# Patient Record
Sex: FEMALE | Race: BLACK OR AFRICAN AMERICAN | NOT HISPANIC OR LATINO | Employment: OTHER | URBAN - METROPOLITAN AREA
[De-identification: names, ages, dates, MRNs, and addresses within clinical notes are randomized per-mention and may not be internally consistent; named-entity substitution may affect disease eponyms.]

---

## 2019-05-08 ENCOUNTER — OFFICE VISIT (OUTPATIENT)
Dept: URGENT CARE | Facility: CLINIC | Age: 64
End: 2019-05-08
Payer: COMMERCIAL

## 2019-05-08 VITALS
HEART RATE: 95 BPM | WEIGHT: 210 LBS | RESPIRATION RATE: 16 BRPM | SYSTOLIC BLOOD PRESSURE: 125 MMHG | BODY MASS INDEX: 32.96 KG/M2 | TEMPERATURE: 99 F | DIASTOLIC BLOOD PRESSURE: 66 MMHG | OXYGEN SATURATION: 96 % | HEIGHT: 67 IN

## 2019-05-08 DIAGNOSIS — K59.00 CONSTIPATION, UNSPECIFIED CONSTIPATION TYPE: ICD-10-CM

## 2019-05-08 DIAGNOSIS — N39.0 URINARY TRACT INFECTION WITHOUT HEMATURIA, SITE UNSPECIFIED: ICD-10-CM

## 2019-05-08 DIAGNOSIS — R10.9 FLANK PAIN: Primary | ICD-10-CM

## 2019-05-08 DIAGNOSIS — M62.830 MUSCLE SPASM OF BACK: ICD-10-CM

## 2019-05-08 LAB
BILIRUB UR QL STRIP: POSITIVE
GLUCOSE UR QL STRIP: POSITIVE
KETONES UR QL STRIP: POSITIVE
LEUKOCYTE ESTERASE UR QL STRIP: POSITIVE
PH, POC UA: 7.5 (ref 5–8)
POC BLOOD, URINE: NEGATIVE
POC NITRATES, URINE: POSITIVE
PROT UR QL STRIP: POSITIVE
SP GR UR STRIP: 1.01 (ref 1–1.03)
UROBILINOGEN UR STRIP-ACNC: POSITIVE (ref 0.1–1.1)

## 2019-05-08 PROCEDURE — 99214 PR OFFICE/OUTPT VISIT, EST, LEVL IV, 30-39 MIN: ICD-10-PCS | Mod: 25,S$GLB,, | Performed by: EMERGENCY MEDICINE

## 2019-05-08 PROCEDURE — 81003 POCT URINALYSIS, DIPSTICK, AUTOMATED, W/O SCOPE: ICD-10-PCS | Mod: QW,S$GLB,, | Performed by: EMERGENCY MEDICINE

## 2019-05-08 PROCEDURE — 99214 OFFICE O/P EST MOD 30 MIN: CPT | Mod: 25,S$GLB,, | Performed by: EMERGENCY MEDICINE

## 2019-05-08 PROCEDURE — 81003 URINALYSIS AUTO W/O SCOPE: CPT | Mod: QW,S$GLB,, | Performed by: EMERGENCY MEDICINE

## 2019-05-08 RX ORDER — ALPRAZOLAM 1 MG/1
TABLET ORAL
Refills: 3 | COMMUNITY
Start: 2019-04-07 | End: 2021-06-08

## 2019-05-08 RX ORDER — SITAGLIPTIN 100 MG/1
TABLET, FILM COATED ORAL
Refills: 3 | COMMUNITY
Start: 2019-04-15 | End: 2019-08-14

## 2019-05-08 RX ORDER — LOSARTAN POTASSIUM AND HYDROCHLOROTHIAZIDE 25; 100 MG/1; MG/1
TABLET ORAL
Refills: 3 | COMMUNITY
Start: 2019-05-01 | End: 2021-05-17 | Stop reason: SDUPTHER

## 2019-05-08 RX ORDER — ATORVASTATIN CALCIUM 40 MG/1
TABLET, FILM COATED ORAL
Refills: 3 | COMMUNITY
Start: 2019-05-01 | End: 2019-08-14

## 2019-05-08 RX ORDER — CIPROFLOXACIN 500 MG/1
500 TABLET ORAL EVERY 12 HOURS
Qty: 14 TABLET | Refills: 0 | Status: SHIPPED | OUTPATIENT
Start: 2019-05-08 | End: 2019-05-15

## 2019-05-08 RX ORDER — CYCLOBENZAPRINE HCL 5 MG
5 TABLET ORAL 3 TIMES DAILY PRN
Qty: 15 TABLET | Refills: 0 | Status: SHIPPED | OUTPATIENT
Start: 2019-05-08 | End: 2019-05-18

## 2019-05-08 RX ORDER — DULAGLUTIDE 1.5 MG/.5ML
INJECTION, SOLUTION SUBCUTANEOUS
Refills: 11 | COMMUNITY
Start: 2019-04-29 | End: 2021-04-06 | Stop reason: SDUPTHER

## 2019-05-08 RX ORDER — PAROXETINE HYDROCHLORIDE 20 MG/1
TABLET, FILM COATED ORAL
Refills: 3 | COMMUNITY
Start: 2019-04-15 | End: 2021-06-08

## 2019-05-08 RX ORDER — DICYCLOMINE HYDROCHLORIDE 20 MG/1
20 TABLET ORAL 3 TIMES DAILY
Qty: 21 TABLET | Refills: 0 | Status: SHIPPED | OUTPATIENT
Start: 2019-05-08 | End: 2019-05-15

## 2019-05-08 NOTE — PATIENT INSTRUCTIONS
Ciprofloxacin prescription.  Seven day course  Bentyl prescription for abdominal pain and cramping  Flexeril prescription for muscle spasm of the back  Important to keep the stool soft and regular.  1.  Drink plenty of fluids and take in plenty of greens and fiber  2.  MiraLax 1 big scoop full twice daily until having 1-2 loose stools per day  3.  Dulcolax suppository as needed to relieve lower constipation or blockage by stool    It is very important to go to the emergency department if you are having worsening pain despite treatment, fevers, persistent nausea or vomiting, blood in the stool, black tarry stools, severe pain that persists, inability to have a bowel movement, inability to pas gas, or if not improved with the above regimen.    Be sure to follow up with her primary care physician  Return for urgent care needs.         Back Spasm (No Trauma)    Spasm of the back muscles can occur after a sudden forceful twisting or bending force (such as in a car accident), after a simple awkward movement, or after lifting something heavy with poor body positioning. In any case, muscle spasm adds to the pain. Sleeping in an awkward position or on a poor quality mattress can also cause this. Some people respond to emotional stress by tensing the muscles of their back.  Pain that continues may need further evaluation or other types of treatment such as physical therapy.  You don't always need X-rays for the initial evaluation of back pain, unless you had a physical injury such as from a car accident or fall. If your pain continues and doesn't respond to medical treatment, X-rays and other tests may then be done.   Home care  · As soon as possible, start sitting or walking again to avoid problems from prolonged bed rest (muscle weakness, worsening back stiffness and pain, blood clots in the legs).  · When in bed, try to find a position of comfort. A firm mattress is best. Try lying flat on your back with pillows under your  knees. You can also try lying on your side with your knees bent up toward your chest and a pillow between your knees.  · Avoid prolonged sitting, long car rides, or travel. This puts more stress on the lower back than standing or walking.   · During the first 24 to 72 hours after an injury or flare-up, apply an ice pack to the painful area for 20 minutes, then remove it for 20 minutes. Do this over a period of 60 to 90 minutes or several times a day. This will reduce swelling and pain. Always wrap ice packs in a thin towel.  · You can start with ice, then switch to heat. Heat (hot shower, hot bath, or heating pad) reduces pain, and works well for muscle spasms. Apply heat to the painful area for 20 minutes, then remove it for 20 minutes. Do this over a period of 60 to 90 minutes or several times a day. Do not sleep on a heating pad as it can burn or damage skin.  · Alternate ice and heat therapies.  · Be aware of safe lifting methods and do not lift anything over 15 pounds until all the pain is gone.  Gentle stretching will help your back heal faster. Do this simple routine 2 to 3 times a day until your back is feeling better.  · Lie on your back with your knees bent and both feet on the ground  · Slowly raise your left knee to your chest as you flatten your lower back against the floor. Hold for 20 to 30 seconds.  · Relax and repeat the exercise with your right knee.  · Do 2 to 3 of these exercises for each leg.  · Repeat, hugging both knees to your chest at the same time.  · Do not bounce, but use a gentle pull.  Medicines  Talk to your doctor before using medicine, especially if you have other medical problems or are taking other medicines.  You may use acetaminophen or ibuprofen to control pain, unless your healthcare provider prescribed another pain medicine. If you have a chronic condition such as diabetes, liver or kidney disease, stomach ulcer, or gastrointestinal bleeding, or are taking blood thinners, talk  with your healthcare provider before taking any medicines.  Be careful if you are given prescription pain medicine, narcotics, or medicine for muscle spasm. They can cause drowsiness, affect your coordination, reflexes, or judgment. Do not drive or operate heavy machinery when taking these medicines. Take pain medicine only as prescribed by your healthcare provider.  Follow-up care  Follow up with your doctor, or as advised. Physical therapy or further tests may be needed.  If X-rays were taken, they may be reviewed by a radiologist. You will be notified of any new findings that may affect your care.  Call 911  Seek emergency medical care if any of these occur:  · Trouble breathing  · Confusion  · Drowsiness or trouble awakening  · Fainting or loss of consciousness  · Rapid or very slow heart rate  · Loss of bowel or bladder control  When to seek medical advice  Call your healthcare provider right away if any of these occur:  · Pain becomes worse or spreads to your legs  · Weakness or numbness in one or both legs  · Numbness in the groin or genital area  · Unexplained fever over 100.4ºF (38.0ºC)  · Burning or pain when passing urine  Date Last Reviewed: 6/1/2016  © 1141-1648 [employer]. [address] [place] [address]. All rights reserved. This information is not intended as a substitute for professional medical care. Always follow your healthcare professional's instructions.        Constipation (Adult)  Constipation means that you have bowel movements that are less frequent than usual. Stools often become very hard and difficult to pass.  Constipation is very common. At some point in life it affects almost everyone. Since everyone's bowel habits are different, what is constipation to one person may not be to another. Your healthcare provider may do tests to diagnose constipation. It depends on what he or she finds when evaluating you.    Symptoms of constipation include:  · Abdominal  pain  · Bloating  · Vomiting  · Painful bowel movements  · Itching, swelling, bleeding, or pain around the anus  Causes  Constipation can have many causes. These include:  · Diet low in fiber  · Too much dairy  · Not drinking enough liquids  · Lack of exercise or physical activity. This is especially true for older adults.  · Changes in lifestyle or daily routine, including pregnancy, aging, work, and travel  · Frequent use or misuse of laxatives  · Ignoring the urge to have a bowel movement or delaying it until later  · Medicines, such as certain prescription pain medicines, iron supplements, antacids, certain antidepressants, and calcium supplements  · Diseases like irritable bowel syndrome, bowel obstructions, stroke, diabetes, thyroid disease, Parkinson disease, hemorrhoids, and colon cancer  Complications  Potential complications of constipation can include:  · Hemorrhoids  · Rectal bleeding from hemorrhoids or anal fissures (skin tears)  · Hernias  · Dependency on laxatives  · Chronic constipation  · Fecal impaction  · Bowel obstruction or perforation  Home care  All treatment should be done after talking with your healthcare provider. This is especially true if you have another medical problems, are taking prescription medicines, or are an older adult. Treatment most often involves lifestyle changes. You may also need medicines. Your healthcare provider will tell you which will work best for you. Follow the advice below to help avoid this problem in the future.  Lifestyle changes  These lifestyle changes can help prevent constipation:  · Diet. Eat a high-fiber diet, with fresh fruit and vegetables, and reduce dairy intake, meats, and processed foods  · Fluids. It's important to get enough fluids each day. Drink plenty of water when you eat more fiber. If you are on diet that limits the amount of fluid you can have, talk about this with your healthcare provider.  · Regular exercise. Check with your healthcare  provider first.  Medications  Take any medicines as directed. Some laxatives are safe to use only every now and then. Others can be taken on a regular basis. Talk with your doctor or pharmacist if you have questions.  Prescription pain medicines can cause constipation. If you are taking this kind of medicine, ask your healthcare provider if you should also take a stool softener.  Medicines you may take to treat constipation include:  · Fiber supplements  · Stool softeners  · Laxatives  · Enemas  · Rectal suppositories  Follow-up care  Follow up with your healthcare provider if symptoms don't get better in the next few days. You may need to have more tests or see a specialist.  Call 911  Call 911 if any of these occur:  · Trouble breathing  · Stiff, rigid abdomen that is severely painful to touch  · Confusion  · Fainting or loss of consciousness  · Rapid heart rate  · Chest pain  When to seek medical advice  Call your healthcare provider right away if any of these occur:  · Fever over 100.4°F (38°C)  · Failure to resume normal bowel movements  · Pain in your abdomen or back gets worse  · Nausea or vomiting  · Swelling in your abdomen  · Blood in the stool  · Black, tarry stool  · Involuntary weight loss  · Weakness  Date Last Reviewed: 12/30/2015  © 3451-4834 [employer]. [address] [place] [address], [place] [address]. All rights reserved. This information is not intended as a substitute for professional medical care. Always follow your healthcare professional's instructions.        Flank Pain, Uncertain Cause  The flank is the area between your upper abdomen and your back. Pain there is often caused by a problem with your kidneys. It might be a kidney infection or a kidney stone. Other causes of flank pain include spinal arthritis, a pinched nerve from a back injury, or a back muscle strain or spasm.  The cause of your flank pain is not certain. You may need other tests.  Home care  Follow these tips when  caring for yourself at home:  · You may use acetaminophen or ibuprofen to control pain, unless your health care provider prescribed another medicine. If you have chronic liver or kidney disease, talk with your provider before taking these medicines. Also talk with your provider first if youve ever had a stomach ulcer or GI bleeding.  · If the pain is coming from your muscles, you may get relief with ice or heat. During the first 2 days after the injury, put an ice pack on the painful area for 20 minutes every 2 to 4 hours. This will reduce swelling and pain. A hot shower, hot bath, or heating pad works well for a muscle spasm. You can start with ice, then switch to heat after 2 days. You might find that alternating ice and heat works well. Use the method that feels the best to you.  Follow-up care  Follow up with your healthcare provider if your symptoms dont get better over the next few days.  When to seek medical advice  Call your healthcare provider right away if any of these happen:  · Repeated vomiting  · Fever of 100.4ºF (38ºC) or higher, or as directed by your health care provider  · Flank pain that gets worse  · Pain that spreads to the front of your belly (abdomen)  · Dizziness, weakness, or fainting  · Blood in your urine  · Burning feeling when you urinate or the need to urinate often  · Pain in one of your legs that gets worse  · Numbness or weakness in a leg  Date Last Reviewed: 10/1/2016  © 7667-0202 [employer]. [address] [place] [address]. All rights reserved. This information is not intended as a substitute for professional medical care. Always follow your healthcare professional's instructions.        Urinary Tract Infections in Women    Urinary tract infections (UTIs) are most often caused by bacteria (germs). These bacteria enter the urinary tract. The bacteria may come from outside the body. Or they may travel from the skin outside the rectum or vagina into the  urethra. Female anatomy makes it easy for bacteria from the bowel to enter a womans urinary tract, which is the most common source of UTI. This means women develop UTIs more often than men. Pain in or around the urinary tract is a common UTI symptom. But the only way to know for sure if you have a UTI for the healthcare provider to test your urine. The two tests that may be done are the urinalysis and urine culture.  Types of UTIs  · Cystitis: A bladder infection (cystitis) is the most common UTI in women. You may have urgent or frequent urination. You may also have pain, burning when you urinate, and bloody urine.  · Urethritis: This is an inflamed urethra, which is the tube that carries urine from the bladder to outside the body. You may have lower stomach or back pain. You may also have urgent or frequent urination.  · Pyelonephritis: This is a kidney infection. If not treated, it can be serious and damage your kidneys. In severe cases, you may be hospitalized. You may have a fever and lower back pain.  Medicines to treat a UTI  Most UTIs are treated with antibiotics. These kill the bacteria. The length of time you need to take them depends on the type of infection. It may be as short as 3 days. If you have repeated UTIs, a low-dose antibiotic may be needed for several months. Take antibiotics exactly as directed. Dont stop taking them until all of the medicine is gone. If you stop taking the antibiotic too soon, the infection may not go away, and you may develop a resistance to the antibiotic. This can make it much harder to treat.  Lifestyle changes to treat and prevent UTIs  The lifestyle changes below will help get rid of your UTI. They may also help prevent future UTIs.  · Drink plenty of fluids. This includes water, juice, or other caffeine-free drinks. Fluids help flush bacteria out of your body.  · Empty your bladder. Always empty your bladder when you feel the urge to urinate. And always urinate before  going to sleep. Urine that stays in your bladder can lead to infection. Try to urinate before and after sex as well.  · Practice good personal hygiene. Wipe yourself from front to back after using the toilet. This helps keep bacteria from getting into the urethra.  · Use condoms during sex. These help prevent UTIs caused by sexually transmitted bacteria. Also, avoid using spermicides during sex. These can increase the risk of UTIs. Choose other forms of birth control instead. For women who tend to get UTIs after sex, a low-dose of a preventive antibiotic may be used. Be sure to discuss this option with your healthcare provider.  · Follow up with your healthcare provider as directed. He or she may test to make sure the infection has cleared. If needed, more treatment may be started.  Date Last Reviewed: 1/1/2017  © 7450-6885 [employer] [address] [place] [address]. All rights reserved. This information is not intended as a substitute for professional medical care. Always follow your healthcare professional's instructions.

## 2019-05-08 NOTE — PROGRESS NOTES
"Subjective:       Patient ID: Dee Hamilton is a 63 y.o. female.    Vitals:    05/08/19 1024   BP: 125/66   Pulse: 95   Resp: 16   Temp: 98.6 °F (37 °C)   TempSrc: Oral   SpO2: 96%   Weight: 95.3 kg (210 lb)   Height: 5' 7" (1.702 m)       Chief Complaint: Flank Pain    Pt states Monday onset lower back pain/spasms. Tuesday more frequent urination. Pt states today left flank pain. Patient states that she has had dysuria and frequent urination with incomplete voiding and low volumes.  She states that this was very much improved with the azo that she took however she developed some bloating, left-sided flank pain, back spasms intermittently not associated with the other pains that she is having.  She states that she always suffers with constipation and that recently she has been having trouble getting stool to pass easily.  She states small caliber and even hard small nuggets of stool without a normal bowel movement in quite some time.  She states that she suffers with chronic constipation.  She has not had any melena or bright red blood per rectum or any nausea or vomiting.  She states that she has increased gas however no problems passing it.  She also states that she has a remote history of diverticulitis.  She has no fever and no specific abdominal tenderness only the left side and flank.    Flank Pain   This is a new problem. The current episode started in the past 7 days. The problem occurs intermittently. The problem has been gradually worsening since onset. The pain does not radiate. The pain is at a severity of 9/10. The pain is severe. The pain is the same all the time. The symptoms are aggravated by sitting. Pertinent negatives include no abdominal pain, dysuria or fever. Treatments tried: uristat. The treatment provided mild relief.     Review of Systems   Constitution: Negative for chills and fever.   Skin: Negative for itching.   Musculoskeletal: Negative for back pain.   Gastrointestinal: Negative " for abdominal pain, nausea and vomiting.   Genitourinary: Positive for flank pain, frequency and urgency. Negative for dysuria, genital sores, hematuria, missed menses and non-menstrual bleeding.       Objective:      Physical Exam   Constitutional: She is oriented to person, place, and time. Vital signs are normal. She appears well-developed and well-nourished. She is active and cooperative. No distress.   HENT:   Head: Normocephalic and atraumatic.   Nose: No nasal deformity. No epistaxis.   Mouth/Throat: Mucous membranes are normal.   Eyes: Conjunctivae and lids are normal.   Neck: Trachea normal, normal range of motion, full passive range of motion without pain and phonation normal. Neck supple.   Cardiovascular: Normal rate, regular rhythm, normal heart sounds, intact distal pulses and normal pulses.   Pulmonary/Chest: Effort normal and breath sounds normal.   Abdominal: Soft. Normal appearance and bowel sounds are normal. She exhibits distension (Soft, reportedly bloated, bowel sounds normal). She exhibits no abdominal bruit and no pulsatile midline mass. There is no tenderness. There is no rebound, no guarding and no CVA tenderness.   Patient has no abdominal tenderness however does have left-sided flank pain and some left-sided lumbar paraspinous muscle pain as well.  No particular CVA tenderness.   Musculoskeletal: Normal range of motion. She exhibits no edema or deformity.   Neurological: She is alert and oriented to person, place, and time. She has normal strength and normal reflexes. No sensory deficit.   Skin: Skin is warm, dry and intact. No rash noted. She is not diaphoretic.   Psychiatric: She has a normal mood and affect. Her speech is normal and behavior is normal. Cognition and memory are normal.   Nursing note and vitals reviewed.        Office Visit on 05/08/2019   Component Date Value Ref Range Status    POC Blood, Urine 05/08/2019 Negative  Negative Final    POC Bilirubin, Urine 05/08/2019  Positive* Negative Final    1.0 mg/dL    POC Urobilinogen, Urine 05/08/2019 Positive  0.1 - 1.1 Final    4.0 mg/dL    POC Ketones, Urine 05/08/2019 Positive* Negative Final    5 mg/dL    POC Protein, Urine 05/08/2019 Positive* Negative Final    10 mg/dL    POC Nitrates, Urine 05/08/2019 Positive* Negative Final    POC Glucose, Urine 05/08/2019 Positive* Negative Final    100 mg/dL    pH, UA 05/08/2019 7.5  5 - 8 Final    POC Specific Gravity, Urine 05/08/2019 1.015  1.003 - 1.029 Final    POC Leukocytes, Urine 05/08/2019 Positive* Negative Final    25 WBC/uL     UA contaminated from taking azo however with very symptomatic in typical symptoms of a UTI prior to developing the flank pain, will cover with ciprofloxacin for 1 week, concomitant only treating constipation and abdominal bloating.  The patient has been given very strict ER precautions due to the fact that she has many things contributing to her flank pain at this time.  See discharge instructions for particular ER precautions and she does have a primary care physician to follow up with.    Assessment:       1. Flank pain    2. Urinary tract infection without hematuria, site unspecified    3. Constipation, unspecified constipation type    4. Muscle spasm of back        Plan:       Dee was seen today for flank pain.    Diagnoses and all orders for this visit:    Flank pain  -     POCT Urinalysis, Dipstick, Automated, W/O Scope    Urinary tract infection without hematuria, site unspecified    Constipation, unspecified constipation type    Muscle spasm of back    Other orders  -     ciprofloxacin HCl (CIPRO) 500 MG tablet; Take 1 tablet (500 mg total) by mouth every 12 (twelve) hours. for 7 days  -     cyclobenzaprine (FLEXERIL) 5 MG tablet; Take 1 tablet (5 mg total) by mouth 3 (three) times daily as needed for Muscle spasms.  -     dicyclomine (BENTYL) 20 mg tablet; Take 1 tablet (20 mg total) by mouth 3 (three) times daily. for 7 days           Patient Instructions   Ciprofloxacin prescription.  Seven day course  Bentyl prescription for abdominal pain and cramping  Flexeril prescription for muscle spasm of the back  Important to keep the stool soft and regular.  1.  Drink plenty of fluids and take in plenty of greens and fiber  2.  MiraLax 1 big scoop full twice daily until having 1-2 loose stools per day  3.  Dulcolax suppository as needed to relieve lower constipation or blockage by stool    It is very important to go to the emergency department if you are having worsening pain despite treatment, fevers, persistent nausea or vomiting, blood in the stool, black tarry stools, severe pain that persists, inability to have a bowel movement, inability to pas gas, or if not improved with the above regimen.    Be sure to follow up with her primary care physician  Return for urgent care needs.         Back Spasm (No Trauma)    Spasm of the back muscles can occur after a sudden forceful twisting or bending force (such as in a car accident), after a simple awkward movement, or after lifting something heavy with poor body positioning. In any case, muscle spasm adds to the pain. Sleeping in an awkward position or on a poor quality mattress can also cause this. Some people respond to emotional stress by tensing the muscles of their back.  Pain that continues may need further evaluation or other types of treatment such as physical therapy.  You don't always need X-rays for the initial evaluation of back pain, unless you had a physical injury such as from a car accident or fall. If your pain continues and doesn't respond to medical treatment, X-rays and other tests may then be done.   Home care  · As soon as possible, start sitting or walking again to avoid problems from prolonged bed rest (muscle weakness, worsening back stiffness and pain, blood clots in the legs).  · When in bed, try to find a position of comfort. A firm mattress is best. Try lying flat on your  back with pillows under your knees. You can also try lying on your side with your knees bent up toward your chest and a pillow between your knees.  · Avoid prolonged sitting, long car rides, or travel. This puts more stress on the lower back than standing or walking.   · During the first 24 to 72 hours after an injury or flare-up, apply an ice pack to the painful area for 20 minutes, then remove it for 20 minutes. Do this over a period of 60 to 90 minutes or several times a day. This will reduce swelling and pain. Always wrap ice packs in a thin towel.  · You can start with ice, then switch to heat. Heat (hot shower, hot bath, or heating pad) reduces pain, and works well for muscle spasms. Apply heat to the painful area for 20 minutes, then remove it for 20 minutes. Do this over a period of 60 to 90 minutes or several times a day. Do not sleep on a heating pad as it can burn or damage skin.  · Alternate ice and heat therapies.  · Be aware of safe lifting methods and do not lift anything over 15 pounds until all the pain is gone.  Gentle stretching will help your back heal faster. Do this simple routine 2 to 3 times a day until your back is feeling better.  · Lie on your back with your knees bent and both feet on the ground  · Slowly raise your left knee to your chest as you flatten your lower back against the floor. Hold for 20 to 30 seconds.  · Relax and repeat the exercise with your right knee.  · Do 2 to 3 of these exercises for each leg.  · Repeat, hugging both knees to your chest at the same time.  · Do not bounce, but use a gentle pull.  Medicines  Talk to your doctor before using medicine, especially if you have other medical problems or are taking other medicines.  You may use acetaminophen or ibuprofen to control pain, unless your healthcare provider prescribed another pain medicine. If you have a chronic condition such as diabetes, liver or kidney disease, stomach ulcer, or gastrointestinal bleeding, or  are taking blood thinners, talk with your healthcare provider before taking any medicines.  Be careful if you are given prescription pain medicine, narcotics, or medicine for muscle spasm. They can cause drowsiness, affect your coordination, reflexes, or judgment. Do not drive or operate heavy machinery when taking these medicines. Take pain medicine only as prescribed by your healthcare provider.  Follow-up care  Follow up with your doctor, or as advised. Physical therapy or further tests may be needed.  If X-rays were taken, they may be reviewed by a radiologist. You will be notified of any new findings that may affect your care.  Call 911  Seek emergency medical care if any of these occur:  · Trouble breathing  · Confusion  · Drowsiness or trouble awakening  · Fainting or loss of consciousness  · Rapid or very slow heart rate  · Loss of bowel or bladder control  When to seek medical advice  Call your healthcare provider right away if any of these occur:  · Pain becomes worse or spreads to your legs  · Weakness or numbness in one or both legs  · Numbness in the groin or genital area  · Unexplained fever over 100.4ºF (38.0ºC)  · Burning or pain when passing urine  Date Last Reviewed: 6/1/2016  © 6763-8115 [employer]. [address] [place] [address], [place] [address]. All rights reserved. This information is not intended as a substitute for professional medical care. Always follow your healthcare professional's instructions.        Constipation (Adult)  Constipation means that you have bowel movements that are less frequent than usual. Stools often become very hard and difficult to pass.  Constipation is very common. At some point in life it affects almost everyone. Since everyone's bowel habits are different, what is constipation to one person may not be to another. Your healthcare provider may do tests to diagnose constipation. It depends on what he or she finds when evaluating you.    Symptoms of  constipation include:  · Abdominal pain  · Bloating  · Vomiting  · Painful bowel movements  · Itching, swelling, bleeding, or pain around the anus  Causes  Constipation can have many causes. These include:  · Diet low in fiber  · Too much dairy  · Not drinking enough liquids  · Lack of exercise or physical activity. This is especially true for older adults.  · Changes in lifestyle or daily routine, including pregnancy, aging, work, and travel  · Frequent use or misuse of laxatives  · Ignoring the urge to have a bowel movement or delaying it until later  · Medicines, such as certain prescription pain medicines, iron supplements, antacids, certain antidepressants, and calcium supplements  · Diseases like irritable bowel syndrome, bowel obstructions, stroke, diabetes, thyroid disease, Parkinson disease, hemorrhoids, and colon cancer  Complications  Potential complications of constipation can include:  · Hemorrhoids  · Rectal bleeding from hemorrhoids or anal fissures (skin tears)  · Hernias  · Dependency on laxatives  · Chronic constipation  · Fecal impaction  · Bowel obstruction or perforation  Home care  All treatment should be done after talking with your healthcare provider. This is especially true if you have another medical problems, are taking prescription medicines, or are an older adult. Treatment most often involves lifestyle changes. You may also need medicines. Your healthcare provider will tell you which will work best for you. Follow the advice below to help avoid this problem in the future.  Lifestyle changes  These lifestyle changes can help prevent constipation:  · Diet. Eat a high-fiber diet, with fresh fruit and vegetables, and reduce dairy intake, meats, and processed foods  · Fluids. It's important to get enough fluids each day. Drink plenty of water when you eat more fiber. If you are on diet that limits the amount of fluid you can have, talk about this with your healthcare provider.  · Regular  exercise. Check with your healthcare provider first.  Medications  Take any medicines as directed. Some laxatives are safe to use only every now and then. Others can be taken on a regular basis. Talk with your doctor or pharmacist if you have questions.  Prescription pain medicines can cause constipation. If you are taking this kind of medicine, ask your healthcare provider if you should also take a stool softener.  Medicines you may take to treat constipation include:  · Fiber supplements  · Stool softeners  · Laxatives  · Enemas  · Rectal suppositories  Follow-up care  Follow up with your healthcare provider if symptoms don't get better in the next few days. You may need to have more tests or see a specialist.  Call 911  Call 911 if any of these occur:  · Trouble breathing  · Stiff, rigid abdomen that is severely painful to touch  · Confusion  · Fainting or loss of consciousness  · Rapid heart rate  · Chest pain  When to seek medical advice  Call your healthcare provider right away if any of these occur:  · Fever over 100.4°F (38°C)  · Failure to resume normal bowel movements  · Pain in your abdomen or back gets worse  · Nausea or vomiting  · Swelling in your abdomen  · Blood in the stool  · Black, tarry stool  · Involuntary weight loss  · Weakness  Date Last Reviewed: 12/30/2015  © 9988-7030 [employer]. [address] [place] [address], [place] [address]. All rights reserved. This information is not intended as a substitute for professional medical care. Always follow your healthcare professional's instructions.        Flank Pain, Uncertain Cause  The flank is the area between your upper abdomen and your back. Pain there is often caused by a problem with your kidneys. It might be a kidney infection or a kidney stone. Other causes of flank pain include spinal arthritis, a pinched nerve from a back injury, or a back muscle strain or spasm.  The cause of your flank pain is not certain. You may need other  tests.  Home care  Follow these tips when caring for yourself at home:  · You may use acetaminophen or ibuprofen to control pain, unless your health care provider prescribed another medicine. If you have chronic liver or kidney disease, talk with your provider before taking these medicines. Also talk with your provider first if youve ever had a stomach ulcer or GI bleeding.  · If the pain is coming from your muscles, you may get relief with ice or heat. During the first 2 days after the injury, put an ice pack on the painful area for 20 minutes every 2 to 4 hours. This will reduce swelling and pain. A hot shower, hot bath, or heating pad works well for a muscle spasm. You can start with ice, then switch to heat after 2 days. You might find that alternating ice and heat works well. Use the method that feels the best to you.  Follow-up care  Follow up with your healthcare provider if your symptoms dont get better over the next few days.  When to seek medical advice  Call your healthcare provider right away if any of these happen:  · Repeated vomiting  · Fever of 100.4ºF (38ºC) or higher, or as directed by your health care provider  · Flank pain that gets worse  · Pain that spreads to the front of your belly (abdomen)  · Dizziness, weakness, or fainting  · Blood in your urine  · Burning feeling when you urinate or the need to urinate often  · Pain in one of your legs that gets worse  · Numbness or weakness in a leg  Date Last Reviewed: 10/1/2016  © 0523-0663 [employer]. [address] [place] [address], [place] [address]. All rights reserved. This information is not intended as a substitute for professional medical care. Always follow your healthcare professional's instructions.        Urinary Tract Infections in Women    Urinary tract infections (UTIs) are most often caused by bacteria (germs). These bacteria enter the urinary tract. The bacteria may come from outside the body. Or they may travel from the skin  outside the rectum or vagina into the urethra. Female anatomy makes it easy for bacteria from the bowel to enter a womans urinary tract, which is the most common source of UTI. This means women develop UTIs more often than men. Pain in or around the urinary tract is a common UTI symptom. But the only way to know for sure if you have a UTI for the healthcare provider to test your urine. The two tests that may be done are the urinalysis and urine culture.  Types of UTIs  · Cystitis: A bladder infection (cystitis) is the most common UTI in women. You may have urgent or frequent urination. You may also have pain, burning when you urinate, and bloody urine.  · Urethritis: This is an inflamed urethra, which is the tube that carries urine from the bladder to outside the body. You may have lower stomach or back pain. You may also have urgent or frequent urination.  · Pyelonephritis: This is a kidney infection. If not treated, it can be serious and damage your kidneys. In severe cases, you may be hospitalized. You may have a fever and lower back pain.  Medicines to treat a UTI  Most UTIs are treated with antibiotics. These kill the bacteria. The length of time you need to take them depends on the type of infection. It may be as short as 3 days. If you have repeated UTIs, a low-dose antibiotic may be needed for several months. Take antibiotics exactly as directed. Dont stop taking them until all of the medicine is gone. If you stop taking the antibiotic too soon, the infection may not go away, and you may develop a resistance to the antibiotic. This can make it much harder to treat.  Lifestyle changes to treat and prevent UTIs  The lifestyle changes below will help get rid of your UTI. They may also help prevent future UTIs.  · Drink plenty of fluids. This includes water, juice, or other caffeine-free drinks. Fluids help flush bacteria out of your body.  · Empty your bladder. Always empty your bladder when you feel the urge  to urinate. And always urinate before going to sleep. Urine that stays in your bladder can lead to infection. Try to urinate before and after sex as well.  · Practice good personal hygiene. Wipe yourself from front to back after using the toilet. This helps keep bacteria from getting into the urethra.  · Use condoms during sex. These help prevent UTIs caused by sexually transmitted bacteria. Also, avoid using spermicides during sex. These can increase the risk of UTIs. Choose other forms of birth control instead. For women who tend to get UTIs after sex, a low-dose of a preventive antibiotic may be used. Be sure to discuss this option with your healthcare provider.  · Follow up with your healthcare provider as directed. He or she may test to make sure the infection has cleared. If needed, more treatment may be started.  Date Last Reviewed: 1/1/2017  © 6543-8498 [employer]. [address] [place] [address], [place] [address]. All rights reserved. This information is not intended as a substitute for professional medical care. Always follow your healthcare professional's instructions.

## 2019-08-14 ENCOUNTER — HOSPITAL ENCOUNTER (EMERGENCY)
Facility: OTHER | Age: 64
Discharge: HOME | End: 2019-08-14
Attending: EMERGENCY MEDICINE
Payer: MEDICARE

## 2019-08-14 VITALS
HEIGHT: 67 IN | WEIGHT: 200 LBS | SYSTOLIC BLOOD PRESSURE: 121 MMHG | BODY MASS INDEX: 31.39 KG/M2 | HEART RATE: 74 BPM | TEMPERATURE: 98 F | OXYGEN SATURATION: 95 % | RESPIRATION RATE: 18 BRPM | DIASTOLIC BLOOD PRESSURE: 60 MMHG

## 2019-08-14 DIAGNOSIS — R10.9 ABDOMINAL PAIN, RIGHT LATERAL: ICD-10-CM

## 2019-08-14 DIAGNOSIS — E83.52 HYPERCALCEMIA: ICD-10-CM

## 2019-08-14 DIAGNOSIS — R19.7 DIARRHEA, UNSPECIFIED TYPE: Primary | ICD-10-CM

## 2019-08-14 LAB
ALBUMIN SERPL BCP-MCNC: 3.9 G/DL (ref 3.5–5.2)
ALP SERPL-CCNC: 105 U/L (ref 55–135)
ALT SERPL W/O P-5'-P-CCNC: 50 U/L (ref 10–44)
ANION GAP SERPL CALC-SCNC: 12 MMOL/L (ref 8–16)
ANION GAP SERPL CALC-SCNC: 13 MMOL/L (ref 8–16)
AST SERPL-CCNC: 140 U/L (ref 10–40)
BACTERIA #/AREA URNS HPF: NORMAL /HPF
BASOPHILS # BLD AUTO: 0.04 K/UL (ref 0–0.2)
BASOPHILS NFR BLD: 0.6 % (ref 0–1.9)
BILIRUB SERPL-MCNC: 0.5 MG/DL (ref 0.1–1)
BILIRUB UR QL STRIP: NEGATIVE
BUN SERPL-MCNC: 8 MG/DL (ref 8–23)
BUN SERPL-MCNC: 9 MG/DL (ref 8–23)
CALCIUM SERPL-MCNC: 11.1 MG/DL (ref 8.7–10.5)
CALCIUM SERPL-MCNC: 12.2 MG/DL (ref 8.7–10.5)
CHLORIDE SERPL-SCNC: 102 MMOL/L (ref 95–110)
CHLORIDE SERPL-SCNC: 98 MMOL/L (ref 95–110)
CLARITY UR: CLEAR
CO2 SERPL-SCNC: 23 MMOL/L (ref 23–29)
CO2 SERPL-SCNC: 26 MMOL/L (ref 23–29)
COLOR UR: YELLOW
CREAT SERPL-MCNC: 0.6 MG/DL (ref 0.5–1.4)
CREAT SERPL-MCNC: 0.8 MG/DL (ref 0.5–1.4)
DIFFERENTIAL METHOD: ABNORMAL
EOSINOPHIL # BLD AUTO: 0.1 K/UL (ref 0–0.5)
EOSINOPHIL NFR BLD: 0.8 % (ref 0–8)
ERYTHROCYTE [DISTWIDTH] IN BLOOD BY AUTOMATED COUNT: 15.2 % (ref 11.5–14.5)
EST. GFR  (AFRICAN AMERICAN): >60 ML/MIN/1.73 M^2
EST. GFR  (AFRICAN AMERICAN): >60 ML/MIN/1.73 M^2
EST. GFR  (NON AFRICAN AMERICAN): >60 ML/MIN/1.73 M^2
EST. GFR  (NON AFRICAN AMERICAN): >60 ML/MIN/1.73 M^2
GLUCOSE SERPL-MCNC: 109 MG/DL (ref 70–110)
GLUCOSE SERPL-MCNC: 265 MG/DL (ref 70–110)
GLUCOSE UR QL STRIP: ABNORMAL
HCT VFR BLD AUTO: 38.5 % (ref 37–48.5)
HGB BLD-MCNC: 12.2 G/DL (ref 12–16)
HGB UR QL STRIP: NEGATIVE
HYALINE CASTS #/AREA URNS LPF: 1 /LPF
IMM GRANULOCYTES # BLD AUTO: 0.02 K/UL (ref 0–0.04)
IMM GRANULOCYTES NFR BLD AUTO: 0.3 % (ref 0–0.5)
KETONES UR QL STRIP: NEGATIVE
LEUKOCYTE ESTERASE UR QL STRIP: NEGATIVE
LYMPHOCYTES # BLD AUTO: 2.7 K/UL (ref 1–4.8)
LYMPHOCYTES NFR BLD: 37.4 % (ref 18–48)
MAGNESIUM SERPL-MCNC: 1.7 MG/DL (ref 1.6–2.6)
MCH RBC QN AUTO: 25.5 PG (ref 27–31)
MCHC RBC AUTO-ENTMCNC: 31.7 G/DL (ref 32–36)
MCV RBC AUTO: 81 FL (ref 82–98)
MICROSCOPIC COMMENT: NORMAL
MONOCYTES # BLD AUTO: 0.4 K/UL (ref 0.3–1)
MONOCYTES NFR BLD: 5 % (ref 4–15)
NEUTROPHILS # BLD AUTO: 4.1 K/UL (ref 1.8–7.7)
NEUTROPHILS NFR BLD: 55.9 % (ref 38–73)
NITRITE UR QL STRIP: NEGATIVE
NON-SQ EPI CELLS #/AREA URNS HPF: NORMAL /HPF
NRBC BLD-RTO: 0 /100 WBC
PH UR STRIP: 7 [PH] (ref 5–8)
PHOSPHATE SERPL-MCNC: 3.5 MG/DL (ref 2.7–4.5)
PLATELET # BLD AUTO: 346 K/UL (ref 150–350)
PMV BLD AUTO: 10.3 FL (ref 9.2–12.9)
POTASSIUM SERPL-SCNC: 3.8 MMOL/L (ref 3.5–5.1)
POTASSIUM SERPL-SCNC: 4.2 MMOL/L (ref 3.5–5.1)
PROT SERPL-MCNC: 7.6 G/DL (ref 6–8.4)
PROT UR QL STRIP: NEGATIVE
RBC # BLD AUTO: 4.78 M/UL (ref 4–5.4)
SODIUM SERPL-SCNC: 137 MMOL/L (ref 136–145)
SODIUM SERPL-SCNC: 137 MMOL/L (ref 136–145)
SP GR UR STRIP: <=1.005 (ref 1–1.03)
SQUAMOUS #/AREA URNS HPF: 5 /HPF
URN SPEC COLLECT METH UR: ABNORMAL
UROBILINOGEN UR STRIP-ACNC: NEGATIVE EU/DL
WBC # BLD AUTO: 7.24 K/UL (ref 3.9–12.7)
WBC #/AREA URNS HPF: 2 /HPF (ref 0–5)
YEAST URNS QL MICRO: NORMAL

## 2019-08-14 PROCEDURE — 96374 THER/PROPH/DIAG INJ IV PUSH: CPT

## 2019-08-14 PROCEDURE — 25500020 PHARM REV CODE 255: Performed by: EMERGENCY MEDICINE

## 2019-08-14 PROCEDURE — 84100 ASSAY OF PHOSPHORUS: CPT

## 2019-08-14 PROCEDURE — 63600175 PHARM REV CODE 636 W HCPCS: Performed by: EMERGENCY MEDICINE

## 2019-08-14 PROCEDURE — 85025 COMPLETE CBC W/AUTO DIFF WBC: CPT

## 2019-08-14 PROCEDURE — 83735 ASSAY OF MAGNESIUM: CPT

## 2019-08-14 PROCEDURE — 80048 BASIC METABOLIC PNL TOTAL CA: CPT

## 2019-08-14 PROCEDURE — 99285 EMERGENCY DEPT VISIT HI MDM: CPT | Mod: 25

## 2019-08-14 PROCEDURE — 36415 COLL VENOUS BLD VENIPUNCTURE: CPT

## 2019-08-14 PROCEDURE — 81000 URINALYSIS NONAUTO W/SCOPE: CPT

## 2019-08-14 PROCEDURE — 80053 COMPREHEN METABOLIC PANEL: CPT

## 2019-08-14 PROCEDURE — 96361 HYDRATE IV INFUSION ADD-ON: CPT

## 2019-08-14 RX ORDER — INSULIN GLARGINE 100 [IU]/ML
45 INJECTION, SOLUTION SUBCUTANEOUS
COMMUNITY
End: 2021-06-08

## 2019-08-14 RX ORDER — ALBUTEROL SULFATE 90 UG/1
AEROSOL, METERED RESPIRATORY (INHALATION)
COMMUNITY
Start: 2018-05-08 | End: 2021-06-08

## 2019-08-14 RX ORDER — LOSARTAN POTASSIUM AND HYDROCHLOROTHIAZIDE 25; 100 MG/1; MG/1
1 TABLET ORAL DAILY
COMMUNITY
End: 2019-08-14

## 2019-08-14 RX ORDER — HYDROMORPHONE HYDROCHLORIDE 1 MG/ML
1 INJECTION, SOLUTION INTRAMUSCULAR; INTRAVENOUS; SUBCUTANEOUS
Status: COMPLETED | OUTPATIENT
Start: 2019-08-14 | End: 2019-08-14

## 2019-08-14 RX ADMIN — HYDROMORPHONE HYDROCHLORIDE 1 MG: 1 INJECTION, SOLUTION INTRAMUSCULAR; INTRAVENOUS; SUBCUTANEOUS at 12:08

## 2019-08-14 RX ADMIN — IOHEXOL 100 ML: 350 INJECTION, SOLUTION INTRAVENOUS at 01:08

## 2019-08-14 RX ADMIN — SODIUM CHLORIDE 1000 ML: 0.9 INJECTION, SOLUTION INTRAVENOUS at 01:08

## 2019-08-14 NOTE — ED NOTES
"ROUNDING:  Lying on the stretcher with HOB elevated, AAOx4, States pain 6/10 on pain scale. Pt states, "Pain is getting a little better, Denies any other discomfort at this time, Skin is warm and dry, Resp. even and unlabored, Comfort and BR needs addressed, Plan of care updated, NADN, Automatic BP cuff and Pulse ox in place, Bed locked in low position, side rails up x2 and call light within reach, Will continue to monitor.  "

## 2019-08-14 NOTE — ED PROVIDER NOTES
"Encounter Date: 8/14/2019    SCRIBE #1 NOTE: I, Milesanne Cadet, am scribing for, and in the presence of, Dr. Luna.       History     Chief Complaint   Patient presents with    Abdominal Pain     R sided ABD cramping, without nausea vomiting x this AM, + reports of gas, also c/o rash to abd. Had an episode of diarrhea this AM.      Time seen by provider: 11:54 AM    This is a 64 y.o. female, with a history of DM, HTN, and HLD, who presents with complaint of constant right-sided abdominal pain. Pain is described as a stabbing sensation that began this morning upon waking up. She reports it is 10/10 at its worst and is currently "an 8 or 9". She also reports abdominal bloating, and three episodes of diarrhea this morning. She reports her abdominal pain worsened after eating grits this morning. She reports taking TUMS for the abdominal pain but had no relief.  She also reports that the abdominal pain occasionally radiated to her right ankle.  The patient denies recent injury to her leg. She also present with cough and sneezing which has been chronic since June. The patient denies chest pain, shortness of breath, vaginal bleeding, vaginal discharge, constipation, blood in her stool, fever, chills, nausea, vomiting, or urinary symptoms. The patient reports surgical history of cholecystectomy. She denies medical history of MI, heart disease, stroke, or cancer. She reports having a colonoscopy in June. She reports quitting tobacco use and denies alcohol or drug use.    The history is provided by the patient.     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     Past Surgical History:   Procedure Laterality Date    BLADDER SUSPENSION      CHOLECYSTECTOMY      EXTRACTION-CATARACT-IOL Right 2016    Performed [facility]ccurdy [facility] at [facility]    EXTRACTION-CATARACT-IOL Left 2016    Performed [facility] at [facility]    Glaucoma Laser Sx ou      vaginal mesh       Family History   Problem Relation Age of Onset    Diabetes Sister     Cataracts Mother     Hypertension Mother     Cancer Father 68        Jaw Bone    Cataracts Father     Amblyopia Neg Hx     Blindness Neg Hx     Glaucoma Neg Hx     Macular degeneration Neg Hx     Retinal detachment Neg Hx     Strabismus Neg Hx     Stroke Neg Hx     Thyroid disease Neg Hx      Social History     Tobacco Use    Smoking status: Former Smoker     Last attempt to quit: 2007     Years since quittin.9    Smokeless tobacco: Never Used   Substance Use Topics    Alcohol use: Yes     Comment: social     Drug use: No     Review of Systems   Constitutional: Negative for chills and fever.   HENT: Positive for sneezing. Negative for congestion and sore throat.    Eyes: Negative for visual disturbance.   Respiratory: Positive for cough. Negative for shortness of breath.    Cardiovascular: Negative for chest pain and palpitations.   Gastrointestinal: Positive for abdominal distention, abdominal pain and diarrhea. Negative for blood in stool, constipation, nausea and vomiting.   Genitourinary: Negative for decreased urine volume, dysuria, frequency, hematuria, urgency, vaginal bleeding and vaginal discharge.   Musculoskeletal: Positive for arthralgias (Right ankle. Right leg.). Negative for joint swelling, neck pain and neck stiffness.   Skin: Negative for rash and wound.   Neurological: Negative for weakness, numbness and headaches.   Psychiatric/Behavioral: Negative for confusion.   All other systems reviewed  and are negative.      Physical Exam     Initial Vitals [08/14/19 1140]   BP Pulse Resp Temp SpO2   121/75 90 18 98.3 °F (36.8 °C) 97 %      MAP       --         Physical Exam    Nursing note and vitals reviewed.  Constitutional: She appears well-developed and well-nourished. She is not diaphoretic. No distress.   HENT:   Head: Normocephalic and atraumatic.   Nose: Nose normal.   Mouth/Throat: Oropharynx is clear and moist. No oropharyngeal exudate.   Eyes: Conjunctivae and EOM are normal. Pupils are equal, round, and reactive to light. No scleral icterus.   Neck: Normal range of motion. Neck supple.   Cardiovascular: Normal rate, regular rhythm, normal heart sounds and intact distal pulses. Exam reveals no gallop and no friction rub.    No murmur heard.  Pulses:       Dorsalis pedis pulses are 2+ on the right side, and 2+ on the left side.   Pulmonary/Chest: Breath sounds normal. No respiratory distress. She has no wheezes. She has no rhonchi. She has no rales.   Abdominal: Soft. Bowel sounds are normal. There is guarding. There is no rebound.   Right sided abdominal tenderness to palpation in both upper and lower quadrants, greatest in the mid abdomen.  No flank tenderness.   Musculoskeletal: Normal range of motion. She exhibits tenderness. She exhibits no edema.   DP pulses 2+ and equal. Tenderness along medial malleolus of right ankle. No visible swelling or bruising.  There is no calf tenderness or edema.   Neurological: She is alert and oriented to person, place, and time. She has normal strength. No cranial nerve deficit or sensory deficit. GCS score is 15. GCS eye subscore is 4. GCS verbal subscore is 5. GCS motor subscore is 6.   Skin: Skin is warm and dry. No rash noted. No erythema. No pallor.   Psychiatric: She has a normal mood and affect. Thought content normal.         ED Course   Procedures  Labs Reviewed   CBC W/ AUTO DIFFERENTIAL - Abnormal; Notable for the following components:       Result Value     Mean Corpuscular Volume 81 (*)     Mean Corpuscular Hemoglobin 25.5 (*)     Mean Corpuscular Hemoglobin Conc 31.7 (*)     RDW 15.2 (*)     All other components within normal limits   COMPREHENSIVE METABOLIC PANEL - Abnormal; Notable for the following components:    Glucose 265 (*)     Calcium 12.2 (*)      (*)     ALT 50 (*)     All other components within normal limits    Narrative:     CA critical result(s) called and verbal readback obtained from Arleen Aj RN. , 08/14/2019 13:24   URINALYSIS, REFLEX TO URINE CULTURE - Abnormal; Notable for the following components:    Specific Gravity, UA <=1.005 (*)     Glucose, UA 3+ (*)     All other components within normal limits    Narrative:     Preferred Collection Type->Urine, Clean Catch   BASIC METABOLIC PANEL - Abnormal; Notable for the following components:    Calcium 11.1 (*)     All other components within normal limits   URINALYSIS MICROSCOPIC    Narrative:     Preferred Collection Type->Urine, Clean Catch   MAGNESIUM   PHOSPHORUS          Imaging Results          CT Abdomen Pelvis With Contrast (Final result)  Result time 08/14/19 14:13:31    Final result by Aashish Shay MD (08/14/19 14:13:31)                 Impression:      1. No acute process or CT findings identified to explain patient's symptoms of right lower quadrant pain.  Specifically, no evidence of appendicitis.  2. Diverticulosis coli without diverticulitis.  3. Hepatomegaly.  4. Cholecystectomy.  5. Grossly stable few additional findings as above.      Electronically signed by: Aashish Shay MD  Date:    08/14/2019  Time:    14:13             Narrative:    EXAMINATION:  CT ABDOMEN PELVIS WITH CONTRAST    CLINICAL HISTORY:  RLQ pain, appendicitis suspected;    TECHNIQUE:  Low dose axial images, sagittal and coronal reformations were obtained from the lung bases to the pubic symphysis following the IV administration of 100 mL of Omnipaque 350 .  Oral contrast was not  given.    COMPARISON:  CT renal stone study 10/10/2016    FINDINGS:  Imaged lung bases are clear.  Base of the heart is within normal limits.  Unchanged small lipoma at the anterolateral lower right chest wall.    Cholecystectomy.  No biliary ductal dilatation.  Liver is enlarged without focal process seen.  Pancreas, spleen, stomach, duodenum and bilateral adrenal glands are within normal limits.    Bilateral kidneys are normal in size, shape and location with symmetric normal enhancement.  No hydronephrosis or significant perinephric stranding.  Right renal upper pole tiny hypoattenuating cortical focus which is too small to characterize.  Ureters are nondilated.  Urinary bladder is within normal limits.  Uterus and bilateral adnexal regions are within normal limits.  Pelvic phleboliths noted.    No ascites, free air or lymphadenopathy.  Mild to moderate scattered atherosclerosis of the aorta and its proximal branch vessels.  No aortic aneurysm or dissection.    Small fat containing umbilical hernia.  Small fat containing left inguinal hernia.  Appendix and terminal ileum are within normal limits.  Multiple scattered diverticula of the descending and sigmoid colon without focal diverticulitis.  Mild amount of scattered fecal material throughout the colon.  Fatty hypertrophy of the ileocecal valve.  There is unchanged mural fat stratification of the cecum and proximal ascending colon, likely sequela of remote/chronic inflammation or laxative use.  No evidence of bowel obstruction or acute inflammation.  No pneumatosis or portal venous gas.    Osseous structures appear grossly stable without acute or destructive process seen.                                 Medical Decision Making:   Clinical Tests:   Lab Tests: Ordered and Reviewed  Radiological Study: Ordered and Reviewed  ED Management:  2:50 PM - Spoke to Dr. Woodson, on-call hospitalist, who states if repeat calcium after hydration is stable and is improved it is  okay for discharge and out-patient workup.    Emergent evaluation of 64-year-old female who presents with complaint of 1 day of right-sided abdominal pain with several episodes of diarrhea, pain occasionally radiating to the leg, other chronic complaints. She was treated with analgesics and fluids.  I considered possibility of aortic aneurysm/dissection, but doubt this given rarity and equal pulses and no midline abdominal pain or mass. Patient has already had cholecystectomy, and has no Velazquez sign. Labs are benign other than hypercalcemia of unknown origin.  I discussed the case with the hospitalist, who recommended repeat metabolic profile now that the patient has been hydrated. Repeat showed improvement, and Dr. Woodson recommended outpatient follow-up for further care, possible hyperparathyroidism.  She did have very mild elevation in LFTs, but has no gallbladder and I do not think levels are commensurate with acute hepatitis.  Patient is advised to follow this up.  She had no further episodes of diarrhea while in the ED. Imaging is benign, no acute process.  Patient had colonoscopy last year.  Urine shows no hematuria, I doubt kidney stone.  She was discharged in good condition and encouraged close follow-up with her PCP or to return for new or worsening symptoms.            Scribe Attestation:   Scribe #1: I performed the above scribed service and the documentation accurately describes the services I performed. I attest to the accuracy of the note.    Attending Attestation:           Physician Attestation for Scribe:  Physician Attestation Statement for Scribe #1: I, Dr. Luna, reviewed documentation, as scribed by Amee Cadet in my presence, and it is both accurate and complete.                    Clinical Impression:     1. Diarrhea, unspecified type    2. Abdominal pain, right lateral    3. Hypercalcemia                                   Sherley Luna MD  08/15/19 1248

## 2019-08-14 NOTE — ED TRIAGE NOTES
63y/o F to Ed for abd pain and bloating noted this morning. Pt report one BM that was normal and after that it was watery diarrhea. abd tender to palpation on the right side.

## 2020-02-11 ENCOUNTER — OFFICE VISIT (OUTPATIENT)
Dept: URGENT CARE | Facility: CLINIC | Age: 65
End: 2020-02-11
Payer: MEDICARE

## 2020-02-11 VITALS
WEIGHT: 200 LBS | OXYGEN SATURATION: 98 % | HEIGHT: 67 IN | HEART RATE: 88 BPM | DIASTOLIC BLOOD PRESSURE: 77 MMHG | TEMPERATURE: 98 F | BODY MASS INDEX: 31.39 KG/M2 | SYSTOLIC BLOOD PRESSURE: 129 MMHG | RESPIRATION RATE: 18 BRPM

## 2020-02-11 DIAGNOSIS — K59.00 CONSTIPATION, UNSPECIFIED CONSTIPATION TYPE: ICD-10-CM

## 2020-02-11 DIAGNOSIS — R10.9 ABDOMINAL PAIN, UNSPECIFIED ABDOMINAL LOCATION: Primary | ICD-10-CM

## 2020-02-11 LAB
BILIRUB UR QL STRIP: NEGATIVE
GLUCOSE UR QL STRIP: POSITIVE
KETONES UR QL STRIP: NEGATIVE
LEUKOCYTE ESTERASE UR QL STRIP: NEGATIVE
PH, POC UA: 5.5 (ref 5–8)
POC BLOOD, URINE: NEGATIVE
POC NITRATES, URINE: NEGATIVE
PROT UR QL STRIP: NEGATIVE
SP GR UR STRIP: 1.02 (ref 1–1.03)
UROBILINOGEN UR STRIP-ACNC: ABNORMAL (ref 0.1–1.1)

## 2020-02-11 PROCEDURE — 74019 RADEX ABDOMEN 2 VIEWS: CPT | Mod: S$GLB,,, | Performed by: RADIOLOGY

## 2020-02-11 PROCEDURE — 81003 POCT URINALYSIS, DIPSTICK, AUTOMATED, W/O SCOPE: ICD-10-PCS | Mod: QW,S$GLB,, | Performed by: FAMILY MEDICINE

## 2020-02-11 PROCEDURE — 74019 XR ABDOMEN FLAT AND ERECT: ICD-10-PCS | Mod: S$GLB,,, | Performed by: RADIOLOGY

## 2020-02-11 PROCEDURE — 81003 URINALYSIS AUTO W/O SCOPE: CPT | Mod: QW,S$GLB,, | Performed by: FAMILY MEDICINE

## 2020-02-11 PROCEDURE — 99214 PR OFFICE/OUTPT VISIT, EST, LEVL IV, 30-39 MIN: ICD-10-PCS | Mod: 25,S$GLB,, | Performed by: FAMILY MEDICINE

## 2020-02-11 PROCEDURE — 99214 OFFICE O/P EST MOD 30 MIN: CPT | Mod: 25,S$GLB,, | Performed by: FAMILY MEDICINE

## 2020-02-11 RX ORDER — EXENATIDE 2 MG/.85ML
INJECTION, SUSPENSION, EXTENDED RELEASE SUBCUTANEOUS
COMMUNITY
Start: 2020-02-09 | End: 2021-04-06 | Stop reason: ALTCHOICE

## 2020-02-11 NOTE — PROGRESS NOTES
"Subjective:       Patient ID: Dee Hamilton is a 64 y.o. female.    Vitals:  height is 5' 7" (1.702 m) and weight is 90.7 kg (200 lb). Her oral temperature is 97.9 °F (36.6 °C). Her blood pressure is 129/77 and her pulse is 88. Her respiration is 18 and oxygen saturation is 98%.     Chief Complaint: Abdominal Pain    Patient states 5 days ago started with abdominal pain she believes from constipation she was only having small bowel movements then yesterday took a stool softener which improved symptoms and she had a good bowel movement slightly diarrhea.  She noticed little lennie colored "worms" (about 3-4 ) in her stool, not moving around, got nervous and flushed immediately did not see any blood.    No nausea vomiting fever. No rectal itching. Recent traveling to [place] last week, no camping no foreign travel.  She ate Mexican just prior to symptom onset.  No one in her house with similar symptoms has not been around any children lately.  Last CBG she got a few days ago 130.  No UTI symptoms. Also with back pain that she has been taking ibuprofen for. Had LLQ pain at onset of sx which improved with the stool softener and BM. The "worms" are what really concerned her today.   Pt had a BM in clinic which I observed and did not see any worms.     Abdominal Pain   This is a new problem. The current episode started in the past 7 days. The pain is at a severity of 8/10. The pain is moderate. The abdominal pain radiates to the back. Associated symptoms include diarrhea. Pertinent negatives include no arthralgias, dysuria, fever, frequency, headaches, myalgias, nausea or vomiting. Treatments tried: Aleve. The treatment provided mild relief.       Constitution: Negative for chills, fatigue and fever.   HENT: Negative for congestion and sore throat.    Neck: Negative for painful lymph nodes.   Cardiovascular: Negative for chest pain and leg swelling.   Eyes: Negative for double vision and blurred vision.   Respiratory: " Negative for cough and shortness of breath.    Gastrointestinal: Positive for abdominal pain and diarrhea. Negative for nausea and vomiting.   Genitourinary: Negative for dysuria, frequency, urgency and history of kidney stones.   Musculoskeletal: Positive for back pain. Negative for joint pain, joint swelling, muscle cramps and muscle ache.   Skin: Negative for color change, pale, rash and bruising.   Allergic/Immunologic: Negative for seasonal allergies.   Neurological: Negative for dizziness, history of vertigo, light-headedness, passing out and headaches.   Hematologic/Lymphatic: Negative for swollen lymph nodes.   Psychiatric/Behavioral: Negative for nervous/anxious, sleep disturbance and depression. The patient is not nervous/anxious.        Objective:      Physical Exam   Constitutional: She is oriented to person, place, and time. She appears well-developed and well-nourished.   HENT:   Head: Normocephalic and atraumatic.   Right Ear: External ear normal.   Left Ear: External ear normal.   Nose: Nose normal.   Mouth/Throat: Mucous membranes are normal.   Eyes: Conjunctivae and lids are normal.   Neck: Trachea normal and full passive range of motion without pain. Neck supple.   Cardiovascular: Normal rate, regular rhythm and normal heart sounds.   Pulmonary/Chest: Effort normal and breath sounds normal. No respiratory distress.   Abdominal: Soft. Normal appearance and bowel sounds are normal. She exhibits no distension, no abdominal bruit, no pulsatile midline mass and no mass. There is tenderness (mild) in the epigastric area. There is CVA tenderness (mild left). There is no rigidity, no rebound, no guarding, no tenderness at McBurney's point and negative Velazquez's sign.   No rebound tenderness or guarding  Appears comfortable in clinic with sitting and lying flat   Genitourinary: Rectal exam shows external hemorrhoid.   Genitourinary Comments: Taylor present for exam.   No worms noted on EULOGIO or around the  rectum, no excoriation   Musculoskeletal: Normal range of motion. She exhibits no edema.   Neurological: She is alert and oriented to person, place, and time. She has normal strength.   Skin: Skin is warm, dry, intact, not diaphoretic and not pale.   Psychiatric: She has a normal mood and affect. Her speech is normal and behavior is normal. Judgment and thought content normal. Cognition and memory are normal.   Nursing note and vitals reviewed.      X-ray Abdomen Flat And Erect    Result Date: 2/11/2020  EXAMINATION: XR ABDOMEN FLAT AND ERECT CLINICAL HISTORY: Unspecified abdominal pain TECHNIQUE: Flat and erect AP views of the abdomen were performed. COMPARISON: None FINDINGS: No free air in the abdomen.  No significant bowel dilatation identified.  Tiny calcification noted in the right paraspinal area at L1/L2 level.     See above Electronically signed by: Osvaldo Wilder MD Date:    02/11/2020 Time:    17:00    Assessment:       1. Abdominal pain, unspecified abdominal location    2. Constipation, unspecified constipation type        Plan:         Abdominal pain, unspecified abdominal location  -     X-Ray Abdomen Flat And Erect; Future; Expected date: 02/11/2020  -     POCT Urinalysis, Dipstick, Automated, W/O Scope    Constipation, unspecified constipation type    UA and xray without any significant results, her symptoms were improving with stool softener advised to keep with that and fluids discussed ER precautions.  Unsure if they were really worms present in her stool if they were pinworms?, showed her a  picture of what pinworms look like and advised over-the-counter pin X if she sees them again if still not clearing see primary care. Pt agreeable to plan    Patient Instructions     PLEASE READ YOUR DISCHARGE INSTRUCTIONS ENTIRELY AS IT CONTAINS IMPORTANT INFORMATION.    Continue taking the stool softener    Drink plenty of fluids bland foods soups crackers    Please go to the ER if you develop severe  abdominal pain blood in your stool throwing up blood fever    If you see the worms again or have rectal itching take over-the-counter PinX - 1 dose when you get it repeated in 2 weeks if needed    If that is still not clearing things please see primary care for stool studies    Please return or see your primary care doctor if you develop new or worsening symptoms.     You must understand that you have received an Urgent Care treatment only and that you may be released before all of your medical problems are known or treated.    Abdominal Pain    Abdominal pain is pain in the stomach or belly area. Everyone has this pain from time to time. In many cases it goes away on its own. But abdominal pain can sometimes be due to a serious problem, such as appendicitis. So its important to know when to seek help.  Causes of abdominal pain  There are many possible causes of abdominal pain. Common causes in adults include:  · Constipation, diarrhea, or gas  · Stomach acid flowing back up into the esophagus (acid reflux or heartburn)  · Severe acid reflux, called GERD (gastroesophageal reflux disease)  · A sore in the lining of the stomach or small intestine (peptic ulcer)  · Inflammation of the gallbladder, liver, or pancreas  · Gallstones or kidney stones  · Appendicitis   · Intestinal blockage   · An internal organ pushing through a muscle or other tissue (hernia)  · Urinary tract infections  · In women, menstrual cramps, fibroids, or endometriosis  · Inflammation or infection of the intestines  Diagnosing the cause of abdominal pain  Your healthcare provider will do a physical exam help find the cause of your pain. If needed, tests will be ordered. Belly pain has many possible causes. So it can be hard to find the reason for your pain. Giving details about your pain can help. Tell your provider where and when you feel the pain, and what makes it better or worse. Also let your provider know if you have other symptoms such  as:  · Fever  · Tiredness  · Upset stomach (nausea)  · Vomiting  · Changes in bathroom habits  Treating abdominal pain  Some causes of pain need emergency medical treatment right away. These include appendicitis or a bowel blockage. Other problems can be treated with rest, fluids, or medicines. Your healthcare provider can give you specific instructions for treatment or self-care based on what is causing your pain.  If you have vomiting or diarrhea, sip water or other clear fluids. When you are ready to eat solid foods again, start with small amounts of easy-to-digest, low-fat foods. These include apple sauce, toast, or crackers.   When to seek medical care  Call 911 or go to the hospital right away if you:  · Cant pass stool and are vomiting  · Are vomiting blood or have bloody diarrhea or black, tarry diarrhea  · Have chest, neck, or shoulder pain  · Feel like you might pass out  · Have pain in your shoulder blades with nausea  · Have sudden, severe belly pain  · Have new, severe pain unlike any you have felt before  · Have a belly that is rigid, hard, and tender to touch  Call your healthcare provider if you have:  · Pain for more than 5 days  · Bloating for more than 2 days  · Diarrhea for more than 5 days  · A fever of 100.4°F (38.0°C) or higher, or as directed by your provider  · Pain that gets worse  · Weight loss for no reason  · Continued lack of appetite  · Blood in your stool  How to prevent abdominal pain  Here are some tips to help prevent abdominal pain:  · Eat smaller amounts of food at one time.  · Avoid greasy, fried, or other high-fat foods.  · Avoid foods that give you gas.  · Exercise regularly.  · Drink plenty of fluids.  To help prevent GERD symptoms:  · Quit smoking.  · Reduce alcohol and certain foods that increase stomach acid.  · Avoid aspirin and over-the-counter pain and fever medicines (NSAIDS or nonsteroidal anti-inflammatory drugs), if possible  · Lose extra weight.  · Finish eating  at least 2 hours before you go to bed or lie down.  · Raise the head of your bed.  Date Last Reviewed: 7/1/2016  © 2475-8730 [employer]. [address] [place] [address], [place] [address]. All rights reserved. This information is not intended as a substitute for professional medical care. Always follow your healthcare professional's instructions.

## 2020-02-11 NOTE — PATIENT INSTRUCTIONS
PLEASE READ YOUR DISCHARGE INSTRUCTIONS ENTIRELY AS IT CONTAINS IMPORTANT INFORMATION.    Continue taking the stool softener    Drink plenty of fluids bland foods soups crackers    Please go to the ER if you develop severe abdominal pain blood in your stool throwing up blood fever    If you see the worms again or have rectal itching take over-the-counter PinX - 1 dose when you get it repeated in 2 weeks if needed    If that is still not clearing things please see primary care for stool studies    Please return or see your primary care doctor if you develop new or worsening symptoms.     You must understand that you have received an Urgent Care treatment only and that you may be released before all of your medical problems are known or treated.    Abdominal Pain    Abdominal pain is pain in the stomach or belly area. Everyone has this pain from time to time. In many cases it goes away on its own. But abdominal pain can sometimes be due to a serious problem, such as appendicitis. So its important to know when to seek help.  Causes of abdominal pain  There are many possible causes of abdominal pain. Common causes in adults include:  · Constipation, diarrhea, or gas  · Stomach acid flowing back up into the esophagus (acid reflux or heartburn)  · Severe acid reflux, called GERD (gastroesophageal reflux disease)  · A sore in the lining of the stomach or small intestine (peptic ulcer)  · Inflammation of the gallbladder, liver, or pancreas  · Gallstones or kidney stones  · Appendicitis   · Intestinal blockage   · An internal organ pushing through a muscle or other tissue (hernia)  · Urinary tract infections  · In women, menstrual cramps, fibroids, or endometriosis  · Inflammation or infection of the intestines  Diagnosing the cause of abdominal pain  Your healthcare provider will do a physical exam help find the cause of your pain. If needed, tests will be ordered. Belly pain has many possible causes. So it can be hard to  find the reason for your pain. Giving details about your pain can help. Tell your provider where and when you feel the pain, and what makes it better or worse. Also let your provider know if you have other symptoms such as:  · Fever  · Tiredness  · Upset stomach (nausea)  · Vomiting  · Changes in bathroom habits  Treating abdominal pain  Some causes of pain need emergency medical treatment right away. These include appendicitis or a bowel blockage. Other problems can be treated with rest, fluids, or medicines. Your healthcare provider can give you specific instructions for treatment or self-care based on what is causing your pain.  If you have vomiting or diarrhea, sip water or other clear fluids. When you are ready to eat solid foods again, start with small amounts of easy-to-digest, low-fat foods. These include apple sauce, toast, or crackers.   When to seek medical care  Call 911 or go to the hospital right away if you:  · Cant pass stool and are vomiting  · Are vomiting blood or have bloody diarrhea or black, tarry diarrhea  · Have chest, neck, or shoulder pain  · Feel like you might pass out  · Have pain in your shoulder blades with nausea  · Have sudden, severe belly pain  · Have new, severe pain unlike any you have felt before  · Have a belly that is rigid, hard, and tender to touch  Call your healthcare provider if you have:  · Pain for more than 5 days  · Bloating for more than 2 days  · Diarrhea for more than 5 days  · A fever of 100.4°F (38.0°C) or higher, or as directed by your provider  · Pain that gets worse  · Weight loss for no reason  · Continued lack of appetite  · Blood in your stool  How to prevent abdominal pain  Here are some tips to help prevent abdominal pain:  · Eat smaller amounts of food at one time.  · Avoid greasy, fried, or other high-fat foods.  · Avoid foods that give you gas.  · Exercise regularly.  · Drink plenty of fluids.  To help prevent GERD symptoms:  · Quit smoking.  · Reduce  alcohol and certain foods that increase stomach acid.  · Avoid aspirin and over-the-counter pain and fever medicines (NSAIDS or nonsteroidal anti-inflammatory drugs), if possible  · Lose extra weight.  · Finish eating at least 2 hours before you go to bed or lie down.  · Raise the head of your bed.  Date Last Reviewed: 7/1/2016  © 7717-3291 [employer]. [address] [place] [address], [place] [address]. All rights reserved. This information is not intended as a substitute for professional medical care. Always follow your healthcare professional's instructions.

## 2020-02-22 ENCOUNTER — HOSPITAL ENCOUNTER (EMERGENCY)
Facility: HOSPITAL | Age: 65
Discharge: HOME | End: 2020-02-22
Attending: EMERGENCY MEDICINE
Payer: MEDICARE

## 2020-02-22 VITALS
DIASTOLIC BLOOD PRESSURE: 61 MMHG | WEIGHT: 200 LBS | RESPIRATION RATE: 17 BRPM | BODY MASS INDEX: 31.39 KG/M2 | OXYGEN SATURATION: 96 % | SYSTOLIC BLOOD PRESSURE: 123 MMHG | HEIGHT: 67 IN | TEMPERATURE: 98 F | HEART RATE: 76 BPM

## 2020-02-22 DIAGNOSIS — R10.32 LEFT LOWER QUADRANT ABDOMINAL PAIN: Primary | ICD-10-CM

## 2020-02-22 LAB
ALBUMIN SERPL BCP-MCNC: 3.9 G/DL (ref 3.5–5.2)
ALP SERPL-CCNC: 102 U/L (ref 55–135)
ALT SERPL W/O P-5'-P-CCNC: 22 U/L (ref 10–44)
ANION GAP SERPL CALC-SCNC: 11 MMOL/L (ref 8–16)
AST SERPL-CCNC: 32 U/L (ref 10–40)
BACTERIA #/AREA URNS AUTO: ABNORMAL /HPF
BASOPHILS # BLD AUTO: 0.03 K/UL (ref 0–0.2)
BASOPHILS NFR BLD: 0.4 % (ref 0–1.9)
BILIRUB SERPL-MCNC: 0.2 MG/DL (ref 0.1–1)
BILIRUB UR QL STRIP: NEGATIVE
BUN SERPL-MCNC: 15 MG/DL (ref 8–23)
CALCIUM SERPL-MCNC: 10.2 MG/DL (ref 8.7–10.5)
CHLORIDE SERPL-SCNC: 100 MMOL/L (ref 95–110)
CLARITY UR REFRACT.AUTO: CLEAR
CO2 SERPL-SCNC: 25 MMOL/L (ref 23–29)
COLOR UR AUTO: ABNORMAL
CREAT SERPL-MCNC: 0.8 MG/DL (ref 0.5–1.4)
DIFFERENTIAL METHOD: ABNORMAL
EOSINOPHIL # BLD AUTO: 0.1 K/UL (ref 0–0.5)
EOSINOPHIL NFR BLD: 1.3 % (ref 0–8)
ERYTHROCYTE [DISTWIDTH] IN BLOOD BY AUTOMATED COUNT: 14.8 % (ref 11.5–14.5)
EST. GFR  (AFRICAN AMERICAN): >60 ML/MIN/1.73 M^2
EST. GFR  (NON AFRICAN AMERICAN): >60 ML/MIN/1.73 M^2
GLUCOSE SERPL-MCNC: 301 MG/DL (ref 70–110)
GLUCOSE UR QL STRIP: ABNORMAL
HCT VFR BLD AUTO: 42.8 % (ref 37–48.5)
HGB BLD-MCNC: 12.8 G/DL (ref 12–16)
HGB UR QL STRIP: NEGATIVE
IMM GRANULOCYTES # BLD AUTO: 0.02 K/UL (ref 0–0.04)
IMM GRANULOCYTES NFR BLD AUTO: 0.3 % (ref 0–0.5)
KETONES UR QL STRIP: NEGATIVE
LACTATE SERPL-SCNC: 2.8 MMOL/L (ref 0.5–2.2)
LACTATE SERPL-SCNC: 2.9 MMOL/L (ref 0.5–2.2)
LEUKOCYTE ESTERASE UR QL STRIP: ABNORMAL
LYMPHOCYTES # BLD AUTO: 2.9 K/UL (ref 1–4.8)
LYMPHOCYTES NFR BLD: 42 % (ref 18–48)
MCH RBC QN AUTO: 25.3 PG (ref 27–31)
MCHC RBC AUTO-ENTMCNC: 29.9 G/DL (ref 32–36)
MCV RBC AUTO: 85 FL (ref 82–98)
MICROSCOPIC COMMENT: ABNORMAL
MONOCYTES # BLD AUTO: 0.3 K/UL (ref 0.3–1)
MONOCYTES NFR BLD: 4.8 % (ref 4–15)
NEUTROPHILS # BLD AUTO: 3.5 K/UL (ref 1.8–7.7)
NEUTROPHILS NFR BLD: 51.2 % (ref 38–73)
NITRITE UR QL STRIP: NEGATIVE
NON-SQ EPI CELLS #/AREA URNS AUTO: 1 /HPF
NRBC BLD-RTO: 0 /100 WBC
PH UR STRIP: 6 [PH] (ref 5–8)
PLATELET # BLD AUTO: 319 K/UL (ref 150–350)
PMV BLD AUTO: 10.5 FL (ref 9.2–12.9)
POTASSIUM SERPL-SCNC: 4.1 MMOL/L (ref 3.5–5.1)
PROT SERPL-MCNC: 8.1 G/DL (ref 6–8.4)
PROT UR QL STRIP: NEGATIVE
RBC # BLD AUTO: 5.05 M/UL (ref 4–5.4)
RBC #/AREA URNS AUTO: 1 /HPF (ref 0–4)
SODIUM SERPL-SCNC: 136 MMOL/L (ref 136–145)
SP GR UR STRIP: 1.01 (ref 1–1.03)
SQUAMOUS #/AREA URNS AUTO: 1 /HPF
URN SPEC COLLECT METH UR: ABNORMAL
WBC # BLD AUTO: 6.84 K/UL (ref 3.9–12.7)
WBC #/AREA URNS AUTO: 2 /HPF (ref 0–5)
YEAST UR QL AUTO: ABNORMAL

## 2020-02-22 PROCEDURE — 99284 EMERGENCY DEPT VISIT MOD MDM: CPT | Mod: ,,, | Performed by: EMERGENCY MEDICINE

## 2020-02-22 PROCEDURE — 96360 HYDRATION IV INFUSION INIT: CPT | Mod: 59

## 2020-02-22 PROCEDURE — 99284 PR EMERGENCY DEPT VISIT,LEVEL IV: ICD-10-PCS | Mod: ,,, | Performed by: EMERGENCY MEDICINE

## 2020-02-22 PROCEDURE — 25000003 PHARM REV CODE 250: Performed by: EMERGENCY MEDICINE

## 2020-02-22 PROCEDURE — 85025 COMPLETE CBC W/AUTO DIFF WBC: CPT

## 2020-02-22 PROCEDURE — 81001 URINALYSIS AUTO W/SCOPE: CPT

## 2020-02-22 PROCEDURE — 99285 EMERGENCY DEPT VISIT HI MDM: CPT | Mod: 25

## 2020-02-22 PROCEDURE — 63600175 PHARM REV CODE 636 W HCPCS: Performed by: EMERGENCY MEDICINE

## 2020-02-22 PROCEDURE — 80053 COMPREHEN METABOLIC PANEL: CPT

## 2020-02-22 PROCEDURE — 83605 ASSAY OF LACTIC ACID: CPT | Mod: 91

## 2020-02-22 PROCEDURE — 25500020 PHARM REV CODE 255: Performed by: EMERGENCY MEDICINE

## 2020-02-22 RX ORDER — DICYCLOMINE HYDROCHLORIDE 10 MG/1
20 CAPSULE ORAL
Status: COMPLETED | OUTPATIENT
Start: 2020-02-22 | End: 2020-02-22

## 2020-02-22 RX ORDER — ACETAMINOPHEN 500 MG
1000 TABLET ORAL
Status: COMPLETED | OUTPATIENT
Start: 2020-02-22 | End: 2020-02-22

## 2020-02-22 RX ORDER — DICYCLOMINE HYDROCHLORIDE 10 MG/1
20 CAPSULE ORAL
Status: DISCONTINUED | OUTPATIENT
Start: 2020-02-22 | End: 2020-02-22

## 2020-02-22 RX ORDER — MAG HYDROX/ALUMINUM HYD/SIMETH 200-200-20
5 SUSPENSION, ORAL (FINAL DOSE FORM) ORAL
Status: COMPLETED | OUTPATIENT
Start: 2020-02-22 | End: 2020-02-22

## 2020-02-22 RX ADMIN — DICYCLOMINE HYDROCHLORIDE 20 MG: 10 CAPSULE ORAL at 01:02

## 2020-02-22 RX ADMIN — SODIUM CHLORIDE 1000 ML: 0.9 INJECTION, SOLUTION INTRAVENOUS at 11:02

## 2020-02-22 RX ADMIN — ALUMINUM HYDROXIDE, MAGNESIUM HYDROXIDE, AND SIMETHICONE 5 ML: 200; 200; 20 SUSPENSION ORAL at 01:02

## 2020-02-22 RX ADMIN — IOHEXOL 100 ML: 350 INJECTION, SOLUTION INTRAVENOUS at 11:02

## 2020-02-22 RX ADMIN — ACETAMINOPHEN 1000 MG: 500 TABLET ORAL at 01:02

## 2020-02-22 NOTE — ED TRIAGE NOTES
States she feels like she is having diverticulitis.  C/O abdominal pain  W/ watery diarrhea for the last week.  No blood in stool. Seen in urgent care b/c she thought she saw worms but it was negative. States she feels weak. Denies SOB/CP/dysuria.

## 2020-02-22 NOTE — DISCHARGE INSTRUCTIONS
It is not clear at this time what is causing your pain. Your CT Scan, blood work, urinalysis did not show a cause for your pain.     I recommend that you take Miralax (Available over the counter) one capful nightly to help relieve constipation. You can take Simethicone (Gas X) 180 mg twice a day for gas pain.  Be sure to drink plenty of water and stay hydrated.     Follow up closely with your primary doctor for re-evaluation.     Seek immediate medical attention if you have new or worsening symptoms, fever, persistent pain, or for any other concern.

## 2020-02-22 NOTE — ED PROVIDER NOTES
Encounter Date: 2/22/2020    SCRIBE #1 NOTE: I, Tanya Vaughn, am scribing for, and in the presence of,  Dr. Reddy. I have scribed the following portions of the note - Other sections scribed: HPI, ROS, PE.       History     Chief Complaint   Patient presents with    Abdominal Pain     lower left abd/     Time patient was seen by the provider: 9:33 AM    The patient is a 64 y.o. female with medical history of type 2 diabetes, anxiety, hyperlipidemia, glaucoma, hypertension and diverticulitis who presents to the ED with a complaint of abdominal pain. Patient reports left lower abdominal pain x several days . She reports history of similar presentation last year with diverticulitis. She took advil for pain relief. She endorses associated small amount of diarrhea, but states she also feels constipated. She tried taking a stool softener. She fever, chills, chest pain, SOB, cough, vomiting, dysuria, hematuria, or rash.     The history is provided by the patient and medical records.     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     Past Surgical History:   Procedure Laterality Date    BLADDER SUSPENSION  12/11    CHOLECYSTECTOMY      Glaucoma Laser Sx ou      TONSILLECTOMY      vaginal mesh       Family History   Problem Relation Age of Onset    Diabetes Sister     Cataracts Mother     Hypertension Mother     Cancer Father 68        Jaw Bone    Cataracts Father     Amblyopia Neg Hx     Blindness Neg Hx     Glaucoma Neg Hx     Macular degeneration Neg Hx      Retinal detachment Neg Hx     Strabismus Neg Hx     Stroke Neg Hx     Thyroid disease Neg Hx      Social History     Tobacco Use    Smoking status: Former Smoker     Last attempt to quit: 2007     Years since quittin.4    Smokeless tobacco: Never Used   Substance Use Topics    Alcohol use: Yes     Comment: social     Drug use: No     Review of Systems   Constitutional: Negative for fever.   HENT: Negative for sore throat.    Eyes: Negative for visual disturbance.   Respiratory: Negative for shortness of breath.    Cardiovascular: Negative for chest pain.   Gastrointestinal: Positive for abdominal pain (left lower), constipation and diarrhea. Negative for abdominal distention, blood in stool, nausea and vomiting.   Endocrine: Negative for polydipsia and polyuria.   Genitourinary: Negative for dysuria.   Musculoskeletal: Negative for back pain.   Skin: Negative for rash.   Allergic/Immunologic: Negative for immunocompromised state.   Neurological: Negative for weakness.   Hematological: Does not bruise/bleed easily.   Psychiatric/Behavioral: The patient is not nervous/anxious.        Physical Exam     Initial Vitals [20 0920]   BP Pulse Resp Temp SpO2   (!) 142/67 98 16 98.6 °F (37 °C) 95 %      MAP       --         Physical Exam    Nursing note and vitals reviewed.  Constitutional: She appears well-developed and well-nourished. She is not diaphoretic. No distress.   HENT:   Head: Normocephalic and atraumatic.   Eyes: EOM are normal.   Neck: Neck supple.   Cardiovascular: Normal rate and regular rhythm.   Pulmonary/Chest: Breath sounds normal. No respiratory distress.   Abdominal: Soft. There is tenderness (left lower). There is no rebound and no guarding.   Musculoskeletal: Normal range of motion.   Neurological: She is alert and oriented to person, place, and time.   Skin: Skin is warm and dry.   Psychiatric: She has a normal mood and affect. Her behavior is normal. Judgment and thought  content normal.         ED Course   Procedures  Labs Reviewed   CBC W/ AUTO DIFFERENTIAL - Abnormal; Notable for the following components:       Result Value    Mean Corpuscular Hemoglobin 25.3 (*)     Mean Corpuscular Hemoglobin Conc 29.9 (*)     RDW 14.8 (*)     All other components within normal limits   COMPREHENSIVE METABOLIC PANEL - Abnormal; Notable for the following components:    Glucose 301 (*)     All other components within normal limits   LACTIC ACID, PLASMA - Abnormal; Notable for the following components:    Lactate (Lactic Acid) 2.8 (*)     All other components within normal limits   URINALYSIS, REFLEX TO URINE CULTURE - Abnormal; Notable for the following components:    Glucose, UA 3+ (*)     Leukocytes, UA Trace (*)     All other components within normal limits    Narrative:     Preferred Collection Type->Urine, Clean Catch   URINALYSIS MICROSCOPIC - Abnormal; Notable for the following components:    Non-Squam Epith 1 (*)     All other components within normal limits    Narrative:     Preferred Collection Type->Urine, Clean Catch   LACTIC ACID, PLASMA - Abnormal; Notable for the following components:    Lactate (Lactic Acid) 2.9 (*)     All other components within normal limits          Imaging Results          CT Abdomen Pelvis With Contrast (Final result)  Result time 02/22/20 11:58:21    Final result by Moshe Aguilar DO (02/22/20 11:58:21)                 Impression:      No acute intra-abdominal findings.    Colonic diverticulosis without evidence for acute diverticulitis.    No signs of appendicitis with normal caliber appendix identified.    Hepatomegaly with diffuse study ule tracheal liver.    Remote operative change from cholecystectomy.    Please see above for additional details.      Electronically signed by: Moshe Aguilar DO  Date:    02/22/2020  Time:    11:58             Narrative:    EXAMINATION:  CT ABDOMEN PELVIS WITH CONTRAST    CLINICAL HISTORY:  LLQ pain, suspect  diverticulitis;    TECHNIQUE:  Low dose axial images, sagittal and coronal reformations were obtained from the lung bases to the pubic symphysis following the IV administration of 100 mL of Omnipaque 350 .  Oral contrast was not given.    COMPARISON:  08/14/2019    FINDINGS:  There is no consolidation within the visualized lungs.  Atherosclerotic plaquing of the visualized aorta without aneurysmal dilatation.    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    The liver is enlarged measuring 21 cm in craniocaudal mention.  There is diffuse decreased attenuation of liver concerning for fatty infiltration.  Surgical clips in gallbladder fossa compatible with prior cholecystectomy.  The spleen and pancreas are grossly normal in size without focal lesion.  The adrenal glands are within normal limits.    The kidneys measure approximately 11-12 cm in length bilaterally.  There is no hydronephrosis with uptake of contrast bilaterally.    No free intraperitoneal gas or fluid collection.  Uterus identified in the pelvis.  No focal pelvic mass, fluid collection or adenopathy.  Colonic diverticulosis without definite acute diverticulitis.  Trace probable inguinal hernia containing fat unchanged from prior.    Degenerative change of the visualized spine without evidence for acute fracture or subluxation..    The appendix normal in caliber without secondary signs to suggest acute appendicitis.  No free intraperitoneal gas or fluid collection.                                 Medical Decision Making:   History:   Old Medical Records: I decided to obtain old medical records.  Initial Assessment:   Left lower ab pain  Differential Diagnosis:   Diverticulitis, colitis, mesenteric ischemia, constipation, ureteral stone, pyelonephritis  Clinical Tests:   Lab Tests: Reviewed and Ordered  Radiological Study: Ordered and Reviewed  ED Management:  1:17 PM  I discussed  with Dr. Aguilar, radiologist who reviewed her CT scan again.  He tells me that patient's mesenteric vasculature is widely patent and while she has diverticulosis there is no sign of diverticulitis    Pt feeling better, is in agreement with discharge  Lactic acid mildly elevated, perhaps secondary to metformin  She has no signs of sepsis and dont think this is mesenteric ischemia  Will start her on a bowel regimen and recommend hydration, return if new or worsening symptoms              Scribe Attestation:   Scribe #1: I performed the above scribed service and the documentation accurately describes the services I performed. I attest to the accuracy of the note.            ED Course as of Feb 24 1013   Sat Feb 22, 2020   1202 No signs of sepsis, does not appear dehydrated  Does take Metformin  Will recheck   Lactate, Niko(!): 2.8 [GK]      ED Course User Index  [GK] Fariha Reddy MD                Clinical Impression:       ICD-10-CM ICD-9-CM   1. Left lower quadrant abdominal pain R10.32 789.04                             Fariha Reddy MD  02/24/20 1013

## 2020-02-22 NOTE — ED NOTES
LOC: The patient is awake and alert; oriented x 3 and speaking appropriately.  APPEARANCE: Patient resting comfortably, patient is clean and well groomed  SKIN: warm and dry, normal skin turgor & moist mucus membranes, skin intact, no breakdown noted.  MUSCULOSKELETAL: Patient moving all extremities well, no obvious swelling or deformities noted  RESPIRATORY: Airway is open and patent,  respirations are spontaneous, normal effort and rate  CARDIAC: Patient has a normal rate, no peripheral edema noted, capillary refill < 3 seconds; No complaints of chest pain   ABDOMEN: Soft and non tender to palpation, no distention noted. C/O watery diarrhea w/ abdominal pain x 1 week.

## 2021-01-22 ENCOUNTER — OFFICE VISIT (OUTPATIENT)
Dept: URGENT CARE | Facility: CLINIC | Age: 66
End: 2021-01-22
Payer: MEDICARE

## 2021-01-22 VITALS
BODY MASS INDEX: 31.39 KG/M2 | DIASTOLIC BLOOD PRESSURE: 72 MMHG | TEMPERATURE: 98 F | RESPIRATION RATE: 20 BRPM | OXYGEN SATURATION: 95 % | WEIGHT: 200 LBS | SYSTOLIC BLOOD PRESSURE: 148 MMHG | HEART RATE: 85 BPM | HEIGHT: 67 IN

## 2021-01-22 DIAGNOSIS — B34.9 ACUTE VIRAL SYNDROME: Primary | ICD-10-CM

## 2021-01-22 DIAGNOSIS — R19.7 DIARRHEA, UNSPECIFIED TYPE: ICD-10-CM

## 2021-01-22 DIAGNOSIS — J02.9 SORE THROAT: ICD-10-CM

## 2021-01-22 DIAGNOSIS — R53.83 FATIGUE, UNSPECIFIED TYPE: ICD-10-CM

## 2021-01-22 LAB
CTP QC/QA: YES
SARS-COV-2 RDRP RESP QL NAA+PROBE: NEGATIVE

## 2021-01-22 PROCEDURE — 99214 PR OFFICE/OUTPT VISIT, EST, LEVL IV, 30-39 MIN: ICD-10-PCS | Mod: S$GLB,,, | Performed by: NURSE PRACTITIONER

## 2021-01-22 PROCEDURE — 99214 OFFICE O/P EST MOD 30 MIN: CPT | Mod: S$GLB,,, | Performed by: NURSE PRACTITIONER

## 2021-01-22 PROCEDURE — U0002 COVID-19 LAB TEST NON-CDC: HCPCS | Mod: QW,S$GLB,, | Performed by: NURSE PRACTITIONER

## 2021-01-22 PROCEDURE — U0002: ICD-10-PCS | Mod: QW,S$GLB,, | Performed by: NURSE PRACTITIONER

## 2021-01-22 PROCEDURE — 3008F PR BODY MASS INDEX (BMI) DOCUMENTED: ICD-10-PCS | Mod: CPTII,S$GLB,, | Performed by: NURSE PRACTITIONER

## 2021-01-22 PROCEDURE — 3008F BODY MASS INDEX DOCD: CPT | Mod: CPTII,S$GLB,, | Performed by: NURSE PRACTITIONER

## 2021-01-22 RX ORDER — DICYCLOMINE HYDROCHLORIDE 20 MG/1
20 TABLET ORAL EVERY 6 HOURS PRN
Qty: 30 TABLET | Refills: 0 | Status: SHIPPED | OUTPATIENT
Start: 2021-01-22 | End: 2021-06-08

## 2021-01-22 RX ORDER — DICYCLOMINE HYDROCHLORIDE 20 MG/1
20 TABLET ORAL EVERY 6 HOURS PRN
Qty: 30 TABLET | Refills: 0 | Status: SHIPPED | OUTPATIENT
Start: 2021-01-22 | End: 2021-01-22 | Stop reason: SDUPTHER

## 2021-01-22 RX ORDER — FUROSEMIDE 20 MG/1
TABLET ORAL
COMMUNITY
Start: 2020-10-27 | End: 2021-06-08

## 2021-03-01 ENCOUNTER — OFFICE VISIT (OUTPATIENT)
Dept: ENDOCRINOLOGY | Facility: CLINIC | Age: 66
End: 2021-03-01
Payer: MEDICARE

## 2021-03-01 ENCOUNTER — CLINICAL SUPPORT (OUTPATIENT)
Dept: INTERNAL MEDICINE | Facility: CLINIC | Age: 66
End: 2021-03-01
Payer: MEDICARE

## 2021-03-01 VITALS — DIASTOLIC BLOOD PRESSURE: 60 MMHG | SYSTOLIC BLOOD PRESSURE: 120 MMHG | HEART RATE: 86 BPM | TEMPERATURE: 97 F

## 2021-03-01 VITALS
SYSTOLIC BLOOD PRESSURE: 120 MMHG | OXYGEN SATURATION: 94 % | HEIGHT: 68 IN | BODY MASS INDEX: 31.02 KG/M2 | HEART RATE: 86 BPM | TEMPERATURE: 97 F | DIASTOLIC BLOOD PRESSURE: 60 MMHG | WEIGHT: 204.69 LBS

## 2021-03-01 DIAGNOSIS — E78.5 HYPERLIPIDEMIA ASSOCIATED WITH TYPE 2 DIABETES MELLITUS: ICD-10-CM

## 2021-03-01 DIAGNOSIS — E55.9 HYPOVITAMINOSIS D: ICD-10-CM

## 2021-03-01 DIAGNOSIS — R94.6 THYROID FUNCTION TEST ABNORMAL: ICD-10-CM

## 2021-03-01 DIAGNOSIS — D53.9 NUTRITIONAL ANEMIA: ICD-10-CM

## 2021-03-01 DIAGNOSIS — E11.65 TYPE 2 DIABETES MELLITUS WITH HYPERGLYCEMIA, UNSPECIFIED WHETHER LONG TERM INSULIN USE: Primary | ICD-10-CM

## 2021-03-01 DIAGNOSIS — E66.9 OBESITY (BMI 30-39.9): ICD-10-CM

## 2021-03-01 DIAGNOSIS — R09.89 CAROTID BRUIT, UNSPECIFIED LATERALITY: ICD-10-CM

## 2021-03-01 DIAGNOSIS — Z78.0 POSTMENOPAUSAL: ICD-10-CM

## 2021-03-01 DIAGNOSIS — I10 ESSENTIAL HYPERTENSION: ICD-10-CM

## 2021-03-01 DIAGNOSIS — E11.69 HYPERLIPIDEMIA ASSOCIATED WITH TYPE 2 DIABETES MELLITUS: ICD-10-CM

## 2021-03-01 DIAGNOSIS — E78.00 HYPERCHOLESTEROLEMIA: ICD-10-CM

## 2021-03-01 DIAGNOSIS — F32.A DEPRESSION, UNSPECIFIED DEPRESSION TYPE: ICD-10-CM

## 2021-03-01 PROCEDURE — 99204 PR OFFICE/OUTPT VISIT, NEW, LEVL IV, 45-59 MIN: ICD-10-PCS | Mod: S$PBB,,, | Performed by: INTERNAL MEDICINE

## 2021-03-01 PROCEDURE — 99204 OFFICE O/P NEW MOD 45 MIN: CPT | Mod: S$PBB,,, | Performed by: INTERNAL MEDICINE

## 2021-03-01 PROCEDURE — 90471 IMMUNIZATION ADMIN: CPT | Mod: PBBFAC,PO

## 2021-03-01 PROCEDURE — 99999 PR PBB SHADOW E&M-NEW PATIENT-LVL IV: ICD-10-PCS | Mod: PBBFAC,,, | Performed by: INTERNAL MEDICINE

## 2021-03-01 PROCEDURE — 99999 PR PBB SHADOW E&M-EST. PATIENT-LVL III: CPT | Mod: PBBFAC,,,

## 2021-03-01 PROCEDURE — 99999 PR PBB SHADOW E&M-NEW PATIENT-LVL IV: CPT | Mod: PBBFAC,,, | Performed by: INTERNAL MEDICINE

## 2021-03-01 PROCEDURE — 99999 PR PBB SHADOW E&M-EST. PATIENT-LVL III: ICD-10-PCS | Mod: PBBFAC,,,

## 2021-03-01 PROCEDURE — 99204 OFFICE O/P NEW MOD 45 MIN: CPT | Mod: PBBFAC,25,PO | Performed by: INTERNAL MEDICINE

## 2021-03-01 RX ORDER — DICYCLOMINE HYDROCHLORIDE 20 MG/1
TABLET ORAL
COMMUNITY
Start: 2021-01-22 | End: 2021-06-08

## 2021-03-01 RX ORDER — TRAVOPROST OPHTHALMIC SOLUTION 0.04 MG/ML
SOLUTION OPHTHALMIC
COMMUNITY
Start: 2021-02-01 | End: 2021-09-08

## 2021-03-01 RX ORDER — DULAGLUTIDE 0.75 MG/.5ML
INJECTION, SOLUTION SUBCUTANEOUS
COMMUNITY
Start: 2021-02-03 | End: 2021-04-06 | Stop reason: DRUGHIGH

## 2021-03-01 RX ORDER — NAPROXEN SODIUM 220 MG/1
81 TABLET, FILM COATED ORAL DAILY
Qty: 90 TABLET | Refills: 3 | Status: SHIPPED | OUTPATIENT
Start: 2021-03-01 | End: 2021-12-10

## 2021-03-01 RX ORDER — INSULIN GLARGINE 100 [IU]/ML
60 INJECTION, SOLUTION SUBCUTANEOUS
COMMUNITY
Start: 2021-02-04 | End: 2021-06-08

## 2021-03-01 RX ORDER — ALPRAZOLAM 1 MG/1
TABLET ORAL
COMMUNITY
Start: 2021-02-20 | End: 2021-12-10

## 2021-03-01 RX ORDER — EXENATIDE 2 MG/.85ML
INJECTION, SUSPENSION, EXTENDED RELEASE SUBCUTANEOUS
COMMUNITY
Start: 2020-12-31 | End: 2021-03-01

## 2021-03-01 RX ORDER — ATORVASTATIN CALCIUM 40 MG/1
40 TABLET, FILM COATED ORAL DAILY
COMMUNITY
End: 2021-04-06 | Stop reason: DRUGHIGH

## 2021-03-01 RX ORDER — LOSARTAN POTASSIUM AND HYDROCHLOROTHIAZIDE 25; 100 MG/1; MG/1
1 TABLET ORAL DAILY
COMMUNITY
Start: 2021-02-01 | End: 2021-06-08

## 2021-03-01 RX ORDER — LANCETS 33 GAUGE
EACH MISCELLANEOUS
COMMUNITY

## 2021-03-01 RX ORDER — POTASSIUM CHLORIDE 750 MG/1
10 TABLET, EXTENDED RELEASE ORAL DAILY
COMMUNITY
End: 2021-09-08

## 2021-03-01 RX ORDER — FUROSEMIDE 20 MG/1
20 TABLET ORAL 2 TIMES DAILY
COMMUNITY
Start: 2021-02-02 | End: 2021-07-12 | Stop reason: SDUPTHER

## 2021-03-01 RX ORDER — METFORMIN HYDROCHLORIDE 1000 MG/1
TABLET ORAL
COMMUNITY
Start: 2021-02-20 | End: 2021-04-06 | Stop reason: DRUGHIGH

## 2021-03-01 RX ORDER — PAROXETINE HYDROCHLORIDE 20 MG/1
20 TABLET, FILM COATED ORAL DAILY
COMMUNITY
Start: 2021-02-01 | End: 2021-06-08

## 2021-04-01 ENCOUNTER — TELEPHONE (OUTPATIENT)
Dept: FAMILY MEDICINE | Facility: CLINIC | Age: 66
End: 2021-04-01

## 2021-04-06 ENCOUNTER — LAB VISIT (OUTPATIENT)
Dept: LAB | Facility: HOSPITAL | Age: 66
End: 2021-04-06
Attending: INTERNAL MEDICINE
Payer: MEDICARE

## 2021-04-06 DIAGNOSIS — R94.6 THYROID FUNCTION TEST ABNORMAL: ICD-10-CM

## 2021-04-06 DIAGNOSIS — D53.9 NUTRITIONAL ANEMIA: ICD-10-CM

## 2021-04-06 DIAGNOSIS — E55.9 HYPOVITAMINOSIS D: ICD-10-CM

## 2021-04-06 DIAGNOSIS — E11.65 TYPE 2 DIABETES MELLITUS WITH HYPERGLYCEMIA, UNSPECIFIED WHETHER LONG TERM INSULIN USE: Primary | ICD-10-CM

## 2021-04-06 DIAGNOSIS — E78.5 HYPERLIPIDEMIA ASSOCIATED WITH TYPE 2 DIABETES MELLITUS: ICD-10-CM

## 2021-04-06 DIAGNOSIS — E83.52 HYPERCALCEMIA: ICD-10-CM

## 2021-04-06 DIAGNOSIS — E11.65 TYPE 2 DIABETES MELLITUS WITH HYPERGLYCEMIA, UNSPECIFIED WHETHER LONG TERM INSULIN USE: ICD-10-CM

## 2021-04-06 DIAGNOSIS — E78.00 HYPERCHOLESTEROLEMIA: ICD-10-CM

## 2021-04-06 DIAGNOSIS — I10 ESSENTIAL HYPERTENSION: ICD-10-CM

## 2021-04-06 DIAGNOSIS — E11.69 HYPERLIPIDEMIA ASSOCIATED WITH TYPE 2 DIABETES MELLITUS: ICD-10-CM

## 2021-04-06 LAB
ALBUMIN SERPL BCP-MCNC: 4 G/DL (ref 3.5–5.2)
ALP SERPL-CCNC: 74 U/L (ref 55–135)
ALT SERPL W/O P-5'-P-CCNC: 22 U/L (ref 10–44)
AMYLASE SERPL-CCNC: 42 U/L (ref 20–110)
ANION GAP SERPL CALC-SCNC: 12 MMOL/L (ref 8–16)
AST SERPL-CCNC: 26 U/L (ref 10–40)
BASOPHILS # BLD AUTO: 0.06 K/UL (ref 0–0.2)
BASOPHILS NFR BLD: 0.7 % (ref 0–1.9)
BILIRUB SERPL-MCNC: 0.3 MG/DL (ref 0.1–1)
BUN SERPL-MCNC: 16 MG/DL (ref 8–23)
CA-I BLDV-SCNC: 1.42 MMOL/L (ref 1.06–1.42)
CALCIUM SERPL-MCNC: 11.5 MG/DL (ref 8.7–10.5)
CHLORIDE SERPL-SCNC: 99 MMOL/L (ref 95–110)
CHOLEST SERPL-MCNC: 253 MG/DL (ref 120–199)
CHOLEST/HDLC SERPL: 6.8 {RATIO} (ref 2–5)
CO2 SERPL-SCNC: 28 MMOL/L (ref 23–29)
CREAT SERPL-MCNC: 0.8 MG/DL (ref 0.5–1.4)
DIFFERENTIAL METHOD: ABNORMAL
EOSINOPHIL # BLD AUTO: 0.2 K/UL (ref 0–0.5)
EOSINOPHIL NFR BLD: 2.6 % (ref 0–8)
ERYTHROCYTE [DISTWIDTH] IN BLOOD BY AUTOMATED COUNT: 14.4 % (ref 11.5–14.5)
EST. GFR  (AFRICAN AMERICAN): >60 ML/MIN/1.73 M^2
EST. GFR  (NON AFRICAN AMERICAN): >60 ML/MIN/1.73 M^2
ESTIMATED AVG GLUCOSE: 189 MG/DL (ref 68–131)
FOLATE SERPL-MCNC: 6 NG/ML (ref 4–24)
GLUCOSE SERPL-MCNC: 97 MG/DL (ref 70–110)
HBA1C MFR BLD: 8.2 % (ref 4–5.6)
HCT VFR BLD AUTO: 39.5 % (ref 37–48.5)
HDLC SERPL-MCNC: 37 MG/DL (ref 40–75)
HDLC SERPL: 14.6 % (ref 20–50)
HGB BLD-MCNC: 12.6 G/DL (ref 12–16)
IMM GRANULOCYTES # BLD AUTO: 0.03 K/UL (ref 0–0.04)
IMM GRANULOCYTES NFR BLD AUTO: 0.4 % (ref 0–0.5)
LDLC SERPL CALC-MCNC: 136.6 MG/DL (ref 63–159)
LIPASE SERPL-CCNC: 17 U/L (ref 4–60)
LYMPHOCYTES # BLD AUTO: 3.3 K/UL (ref 1–4.8)
LYMPHOCYTES NFR BLD: 40 % (ref 18–48)
MCH RBC QN AUTO: 27 PG (ref 27–31)
MCHC RBC AUTO-ENTMCNC: 31.9 G/DL (ref 32–36)
MCV RBC AUTO: 85 FL (ref 82–98)
MONOCYTES # BLD AUTO: 0.4 K/UL (ref 0.3–1)
MONOCYTES NFR BLD: 5 % (ref 4–15)
NEUTROPHILS # BLD AUTO: 4.2 K/UL (ref 1.8–7.7)
NEUTROPHILS NFR BLD: 51.3 % (ref 38–73)
NONHDLC SERPL-MCNC: 216 MG/DL
NRBC BLD-RTO: 0 /100 WBC
PLATELET # BLD AUTO: 357 K/UL (ref 150–450)
PMV BLD AUTO: 9.6 FL (ref 9.2–12.9)
POTASSIUM SERPL-SCNC: 4 MMOL/L (ref 3.5–5.1)
PROT SERPL-MCNC: 8 G/DL (ref 6–8.4)
PTH-INTACT SERPL-MCNC: 54.7 PG/ML (ref 9–77)
RBC # BLD AUTO: 4.66 M/UL (ref 4–5.4)
SODIUM SERPL-SCNC: 139 MMOL/L (ref 136–145)
T3 SERPL-MCNC: 102 NG/DL (ref 60–180)
T4 FREE SERPL-MCNC: 1.03 NG/DL (ref 0.71–1.51)
TRIGL SERPL-MCNC: 397 MG/DL (ref 30–150)
TSH SERPL DL<=0.005 MIU/L-ACNC: 2.1 UIU/ML (ref 0.4–4)
URATE SERPL-MCNC: 7 MG/DL (ref 2.4–5.7)
VIT B12 SERPL-MCNC: >2000 PG/ML (ref 210–950)
WBC # BLD AUTO: 8.19 K/UL (ref 3.9–12.7)

## 2021-04-06 PROCEDURE — 82306 VITAMIN D 25 HYDROXY: CPT | Performed by: INTERNAL MEDICINE

## 2021-04-06 PROCEDURE — 84550 ASSAY OF BLOOD/URIC ACID: CPT | Performed by: INTERNAL MEDICINE

## 2021-04-06 PROCEDURE — 82330 ASSAY OF CALCIUM: CPT | Performed by: INTERNAL MEDICINE

## 2021-04-06 PROCEDURE — 84378 SUGARS SINGLE QUANT: CPT | Performed by: INTERNAL MEDICINE

## 2021-04-06 PROCEDURE — 80053 COMPREHEN METABOLIC PANEL: CPT | Performed by: INTERNAL MEDICINE

## 2021-04-06 PROCEDURE — 36415 COLL VENOUS BLD VENIPUNCTURE: CPT | Performed by: INTERNAL MEDICINE

## 2021-04-06 PROCEDURE — 80061 LIPID PANEL: CPT | Performed by: INTERNAL MEDICINE

## 2021-04-06 PROCEDURE — 84480 ASSAY TRIIODOTHYRONINE (T3): CPT | Performed by: INTERNAL MEDICINE

## 2021-04-06 PROCEDURE — 83970 ASSAY OF PARATHORMONE: CPT | Performed by: INTERNAL MEDICINE

## 2021-04-06 PROCEDURE — 84443 ASSAY THYROID STIM HORMONE: CPT | Performed by: INTERNAL MEDICINE

## 2021-04-06 PROCEDURE — 85025 COMPLETE CBC W/AUTO DIFF WBC: CPT | Performed by: INTERNAL MEDICINE

## 2021-04-06 PROCEDURE — 82746 ASSAY OF FOLIC ACID SERUM: CPT | Performed by: INTERNAL MEDICINE

## 2021-04-06 PROCEDURE — 82607 VITAMIN B-12: CPT | Performed by: INTERNAL MEDICINE

## 2021-04-06 PROCEDURE — 83036 HEMOGLOBIN GLYCOSYLATED A1C: CPT | Performed by: INTERNAL MEDICINE

## 2021-04-06 PROCEDURE — 83690 ASSAY OF LIPASE: CPT | Performed by: INTERNAL MEDICINE

## 2021-04-06 PROCEDURE — 82150 ASSAY OF AMYLASE: CPT | Performed by: INTERNAL MEDICINE

## 2021-04-06 PROCEDURE — 84439 ASSAY OF FREE THYROXINE: CPT | Performed by: INTERNAL MEDICINE

## 2021-04-06 PROCEDURE — 82985 ASSAY OF GLYCATED PROTEIN: CPT | Performed by: INTERNAL MEDICINE

## 2021-04-06 PROCEDURE — 82747 ASSAY OF FOLIC ACID RBC: CPT | Performed by: INTERNAL MEDICINE

## 2021-04-06 RX ORDER — ATORVASTATIN CALCIUM 80 MG/1
80 TABLET, FILM COATED ORAL DAILY
Qty: 90 TABLET | Refills: 3 | Status: SHIPPED | OUTPATIENT
Start: 2021-04-06 | End: 2021-05-17 | Stop reason: SDUPTHER

## 2021-04-06 RX ORDER — METFORMIN HYDROCHLORIDE 850 MG/1
850 TABLET ORAL
Qty: 270 TABLET | Refills: 3 | Status: SHIPPED | OUTPATIENT
Start: 2021-04-06 | End: 2021-07-12

## 2021-04-06 RX ORDER — DULAGLUTIDE 1.5 MG/.5ML
1.5 INJECTION, SOLUTION SUBCUTANEOUS WEEKLY
Qty: 12 PEN | Refills: 3 | Status: SHIPPED | OUTPATIENT
Start: 2021-04-06 | End: 2021-05-17 | Stop reason: SDUPTHER

## 2021-04-07 LAB
25(OH)D3+25(OH)D2 SERPL-MCNC: 30 NG/ML (ref 30–96)
CALCIT SERPL-MCNC: <5 PG/ML
THRYOGLOBULIN INTERPRETATION: ABNORMAL
THYROGLOB AB SERPL-ACNC: <1.8 IU/ML
THYROGLOB SERPL-MCNC: 7.7 NG/ML
VIT B12 SERPL-MCNC: >1400 NG/L (ref 180–914)

## 2021-04-08 PROBLEM — E55.9 HYPOVITAMINOSIS D: Status: ACTIVE | Noted: 2021-04-08

## 2021-04-09 LAB — GLYCOMARK (TM): 9 UG/ML

## 2021-04-12 LAB
FOLATE RBC-MCNC: 666 NG/ML (ref 280–791)
FRUCTOSAMINE SERPL-SCNC: 250 UMOL /L

## 2021-04-14 ENCOUNTER — TELEPHONE (OUTPATIENT)
Dept: DIABETES | Facility: CLINIC | Age: 66
End: 2021-04-14

## 2021-04-21 ENCOUNTER — TELEPHONE (OUTPATIENT)
Dept: ENDOCRINOLOGY | Facility: CLINIC | Age: 66
End: 2021-04-21

## 2021-04-21 ENCOUNTER — TELEPHONE (OUTPATIENT)
Dept: ADMINISTRATIVE | Facility: HOSPITAL | Age: 66
End: 2021-04-21

## 2021-04-21 RX ORDER — ALPRAZOLAM 1 MG/1
1 TABLET ORAL 2 TIMES DAILY PRN
Refills: 3 | OUTPATIENT
Start: 2021-04-21

## 2021-04-27 DIAGNOSIS — E11.9 DIABETES MELLITUS DUE TO INSULIN RECEPTOR ANTIBODIES: ICD-10-CM

## 2021-04-27 RX ORDER — INSULIN GLARGINE 100 [IU]/ML
INJECTION, SOLUTION SUBCUTANEOUS
Qty: 15 ML | Refills: 3 | Status: SHIPPED | OUTPATIENT
Start: 2021-04-27 | End: 2022-01-30

## 2021-04-28 RX ORDER — PAROXETINE HYDROCHLORIDE 20 MG/1
TABLET, FILM COATED ORAL
Refills: 3 | OUTPATIENT
Start: 2021-04-28

## 2021-04-30 DIAGNOSIS — E11.65 TYPE 2 DIABETES MELLITUS WITH HYPERGLYCEMIA, UNSPECIFIED WHETHER LONG TERM INSULIN USE: ICD-10-CM

## 2021-04-30 RX ORDER — METFORMIN HYDROCHLORIDE 850 MG/1
850 TABLET ORAL
Qty: 270 TABLET | Refills: 3 | OUTPATIENT
Start: 2021-04-30 | End: 2021-07-29

## 2021-04-30 RX ORDER — FUROSEMIDE 20 MG/1
20 TABLET ORAL 2 TIMES DAILY
Qty: 180 TABLET | Refills: 3 | OUTPATIENT
Start: 2021-04-30 | End: 2021-07-29

## 2021-04-30 RX ORDER — INSULIN PUMP SYRINGE, 3 ML
EACH MISCELLANEOUS
Qty: 1 EACH | Refills: 0 | OUTPATIENT
Start: 2021-04-30 | End: 2022-04-30

## 2021-04-30 RX ORDER — LOSARTAN POTASSIUM AND HYDROCHLOROTHIAZIDE 25; 100 MG/1; MG/1
1 TABLET ORAL DAILY
Qty: 90 TABLET | Refills: 3 | OUTPATIENT
Start: 2021-04-30 | End: 2021-07-29

## 2021-04-30 RX ORDER — PAROXETINE HYDROCHLORIDE 20 MG/1
20 TABLET, FILM COATED ORAL DAILY
OUTPATIENT
Start: 2021-04-30

## 2021-05-11 ENCOUNTER — PATIENT MESSAGE (OUTPATIENT)
Dept: ENDOCRINOLOGY | Facility: CLINIC | Age: 66
End: 2021-05-11

## 2021-05-15 ENCOUNTER — PATIENT MESSAGE (OUTPATIENT)
Dept: ENDOCRINOLOGY | Facility: CLINIC | Age: 66
End: 2021-05-15

## 2021-05-17 DIAGNOSIS — E78.5 HYPERLIPIDEMIA ASSOCIATED WITH TYPE 2 DIABETES MELLITUS: ICD-10-CM

## 2021-05-17 DIAGNOSIS — E11.65 TYPE 2 DIABETES MELLITUS WITH HYPERGLYCEMIA, UNSPECIFIED WHETHER LONG TERM INSULIN USE: ICD-10-CM

## 2021-05-17 DIAGNOSIS — E11.69 HYPERLIPIDEMIA ASSOCIATED WITH TYPE 2 DIABETES MELLITUS: ICD-10-CM

## 2021-05-17 DIAGNOSIS — E78.00 HYPERCHOLESTEROLEMIA: ICD-10-CM

## 2021-05-17 DIAGNOSIS — I10 ESSENTIAL HYPERTENSION: Primary | ICD-10-CM

## 2021-05-17 RX ORDER — BLOOD SUGAR DIAGNOSTIC
STRIP MISCELLANEOUS
Qty: 50 STRIP | Refills: 3 | OUTPATIENT
Start: 2021-05-17

## 2021-05-17 RX ORDER — METFORMIN HYDROCHLORIDE 850 MG/1
850 TABLET ORAL
Qty: 270 TABLET | Refills: 3 | Status: CANCELLED | OUTPATIENT
Start: 2021-05-17 | End: 2021-08-15

## 2021-05-17 RX ORDER — LOSARTAN POTASSIUM AND HYDROCHLOROTHIAZIDE 25; 100 MG/1; MG/1
TABLET ORAL
Qty: 90 TABLET | Refills: 3 | Status: SHIPPED | OUTPATIENT
Start: 2021-05-17 | End: 2021-07-12

## 2021-05-17 RX ORDER — DULAGLUTIDE 1.5 MG/.5ML
1.5 INJECTION, SOLUTION SUBCUTANEOUS WEEKLY
Qty: 12 PEN | Refills: 3 | Status: SHIPPED | OUTPATIENT
Start: 2021-05-17 | End: 2021-06-30 | Stop reason: SDUPTHER

## 2021-05-17 RX ORDER — ATORVASTATIN CALCIUM 80 MG/1
80 TABLET, FILM COATED ORAL DAILY
Qty: 90 TABLET | Refills: 3 | Status: SHIPPED | OUTPATIENT
Start: 2021-05-17 | End: 2022-03-08 | Stop reason: SDUPTHER

## 2021-06-01 ENCOUNTER — HOSPITAL ENCOUNTER (OUTPATIENT)
Dept: RADIOLOGY | Facility: HOSPITAL | Age: 66
Discharge: HOME | End: 2021-06-01
Attending: INTERNAL MEDICINE
Payer: MEDICARE

## 2021-06-01 DIAGNOSIS — E78.5 HYPERLIPIDEMIA ASSOCIATED WITH TYPE 2 DIABETES MELLITUS: ICD-10-CM

## 2021-06-01 DIAGNOSIS — R09.89 CAROTID BRUIT, UNSPECIFIED LATERALITY: ICD-10-CM

## 2021-06-01 DIAGNOSIS — E78.00 HYPERCHOLESTEROLEMIA: ICD-10-CM

## 2021-06-01 DIAGNOSIS — E11.69 HYPERLIPIDEMIA ASSOCIATED WITH TYPE 2 DIABETES MELLITUS: ICD-10-CM

## 2021-06-01 DIAGNOSIS — I10 ESSENTIAL HYPERTENSION: ICD-10-CM

## 2021-06-01 PROCEDURE — 93880 EXTRACRANIAL BILAT STUDY: CPT | Mod: 26,,, | Performed by: RADIOLOGY

## 2021-06-01 PROCEDURE — 93880 US CAROTID BILATERAL: ICD-10-PCS | Mod: 26,,, | Performed by: RADIOLOGY

## 2021-06-01 PROCEDURE — 93880 EXTRACRANIAL BILAT STUDY: CPT | Mod: TC

## 2021-06-03 ENCOUNTER — TELEPHONE (OUTPATIENT)
Dept: OBSTETRICS AND GYNECOLOGY | Facility: CLINIC | Age: 66
End: 2021-06-03

## 2021-06-03 ENCOUNTER — CLINICAL SUPPORT (OUTPATIENT)
Dept: DIABETES | Facility: CLINIC | Age: 66
End: 2021-06-03
Payer: MEDICARE

## 2021-06-03 DIAGNOSIS — F33.9 RECURRENT MAJOR DEPRESSIVE DISORDER, REMISSION STATUS UNSPECIFIED: ICD-10-CM

## 2021-06-03 DIAGNOSIS — E11.65 TYPE 2 DIABETES MELLITUS WITH HYPERGLYCEMIA, UNSPECIFIED WHETHER LONG TERM INSULIN USE: ICD-10-CM

## 2021-06-03 DIAGNOSIS — F41.9 ANXIETY: Primary | ICD-10-CM

## 2021-06-03 PROCEDURE — G0108 PR DIAB MANAGE TRN  PER INDIV: ICD-10-PCS | Mod: S$GLB,,, | Performed by: DIETITIAN, REGISTERED

## 2021-06-03 PROCEDURE — G0108 DIAB MANAGE TRN  PER INDIV: HCPCS | Mod: S$GLB,,, | Performed by: DIETITIAN, REGISTERED

## 2021-06-08 ENCOUNTER — PATIENT OUTREACH (OUTPATIENT)
Dept: ADMINISTRATIVE | Facility: HOSPITAL | Age: 66
End: 2021-06-08

## 2021-06-08 ENCOUNTER — OFFICE VISIT (OUTPATIENT)
Dept: PRIMARY CARE CLINIC | Facility: CLINIC | Age: 66
End: 2021-06-08
Payer: MEDICARE

## 2021-06-08 ENCOUNTER — LAB VISIT (OUTPATIENT)
Dept: LAB | Facility: HOSPITAL | Age: 66
End: 2021-06-08
Attending: INTERNAL MEDICINE
Payer: MEDICARE

## 2021-06-08 VITALS
HEART RATE: 99 BPM | RESPIRATION RATE: 18 BRPM | TEMPERATURE: 98 F | BODY MASS INDEX: 30.87 KG/M2 | DIASTOLIC BLOOD PRESSURE: 67 MMHG | SYSTOLIC BLOOD PRESSURE: 124 MMHG | WEIGHT: 203.69 LBS | OXYGEN SATURATION: 99 % | HEIGHT: 68 IN

## 2021-06-08 DIAGNOSIS — R41.3 MEMORY IMPAIRMENT: ICD-10-CM

## 2021-06-08 DIAGNOSIS — R42 DIZZINESS AND GIDDINESS: ICD-10-CM

## 2021-06-08 DIAGNOSIS — F41.9 ANXIETY: ICD-10-CM

## 2021-06-08 DIAGNOSIS — F32.9 MAJOR DEPRESSIVE DISORDER WITH CURRENT ACTIVE EPISODE, UNSPECIFIED DEPRESSION EPISODE SEVERITY, UNSPECIFIED WHETHER RECURRENT: ICD-10-CM

## 2021-06-08 DIAGNOSIS — Z12.31 SCREENING MAMMOGRAM, ENCOUNTER FOR: ICD-10-CM

## 2021-06-08 DIAGNOSIS — E11.69 HYPERLIPIDEMIA ASSOCIATED WITH TYPE 2 DIABETES MELLITUS: ICD-10-CM

## 2021-06-08 DIAGNOSIS — Z11.59 NEED FOR HEPATITIS C SCREENING TEST: ICD-10-CM

## 2021-06-08 DIAGNOSIS — E11.65 TYPE 2 DIABETES MELLITUS WITH HYPERGLYCEMIA, WITH LONG-TERM CURRENT USE OF INSULIN: Primary | ICD-10-CM

## 2021-06-08 DIAGNOSIS — Z91.199 NONCOMPLIANCE: ICD-10-CM

## 2021-06-08 DIAGNOSIS — Z79.4 TYPE 2 DIABETES MELLITUS WITH HYPERGLYCEMIA, WITH LONG-TERM CURRENT USE OF INSULIN: Primary | ICD-10-CM

## 2021-06-08 DIAGNOSIS — N39.46 MIXED INCONTINENCE URGE AND STRESS (MALE)(FEMALE): ICD-10-CM

## 2021-06-08 DIAGNOSIS — R26.89 IMBALANCE: ICD-10-CM

## 2021-06-08 DIAGNOSIS — E78.5 HYPERLIPIDEMIA ASSOCIATED WITH TYPE 2 DIABETES MELLITUS: ICD-10-CM

## 2021-06-08 DIAGNOSIS — R60.0 BILATERAL LEG EDEMA: ICD-10-CM

## 2021-06-08 DIAGNOSIS — E66.09 CLASS 1 OBESITY DUE TO EXCESS CALORIES WITH SERIOUS COMORBIDITY AND BODY MASS INDEX (BMI) OF 30.0 TO 30.9 IN ADULT: ICD-10-CM

## 2021-06-08 DIAGNOSIS — N81.11 CYSTOCELE, MIDLINE: ICD-10-CM

## 2021-06-08 DIAGNOSIS — Z12.4 ENCOUNTER FOR PAPANICOLAOU SMEAR FOR CERVICAL CANCER SCREENING: ICD-10-CM

## 2021-06-08 PROBLEM — E66.811 CLASS 1 OBESITY DUE TO EXCESS CALORIES WITH SERIOUS COMORBIDITY AND BODY MASS INDEX (BMI) OF 30.0 TO 30.9 IN ADULT: Status: ACTIVE | Noted: 2021-03-01

## 2021-06-08 PROBLEM — E78.00 HYPERCHOLESTEROLEMIA: Status: RESOLVED | Noted: 2021-03-01 | Resolved: 2021-06-08

## 2021-06-08 LAB
CREAT SERPL-MCNC: 0.7 MG/DL (ref 0.5–1.4)
EST. GFR  (AFRICAN AMERICAN): >60 ML/MIN/1.73 M^2
EST. GFR  (NON AFRICAN AMERICAN): >60 ML/MIN/1.73 M^2
FOLATE SERPL-MCNC: 5.9 NG/ML (ref 4–24)
VIT B12 SERPL-MCNC: >2000 PG/ML (ref 210–950)

## 2021-06-08 PROCEDURE — 1159F PR MEDICATION LIST DOCUMENTED IN MEDICAL RECORD: ICD-10-PCS | Mod: S$GLB,,, | Performed by: INTERNAL MEDICINE

## 2021-06-08 PROCEDURE — 1159F MED LIST DOCD IN RCRD: CPT | Mod: S$GLB,,, | Performed by: INTERNAL MEDICINE

## 2021-06-08 PROCEDURE — 99204 PR OFFICE/OUTPT VISIT, NEW, LEVL IV, 45-59 MIN: ICD-10-PCS | Mod: S$GLB,,, | Performed by: INTERNAL MEDICINE

## 2021-06-08 PROCEDURE — 99499 UNLISTED E&M SERVICE: CPT | Mod: S$GLB,,, | Performed by: INTERNAL MEDICINE

## 2021-06-08 PROCEDURE — 3288F PR FALLS RISK ASSESSMENT DOCUMENTED: ICD-10-PCS | Mod: CPTII,S$GLB,, | Performed by: INTERNAL MEDICINE

## 2021-06-08 PROCEDURE — 1126F PR PAIN SEVERITY QUANTIFIED, NO PAIN PRESENT: ICD-10-PCS | Mod: S$GLB,,, | Performed by: INTERNAL MEDICINE

## 2021-06-08 PROCEDURE — 82565 ASSAY OF CREATININE: CPT | Performed by: INTERNAL MEDICINE

## 2021-06-08 PROCEDURE — 99204 OFFICE O/P NEW MOD 45 MIN: CPT | Mod: S$GLB,,, | Performed by: INTERNAL MEDICINE

## 2021-06-08 PROCEDURE — 1101F PT FALLS ASSESS-DOCD LE1/YR: CPT | Mod: CPTII,S$GLB,, | Performed by: INTERNAL MEDICINE

## 2021-06-08 PROCEDURE — 3008F BODY MASS INDEX DOCD: CPT | Mod: CPTII,S$GLB,, | Performed by: INTERNAL MEDICINE

## 2021-06-08 PROCEDURE — 1101F PR PT FALLS ASSESS DOC 0-1 FALLS W/OUT INJ PAST YR: ICD-10-PCS | Mod: CPTII,S$GLB,, | Performed by: INTERNAL MEDICINE

## 2021-06-08 PROCEDURE — 99999 PR PBB SHADOW E&M-EST. PATIENT-LVL V: CPT | Mod: PBBFAC,,, | Performed by: INTERNAL MEDICINE

## 2021-06-08 PROCEDURE — 3052F HG A1C>EQUAL 8.0%<EQUAL 9.0%: CPT | Mod: CPTII,S$GLB,, | Performed by: INTERNAL MEDICINE

## 2021-06-08 PROCEDURE — 86803 HEPATITIS C AB TEST: CPT | Performed by: INTERNAL MEDICINE

## 2021-06-08 PROCEDURE — 3052F PR MOST RECENT HEMOGLOBIN A1C LEVEL 8.0 - < 9.0%: ICD-10-PCS | Mod: CPTII,S$GLB,, | Performed by: INTERNAL MEDICINE

## 2021-06-08 PROCEDURE — 84425 ASSAY OF VITAMIN B-1: CPT | Performed by: INTERNAL MEDICINE

## 2021-06-08 PROCEDURE — 3288F FALL RISK ASSESSMENT DOCD: CPT | Mod: CPTII,S$GLB,, | Performed by: INTERNAL MEDICINE

## 2021-06-08 PROCEDURE — 99999 PR PBB SHADOW E&M-EST. PATIENT-LVL V: ICD-10-PCS | Mod: PBBFAC,,, | Performed by: INTERNAL MEDICINE

## 2021-06-08 PROCEDURE — 3008F PR BODY MASS INDEX (BMI) DOCUMENTED: ICD-10-PCS | Mod: CPTII,S$GLB,, | Performed by: INTERNAL MEDICINE

## 2021-06-08 PROCEDURE — 82746 ASSAY OF FOLIC ACID SERUM: CPT | Performed by: INTERNAL MEDICINE

## 2021-06-08 PROCEDURE — 82607 VITAMIN B-12: CPT | Performed by: INTERNAL MEDICINE

## 2021-06-08 PROCEDURE — 1126F AMNT PAIN NOTED NONE PRSNT: CPT | Mod: S$GLB,,, | Performed by: INTERNAL MEDICINE

## 2021-06-08 PROCEDURE — 99499 RISK ADDL DX/OHS AUDIT: ICD-10-PCS | Mod: S$GLB,,, | Performed by: INTERNAL MEDICINE

## 2021-06-08 RX ORDER — PAROXETINE HYDROCHLORIDE 20 MG/1
20 TABLET, FILM COATED ORAL DAILY
Qty: 90 TABLET | Refills: 0 | Status: SHIPPED | OUTPATIENT
Start: 2021-06-08 | End: 2021-07-08

## 2021-06-09 LAB — HCV AB SERPL QL IA: NEGATIVE

## 2021-06-10 ENCOUNTER — HOSPITAL ENCOUNTER (OUTPATIENT)
Dept: RADIOLOGY | Facility: HOSPITAL | Age: 66
Discharge: HOME | End: 2021-06-10
Attending: INTERNAL MEDICINE
Payer: MEDICARE

## 2021-06-10 ENCOUNTER — TELEPHONE (OUTPATIENT)
Dept: PHARMACY | Facility: CLINIC | Age: 66
End: 2021-06-10

## 2021-06-10 DIAGNOSIS — Z12.31 SCREENING MAMMOGRAM, ENCOUNTER FOR: ICD-10-CM

## 2021-06-10 PROCEDURE — 77067 MAMMO DIGITAL SCREENING BILAT WITH TOMO: ICD-10-PCS | Mod: 26,,, | Performed by: RADIOLOGY

## 2021-06-10 PROCEDURE — 77067 SCR MAMMO BI INCL CAD: CPT | Mod: TC,PN

## 2021-06-10 PROCEDURE — 77063 MAMMO DIGITAL SCREENING BILAT WITH TOMO: ICD-10-PCS | Mod: 26,,, | Performed by: RADIOLOGY

## 2021-06-10 PROCEDURE — 77067 SCR MAMMO BI INCL CAD: CPT | Mod: 26,,, | Performed by: RADIOLOGY

## 2021-06-10 PROCEDURE — 77063 BREAST TOMOSYNTHESIS BI: CPT | Mod: 26,,, | Performed by: RADIOLOGY

## 2021-06-15 LAB — VIT B1 BLD-MCNC: 48 UG/L (ref 38–122)

## 2021-06-17 ENCOUNTER — PATIENT OUTREACH (OUTPATIENT)
Dept: ADMINISTRATIVE | Facility: OTHER | Age: 66
End: 2021-06-17

## 2021-06-17 DIAGNOSIS — Z12.11 ENCOUNTER FOR FIT (FECAL IMMUNOCHEMICAL TEST) SCREENING: Primary | ICD-10-CM

## 2021-06-18 ENCOUNTER — OFFICE VISIT (OUTPATIENT)
Dept: UROGYNECOLOGY | Facility: CLINIC | Age: 66
End: 2021-06-18
Payer: MEDICARE

## 2021-06-18 VITALS — WEIGHT: 196.88 LBS | HEIGHT: 68 IN | BODY MASS INDEX: 29.84 KG/M2

## 2021-06-18 DIAGNOSIS — Z12.11 SPECIAL SCREENING FOR MALIGNANT NEOPLASM OF COLON: Primary | ICD-10-CM

## 2021-06-18 DIAGNOSIS — N39.46 MIXED INCONTINENCE URGE AND STRESS (MALE)(FEMALE): ICD-10-CM

## 2021-06-18 PROCEDURE — 99999 PR PBB SHADOW E&M-EST. PATIENT-LVL III: ICD-10-PCS | Mod: PBBFAC,,, | Performed by: OBSTETRICS & GYNECOLOGY

## 2021-06-18 PROCEDURE — 1159F PR MEDICATION LIST DOCUMENTED IN MEDICAL RECORD: ICD-10-PCS | Mod: S$GLB,,, | Performed by: OBSTETRICS & GYNECOLOGY

## 2021-06-18 PROCEDURE — 3008F PR BODY MASS INDEX (BMI) DOCUMENTED: ICD-10-PCS | Mod: CPTII,S$GLB,, | Performed by: OBSTETRICS & GYNECOLOGY

## 2021-06-18 PROCEDURE — 1101F PT FALLS ASSESS-DOCD LE1/YR: CPT | Mod: CPTII,S$GLB,, | Performed by: OBSTETRICS & GYNECOLOGY

## 2021-06-18 PROCEDURE — 3008F BODY MASS INDEX DOCD: CPT | Mod: CPTII,S$GLB,, | Performed by: OBSTETRICS & GYNECOLOGY

## 2021-06-18 PROCEDURE — 99999 PR PBB SHADOW E&M-EST. PATIENT-LVL III: CPT | Mod: PBBFAC,,, | Performed by: OBSTETRICS & GYNECOLOGY

## 2021-06-18 PROCEDURE — 99204 PR OFFICE/OUTPT VISIT, NEW, LEVL IV, 45-59 MIN: ICD-10-PCS | Mod: S$GLB,,, | Performed by: OBSTETRICS & GYNECOLOGY

## 2021-06-18 PROCEDURE — 1126F PR PAIN SEVERITY QUANTIFIED, NO PAIN PRESENT: ICD-10-PCS | Mod: S$GLB,,, | Performed by: OBSTETRICS & GYNECOLOGY

## 2021-06-18 PROCEDURE — 3288F FALL RISK ASSESSMENT DOCD: CPT | Mod: CPTII,S$GLB,, | Performed by: OBSTETRICS & GYNECOLOGY

## 2021-06-18 PROCEDURE — 99204 OFFICE O/P NEW MOD 45 MIN: CPT | Mod: S$GLB,,, | Performed by: OBSTETRICS & GYNECOLOGY

## 2021-06-18 PROCEDURE — 1126F AMNT PAIN NOTED NONE PRSNT: CPT | Mod: S$GLB,,, | Performed by: OBSTETRICS & GYNECOLOGY

## 2021-06-18 PROCEDURE — 1159F MED LIST DOCD IN RCRD: CPT | Mod: S$GLB,,, | Performed by: OBSTETRICS & GYNECOLOGY

## 2021-06-18 PROCEDURE — 1101F PR PT FALLS ASSESS DOC 0-1 FALLS W/OUT INJ PAST YR: ICD-10-PCS | Mod: CPTII,S$GLB,, | Performed by: OBSTETRICS & GYNECOLOGY

## 2021-06-18 PROCEDURE — 3288F PR FALLS RISK ASSESSMENT DOCUMENTED: ICD-10-PCS | Mod: CPTII,S$GLB,, | Performed by: OBSTETRICS & GYNECOLOGY

## 2021-06-18 RX ORDER — MIRABEGRON 25 MG/1
25 TABLET, FILM COATED, EXTENDED RELEASE ORAL DAILY
Qty: 30 TABLET | Refills: 11 | Status: SHIPPED | OUTPATIENT
Start: 2021-06-18 | End: 2021-09-27 | Stop reason: CLARIF

## 2021-06-28 ENCOUNTER — LAB VISIT (OUTPATIENT)
Dept: LAB | Facility: HOSPITAL | Age: 66
End: 2021-06-28
Attending: INTERNAL MEDICINE
Payer: MEDICARE

## 2021-06-28 ENCOUNTER — OFFICE VISIT (OUTPATIENT)
Dept: OBSTETRICS AND GYNECOLOGY | Facility: CLINIC | Age: 66
End: 2021-06-28
Payer: MEDICARE

## 2021-06-28 VITALS
DIASTOLIC BLOOD PRESSURE: 70 MMHG | WEIGHT: 201.75 LBS | SYSTOLIC BLOOD PRESSURE: 126 MMHG | BODY MASS INDEX: 30.67 KG/M2

## 2021-06-28 DIAGNOSIS — Z01.419 ENCOUNTER FOR ANNUAL ROUTINE GYNECOLOGICAL EXAMINATION: Primary | ICD-10-CM

## 2021-06-28 DIAGNOSIS — N95.1 HOT FLASHES DUE TO MENOPAUSE: ICD-10-CM

## 2021-06-28 DIAGNOSIS — Z12.4 ENCOUNTER FOR PAPANICOLAOU SMEAR FOR CERVICAL CANCER SCREENING: ICD-10-CM

## 2021-06-28 DIAGNOSIS — Z12.4 SCREENING FOR MALIGNANT NEOPLASM OF CERVIX: ICD-10-CM

## 2021-06-28 LAB — ESTRADIOL SERPL-MCNC: 13 PG/ML

## 2021-06-28 PROCEDURE — 1101F PT FALLS ASSESS-DOCD LE1/YR: CPT | Mod: CPTII,,, | Performed by: NURSE PRACTITIONER

## 2021-06-28 PROCEDURE — 88175 CYTOPATH C/V AUTO FLUID REDO: CPT | Performed by: PATHOLOGY

## 2021-06-28 PROCEDURE — 82670 ASSAY OF TOTAL ESTRADIOL: CPT | Performed by: NURSE PRACTITIONER

## 2021-06-28 PROCEDURE — G0101 CA SCREEN;PELVIC/BREAST EXAM: HCPCS | Mod: ,,, | Performed by: NURSE PRACTITIONER

## 2021-06-28 PROCEDURE — 99999 PR PBB SHADOW E&M-EST. PATIENT-LVL IV: CPT | Mod: PBBFAC,,, | Performed by: NURSE PRACTITIONER

## 2021-06-28 PROCEDURE — 3288F PR FALLS RISK ASSESSMENT DOCUMENTED: ICD-10-PCS | Mod: CPTII,,, | Performed by: NURSE PRACTITIONER

## 2021-06-28 PROCEDURE — 88141 CYTOPATH C/V INTERPRET: CPT | Mod: ,,, | Performed by: PATHOLOGY

## 2021-06-28 PROCEDURE — 1101F PR PT FALLS ASSESS DOC 0-1 FALLS W/OUT INJ PAST YR: ICD-10-PCS | Mod: CPTII,,, | Performed by: NURSE PRACTITIONER

## 2021-06-28 PROCEDURE — 87624 HPV HI-RISK TYP POOLED RSLT: CPT | Performed by: NURSE PRACTITIONER

## 2021-06-28 PROCEDURE — 3008F PR BODY MASS INDEX (BMI) DOCUMENTED: ICD-10-PCS | Mod: CPTII,,, | Performed by: NURSE PRACTITIONER

## 2021-06-28 PROCEDURE — 36415 COLL VENOUS BLD VENIPUNCTURE: CPT | Mod: PN | Performed by: NURSE PRACTITIONER

## 2021-06-28 PROCEDURE — 1126F AMNT PAIN NOTED NONE PRSNT: CPT | Mod: ,,, | Performed by: NURSE PRACTITIONER

## 2021-06-28 PROCEDURE — 3288F FALL RISK ASSESSMENT DOCD: CPT | Mod: CPTII,,, | Performed by: NURSE PRACTITIONER

## 2021-06-28 PROCEDURE — 99999 PR PBB SHADOW E&M-EST. PATIENT-LVL IV: ICD-10-PCS | Mod: PBBFAC,,, | Performed by: NURSE PRACTITIONER

## 2021-06-28 PROCEDURE — 88141 PR  CYTOPATH CERV/VAG INTERPRET: ICD-10-PCS | Mod: ,,, | Performed by: PATHOLOGY

## 2021-06-28 PROCEDURE — 1126F PR PAIN SEVERITY QUANTIFIED, NO PAIN PRESENT: ICD-10-PCS | Mod: ,,, | Performed by: NURSE PRACTITIONER

## 2021-06-28 PROCEDURE — G0101 PR CA SCREEN;PELVIC/BREAST EXAM: ICD-10-PCS | Mod: ,,, | Performed by: NURSE PRACTITIONER

## 2021-06-28 PROCEDURE — 3008F BODY MASS INDEX DOCD: CPT | Mod: CPTII,,, | Performed by: NURSE PRACTITIONER

## 2021-06-29 ENCOUNTER — PATIENT MESSAGE (OUTPATIENT)
Dept: OBSTETRICS AND GYNECOLOGY | Facility: CLINIC | Age: 66
End: 2021-06-29

## 2021-06-30 DIAGNOSIS — E11.65 TYPE 2 DIABETES MELLITUS WITH HYPERGLYCEMIA, UNSPECIFIED WHETHER LONG TERM INSULIN USE: ICD-10-CM

## 2021-07-01 RX ORDER — DULAGLUTIDE 1.5 MG/.5ML
1.5 INJECTION, SOLUTION SUBCUTANEOUS WEEKLY
Qty: 12 PEN | Refills: 3 | Status: SHIPPED | OUTPATIENT
Start: 2021-07-01 | End: 2021-08-05 | Stop reason: SDUPTHER

## 2021-07-06 ENCOUNTER — TELEPHONE (OUTPATIENT)
Dept: OBSTETRICS AND GYNECOLOGY | Facility: CLINIC | Age: 66
End: 2021-07-06

## 2021-07-06 LAB
FINAL PATHOLOGIC DIAGNOSIS: ABNORMAL
HPV HR 12 DNA SPEC QL NAA+PROBE: POSITIVE
HPV16 AG SPEC QL: NEGATIVE
HPV18 DNA SPEC QL NAA+PROBE: NEGATIVE
Lab: ABNORMAL

## 2021-07-09 ENCOUNTER — LAB VISIT (OUTPATIENT)
Dept: LAB | Facility: OTHER | Age: 66
End: 2021-07-09
Attending: INTERNAL MEDICINE
Payer: MEDICARE

## 2021-07-09 ENCOUNTER — OFFICE VISIT (OUTPATIENT)
Dept: ENDOCRINOLOGY | Facility: CLINIC | Age: 66
End: 2021-07-09
Payer: MEDICARE

## 2021-07-09 VITALS
WEIGHT: 201.75 LBS | HEART RATE: 91 BPM | BODY MASS INDEX: 30.58 KG/M2 | HEIGHT: 68 IN | DIASTOLIC BLOOD PRESSURE: 57 MMHG | SYSTOLIC BLOOD PRESSURE: 111 MMHG

## 2021-07-09 DIAGNOSIS — E11.65 TYPE 2 DIABETES MELLITUS WITH HYPERGLYCEMIA, WITH LONG-TERM CURRENT USE OF INSULIN: ICD-10-CM

## 2021-07-09 DIAGNOSIS — E83.52 HYPERCALCEMIA: Primary | ICD-10-CM

## 2021-07-09 DIAGNOSIS — E83.52 HYPERCALCEMIA: ICD-10-CM

## 2021-07-09 DIAGNOSIS — E78.5 HYPERLIPIDEMIA ASSOCIATED WITH TYPE 2 DIABETES MELLITUS: ICD-10-CM

## 2021-07-09 DIAGNOSIS — Z79.4 TYPE 2 DIABETES MELLITUS WITH HYPERGLYCEMIA, WITH LONG-TERM CURRENT USE OF INSULIN: ICD-10-CM

## 2021-07-09 DIAGNOSIS — E66.09 CLASS 1 OBESITY DUE TO EXCESS CALORIES WITH SERIOUS COMORBIDITY AND BODY MASS INDEX (BMI) OF 30.0 TO 30.9 IN ADULT: ICD-10-CM

## 2021-07-09 DIAGNOSIS — E11.69 HYPERLIPIDEMIA ASSOCIATED WITH TYPE 2 DIABETES MELLITUS: ICD-10-CM

## 2021-07-09 LAB
ALBUMIN SERPL BCP-MCNC: 4 G/DL (ref 3.5–5.2)
ALP SERPL-CCNC: 87 U/L (ref 55–135)
ALT SERPL W/O P-5'-P-CCNC: 14 U/L (ref 10–44)
ANION GAP SERPL CALC-SCNC: 15 MMOL/L (ref 8–16)
AST SERPL-CCNC: 25 U/L (ref 10–40)
BILIRUB SERPL-MCNC: 0.5 MG/DL (ref 0.1–1)
BUN SERPL-MCNC: 16 MG/DL (ref 8–23)
CA-I BLDV-SCNC: 1.38 MMOL/L (ref 1.06–1.42)
CALCIUM SERPL-MCNC: 11.4 MG/DL (ref 8.7–10.5)
CHLORIDE SERPL-SCNC: 97 MMOL/L (ref 95–110)
CO2 SERPL-SCNC: 25 MMOL/L (ref 23–29)
CREAT SERPL-MCNC: 0.8 MG/DL (ref 0.5–1.4)
EST. GFR  (AFRICAN AMERICAN): >60 ML/MIN/1.73 M^2
EST. GFR  (NON AFRICAN AMERICAN): >60 ML/MIN/1.73 M^2
ESTIMATED AVG GLUCOSE: 226 MG/DL (ref 68–131)
GLUCOSE SERPL-MCNC: 206 MG/DL (ref 70–110)
HBA1C MFR BLD: 9.5 % (ref 4–5.6)
POTASSIUM SERPL-SCNC: 3.8 MMOL/L (ref 3.5–5.1)
PROT SERPL-MCNC: 7.9 G/DL (ref 6–8.4)
PTH-INTACT SERPL-MCNC: 42.6 PG/ML (ref 9–77)
SODIUM SERPL-SCNC: 137 MMOL/L (ref 136–145)

## 2021-07-09 PROCEDURE — 3288F FALL RISK ASSESSMENT DOCD: CPT | Mod: CPTII,S$GLB,, | Performed by: INTERNAL MEDICINE

## 2021-07-09 PROCEDURE — 1126F AMNT PAIN NOTED NONE PRSNT: CPT | Mod: S$GLB,,, | Performed by: INTERNAL MEDICINE

## 2021-07-09 PROCEDURE — 3008F BODY MASS INDEX DOCD: CPT | Mod: CPTII,S$GLB,, | Performed by: INTERNAL MEDICINE

## 2021-07-09 PROCEDURE — 3052F HG A1C>EQUAL 8.0%<EQUAL 9.0%: CPT | Mod: CPTII,S$GLB,, | Performed by: INTERNAL MEDICINE

## 2021-07-09 PROCEDURE — 83970 ASSAY OF PARATHORMONE: CPT | Performed by: INTERNAL MEDICINE

## 2021-07-09 PROCEDURE — 36415 COLL VENOUS BLD VENIPUNCTURE: CPT | Performed by: INTERNAL MEDICINE

## 2021-07-09 PROCEDURE — 1101F PT FALLS ASSESS-DOCD LE1/YR: CPT | Mod: CPTII,S$GLB,, | Performed by: INTERNAL MEDICINE

## 2021-07-09 PROCEDURE — 3008F PR BODY MASS INDEX (BMI) DOCUMENTED: ICD-10-PCS | Mod: CPTII,S$GLB,, | Performed by: INTERNAL MEDICINE

## 2021-07-09 PROCEDURE — 99214 OFFICE O/P EST MOD 30 MIN: CPT | Mod: S$GLB,,, | Performed by: INTERNAL MEDICINE

## 2021-07-09 PROCEDURE — 1101F PR PT FALLS ASSESS DOC 0-1 FALLS W/OUT INJ PAST YR: ICD-10-PCS | Mod: CPTII,S$GLB,, | Performed by: INTERNAL MEDICINE

## 2021-07-09 PROCEDURE — 82330 ASSAY OF CALCIUM: CPT | Performed by: INTERNAL MEDICINE

## 2021-07-09 PROCEDURE — 3052F PR MOST RECENT HEMOGLOBIN A1C LEVEL 8.0 - < 9.0%: ICD-10-PCS | Mod: CPTII,S$GLB,, | Performed by: INTERNAL MEDICINE

## 2021-07-09 PROCEDURE — 99214 PR OFFICE/OUTPT VISIT, EST, LEVL IV, 30-39 MIN: ICD-10-PCS | Mod: S$GLB,,, | Performed by: INTERNAL MEDICINE

## 2021-07-09 PROCEDURE — 83036 HEMOGLOBIN GLYCOSYLATED A1C: CPT | Performed by: INTERNAL MEDICINE

## 2021-07-09 PROCEDURE — 80053 COMPREHEN METABOLIC PANEL: CPT | Performed by: INTERNAL MEDICINE

## 2021-07-09 PROCEDURE — 3288F PR FALLS RISK ASSESSMENT DOCUMENTED: ICD-10-PCS | Mod: CPTII,S$GLB,, | Performed by: INTERNAL MEDICINE

## 2021-07-09 PROCEDURE — 1126F PR PAIN SEVERITY QUANTIFIED, NO PAIN PRESENT: ICD-10-PCS | Mod: S$GLB,,, | Performed by: INTERNAL MEDICINE

## 2021-07-09 RX ORDER — INSULIN PUMP SYRINGE, 3 ML
EACH MISCELLANEOUS
Qty: 1 EACH | Refills: 0 | Status: SHIPPED | OUTPATIENT
Start: 2021-07-09

## 2021-07-09 RX ORDER — LANCETS
EACH MISCELLANEOUS
Qty: 200 EACH | Refills: 11 | Status: SHIPPED | OUTPATIENT
Start: 2021-07-09 | End: 2024-02-28

## 2021-07-12 ENCOUNTER — OFFICE VISIT (OUTPATIENT)
Dept: PRIMARY CARE CLINIC | Facility: CLINIC | Age: 66
End: 2021-07-12
Payer: MEDICARE

## 2021-07-12 ENCOUNTER — PATIENT OUTREACH (OUTPATIENT)
Dept: ADMINISTRATIVE | Facility: HOSPITAL | Age: 66
End: 2021-07-12

## 2021-07-12 VITALS
WEIGHT: 203.06 LBS | DIASTOLIC BLOOD PRESSURE: 67 MMHG | TEMPERATURE: 98 F | OXYGEN SATURATION: 98 % | HEIGHT: 68 IN | BODY MASS INDEX: 30.78 KG/M2 | HEART RATE: 94 BPM | SYSTOLIC BLOOD PRESSURE: 126 MMHG | RESPIRATION RATE: 19 BRPM

## 2021-07-12 DIAGNOSIS — E78.5 HYPERLIPIDEMIA ASSOCIATED WITH TYPE 2 DIABETES MELLITUS: Primary | ICD-10-CM

## 2021-07-12 DIAGNOSIS — I10 HYPERTENSION, UNSPECIFIED TYPE: ICD-10-CM

## 2021-07-12 DIAGNOSIS — Z79.4 TYPE 2 DIABETES MELLITUS WITH HYPERGLYCEMIA, WITH LONG-TERM CURRENT USE OF INSULIN: ICD-10-CM

## 2021-07-12 DIAGNOSIS — R60.0 BILATERAL LEG EDEMA: ICD-10-CM

## 2021-07-12 DIAGNOSIS — F32.9 MAJOR DEPRESSIVE DISORDER WITH CURRENT ACTIVE EPISODE, UNSPECIFIED DEPRESSION EPISODE SEVERITY, UNSPECIFIED WHETHER RECURRENT: ICD-10-CM

## 2021-07-12 DIAGNOSIS — E83.52 HYPERCALCEMIA: ICD-10-CM

## 2021-07-12 DIAGNOSIS — E11.65 TYPE 2 DIABETES MELLITUS WITH HYPERGLYCEMIA, WITH LONG-TERM CURRENT USE OF INSULIN: ICD-10-CM

## 2021-07-12 DIAGNOSIS — R19.7 DIARRHEA, UNSPECIFIED TYPE: ICD-10-CM

## 2021-07-12 DIAGNOSIS — F41.9 ANXIETY: ICD-10-CM

## 2021-07-12 DIAGNOSIS — E11.69 HYPERLIPIDEMIA ASSOCIATED WITH TYPE 2 DIABETES MELLITUS: Primary | ICD-10-CM

## 2021-07-12 PROCEDURE — 3288F FALL RISK ASSESSMENT DOCD: CPT | Mod: CPTII,S$GLB,, | Performed by: INTERNAL MEDICINE

## 2021-07-12 PROCEDURE — 3046F HEMOGLOBIN A1C LEVEL >9.0%: CPT | Mod: CPTII,S$GLB,, | Performed by: INTERNAL MEDICINE

## 2021-07-12 PROCEDURE — 99999 PR PBB SHADOW E&M-EST. PATIENT-LVL V: ICD-10-PCS | Mod: PBBFAC,,, | Performed by: INTERNAL MEDICINE

## 2021-07-12 PROCEDURE — 3046F PR MOST RECENT HEMOGLOBIN A1C LEVEL > 9.0%: ICD-10-PCS | Mod: CPTII,S$GLB,, | Performed by: INTERNAL MEDICINE

## 2021-07-12 PROCEDURE — 99499 UNLISTED E&M SERVICE: CPT | Mod: S$GLB,,, | Performed by: INTERNAL MEDICINE

## 2021-07-12 PROCEDURE — 3008F BODY MASS INDEX DOCD: CPT | Mod: CPTII,S$GLB,, | Performed by: INTERNAL MEDICINE

## 2021-07-12 PROCEDURE — 99214 PR OFFICE/OUTPT VISIT, EST, LEVL IV, 30-39 MIN: ICD-10-PCS | Mod: S$GLB,,, | Performed by: INTERNAL MEDICINE

## 2021-07-12 PROCEDURE — 1126F AMNT PAIN NOTED NONE PRSNT: CPT | Mod: S$GLB,,, | Performed by: INTERNAL MEDICINE

## 2021-07-12 PROCEDURE — 3078F PR MOST RECENT DIASTOLIC BLOOD PRESSURE < 80 MM HG: ICD-10-PCS | Mod: CPTII,S$GLB,, | Performed by: INTERNAL MEDICINE

## 2021-07-12 PROCEDURE — 3288F PR FALLS RISK ASSESSMENT DOCUMENTED: ICD-10-PCS | Mod: CPTII,S$GLB,, | Performed by: INTERNAL MEDICINE

## 2021-07-12 PROCEDURE — 1101F PR PT FALLS ASSESS DOC 0-1 FALLS W/OUT INJ PAST YR: ICD-10-PCS | Mod: CPTII,S$GLB,, | Performed by: INTERNAL MEDICINE

## 2021-07-12 PROCEDURE — 99999 PR PBB SHADOW E&M-EST. PATIENT-LVL V: CPT | Mod: PBBFAC,,, | Performed by: INTERNAL MEDICINE

## 2021-07-12 PROCEDURE — 3008F PR BODY MASS INDEX (BMI) DOCUMENTED: ICD-10-PCS | Mod: CPTII,S$GLB,, | Performed by: INTERNAL MEDICINE

## 2021-07-12 PROCEDURE — 1159F PR MEDICATION LIST DOCUMENTED IN MEDICAL RECORD: ICD-10-PCS | Mod: S$GLB,,, | Performed by: INTERNAL MEDICINE

## 2021-07-12 PROCEDURE — 3074F SYST BP LT 130 MM HG: CPT | Mod: CPTII,S$GLB,, | Performed by: INTERNAL MEDICINE

## 2021-07-12 PROCEDURE — 99214 OFFICE O/P EST MOD 30 MIN: CPT | Mod: S$GLB,,, | Performed by: INTERNAL MEDICINE

## 2021-07-12 PROCEDURE — 1101F PT FALLS ASSESS-DOCD LE1/YR: CPT | Mod: CPTII,S$GLB,, | Performed by: INTERNAL MEDICINE

## 2021-07-12 PROCEDURE — 1159F MED LIST DOCD IN RCRD: CPT | Mod: S$GLB,,, | Performed by: INTERNAL MEDICINE

## 2021-07-12 PROCEDURE — 3078F DIAST BP <80 MM HG: CPT | Mod: CPTII,S$GLB,, | Performed by: INTERNAL MEDICINE

## 2021-07-12 PROCEDURE — 1126F PR PAIN SEVERITY QUANTIFIED, NO PAIN PRESENT: ICD-10-PCS | Mod: S$GLB,,, | Performed by: INTERNAL MEDICINE

## 2021-07-12 PROCEDURE — 99499 RISK ADDL DX/OHS AUDIT: ICD-10-PCS | Mod: S$GLB,,, | Performed by: INTERNAL MEDICINE

## 2021-07-12 PROCEDURE — 3074F PR MOST RECENT SYSTOLIC BLOOD PRESSURE < 130 MM HG: ICD-10-PCS | Mod: CPTII,S$GLB,, | Performed by: INTERNAL MEDICINE

## 2021-07-12 RX ORDER — METFORMIN HYDROCHLORIDE 500 MG/1
TABLET, EXTENDED RELEASE ORAL
Qty: 180 TABLET | Refills: 0 | Status: SHIPPED | OUTPATIENT
Start: 2021-07-12 | End: 2021-09-08 | Stop reason: SDUPTHER

## 2021-07-12 RX ORDER — FUROSEMIDE 20 MG/1
20 TABLET ORAL DAILY
Qty: 90 TABLET | Refills: 0 | Status: SHIPPED | OUTPATIENT
Start: 2021-07-12 | End: 2021-08-05

## 2021-07-12 RX ORDER — LATANOPROST 50 UG/ML
SOLUTION/ DROPS OPHTHALMIC
COMMUNITY
Start: 2021-06-03 | End: 2022-04-01 | Stop reason: ALTCHOICE

## 2021-07-12 RX ORDER — LOSARTAN POTASSIUM 100 MG/1
100 TABLET ORAL DAILY
Qty: 90 TABLET | Refills: 1 | Status: SHIPPED | OUTPATIENT
Start: 2021-07-12 | End: 2021-10-05

## 2021-07-13 ENCOUNTER — PATIENT MESSAGE (OUTPATIENT)
Dept: PRIMARY CARE CLINIC | Facility: CLINIC | Age: 66
End: 2021-07-13

## 2021-07-13 ENCOUNTER — PATIENT OUTREACH (OUTPATIENT)
Dept: ADMINISTRATIVE | Facility: HOSPITAL | Age: 66
End: 2021-07-13

## 2021-07-13 ENCOUNTER — TELEPHONE (OUTPATIENT)
Dept: PRIMARY CARE CLINIC | Facility: CLINIC | Age: 66
End: 2021-07-13

## 2021-07-19 ENCOUNTER — LAB VISIT (OUTPATIENT)
Dept: LAB | Facility: HOSPITAL | Age: 66
End: 2021-07-19
Attending: INTERNAL MEDICINE
Payer: MEDICARE

## 2021-07-19 DIAGNOSIS — E78.5 HYPERLIPIDEMIA ASSOCIATED WITH TYPE 2 DIABETES MELLITUS: ICD-10-CM

## 2021-07-19 DIAGNOSIS — I10 HYPERTENSION, UNSPECIFIED TYPE: ICD-10-CM

## 2021-07-19 DIAGNOSIS — E11.69 HYPERLIPIDEMIA ASSOCIATED WITH TYPE 2 DIABETES MELLITUS: ICD-10-CM

## 2021-07-19 LAB
ANION GAP SERPL CALC-SCNC: 12 MMOL/L (ref 8–16)
BUN SERPL-MCNC: 8 MG/DL (ref 8–23)
CALCIUM SERPL-MCNC: 10.2 MG/DL (ref 8.7–10.5)
CHLORIDE SERPL-SCNC: 106 MMOL/L (ref 95–110)
CHOLEST SERPL-MCNC: 155 MG/DL (ref 120–199)
CHOLEST/HDLC SERPL: 4.8 {RATIO} (ref 2–5)
CO2 SERPL-SCNC: 24 MMOL/L (ref 23–29)
CREAT SERPL-MCNC: 0.8 MG/DL (ref 0.5–1.4)
EST. GFR  (AFRICAN AMERICAN): >60 ML/MIN/1.73 M^2
EST. GFR  (NON AFRICAN AMERICAN): >60 ML/MIN/1.73 M^2
GLUCOSE SERPL-MCNC: 171 MG/DL (ref 70–110)
HDLC SERPL-MCNC: 32 MG/DL (ref 40–75)
HDLC SERPL: 20.6 % (ref 20–50)
LDLC SERPL CALC-MCNC: 92.8 MG/DL (ref 63–159)
NONHDLC SERPL-MCNC: 123 MG/DL
POTASSIUM SERPL-SCNC: 3.8 MMOL/L (ref 3.5–5.1)
SODIUM SERPL-SCNC: 142 MMOL/L (ref 136–145)
TRIGL SERPL-MCNC: 151 MG/DL (ref 30–150)

## 2021-07-19 PROCEDURE — 80048 BASIC METABOLIC PNL TOTAL CA: CPT | Performed by: INTERNAL MEDICINE

## 2021-07-19 PROCEDURE — 80061 LIPID PANEL: CPT | Performed by: INTERNAL MEDICINE

## 2021-07-19 PROCEDURE — 36415 COLL VENOUS BLD VENIPUNCTURE: CPT | Mod: PN | Performed by: INTERNAL MEDICINE

## 2021-07-20 ENCOUNTER — TELEPHONE (OUTPATIENT)
Dept: PRIMARY CARE CLINIC | Facility: CLINIC | Age: 66
End: 2021-07-20

## 2021-07-22 DIAGNOSIS — E83.52 HYPERCALCEMIA: ICD-10-CM

## 2021-07-22 DIAGNOSIS — E11.65 TYPE 2 DIABETES MELLITUS WITH HYPERGLYCEMIA, WITH LONG-TERM CURRENT USE OF INSULIN: Primary | ICD-10-CM

## 2021-07-22 DIAGNOSIS — Z79.4 TYPE 2 DIABETES MELLITUS WITH HYPERGLYCEMIA, WITH LONG-TERM CURRENT USE OF INSULIN: Primary | ICD-10-CM

## 2021-07-27 ENCOUNTER — PROCEDURE VISIT (OUTPATIENT)
Dept: OBSTETRICS AND GYNECOLOGY | Facility: CLINIC | Age: 66
End: 2021-07-27
Payer: MEDICARE

## 2021-07-27 VITALS
BODY MASS INDEX: 30.77 KG/M2 | SYSTOLIC BLOOD PRESSURE: 122 MMHG | WEIGHT: 202.38 LBS | DIASTOLIC BLOOD PRESSURE: 68 MMHG

## 2021-07-27 DIAGNOSIS — R87.619 ATYPICAL GLANDULAR CELLS ON CERVICAL PAP SMEAR: Primary | ICD-10-CM

## 2021-07-27 PROCEDURE — 58110 BIOPSY (GYNECOLOGICAL): ICD-10-PCS | Mod: S$GLB,,, | Performed by: OBSTETRICS & GYNECOLOGY

## 2021-07-27 PROCEDURE — 57454 BX/CURETT OF CERVIX W/SCOPE: CPT | Mod: S$GLB,,, | Performed by: OBSTETRICS & GYNECOLOGY

## 2021-07-27 PROCEDURE — 88305 TISSUE EXAM BY PATHOLOGIST: ICD-10-PCS | Mod: 26,,, | Performed by: PATHOLOGY

## 2021-07-27 PROCEDURE — 99499 UNLISTED E&M SERVICE: CPT | Mod: S$GLB,,, | Performed by: OBSTETRICS & GYNECOLOGY

## 2021-07-27 PROCEDURE — 57454 COLPOSCOPY: ICD-10-PCS | Mod: S$GLB,,, | Performed by: OBSTETRICS & GYNECOLOGY

## 2021-07-27 PROCEDURE — 88305 TISSUE EXAM BY PATHOLOGIST: CPT | Performed by: PATHOLOGY

## 2021-07-27 PROCEDURE — 58110 BX DONE W/COLPOSCOPY ADD-ON: CPT | Mod: S$GLB,,, | Performed by: OBSTETRICS & GYNECOLOGY

## 2021-07-27 PROCEDURE — 99499 NO LOS: ICD-10-PCS | Mod: S$GLB,,, | Performed by: OBSTETRICS & GYNECOLOGY

## 2021-07-27 PROCEDURE — 88305 TISSUE EXAM BY PATHOLOGIST: CPT | Mod: 26,,, | Performed by: PATHOLOGY

## 2021-07-27 RX ORDER — BLOOD-GLUCOSE METER
EACH MISCELLANEOUS
COMMUNITY
Start: 2021-07-11 | End: 2022-01-30

## 2021-07-27 RX ORDER — LOSARTAN POTASSIUM AND HYDROCHLOROTHIAZIDE 25; 100 MG/1; MG/1
1 TABLET ORAL DAILY
COMMUNITY
Start: 2021-03-16 | End: 2021-09-08

## 2021-07-30 LAB
FINAL PATHOLOGIC DIAGNOSIS: NORMAL
GROSS: NORMAL
Lab: NORMAL

## 2021-08-02 ENCOUNTER — TELEPHONE (OUTPATIENT)
Dept: OBSTETRICS AND GYNECOLOGY | Facility: CLINIC | Age: 66
End: 2021-08-02

## 2021-08-02 DIAGNOSIS — R87.619 ATYPICAL CERVICAL GLANDULAR CELLS: Primary | ICD-10-CM

## 2021-08-02 DIAGNOSIS — N87.1 CIN II (CERVICAL INTRAEPITHELIAL NEOPLASIA II): ICD-10-CM

## 2021-08-03 ENCOUNTER — TELEPHONE (OUTPATIENT)
Dept: SURGERY | Facility: CLINIC | Age: 66
End: 2021-08-03

## 2021-08-04 ENCOUNTER — PATIENT MESSAGE (OUTPATIENT)
Dept: ENDOCRINOLOGY | Facility: CLINIC | Age: 66
End: 2021-08-04

## 2021-08-04 DIAGNOSIS — E11.65 TYPE 2 DIABETES MELLITUS WITH HYPERGLYCEMIA, UNSPECIFIED WHETHER LONG TERM INSULIN USE: ICD-10-CM

## 2021-08-05 RX ORDER — DULAGLUTIDE 1.5 MG/.5ML
1.5 INJECTION, SOLUTION SUBCUTANEOUS WEEKLY
Qty: 12 PEN | Refills: 3 | Status: SHIPPED | OUTPATIENT
Start: 2021-08-05 | End: 2022-03-08 | Stop reason: SDUPTHER

## 2021-09-08 ENCOUNTER — OFFICE VISIT (OUTPATIENT)
Dept: PRIMARY CARE CLINIC | Facility: CLINIC | Age: 66
End: 2021-09-08
Payer: MEDICARE

## 2021-09-08 VITALS
HEIGHT: 68 IN | SYSTOLIC BLOOD PRESSURE: 127 MMHG | OXYGEN SATURATION: 97 % | RESPIRATION RATE: 18 BRPM | WEIGHT: 205.94 LBS | TEMPERATURE: 98 F | DIASTOLIC BLOOD PRESSURE: 72 MMHG | HEART RATE: 86 BPM | BODY MASS INDEX: 31.21 KG/M2

## 2021-09-08 DIAGNOSIS — E11.65 TYPE 2 DIABETES MELLITUS WITH HYPERGLYCEMIA, WITH LONG-TERM CURRENT USE OF INSULIN: Primary | ICD-10-CM

## 2021-09-08 DIAGNOSIS — Z79.4 TYPE 2 DIABETES MELLITUS WITH HYPERGLYCEMIA, WITH LONG-TERM CURRENT USE OF INSULIN: Primary | ICD-10-CM

## 2021-09-08 DIAGNOSIS — I10 HYPERTENSION, UNSPECIFIED TYPE: ICD-10-CM

## 2021-09-08 DIAGNOSIS — E11.69 HYPERLIPIDEMIA ASSOCIATED WITH TYPE 2 DIABETES MELLITUS: ICD-10-CM

## 2021-09-08 DIAGNOSIS — F32.9 MAJOR DEPRESSIVE DISORDER WITH CURRENT ACTIVE EPISODE, UNSPECIFIED DEPRESSION EPISODE SEVERITY, UNSPECIFIED WHETHER RECURRENT: ICD-10-CM

## 2021-09-08 DIAGNOSIS — E78.5 HYPERLIPIDEMIA ASSOCIATED WITH TYPE 2 DIABETES MELLITUS: ICD-10-CM

## 2021-09-08 DIAGNOSIS — R60.0 BILATERAL LEG EDEMA: ICD-10-CM

## 2021-09-08 PROCEDURE — 99999 PR PBB SHADOW E&M-EST. PATIENT-LVL V: ICD-10-PCS | Mod: PBBFAC,,, | Performed by: INTERNAL MEDICINE

## 2021-09-08 PROCEDURE — 3078F PR MOST RECENT DIASTOLIC BLOOD PRESSURE < 80 MM HG: ICD-10-PCS | Mod: CPTII,S$GLB,, | Performed by: INTERNAL MEDICINE

## 2021-09-08 PROCEDURE — 1160F RVW MEDS BY RX/DR IN RCRD: CPT | Mod: CPTII,S$GLB,, | Performed by: INTERNAL MEDICINE

## 2021-09-08 PROCEDURE — 1159F MED LIST DOCD IN RCRD: CPT | Mod: CPTII,S$GLB,, | Performed by: INTERNAL MEDICINE

## 2021-09-08 PROCEDURE — 4010F PR ACE/ARB THEARPY RXD/TAKEN: ICD-10-PCS | Mod: CPTII,S$GLB,, | Performed by: INTERNAL MEDICINE

## 2021-09-08 PROCEDURE — 1126F PR PAIN SEVERITY QUANTIFIED, NO PAIN PRESENT: ICD-10-PCS | Mod: CPTII,S$GLB,, | Performed by: INTERNAL MEDICINE

## 2021-09-08 PROCEDURE — 99214 OFFICE O/P EST MOD 30 MIN: CPT | Mod: S$GLB,,, | Performed by: INTERNAL MEDICINE

## 2021-09-08 PROCEDURE — 3066F PR DOCUMENTATION OF TREATMENT FOR NEPHROPATHY: ICD-10-PCS | Mod: CPTII,S$GLB,, | Performed by: INTERNAL MEDICINE

## 2021-09-08 PROCEDURE — 3288F FALL RISK ASSESSMENT DOCD: CPT | Mod: CPTII,S$GLB,, | Performed by: INTERNAL MEDICINE

## 2021-09-08 PROCEDURE — 3046F HEMOGLOBIN A1C LEVEL >9.0%: CPT | Mod: CPTII,S$GLB,, | Performed by: INTERNAL MEDICINE

## 2021-09-08 PROCEDURE — 3074F PR MOST RECENT SYSTOLIC BLOOD PRESSURE < 130 MM HG: ICD-10-PCS | Mod: CPTII,S$GLB,, | Performed by: INTERNAL MEDICINE

## 2021-09-08 PROCEDURE — 99499 RISK ADDL DX/OHS AUDIT: ICD-10-PCS | Mod: S$GLB,,, | Performed by: INTERNAL MEDICINE

## 2021-09-08 PROCEDURE — 3046F PR MOST RECENT HEMOGLOBIN A1C LEVEL > 9.0%: ICD-10-PCS | Mod: CPTII,S$GLB,, | Performed by: INTERNAL MEDICINE

## 2021-09-08 PROCEDURE — 1101F PR PT FALLS ASSESS DOC 0-1 FALLS W/OUT INJ PAST YR: ICD-10-PCS | Mod: CPTII,S$GLB,, | Performed by: INTERNAL MEDICINE

## 2021-09-08 PROCEDURE — 3074F SYST BP LT 130 MM HG: CPT | Mod: CPTII,S$GLB,, | Performed by: INTERNAL MEDICINE

## 2021-09-08 PROCEDURE — 1126F AMNT PAIN NOTED NONE PRSNT: CPT | Mod: CPTII,S$GLB,, | Performed by: INTERNAL MEDICINE

## 2021-09-08 PROCEDURE — 3061F PR NEG MICROALBUMINURIA RESULT DOCUMENTED/REVIEW: ICD-10-PCS | Mod: CPTII,S$GLB,, | Performed by: INTERNAL MEDICINE

## 2021-09-08 PROCEDURE — 99214 PR OFFICE/OUTPT VISIT, EST, LEVL IV, 30-39 MIN: ICD-10-PCS | Mod: S$GLB,,, | Performed by: INTERNAL MEDICINE

## 2021-09-08 PROCEDURE — 3008F PR BODY MASS INDEX (BMI) DOCUMENTED: ICD-10-PCS | Mod: CPTII,S$GLB,, | Performed by: INTERNAL MEDICINE

## 2021-09-08 PROCEDURE — 4010F ACE/ARB THERAPY RXD/TAKEN: CPT | Mod: CPTII,S$GLB,, | Performed by: INTERNAL MEDICINE

## 2021-09-08 PROCEDURE — 1159F PR MEDICATION LIST DOCUMENTED IN MEDICAL RECORD: ICD-10-PCS | Mod: CPTII,S$GLB,, | Performed by: INTERNAL MEDICINE

## 2021-09-08 PROCEDURE — 3288F PR FALLS RISK ASSESSMENT DOCUMENTED: ICD-10-PCS | Mod: CPTII,S$GLB,, | Performed by: INTERNAL MEDICINE

## 2021-09-08 PROCEDURE — 3066F NEPHROPATHY DOC TX: CPT | Mod: CPTII,S$GLB,, | Performed by: INTERNAL MEDICINE

## 2021-09-08 PROCEDURE — 3008F BODY MASS INDEX DOCD: CPT | Mod: CPTII,S$GLB,, | Performed by: INTERNAL MEDICINE

## 2021-09-08 PROCEDURE — 1160F PR REVIEW ALL MEDS BY PRESCRIBER/CLIN PHARMACIST DOCUMENTED: ICD-10-PCS | Mod: CPTII,S$GLB,, | Performed by: INTERNAL MEDICINE

## 2021-09-08 PROCEDURE — 3061F NEG MICROALBUMINURIA REV: CPT | Mod: CPTII,S$GLB,, | Performed by: INTERNAL MEDICINE

## 2021-09-08 PROCEDURE — 99499 UNLISTED E&M SERVICE: CPT | Mod: S$GLB,,, | Performed by: INTERNAL MEDICINE

## 2021-09-08 PROCEDURE — 99999 PR PBB SHADOW E&M-EST. PATIENT-LVL V: CPT | Mod: PBBFAC,,, | Performed by: INTERNAL MEDICINE

## 2021-09-08 PROCEDURE — 1101F PT FALLS ASSESS-DOCD LE1/YR: CPT | Mod: CPTII,S$GLB,, | Performed by: INTERNAL MEDICINE

## 2021-09-08 PROCEDURE — 3078F DIAST BP <80 MM HG: CPT | Mod: CPTII,S$GLB,, | Performed by: INTERNAL MEDICINE

## 2021-09-08 RX ORDER — FUROSEMIDE 20 MG/1
20 TABLET ORAL DAILY
Qty: 90 TABLET | Refills: 1 | Status: SHIPPED | OUTPATIENT
Start: 2021-09-08 | End: 2022-03-08 | Stop reason: SDUPTHER

## 2021-09-08 RX ORDER — METFORMIN HYDROCHLORIDE 500 MG/1
TABLET, EXTENDED RELEASE ORAL
Qty: 180 TABLET | Refills: 0 | Status: SHIPPED | OUTPATIENT
Start: 2021-09-08 | End: 2021-09-10

## 2021-09-08 RX ORDER — POTASSIUM CHLORIDE 750 MG/1
10 TABLET, EXTENDED RELEASE ORAL DAILY
Qty: 90 TABLET | Refills: 0 | Status: SHIPPED | OUTPATIENT
Start: 2021-09-08 | End: 2021-10-05

## 2021-09-26 ENCOUNTER — PATIENT OUTREACH (OUTPATIENT)
Dept: ADMINISTRATIVE | Facility: OTHER | Age: 66
End: 2021-09-26

## 2021-09-27 ENCOUNTER — ANESTHESIA EVENT (OUTPATIENT)
Dept: SURGERY | Facility: OTHER | Age: 66
End: 2021-09-27
Payer: MEDICARE

## 2021-09-27 ENCOUNTER — HOSPITAL ENCOUNTER (OUTPATIENT)
Dept: PREADMISSION TESTING | Facility: OTHER | Age: 66
Discharge: HOME | End: 2021-09-27
Attending: OBSTETRICS & GYNECOLOGY
Payer: MEDICARE

## 2021-09-27 ENCOUNTER — OFFICE VISIT (OUTPATIENT)
Dept: OBSTETRICS AND GYNECOLOGY | Facility: CLINIC | Age: 66
End: 2021-09-27
Payer: MEDICARE

## 2021-09-27 VITALS
HEART RATE: 78 BPM | TEMPERATURE: 98 F | OXYGEN SATURATION: 97 % | BODY MASS INDEX: 30.16 KG/M2 | WEIGHT: 199 LBS | HEIGHT: 68 IN | DIASTOLIC BLOOD PRESSURE: 62 MMHG | SYSTOLIC BLOOD PRESSURE: 125 MMHG | RESPIRATION RATE: 16 BRPM

## 2021-09-27 VITALS
SYSTOLIC BLOOD PRESSURE: 118 MMHG | WEIGHT: 199.75 LBS | BODY MASS INDEX: 30.27 KG/M2 | HEIGHT: 68 IN | DIASTOLIC BLOOD PRESSURE: 60 MMHG

## 2021-09-27 DIAGNOSIS — R87.619 ATYPICAL GLANDULAR CELLS ON CERVICAL PAP SMEAR: ICD-10-CM

## 2021-09-27 DIAGNOSIS — R87.619 ATYPICAL CERVICAL GLANDULAR CELLS: Primary | ICD-10-CM

## 2021-09-27 DIAGNOSIS — Z01.818 PRE-OP TESTING: ICD-10-CM

## 2021-09-27 LAB — SARS-COV-2 RDRP RESP QL NAA+PROBE: NEGATIVE

## 2021-09-27 PROCEDURE — U0002 COVID-19 LAB TEST NON-CDC: HCPCS | Performed by: ANESTHESIOLOGY

## 2021-09-27 PROCEDURE — 3046F HEMOGLOBIN A1C LEVEL >9.0%: CPT | Mod: CPTII,S$GLB,, | Performed by: OBSTETRICS & GYNECOLOGY

## 2021-09-27 PROCEDURE — 3066F PR DOCUMENTATION OF TREATMENT FOR NEPHROPATHY: ICD-10-PCS | Mod: CPTII,S$GLB,, | Performed by: OBSTETRICS & GYNECOLOGY

## 2021-09-27 PROCEDURE — 99999 PR PBB SHADOW E&M-EST. PATIENT-LVL III: CPT | Mod: PBBFAC,,, | Performed by: OBSTETRICS & GYNECOLOGY

## 2021-09-27 PROCEDURE — 1160F PR REVIEW ALL MEDS BY PRESCRIBER/CLIN PHARMACIST DOCUMENTED: ICD-10-PCS | Mod: CPTII,S$GLB,, | Performed by: OBSTETRICS & GYNECOLOGY

## 2021-09-27 PROCEDURE — 3074F PR MOST RECENT SYSTOLIC BLOOD PRESSURE < 130 MM HG: ICD-10-PCS | Mod: CPTII,S$GLB,, | Performed by: OBSTETRICS & GYNECOLOGY

## 2021-09-27 PROCEDURE — 1160F RVW MEDS BY RX/DR IN RCRD: CPT | Mod: CPTII,S$GLB,, | Performed by: OBSTETRICS & GYNECOLOGY

## 2021-09-27 PROCEDURE — 3008F BODY MASS INDEX DOCD: CPT | Mod: CPTII,S$GLB,, | Performed by: OBSTETRICS & GYNECOLOGY

## 2021-09-27 PROCEDURE — 99999 PR PBB SHADOW E&M-EST. PATIENT-LVL III: ICD-10-PCS | Mod: PBBFAC,,, | Performed by: OBSTETRICS & GYNECOLOGY

## 2021-09-27 PROCEDURE — 1126F PR PAIN SEVERITY QUANTIFIED, NO PAIN PRESENT: ICD-10-PCS | Mod: CPTII,S$GLB,, | Performed by: OBSTETRICS & GYNECOLOGY

## 2021-09-27 PROCEDURE — 1159F MED LIST DOCD IN RCRD: CPT | Mod: CPTII,S$GLB,, | Performed by: OBSTETRICS & GYNECOLOGY

## 2021-09-27 PROCEDURE — 3061F PR NEG MICROALBUMINURIA RESULT DOCUMENTED/REVIEW: ICD-10-PCS | Mod: CPTII,S$GLB,, | Performed by: OBSTETRICS & GYNECOLOGY

## 2021-09-27 PROCEDURE — 3078F DIAST BP <80 MM HG: CPT | Mod: CPTII,S$GLB,, | Performed by: OBSTETRICS & GYNECOLOGY

## 2021-09-27 PROCEDURE — 3046F PR MOST RECENT HEMOGLOBIN A1C LEVEL > 9.0%: ICD-10-PCS | Mod: CPTII,S$GLB,, | Performed by: OBSTETRICS & GYNECOLOGY

## 2021-09-27 PROCEDURE — 4010F PR ACE/ARB THEARPY RXD/TAKEN: ICD-10-PCS | Mod: CPTII,S$GLB,, | Performed by: OBSTETRICS & GYNECOLOGY

## 2021-09-27 PROCEDURE — 99499 NO LOS: ICD-10-PCS | Mod: S$GLB,,, | Performed by: OBSTETRICS & GYNECOLOGY

## 2021-09-27 PROCEDURE — 1126F AMNT PAIN NOTED NONE PRSNT: CPT | Mod: CPTII,S$GLB,, | Performed by: OBSTETRICS & GYNECOLOGY

## 2021-09-27 PROCEDURE — 3074F SYST BP LT 130 MM HG: CPT | Mod: CPTII,S$GLB,, | Performed by: OBSTETRICS & GYNECOLOGY

## 2021-09-27 PROCEDURE — 3066F NEPHROPATHY DOC TX: CPT | Mod: CPTII,S$GLB,, | Performed by: OBSTETRICS & GYNECOLOGY

## 2021-09-27 PROCEDURE — 4010F ACE/ARB THERAPY RXD/TAKEN: CPT | Mod: CPTII,S$GLB,, | Performed by: OBSTETRICS & GYNECOLOGY

## 2021-09-27 PROCEDURE — 3078F PR MOST RECENT DIASTOLIC BLOOD PRESSURE < 80 MM HG: ICD-10-PCS | Mod: CPTII,S$GLB,, | Performed by: OBSTETRICS & GYNECOLOGY

## 2021-09-27 PROCEDURE — 1159F PR MEDICATION LIST DOCUMENTED IN MEDICAL RECORD: ICD-10-PCS | Mod: CPTII,S$GLB,, | Performed by: OBSTETRICS & GYNECOLOGY

## 2021-09-27 PROCEDURE — 3061F NEG MICROALBUMINURIA REV: CPT | Mod: CPTII,S$GLB,, | Performed by: OBSTETRICS & GYNECOLOGY

## 2021-09-27 PROCEDURE — 3008F PR BODY MASS INDEX (BMI) DOCUMENTED: ICD-10-PCS | Mod: CPTII,S$GLB,, | Performed by: OBSTETRICS & GYNECOLOGY

## 2021-09-27 PROCEDURE — 99499 UNLISTED E&M SERVICE: CPT | Mod: S$GLB,,, | Performed by: OBSTETRICS & GYNECOLOGY

## 2021-09-27 RX ORDER — FAMOTIDINE 20 MG/1
20 TABLET, FILM COATED ORAL
Status: CANCELLED | OUTPATIENT
Start: 2021-09-27 | End: 2021-09-27

## 2021-09-27 RX ORDER — LIDOCAINE HYDROCHLORIDE 10 MG/ML
0.5 INJECTION, SOLUTION EPIDURAL; INFILTRATION; INTRACAUDAL; PERINEURAL ONCE
Status: CANCELLED | OUTPATIENT
Start: 2021-09-27 | End: 2021-09-27

## 2021-09-27 RX ORDER — SODIUM CHLORIDE, SODIUM LACTATE, POTASSIUM CHLORIDE, CALCIUM CHLORIDE 600; 310; 30; 20 MG/100ML; MG/100ML; MG/100ML; MG/100ML
INJECTION, SOLUTION INTRAVENOUS CONTINUOUS
Status: CANCELLED | OUTPATIENT
Start: 2021-09-27

## 2021-09-27 RX ORDER — ACETAMINOPHEN 500 MG
1000 TABLET ORAL
Status: CANCELLED | OUTPATIENT
Start: 2021-09-27 | End: 2021-09-27

## 2021-09-28 ENCOUNTER — TELEPHONE (OUTPATIENT)
Dept: OBSTETRICS AND GYNECOLOGY | Facility: CLINIC | Age: 66
End: 2021-09-28

## 2021-09-28 RX ORDER — SODIUM CHLORIDE 9 MG/ML
INJECTION, SOLUTION INTRAVENOUS CONTINUOUS
Status: CANCELLED | OUTPATIENT
Start: 2021-09-28

## 2021-09-29 ENCOUNTER — ANESTHESIA (OUTPATIENT)
Dept: SURGERY | Facility: OTHER | Age: 66
End: 2021-09-29
Payer: MEDICARE

## 2021-09-29 ENCOUNTER — HOSPITAL ENCOUNTER (OUTPATIENT)
Facility: OTHER | Age: 66
Discharge: HOME | End: 2021-09-29
Attending: OBSTETRICS & GYNECOLOGY | Admitting: OBSTETRICS & GYNECOLOGY
Payer: MEDICARE

## 2021-09-29 VITALS
DIASTOLIC BLOOD PRESSURE: 62 MMHG | TEMPERATURE: 98 F | OXYGEN SATURATION: 95 % | HEART RATE: 77 BPM | BODY MASS INDEX: 30.16 KG/M2 | RESPIRATION RATE: 16 BRPM | SYSTOLIC BLOOD PRESSURE: 135 MMHG | HEIGHT: 68 IN | WEIGHT: 199 LBS

## 2021-09-29 DIAGNOSIS — Z98.890 HISTORY OF CONIZATION OF CERVIX: ICD-10-CM

## 2021-09-29 DIAGNOSIS — R87.619 ATYPICAL GLANDULAR CELLS ON CERVICAL PAP SMEAR: ICD-10-CM

## 2021-09-29 DIAGNOSIS — Z98.890 STATUS POST HYSTEROSCOPY: Primary | ICD-10-CM

## 2021-09-29 DIAGNOSIS — R87.619 ATYPICAL CERVICAL GLANDULAR CELLS: ICD-10-CM

## 2021-09-29 DIAGNOSIS — Z01.818 PRE-OP TESTING: ICD-10-CM

## 2021-09-29 LAB — POCT GLUCOSE: 112 MG/DL (ref 70–110)

## 2021-09-29 PROCEDURE — 82962 GLUCOSE BLOOD TEST: CPT | Performed by: OBSTETRICS & GYNECOLOGY

## 2021-09-29 PROCEDURE — 88305 TISSUE EXAM BY PATHOLOGIST: CPT | Mod: 59 | Performed by: PATHOLOGY

## 2021-09-29 PROCEDURE — 25000003 PHARM REV CODE 250: Performed by: ANESTHESIOLOGY

## 2021-09-29 PROCEDURE — 57520 PR CONIZATION CERVIX,KNIFE/LASER: ICD-10-PCS | Mod: ,,, | Performed by: OBSTETRICS & GYNECOLOGY

## 2021-09-29 PROCEDURE — 88305 TISSUE EXAM BY PATHOLOGIST: ICD-10-PCS | Mod: 26,,, | Performed by: PATHOLOGY

## 2021-09-29 PROCEDURE — 63600175 PHARM REV CODE 636 W HCPCS: Performed by: ANESTHESIOLOGY

## 2021-09-29 PROCEDURE — 36000706: Performed by: OBSTETRICS & GYNECOLOGY

## 2021-09-29 PROCEDURE — 37000009 HC ANESTHESIA EA ADD 15 MINS: Performed by: OBSTETRICS & GYNECOLOGY

## 2021-09-29 PROCEDURE — 88307 PR  SURG PATH,LEVEL V: ICD-10-PCS | Mod: 26,,, | Performed by: PATHOLOGY

## 2021-09-29 PROCEDURE — 37000008 HC ANESTHESIA 1ST 15 MINUTES: Performed by: OBSTETRICS & GYNECOLOGY

## 2021-09-29 PROCEDURE — 25000003 PHARM REV CODE 250: Performed by: SPECIALIST

## 2021-09-29 PROCEDURE — 58558 HYSTEROSCOPY BIOPSY: CPT | Mod: 51,,, | Performed by: OBSTETRICS & GYNECOLOGY

## 2021-09-29 PROCEDURE — 71000039 HC RECOVERY, EACH ADD'L HOUR: Performed by: OBSTETRICS & GYNECOLOGY

## 2021-09-29 PROCEDURE — 25000003 PHARM REV CODE 250: Performed by: NURSE ANESTHETIST, CERTIFIED REGISTERED

## 2021-09-29 PROCEDURE — 71000033 HC RECOVERY, INTIAL HOUR: Performed by: OBSTETRICS & GYNECOLOGY

## 2021-09-29 PROCEDURE — 88305 TISSUE EXAM BY PATHOLOGIST: CPT | Mod: 26,,, | Performed by: PATHOLOGY

## 2021-09-29 PROCEDURE — 27201423 OPTIME MED/SURG SUP & DEVICES STERILE SUPPLY: Performed by: OBSTETRICS & GYNECOLOGY

## 2021-09-29 PROCEDURE — 25000003 PHARM REV CODE 250: Performed by: OBSTETRICS & GYNECOLOGY

## 2021-09-29 PROCEDURE — 71000016 HC POSTOP RECOV ADDL HR: Performed by: OBSTETRICS & GYNECOLOGY

## 2021-09-29 PROCEDURE — 36000707: Performed by: OBSTETRICS & GYNECOLOGY

## 2021-09-29 PROCEDURE — 88307 TISSUE EXAM BY PATHOLOGIST: CPT | Mod: 26,,, | Performed by: PATHOLOGY

## 2021-09-29 PROCEDURE — 88307 TISSUE EXAM BY PATHOLOGIST: CPT | Performed by: PATHOLOGY

## 2021-09-29 PROCEDURE — 63600175 PHARM REV CODE 636 W HCPCS: Performed by: SPECIALIST

## 2021-09-29 PROCEDURE — 58558 PR HYSTEROSCOPY,W/ENDO BX: ICD-10-PCS | Mod: 51,,, | Performed by: OBSTETRICS & GYNECOLOGY

## 2021-09-29 PROCEDURE — 71000015 HC POSTOP RECOV 1ST HR: Performed by: OBSTETRICS & GYNECOLOGY

## 2021-09-29 PROCEDURE — 63600175 PHARM REV CODE 636 W HCPCS: Performed by: NURSE ANESTHETIST, CERTIFIED REGISTERED

## 2021-09-29 PROCEDURE — 57520 CONIZATION OF CERVIX: CPT | Mod: ,,, | Performed by: OBSTETRICS & GYNECOLOGY

## 2021-09-29 RX ORDER — PHENYLEPHRINE HYDROCHLORIDE 10 MG/ML
INJECTION INTRAVENOUS
Status: DISCONTINUED | OUTPATIENT
Start: 2021-09-29 | End: 2021-09-29

## 2021-09-29 RX ORDER — PROPOFOL 10 MG/ML
INJECTION, EMULSION INTRAVENOUS
Status: DISCONTINUED | OUTPATIENT
Start: 2021-09-29 | End: 2021-09-29

## 2021-09-29 RX ORDER — FENTANYL CITRATE 50 UG/ML
INJECTION, SOLUTION INTRAMUSCULAR; INTRAVENOUS
Status: DISCONTINUED | OUTPATIENT
Start: 2021-09-29 | End: 2021-09-29

## 2021-09-29 RX ORDER — SODIUM CHLORIDE 0.9 % (FLUSH) 0.9 %
3 SYRINGE (ML) INJECTION
Status: DISCONTINUED | OUTPATIENT
Start: 2021-09-29 | End: 2021-09-29 | Stop reason: HOSPADM

## 2021-09-29 RX ORDER — HYDROCODONE BITARTRATE AND ACETAMINOPHEN 5; 325 MG/1; MG/1
1 TABLET ORAL EVERY 6 HOURS PRN
Qty: 10 TABLET | Refills: 0 | Status: SHIPPED | OUTPATIENT
Start: 2021-09-29 | End: 2022-03-08

## 2021-09-29 RX ORDER — MEPERIDINE HYDROCHLORIDE 25 MG/ML
12.5 INJECTION INTRAMUSCULAR; INTRAVENOUS; SUBCUTANEOUS ONCE AS NEEDED
Status: DISCONTINUED | OUTPATIENT
Start: 2021-09-29 | End: 2021-09-29 | Stop reason: HOSPADM

## 2021-09-29 RX ORDER — SODIUM CHLORIDE 9 MG/ML
INJECTION, SOLUTION INTRAVENOUS CONTINUOUS
Status: DISCONTINUED | OUTPATIENT
Start: 2021-09-29 | End: 2021-09-29 | Stop reason: HOSPADM

## 2021-09-29 RX ORDER — HYDROMORPHONE HYDROCHLORIDE 2 MG/ML
0.4 INJECTION, SOLUTION INTRAMUSCULAR; INTRAVENOUS; SUBCUTANEOUS EVERY 5 MIN PRN
Status: DISCONTINUED | OUTPATIENT
Start: 2021-09-29 | End: 2021-09-29 | Stop reason: HOSPADM

## 2021-09-29 RX ORDER — ACETAMINOPHEN 500 MG
1000 TABLET ORAL
Status: COMPLETED | OUTPATIENT
Start: 2021-09-29 | End: 2021-09-29

## 2021-09-29 RX ORDER — OXYCODONE HYDROCHLORIDE 5 MG/1
5 TABLET ORAL
Status: DISCONTINUED | OUTPATIENT
Start: 2021-09-29 | End: 2021-09-29 | Stop reason: HOSPADM

## 2021-09-29 RX ORDER — IBUPROFEN 600 MG/1
600 TABLET ORAL EVERY 6 HOURS PRN
Status: CANCELLED | OUTPATIENT
Start: 2021-09-29

## 2021-09-29 RX ORDER — ONDANSETRON 2 MG/ML
INJECTION INTRAMUSCULAR; INTRAVENOUS
Status: DISCONTINUED | OUTPATIENT
Start: 2021-09-29 | End: 2021-09-29

## 2021-09-29 RX ORDER — LIDOCAINE HYDROCHLORIDE 10 MG/ML
0.5 INJECTION, SOLUTION EPIDURAL; INFILTRATION; INTRACAUDAL; PERINEURAL ONCE
Status: DISCONTINUED | OUTPATIENT
Start: 2021-09-29 | End: 2021-09-29 | Stop reason: HOSPADM

## 2021-09-29 RX ORDER — DIPHENHYDRAMINE HCL 25 MG
25 CAPSULE ORAL EVERY 4 HOURS PRN
Status: CANCELLED | OUTPATIENT
Start: 2021-09-29

## 2021-09-29 RX ORDER — LIDOCAINE HCL/PF 100 MG/5ML
SYRINGE (ML) INTRAVENOUS
Status: DISCONTINUED | OUTPATIENT
Start: 2021-09-29 | End: 2021-09-29

## 2021-09-29 RX ORDER — HYDROCODONE BITARTRATE AND ACETAMINOPHEN 10; 325 MG/1; MG/1
1 TABLET ORAL EVERY 4 HOURS PRN
Status: CANCELLED | OUTPATIENT
Start: 2021-09-29

## 2021-09-29 RX ORDER — DIPHENHYDRAMINE HYDROCHLORIDE 50 MG/ML
25 INJECTION INTRAMUSCULAR; INTRAVENOUS EVERY 4 HOURS PRN
Status: CANCELLED | OUTPATIENT
Start: 2021-09-29

## 2021-09-29 RX ORDER — FAMOTIDINE 20 MG/1
20 TABLET, FILM COATED ORAL
Status: COMPLETED | OUTPATIENT
Start: 2021-09-29 | End: 2021-09-29

## 2021-09-29 RX ORDER — PROCHLORPERAZINE EDISYLATE 5 MG/ML
5 INJECTION INTRAMUSCULAR; INTRAVENOUS EVERY 6 HOURS PRN
Status: DISCONTINUED | OUTPATIENT
Start: 2021-09-29 | End: 2021-09-29 | Stop reason: HOSPADM

## 2021-09-29 RX ORDER — SODIUM CHLORIDE, SODIUM LACTATE, POTASSIUM CHLORIDE, CALCIUM CHLORIDE 600; 310; 30; 20 MG/100ML; MG/100ML; MG/100ML; MG/100ML
INJECTION, SOLUTION INTRAVENOUS CONTINUOUS
Status: DISCONTINUED | OUTPATIENT
Start: 2021-09-29 | End: 2021-09-29 | Stop reason: HOSPADM

## 2021-09-29 RX ORDER — HYDROCODONE BITARTRATE AND ACETAMINOPHEN 5; 325 MG/1; MG/1
1 TABLET ORAL EVERY 4 HOURS PRN
Status: CANCELLED | OUTPATIENT
Start: 2021-09-29

## 2021-09-29 RX ORDER — DIPHENHYDRAMINE HYDROCHLORIDE 50 MG/ML
25 INJECTION INTRAMUSCULAR; INTRAVENOUS EVERY 6 HOURS PRN
Status: DISCONTINUED | OUTPATIENT
Start: 2021-09-29 | End: 2021-09-29 | Stop reason: HOSPADM

## 2021-09-29 RX ORDER — IBUPROFEN 600 MG/1
600 TABLET ORAL EVERY 6 HOURS PRN
Qty: 30 TABLET | Refills: 3 | Status: SHIPPED | OUTPATIENT
Start: 2021-09-29 | End: 2022-03-08

## 2021-09-29 RX ORDER — PROCHLORPERAZINE EDISYLATE 5 MG/ML
5 INJECTION INTRAMUSCULAR; INTRAVENOUS EVERY 30 MIN PRN
Status: DISCONTINUED | OUTPATIENT
Start: 2021-09-29 | End: 2021-09-29 | Stop reason: HOSPADM

## 2021-09-29 RX ORDER — ONDANSETRON 8 MG/1
8 TABLET, ORALLY DISINTEGRATING ORAL EVERY 8 HOURS PRN
Status: DISCONTINUED | OUTPATIENT
Start: 2021-09-29 | End: 2021-09-29 | Stop reason: HOSPADM

## 2021-09-29 RX ORDER — VASOPRESSIN 20 [USP'U]/ML
INJECTION, SOLUTION INTRAMUSCULAR; SUBCUTANEOUS
Status: DISCONTINUED | OUTPATIENT
Start: 2021-09-29 | End: 2021-09-29 | Stop reason: HOSPADM

## 2021-09-29 RX ORDER — IBUPROFEN 400 MG/1
800 TABLET ORAL ONCE
Status: COMPLETED | OUTPATIENT
Start: 2021-09-29 | End: 2021-09-29

## 2021-09-29 RX ADMIN — SODIUM CHLORIDE, SODIUM LACTATE, POTASSIUM CHLORIDE, AND CALCIUM CHLORIDE: 600; 310; 30; 20 INJECTION, SOLUTION INTRAVENOUS at 10:09

## 2021-09-29 RX ADMIN — HYDROMORPHONE HYDROCHLORIDE 0.4 MG: 2 INJECTION INTRAMUSCULAR; INTRAVENOUS; SUBCUTANEOUS at 01:09

## 2021-09-29 RX ADMIN — PHENYLEPHRINE HYDROCHLORIDE 100 MCG: 10 INJECTION INTRAVENOUS at 11:09

## 2021-09-29 RX ADMIN — GLYCOPYRROLATE 0.2 MG: 0.2 INJECTION, SOLUTION INTRAMUSCULAR; INTRAVITREAL at 11:09

## 2021-09-29 RX ADMIN — FAMOTIDINE 20 MG: 20 TABLET ORAL at 10:09

## 2021-09-29 RX ADMIN — SODIUM CHLORIDE, SODIUM LACTATE, POTASSIUM CHLORIDE, AND CALCIUM CHLORIDE: 600; 310; 30; 20 INJECTION, SOLUTION INTRAVENOUS at 12:09

## 2021-09-29 RX ADMIN — ONDANSETRON HYDROCHLORIDE 4 MG: 2 INJECTION INTRAMUSCULAR; INTRAVENOUS at 11:09

## 2021-09-29 RX ADMIN — FENTANYL CITRATE 50 MCG: 50 INJECTION, SOLUTION INTRAMUSCULAR; INTRAVENOUS at 11:09

## 2021-09-29 RX ADMIN — ACETAMINOPHEN 1000 MG: 500 TABLET, FILM COATED ORAL at 10:09

## 2021-09-29 RX ADMIN — IBUPROFEN 800 MG: 400 TABLET, FILM COATED ORAL at 03:09

## 2021-09-29 RX ADMIN — LIDOCAINE HYDROCHLORIDE 60 MG: 20 INJECTION, SOLUTION INTRAVENOUS at 11:09

## 2021-09-29 RX ADMIN — OXYCODONE 5 MG: 5 TABLET ORAL at 01:09

## 2021-09-29 RX ADMIN — PROPOFOL 200 MG: 10 INJECTION, EMULSION INTRAVENOUS at 11:09

## 2021-09-29 RX ADMIN — PHENYLEPHRINE HYDROCHLORIDE 100 MCG: 10 INJECTION INTRAVENOUS at 12:09

## 2021-09-29 RX ADMIN — IBUPROFEN 800 MG: 800 INJECTION INTRAVENOUS at 02:09

## 2021-09-30 ENCOUNTER — TELEPHONE (OUTPATIENT)
Dept: OBSTETRICS AND GYNECOLOGY | Facility: CLINIC | Age: 66
End: 2021-09-30

## 2021-10-04 ENCOUNTER — TELEPHONE (OUTPATIENT)
Dept: OBSTETRICS AND GYNECOLOGY | Facility: CLINIC | Age: 66
End: 2021-10-04

## 2021-10-04 DIAGNOSIS — I10 HYPERTENSION, UNSPECIFIED TYPE: ICD-10-CM

## 2021-10-04 DIAGNOSIS — F32.9 MAJOR DEPRESSIVE DISORDER WITH CURRENT ACTIVE EPISODE, UNSPECIFIED DEPRESSION EPISODE SEVERITY, UNSPECIFIED WHETHER RECURRENT: ICD-10-CM

## 2021-10-04 DIAGNOSIS — F41.9 ANXIETY: ICD-10-CM

## 2021-10-04 LAB
FINAL PATHOLOGIC DIAGNOSIS: NORMAL
GROSS: NORMAL
Lab: NORMAL

## 2021-10-05 RX ORDER — POTASSIUM CHLORIDE 750 MG/1
TABLET, EXTENDED RELEASE ORAL
Qty: 90 TABLET | Refills: 0 | Status: SHIPPED | OUTPATIENT
Start: 2021-10-05 | End: 2022-03-08 | Stop reason: SDUPTHER

## 2021-10-05 RX ORDER — LOSARTAN POTASSIUM 100 MG/1
TABLET ORAL
Qty: 90 TABLET | Refills: 1 | Status: SHIPPED | OUTPATIENT
Start: 2021-10-05 | End: 2022-03-08 | Stop reason: SDUPTHER

## 2021-10-05 RX ORDER — PAROXETINE HYDROCHLORIDE 20 MG/1
20 TABLET, FILM COATED ORAL DAILY
Qty: 90 TABLET | Refills: 0 | OUTPATIENT
Start: 2021-10-05

## 2021-10-07 ENCOUNTER — TELEPHONE (OUTPATIENT)
Dept: OBSTETRICS AND GYNECOLOGY | Facility: CLINIC | Age: 66
End: 2021-10-07

## 2021-10-08 ENCOUNTER — LAB VISIT (OUTPATIENT)
Dept: LAB | Facility: OTHER | Age: 66
End: 2021-10-08
Attending: INTERNAL MEDICINE
Payer: MEDICARE

## 2021-10-08 DIAGNOSIS — Z79.4 TYPE 2 DIABETES MELLITUS WITH HYPERGLYCEMIA, WITH LONG-TERM CURRENT USE OF INSULIN: ICD-10-CM

## 2021-10-08 DIAGNOSIS — E11.65 TYPE 2 DIABETES MELLITUS WITH HYPERGLYCEMIA, WITH LONG-TERM CURRENT USE OF INSULIN: ICD-10-CM

## 2021-10-08 DIAGNOSIS — E83.52 HYPERCALCEMIA: ICD-10-CM

## 2021-10-08 LAB
ALBUMIN SERPL BCP-MCNC: 3.8 G/DL (ref 3.5–5.2)
ALP SERPL-CCNC: 100 U/L (ref 55–135)
ALT SERPL W/O P-5'-P-CCNC: 18 U/L (ref 10–44)
ANION GAP SERPL CALC-SCNC: 11 MMOL/L (ref 8–16)
AST SERPL-CCNC: 25 U/L (ref 10–40)
BILIRUB SERPL-MCNC: 0.4 MG/DL (ref 0.1–1)
BUN SERPL-MCNC: 9 MG/DL (ref 8–23)
CA-I BLDV-SCNC: 1.27 MMOL/L (ref 1.06–1.42)
CALCIUM SERPL-MCNC: 10 MG/DL (ref 8.7–10.5)
CHLORIDE SERPL-SCNC: 106 MMOL/L (ref 95–110)
CO2 SERPL-SCNC: 24 MMOL/L (ref 23–29)
CREAT SERPL-MCNC: 0.8 MG/DL (ref 0.5–1.4)
EST. GFR  (AFRICAN AMERICAN): >60 ML/MIN/1.73 M^2
EST. GFR  (NON AFRICAN AMERICAN): >60 ML/MIN/1.73 M^2
ESTIMATED AVG GLUCOSE: 258 MG/DL (ref 68–131)
GLUCOSE SERPL-MCNC: 158 MG/DL (ref 70–110)
HBA1C MFR BLD: 10.6 % (ref 4–5.6)
POTASSIUM SERPL-SCNC: 3.6 MMOL/L (ref 3.5–5.1)
PROT SERPL-MCNC: 7.6 G/DL (ref 6–8.4)
PTH-INTACT SERPL-MCNC: 116 PG/ML (ref 9–77)
SODIUM SERPL-SCNC: 141 MMOL/L (ref 136–145)

## 2021-10-08 PROCEDURE — 83970 ASSAY OF PARATHORMONE: CPT | Performed by: INTERNAL MEDICINE

## 2021-10-08 PROCEDURE — 36415 COLL VENOUS BLD VENIPUNCTURE: CPT | Performed by: INTERNAL MEDICINE

## 2021-10-08 PROCEDURE — 80053 COMPREHEN METABOLIC PANEL: CPT | Performed by: INTERNAL MEDICINE

## 2021-10-08 PROCEDURE — 83036 HEMOGLOBIN GLYCOSYLATED A1C: CPT | Performed by: INTERNAL MEDICINE

## 2021-10-08 PROCEDURE — 82330 ASSAY OF CALCIUM: CPT | Performed by: INTERNAL MEDICINE

## 2021-10-15 ENCOUNTER — OFFICE VISIT (OUTPATIENT)
Dept: ENDOCRINOLOGY | Facility: CLINIC | Age: 66
End: 2021-10-15
Payer: MEDICARE

## 2021-10-15 ENCOUNTER — PATIENT MESSAGE (OUTPATIENT)
Dept: PRIMARY CARE CLINIC | Facility: CLINIC | Age: 66
End: 2021-10-15
Payer: MEDICARE

## 2021-10-15 VITALS
HEART RATE: 85 BPM | HEIGHT: 68 IN | DIASTOLIC BLOOD PRESSURE: 69 MMHG | WEIGHT: 201.94 LBS | SYSTOLIC BLOOD PRESSURE: 155 MMHG | BODY MASS INDEX: 30.61 KG/M2

## 2021-10-15 DIAGNOSIS — E66.09 CLASS 1 OBESITY DUE TO EXCESS CALORIES WITH SERIOUS COMORBIDITY AND BODY MASS INDEX (BMI) OF 30.0 TO 30.9 IN ADULT: ICD-10-CM

## 2021-10-15 DIAGNOSIS — R53.83 FATIGUE, UNSPECIFIED TYPE: ICD-10-CM

## 2021-10-15 DIAGNOSIS — E11.69 HYPERLIPIDEMIA ASSOCIATED WITH TYPE 2 DIABETES MELLITUS: ICD-10-CM

## 2021-10-15 DIAGNOSIS — E78.5 HYPERLIPIDEMIA ASSOCIATED WITH TYPE 2 DIABETES MELLITUS: ICD-10-CM

## 2021-10-15 DIAGNOSIS — Z79.4 TYPE 2 DIABETES MELLITUS WITH HYPERGLYCEMIA, WITH LONG-TERM CURRENT USE OF INSULIN: Primary | ICD-10-CM

## 2021-10-15 DIAGNOSIS — F32.9 MAJOR DEPRESSIVE DISORDER WITH CURRENT ACTIVE EPISODE, UNSPECIFIED DEPRESSION EPISODE SEVERITY, UNSPECIFIED WHETHER RECURRENT: ICD-10-CM

## 2021-10-15 DIAGNOSIS — E83.52 HYPERCALCEMIA: ICD-10-CM

## 2021-10-15 DIAGNOSIS — R79.89 OTHER SPECIFIED ABNORMAL FINDINGS OF BLOOD CHEMISTRY: ICD-10-CM

## 2021-10-15 DIAGNOSIS — E11.65 TYPE 2 DIABETES MELLITUS WITH HYPERGLYCEMIA, WITH LONG-TERM CURRENT USE OF INSULIN: Primary | ICD-10-CM

## 2021-10-15 DIAGNOSIS — F41.9 ANXIETY: ICD-10-CM

## 2021-10-15 PROCEDURE — 4010F ACE/ARB THERAPY RXD/TAKEN: CPT | Mod: CPTII,S$GLB,, | Performed by: INTERNAL MEDICINE

## 2021-10-15 PROCEDURE — 3061F NEG MICROALBUMINURIA REV: CPT | Mod: CPTII,S$GLB,, | Performed by: INTERNAL MEDICINE

## 2021-10-15 PROCEDURE — 3061F PR NEG MICROALBUMINURIA RESULT DOCUMENTED/REVIEW: ICD-10-PCS | Mod: CPTII,S$GLB,, | Performed by: INTERNAL MEDICINE

## 2021-10-15 PROCEDURE — 3288F PR FALLS RISK ASSESSMENT DOCUMENTED: ICD-10-PCS | Mod: CPTII,S$GLB,, | Performed by: INTERNAL MEDICINE

## 2021-10-15 PROCEDURE — 99214 OFFICE O/P EST MOD 30 MIN: CPT | Mod: S$GLB,,, | Performed by: INTERNAL MEDICINE

## 2021-10-15 PROCEDURE — 3046F HEMOGLOBIN A1C LEVEL >9.0%: CPT | Mod: CPTII,S$GLB,, | Performed by: INTERNAL MEDICINE

## 2021-10-15 PROCEDURE — 3008F BODY MASS INDEX DOCD: CPT | Mod: CPTII,S$GLB,, | Performed by: INTERNAL MEDICINE

## 2021-10-15 PROCEDURE — 3078F DIAST BP <80 MM HG: CPT | Mod: CPTII,S$GLB,, | Performed by: INTERNAL MEDICINE

## 2021-10-15 PROCEDURE — 3077F SYST BP >= 140 MM HG: CPT | Mod: CPTII,S$GLB,, | Performed by: INTERNAL MEDICINE

## 2021-10-15 PROCEDURE — 99499 UNLISTED E&M SERVICE: CPT | Mod: S$GLB,,, | Performed by: INTERNAL MEDICINE

## 2021-10-15 PROCEDURE — 3046F PR MOST RECENT HEMOGLOBIN A1C LEVEL > 9.0%: ICD-10-PCS | Mod: CPTII,S$GLB,, | Performed by: INTERNAL MEDICINE

## 2021-10-15 PROCEDURE — 1126F AMNT PAIN NOTED NONE PRSNT: CPT | Mod: CPTII,S$GLB,, | Performed by: INTERNAL MEDICINE

## 2021-10-15 PROCEDURE — 1160F RVW MEDS BY RX/DR IN RCRD: CPT | Mod: CPTII,S$GLB,, | Performed by: INTERNAL MEDICINE

## 2021-10-15 PROCEDURE — 3288F FALL RISK ASSESSMENT DOCD: CPT | Mod: CPTII,S$GLB,, | Performed by: INTERNAL MEDICINE

## 2021-10-15 PROCEDURE — 99214 PR OFFICE/OUTPT VISIT, EST, LEVL IV, 30-39 MIN: ICD-10-PCS | Mod: S$GLB,,, | Performed by: INTERNAL MEDICINE

## 2021-10-15 PROCEDURE — 3078F PR MOST RECENT DIASTOLIC BLOOD PRESSURE < 80 MM HG: ICD-10-PCS | Mod: CPTII,S$GLB,, | Performed by: INTERNAL MEDICINE

## 2021-10-15 PROCEDURE — 3077F PR MOST RECENT SYSTOLIC BLOOD PRESSURE >= 140 MM HG: ICD-10-PCS | Mod: CPTII,S$GLB,, | Performed by: INTERNAL MEDICINE

## 2021-10-15 PROCEDURE — 1159F PR MEDICATION LIST DOCUMENTED IN MEDICAL RECORD: ICD-10-PCS | Mod: CPTII,S$GLB,, | Performed by: INTERNAL MEDICINE

## 2021-10-15 PROCEDURE — 99499 RISK ADDL DX/OHS AUDIT: ICD-10-PCS | Mod: S$GLB,,, | Performed by: INTERNAL MEDICINE

## 2021-10-15 PROCEDURE — 3066F PR DOCUMENTATION OF TREATMENT FOR NEPHROPATHY: ICD-10-PCS | Mod: CPTII,S$GLB,, | Performed by: INTERNAL MEDICINE

## 2021-10-15 PROCEDURE — 1126F PR PAIN SEVERITY QUANTIFIED, NO PAIN PRESENT: ICD-10-PCS | Mod: CPTII,S$GLB,, | Performed by: INTERNAL MEDICINE

## 2021-10-15 PROCEDURE — 4010F PR ACE/ARB THEARPY RXD/TAKEN: ICD-10-PCS | Mod: CPTII,S$GLB,, | Performed by: INTERNAL MEDICINE

## 2021-10-15 PROCEDURE — 3008F PR BODY MASS INDEX (BMI) DOCUMENTED: ICD-10-PCS | Mod: CPTII,S$GLB,, | Performed by: INTERNAL MEDICINE

## 2021-10-15 PROCEDURE — 1101F PR PT FALLS ASSESS DOC 0-1 FALLS W/OUT INJ PAST YR: ICD-10-PCS | Mod: CPTII,S$GLB,, | Performed by: INTERNAL MEDICINE

## 2021-10-15 PROCEDURE — 1160F PR REVIEW ALL MEDS BY PRESCRIBER/CLIN PHARMACIST DOCUMENTED: ICD-10-PCS | Mod: CPTII,S$GLB,, | Performed by: INTERNAL MEDICINE

## 2021-10-15 PROCEDURE — 1101F PT FALLS ASSESS-DOCD LE1/YR: CPT | Mod: CPTII,S$GLB,, | Performed by: INTERNAL MEDICINE

## 2021-10-15 PROCEDURE — 3066F NEPHROPATHY DOC TX: CPT | Mod: CPTII,S$GLB,, | Performed by: INTERNAL MEDICINE

## 2021-10-15 PROCEDURE — 1159F MED LIST DOCD IN RCRD: CPT | Mod: CPTII,S$GLB,, | Performed by: INTERNAL MEDICINE

## 2021-10-15 RX ORDER — REPAGLINIDE 1 MG/1
1 TABLET ORAL
Qty: 270 TABLET | Refills: 3 | Status: SHIPPED | OUTPATIENT
Start: 2021-10-15 | End: 2022-03-08 | Stop reason: SDUPTHER

## 2021-10-15 RX ORDER — PAROXETINE HYDROCHLORIDE 20 MG/1
20 TABLET, FILM COATED ORAL DAILY
Qty: 90 TABLET | Refills: 0 | Status: SHIPPED | OUTPATIENT
Start: 2021-10-15 | End: 2021-12-27

## 2021-10-19 ENCOUNTER — PATIENT MESSAGE (OUTPATIENT)
Dept: ADMINISTRATIVE | Facility: OTHER | Age: 66
End: 2021-10-19
Payer: MEDICARE

## 2021-10-19 ENCOUNTER — TELEPHONE (OUTPATIENT)
Dept: OBSTETRICS AND GYNECOLOGY | Facility: CLINIC | Age: 66
End: 2021-10-19

## 2021-10-29 ENCOUNTER — TELEPHONE (OUTPATIENT)
Dept: OBSTETRICS AND GYNECOLOGY | Facility: CLINIC | Age: 66
End: 2021-10-29
Payer: MEDICARE

## 2021-11-02 ENCOUNTER — PATIENT MESSAGE (OUTPATIENT)
Dept: OBSTETRICS AND GYNECOLOGY | Facility: CLINIC | Age: 66
End: 2021-11-02
Payer: MEDICARE

## 2021-11-02 ENCOUNTER — TELEPHONE (OUTPATIENT)
Dept: OBSTETRICS AND GYNECOLOGY | Facility: CLINIC | Age: 66
End: 2021-11-02
Payer: MEDICARE

## 2021-11-02 DIAGNOSIS — R30.0 DYSURIA: Primary | ICD-10-CM

## 2021-11-02 RX ORDER — SULFAMETHOXAZOLE AND TRIMETHOPRIM 800; 160 MG/1; MG/1
1 TABLET ORAL 2 TIMES DAILY
Qty: 6 TABLET | Refills: 0 | Status: SHIPPED | OUTPATIENT
Start: 2021-11-02 | End: 2021-11-05

## 2021-11-03 ENCOUNTER — LAB VISIT (OUTPATIENT)
Dept: LAB | Facility: HOSPITAL | Age: 66
End: 2021-11-03
Attending: NURSE PRACTITIONER
Payer: MEDICARE

## 2021-11-03 ENCOUNTER — PATIENT MESSAGE (OUTPATIENT)
Dept: OBSTETRICS AND GYNECOLOGY | Facility: CLINIC | Age: 66
End: 2021-11-03
Payer: MEDICARE

## 2021-11-03 DIAGNOSIS — R30.0 DYSURIA: ICD-10-CM

## 2021-11-03 PROCEDURE — 87086 URINE CULTURE/COLONY COUNT: CPT | Performed by: NURSE PRACTITIONER

## 2021-11-03 PROCEDURE — 87077 CULTURE AEROBIC IDENTIFY: CPT | Performed by: NURSE PRACTITIONER

## 2021-11-03 PROCEDURE — 87186 SC STD MICRODIL/AGAR DIL: CPT | Performed by: NURSE PRACTITIONER

## 2021-11-03 PROCEDURE — 87088 URINE BACTERIA CULTURE: CPT | Performed by: NURSE PRACTITIONER

## 2021-11-06 LAB — BACTERIA UR CULT: ABNORMAL

## 2021-12-08 ENCOUNTER — PATIENT OUTREACH (OUTPATIENT)
Dept: ADMINISTRATIVE | Facility: OTHER | Age: 66
End: 2021-12-08
Payer: MEDICARE

## 2021-12-09 ENCOUNTER — OFFICE VISIT (OUTPATIENT)
Dept: OBSTETRICS AND GYNECOLOGY | Facility: CLINIC | Age: 66
End: 2021-12-09
Payer: MEDICARE

## 2021-12-09 VITALS
WEIGHT: 207.69 LBS | DIASTOLIC BLOOD PRESSURE: 72 MMHG | SYSTOLIC BLOOD PRESSURE: 136 MMHG | HEIGHT: 68 IN | BODY MASS INDEX: 31.48 KG/M2

## 2021-12-09 DIAGNOSIS — R19.8 ABNORMAL DEFECATION: ICD-10-CM

## 2021-12-09 DIAGNOSIS — Z79.4 TYPE 2 DIABETES MELLITUS WITH HYPERGLYCEMIA, WITH LONG-TERM CURRENT USE OF INSULIN: ICD-10-CM

## 2021-12-09 DIAGNOSIS — N87.9 CERVICAL DYSPLASIA: ICD-10-CM

## 2021-12-09 DIAGNOSIS — Z78.0 MENOPAUSE: ICD-10-CM

## 2021-12-09 DIAGNOSIS — N89.8 VAGINAL DISCHARGE: ICD-10-CM

## 2021-12-09 DIAGNOSIS — E11.65 TYPE 2 DIABETES MELLITUS WITH HYPERGLYCEMIA, WITH LONG-TERM CURRENT USE OF INSULIN: ICD-10-CM

## 2021-12-09 DIAGNOSIS — Z98.890 POST-OPERATIVE STATE: Primary | ICD-10-CM

## 2021-12-09 PROCEDURE — 99999 PR PBB SHADOW E&M-EST. PATIENT-LVL IV: ICD-10-PCS | Mod: PBBFAC,,, | Performed by: OBSTETRICS & GYNECOLOGY

## 2021-12-09 PROCEDURE — 99024 PR POST-OP FOLLOW-UP VISIT: ICD-10-PCS | Mod: POP,,, | Performed by: OBSTETRICS & GYNECOLOGY

## 2021-12-09 PROCEDURE — 99999 PR PBB SHADOW E&M-EST. PATIENT-LVL IV: CPT | Mod: PBBFAC,,, | Performed by: OBSTETRICS & GYNECOLOGY

## 2021-12-09 PROCEDURE — 87481 CANDIDA DNA AMP PROBE: CPT | Mod: 59 | Performed by: OBSTETRICS & GYNECOLOGY

## 2021-12-09 PROCEDURE — 99024 POSTOP FOLLOW-UP VISIT: CPT | Mod: POP,,, | Performed by: OBSTETRICS & GYNECOLOGY

## 2021-12-10 ENCOUNTER — OFFICE VISIT (OUTPATIENT)
Dept: GASTROENTEROLOGY | Facility: CLINIC | Age: 66
End: 2021-12-10
Payer: MEDICARE

## 2021-12-10 ENCOUNTER — TELEPHONE (OUTPATIENT)
Dept: OBSTETRICS AND GYNECOLOGY | Facility: CLINIC | Age: 66
End: 2021-12-10
Payer: MEDICARE

## 2021-12-10 VITALS
SYSTOLIC BLOOD PRESSURE: 138 MMHG | HEART RATE: 73 BPM | OXYGEN SATURATION: 97 % | RESPIRATION RATE: 16 BRPM | HEIGHT: 67 IN | DIASTOLIC BLOOD PRESSURE: 70 MMHG | WEIGHT: 209.69 LBS | BODY MASS INDEX: 32.91 KG/M2

## 2021-12-10 DIAGNOSIS — R19.7 DIARRHEA, UNSPECIFIED TYPE: ICD-10-CM

## 2021-12-10 DIAGNOSIS — Z86.010 HISTORY OF COLON POLYPS: Primary | ICD-10-CM

## 2021-12-10 PROCEDURE — 99215 OFFICE O/P EST HI 40 MIN: CPT | Mod: PBBFAC,PN | Performed by: INTERNAL MEDICINE

## 2021-12-10 PROCEDURE — 99204 OFFICE O/P NEW MOD 45 MIN: CPT | Mod: S$GLB,,, | Performed by: INTERNAL MEDICINE

## 2021-12-10 PROCEDURE — 99204 PR OFFICE/OUTPT VISIT, NEW, LEVL IV, 45-59 MIN: ICD-10-PCS | Mod: S$GLB,,, | Performed by: INTERNAL MEDICINE

## 2021-12-10 PROCEDURE — 99999 PR PBB SHADOW E&M-EST. PATIENT-LVL V: CPT | Mod: PBBFAC,,, | Performed by: INTERNAL MEDICINE

## 2021-12-10 PROCEDURE — 99999 PR PBB SHADOW E&M-EST. PATIENT-LVL V: ICD-10-PCS | Mod: PBBFAC,,, | Performed by: INTERNAL MEDICINE

## 2021-12-10 RX ORDER — PANTOPRAZOLE SODIUM 40 MG/1
40 TABLET, DELAYED RELEASE ORAL DAILY
Qty: 30 TABLET | Refills: 2 | Status: SHIPPED | OUTPATIENT
Start: 2021-12-10 | End: 2022-03-08

## 2021-12-13 LAB
BACTERIAL VAGINOSIS DNA: NEGATIVE
CANDIDA GLABRATA DNA: NEGATIVE
CANDIDA KRUSEI DNA: NEGATIVE
CANDIDA RRNA VAG QL PROBE: NEGATIVE
T VAGINALIS RRNA GENITAL QL PROBE: NEGATIVE

## 2021-12-17 DIAGNOSIS — F41.9 ANXIETY: ICD-10-CM

## 2021-12-17 DIAGNOSIS — F32.9 MAJOR DEPRESSIVE DISORDER WITH CURRENT ACTIVE EPISODE, UNSPECIFIED DEPRESSION EPISODE SEVERITY, UNSPECIFIED WHETHER RECURRENT: ICD-10-CM

## 2021-12-23 ENCOUNTER — PATIENT MESSAGE (OUTPATIENT)
Dept: OBSTETRICS AND GYNECOLOGY | Facility: CLINIC | Age: 66
End: 2021-12-23
Payer: MEDICARE

## 2021-12-26 DIAGNOSIS — N39.46 URINARY INCONTINENCE, MIXED: Primary | ICD-10-CM

## 2021-12-27 RX ORDER — PAROXETINE HYDROCHLORIDE 20 MG/1
20 TABLET, FILM COATED ORAL DAILY
Qty: 90 TABLET | Refills: 2 | Status: SHIPPED | OUTPATIENT
Start: 2021-12-27 | End: 2022-03-08 | Stop reason: SDUPTHER

## 2022-01-05 DIAGNOSIS — E11.65 TYPE 2 DIABETES MELLITUS WITH HYPERGLYCEMIA, UNSPECIFIED WHETHER LONG TERM INSULIN USE: ICD-10-CM

## 2022-01-05 NOTE — TELEPHONE ENCOUNTER
Can you send the Rx. for TRULICITY 1.5 mg/0.5 mL pen injector to [facility] & they will submit the prior authorization. Once approved the pharmacy will mail the medication to the patient & if denied they will process the appeal.

## 2022-01-06 RX ORDER — DULAGLUTIDE 1.5 MG/.5ML
1.5 INJECTION, SOLUTION SUBCUTANEOUS WEEKLY
Qty: 12 PEN | Refills: 3 | OUTPATIENT
Start: 2022-01-06

## 2022-01-14 ENCOUNTER — LAB VISIT (OUTPATIENT)
Dept: PRIMARY CARE CLINIC | Facility: CLINIC | Age: 67
End: 2022-01-14
Payer: MEDICARE

## 2022-01-14 DIAGNOSIS — E11.65 TYPE 2 DIABETES MELLITUS WITH HYPERGLYCEMIA, WITH LONG-TERM CURRENT USE OF INSULIN: ICD-10-CM

## 2022-01-14 DIAGNOSIS — E83.52 HYPERCALCEMIA: ICD-10-CM

## 2022-01-14 DIAGNOSIS — R53.83 FATIGUE, UNSPECIFIED TYPE: ICD-10-CM

## 2022-01-14 DIAGNOSIS — R79.89 OTHER SPECIFIED ABNORMAL FINDINGS OF BLOOD CHEMISTRY: ICD-10-CM

## 2022-01-14 DIAGNOSIS — Z79.4 TYPE 2 DIABETES MELLITUS WITH HYPERGLYCEMIA, WITH LONG-TERM CURRENT USE OF INSULIN: ICD-10-CM

## 2022-01-14 LAB
25(OH)D3+25(OH)D2 SERPL-MCNC: 24 NG/ML (ref 30–96)
ALBUMIN SERPL BCP-MCNC: 4.3 G/DL (ref 3.5–5.2)
ALP SERPL-CCNC: 100 U/L (ref 55–135)
ALT SERPL W/O P-5'-P-CCNC: 20 U/L (ref 10–44)
ANION GAP SERPL CALC-SCNC: 13 MMOL/L (ref 8–16)
AST SERPL-CCNC: 26 U/L (ref 10–40)
BILIRUB SERPL-MCNC: 0.7 MG/DL (ref 0.1–1)
BUN SERPL-MCNC: 12 MG/DL (ref 8–23)
CA-I BLDV-SCNC: 1.39 MMOL/L (ref 1.06–1.42)
CALCIUM SERPL-MCNC: 10.5 MG/DL (ref 8.7–10.5)
CHLORIDE SERPL-SCNC: 105 MMOL/L (ref 95–110)
CO2 SERPL-SCNC: 25 MMOL/L (ref 23–29)
CREAT SERPL-MCNC: 0.8 MG/DL (ref 0.5–1.4)
EST. GFR  (AFRICAN AMERICAN): >60 ML/MIN/1.73 M^2
EST. GFR  (NON AFRICAN AMERICAN): >60 ML/MIN/1.73 M^2
ESTIMATED AVG GLUCOSE: 200 MG/DL (ref 68–131)
FERRITIN SERPL-MCNC: 34 NG/ML (ref 20–300)
GLUCOSE SERPL-MCNC: 143 MG/DL (ref 70–110)
HBA1C MFR BLD: 8.6 % (ref 4–5.6)
POTASSIUM SERPL-SCNC: 4.7 MMOL/L (ref 3.5–5.1)
PROT SERPL-MCNC: 7.7 G/DL (ref 6–8.4)
PTH-INTACT SERPL-MCNC: 118.9 PG/ML (ref 9–77)
SODIUM SERPL-SCNC: 143 MMOL/L (ref 136–145)

## 2022-01-14 PROCEDURE — 82306 VITAMIN D 25 HYDROXY: CPT | Performed by: INTERNAL MEDICINE

## 2022-01-14 PROCEDURE — 83036 HEMOGLOBIN GLYCOSYLATED A1C: CPT | Performed by: INTERNAL MEDICINE

## 2022-01-14 PROCEDURE — 83970 ASSAY OF PARATHORMONE: CPT | Performed by: INTERNAL MEDICINE

## 2022-01-14 PROCEDURE — 82728 ASSAY OF FERRITIN: CPT | Performed by: INTERNAL MEDICINE

## 2022-01-14 PROCEDURE — 80053 COMPREHEN METABOLIC PANEL: CPT | Performed by: INTERNAL MEDICINE

## 2022-01-14 PROCEDURE — 36415 COLL VENOUS BLD VENIPUNCTURE: CPT | Mod: PBBFAC,PN

## 2022-01-14 PROCEDURE — 82330 ASSAY OF CALCIUM: CPT | Performed by: INTERNAL MEDICINE

## 2022-01-19 ENCOUNTER — PATIENT OUTREACH (OUTPATIENT)
Dept: ADMINISTRATIVE | Facility: OTHER | Age: 67
End: 2022-01-19
Payer: MEDICARE

## 2022-01-20 ENCOUNTER — OFFICE VISIT (OUTPATIENT)
Dept: UROGYNECOLOGY | Facility: CLINIC | Age: 67
End: 2022-01-20
Payer: MEDICARE

## 2022-01-20 VITALS
HEIGHT: 67 IN | HEART RATE: 75 BPM | DIASTOLIC BLOOD PRESSURE: 59 MMHG | SYSTOLIC BLOOD PRESSURE: 135 MMHG | WEIGHT: 205.38 LBS | BODY MASS INDEX: 32.24 KG/M2

## 2022-01-20 DIAGNOSIS — R27.8 DYSSYNERGIA: ICD-10-CM

## 2022-01-20 DIAGNOSIS — N81.6 RECTOCELE: Primary | ICD-10-CM

## 2022-01-20 DIAGNOSIS — N39.46 URINARY INCONTINENCE, MIXED: ICD-10-CM

## 2022-01-20 DIAGNOSIS — K59.00 CONSTIPATION, UNSPECIFIED CONSTIPATION TYPE: ICD-10-CM

## 2022-01-20 PROCEDURE — 99999 PR PBB SHADOW E&M-EST. PATIENT-LVL V: ICD-10-PCS | Mod: PBBFAC,,, | Performed by: NURSE PRACTITIONER

## 2022-01-20 PROCEDURE — 99999 PR PBB SHADOW E&M-EST. PATIENT-LVL V: CPT | Mod: PBBFAC,,, | Performed by: NURSE PRACTITIONER

## 2022-01-20 PROCEDURE — 99215 OFFICE O/P EST HI 40 MIN: CPT | Mod: PBBFAC | Performed by: NURSE PRACTITIONER

## 2022-01-20 PROCEDURE — 99214 PR OFFICE/OUTPT VISIT, EST, LEVL IV, 30-39 MIN: ICD-10-PCS | Mod: S$GLB,,, | Performed by: NURSE PRACTITIONER

## 2022-01-20 PROCEDURE — 99214 OFFICE O/P EST MOD 30 MIN: CPT | Mod: S$GLB,,, | Performed by: NURSE PRACTITIONER

## 2022-01-20 RX ORDER — ESTRADIOL 0.1 MG/G
CREAM VAGINAL
Qty: 42.5 G | Refills: 3 | Status: SHIPPED | OUTPATIENT
Start: 2022-01-20 | End: 2023-08-15

## 2022-01-20 NOTE — PATIENT INSTRUCTIONS
1. Rectocele stage 2/ constipation  --continue to control bowel movements  --add stool softeners  --start pelvic floor PT with  BERLIN Cline: (p) [phone].  Or [phone].         2. Vaginal atrophy (dryness):  Use 1 gram of estrogen cream in vagina nightly x 2 weeks, then twice a week thereafter. Call if it is over $120.00    3. 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 RTC 4 months for follow up to discuss surgery with Dr. Coleman

## 2022-01-20 NOTE — PROGRESS NOTES
Urogyn follow up  01/20/2022  .  [facility] - UROGYNECOLOGY  [address] [id number]    Dee Hamilton  [id number]  1955      Dee Hamilton is a 66 y.o. here for a urogyn follow up for prolapse.        1)  UI:  (--) LASHA  (had sling placed)  (+) UUI  Very rarely for several years.   (--) pads: Daytime frequency: Q 2 hours.  Nocturia: Yes: 1/night. Limits fluids prior to bedtime.   (--) dysuria,  (--) hematuria,  (--) frequent UTIs.  (+) complete bladder emptying.  Complains of inconsistent urine flow    2)  POP:  Present. above introitus.  Symptoms:(--)    (--) vaginal bleeding. (--) vaginal discharge. (+) sexually active.  (+) dyspareunia--with initial penetration (+)  Vaginal dryness.  (--) vaginal estrogen use.     3)  BM:  (+) constipation/straining.  (--) chronic diarrhea. (--) hematochezia.  (--) fecal incontinence.  (--) fecal smearing/urgency.  (--) incomplete evacuation. Taking metamucil 2 capfuls daily and miralax prn. Splints to have a bowel movements       DATE OF PROCEDURE:  12/09/2011     TITLE OF OPERATION:   1.  Anterior repair.  2.  Placement of transobturator mid-urethral sling, TOT ([place]).  3.  Cystourethroscopy.          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       Past Surgical History:   Procedure Laterality Date    BLADDER SUSPENSION  12/11    CATARACT EXTRACTION, BILATERAL      CHOLECYSTECTOMY      COLD KNIFE CONIZATION OF CERVIX N/A 9/29/2021    Procedure: CONE BIOPSY, CERVIX, USING COLD KNIFE;  Surgeon: Sheeba Frank MD;  Location: [facility];  Service: OB/GYN;  Laterality: N/A;    EYE SURGERY      Glaucoma Laser Sx ou      HYSTEROSCOPY WITH DILATION AND  CURETTAGE OF UTERUS N/A 2021    Procedure: HYSTEROSCOPY, WITH DILATION AND CURETTAGE OF UTERUS;  Surgeon: Sheeba Frank MD;  Location: [facility];  Service: OB/GYN;  Laterality: N/A;    OOPHORECTOMY      TONSILLECTOMY      vaginal mesh         Family History   Problem Relation Age of Onset    Diabetes Sister     Cataracts Mother     Hypertension Mother     Cancer Father 68        Jaw Bone    Cataracts Father     Pancreatic cancer Brother     Ulcers Brother     Amblyopia Neg Hx     Blindness Neg Hx     Glaucoma Neg Hx     Macular degeneration Neg Hx     Retinal detachment Neg Hx     Strabismus Neg Hx     Stroke Neg Hx     Thyroid disease Neg Hx     Breast cancer Neg Hx     Colon cancer Neg Hx     Ovarian cancer Neg Hx        Social History     Socioeconomic History    Marital status:    Occupational History     Employer: [employer]   Tobacco Use    Smoking status: Former Smoker     Packs/day: 2.00     Years: 25.00     Pack years: 50.00     Quit date:      Years since quittin.0    Smokeless tobacco: Never Used   Substance and Sexual Activity    Alcohol use: Not Currently     Comment: social     Drug use: Never    Sexual activity: Not Currently     Partners: Male     Comment: works at [employer], 3 grown kids    Social History Narrative    ** Merged History Encounter **            Current Outpatient Medications   Medication Sig Dispense Refill    atorvastatin (LIPITOR) 80 MG tablet Take 1 tablet (80 mg total) by mouth once daily. 90 tablet 3    blood sugar diagnostic Strp To check BG 2 times daily, to use with insurance preferred meter 200 strip 3    blood-glucose meter kit To check BG 2 times daily, to use with insurance preferred meter 1 each 0    CONTOUR TEST STRIPS Strp USE TO TEST BLOOD SUGAR TWICE DAILY 50 strip 3    estradiol-norethindrone (COMBIPATCH) 0.05-0.25 mg/24 hr Place 1 patch onto the skin twice a week. 8 patch 6    furosemide (LASIX) 20 MG  "tablet Take 1 tablet (20 mg total) by mouth once daily. 90 tablet 1    HYDROcodone-acetaminophen (NORCO) 5-325 mg per tablet Take 1 tablet by mouth every 6 (six) hours as needed for Pain. 10 tablet 0    ibuprofen (ADVIL,MOTRIN) 600 MG tablet Take 1 tablet (600 mg total) by mouth every 6 (six) hours as needed for Pain. 30 tablet 3    insulin needles, disposable, (RELION PEN NEEDLES) 32 x 5/32 " Ndle Use as directed 50 each 6    lancets 33 gauge Misc by Misc.(Non-Drug; Combo Route) route. True plus      lancets Misc To check BG 2 times daily, to use with insurance preferred meter 200 each 11    latanoprost 0.005 % ophthalmic solution       losartan (COZAAR) 100 MG tablet TAKE 1 TABLET EVERY DAY 90 tablet 1    metFORMIN (GLUCOPHAGE-XR) 500 MG ER 24hr tablet TAKE 1 TABLET TWICE DAILY 180 tablet 1    pantoprazole (PROTONIX) 40 MG tablet Take 1 tablet (40 mg total) by mouth once daily. 30 tablet 2    paroxetine (PAXIL) 20 MG tablet TAKE 1 TABLET (20 MG TOTAL) BY MOUTH ONCE DAILY. 90 tablet 2    potassium chloride SA (K-DUR,KLOR-CON) 10 MEQ tablet TAKE 1 TABLET EVERY DAY 90 tablet 0    repaglinide (PRANDIN) 1 MG tablet Take 1 tablet (1 mg total) by mouth 3 (three) times daily before meals. 270 tablet 3    TRULICITY 1.5 mg/0.5 mL pen injector Inject 1.5 mg into the skin once a week. 12 pen 3    ACCU-CHEK GUIDE GLUCOSE METER [facility]       estradioL (ESTRACE) 0.01 % (0.1 mg/gram) vaginal cream Use 1 gram of estrogen cream in vagina nightly for 2 weeks, then twice a week thereafter. 42.5 g 3    insulin (LANTUS SOLOSTAR U-100 INSULIN) glargine 100 units/mL (3mL) SubQ pen Inject 8 units SQ qhs (Patient taking differently: 20 Units. Inject 8 units SQ qhs) 15 mL 3     No current facility-administered medications for this visit.       Review of patient's allergies indicates:  No Known Allergies    Well woman:  Pap:09/2021 ckc 1. CERVICAL CONE BIOPSY, STITCH AT 12 O'CLOCK SHOWS AREAS OF MODERATE TO   SEVERE SQUAMOUS " "DYSPLASIA (CHUN 2-3).  DYSPLASIA IS PRESENT IN SECTIONS FROM   THE 12-3 O'CLOCK QUADRANT AND FROM THE 9 TO 12 O'CLOCK QUADRANT.  DYSPLASIA   DOES INVOLVE THE INKED ENDOCERVICAL EDGE IN THESE AREAS.   2. CERVICAL CONE BIOPSY, STITCH AT 1 O'CLOCK FRAGMENTS OF BENIGN ENDOCERVICAL   MUCOSA WITH NO DYSPLASIA IDENTIFIED.   3. ENDOCERVICAL CURETTAGE SHOWS SMALL FRAGMENTS OF BENIGN ENDOCERVICAL TISSUE   ADMIXED WITH MUCUS AND BLOOD CLOT, NO DYSPLASIA IDENTIFIED.   4. FRAGMENTS INACTIVE ENDOMETRIUM AND BLOOD CLOT   Mammo:06/2021 normal  Colonoscopy:07/2018 diverticula  Dexa:09/2011 Osteoporosis of the lumbar spine.  Osteopenia of the left femoral neck.     ROS:  As per HPI.      Exam  BP (!) 135/59   Pulse 75   Ht 5' 7" (1.702 m)   Wt 93.2 kg (205 lb 6.4 oz)   BMI 32.17 kg/m²   General: alert and oriented, no acute distress  Respiratory: normal respiratory effort  Abd: soft, non-tender, non-distended    Pelvic  Ext. Genitalia: normal external genitalia. Normal bartholin's and skeens glands  Vagina: + atrophy. Normal vaginal mucosa without lesions. No discharge noted.   Non-tender bladder base without palpable mass.  Cervix: no lesions  Uterus:  uterus is normal size, shape, consistency and nontender   Urethra: no masses or tenderness  Urethral meatus: no lesions, caruncle or prolapse.    POP-Q:  Aa -3; Ba -3; C -5; Ap 0; Bp 0; D -7.  Genital hiatus 3, perineal body 2 total vaginal length 8.    Kegel 1/5 valsalva first    Impression  1. Rectocele     2. Urinary incontinence, mixed  Ambulatory referral/consult to Urogynecology   3. Constipation, unspecified constipation type  Ambulatory referral/consult to Physical/Occupational Therapy   4. Dyssynergia  Ambulatory referral/consult to Physical/Occupational Therapy     We reviewed the above issues and discussed options for short-term versus long-term management of her problems.   Plan:     1. Rectocele stage 2/ constipation  --continue to control bowel movements  --add stool " softeners  --start pelvic floor PT with  BERLIN Cline: (p) [phone].  Or [phone].         2. Vaginal atrophy (dryness):  Use 1 gram of estrogen cream in vagina nightly x 2 weeks, then twice a week thereafter. Call if it is over $120.00    3. 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 RTC 3 months for follow up to discuss surgery with Dr. Coleman        I spent a total of 30 minutes on the day of the visit.  This includes face to face time and non-face to face time preparing to see the patient (eg, review of tests), obtaining and/or reviewing separately obtained history, documenting clinical information in the electronic or other health record, independently interpreting results and communicating results to the patient/family/caregiver, or care coordinator.      Alicia Monroy, REGGIE-BC  [facility]  Division of Female Pelvic Medicine and Reconstructive Surgery  Department of Obstetrics & Gynecology

## 2022-03-06 NOTE — PROGRESS NOTES
"[facility] Note    Chief Complaint      Chief Complaint   Patient presents with    Follow-up       History of Present Illness      Dee Hamilton is a 66 y.o. F with Anxiety, Depression, Glaucoma, HLD, Hypercalcemia. Last visit - 9/8/21     ASCUS - Pt is fu by OB/GYN, Dr. Frank.  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 ECC neg. EMBx insufficient. Pt is s/p hysteroscopy/D&C and CKC 9/29/21.  Pt now reports spotting.  She ran out of her HRT patch and plans to d/w her OB, Dr. Frank and I alerted Dr. Frank as pt has some memory issues off and on.     Anxiety - She is on Paroxetine 20 mg/d. Will try inc to 30 mg/d.   D/w pt withdrawal effects with abrupt discontinuation. Referred to Psychiatry but she never got to go because her appt got cancelled twice at [facility].   "I go down a rabbit hole every now and then". It's depression more gagandeep than Anxiety because she doesn't go out much".  Pt talks to someone who is a friend of her daughter which has helped. She would like a support animal but her  does not like dogs. Playing with the neighbor's dog is helpful.  Will refer to Jonna NICOLE.      Depression - She is on Paroxetine 20 mg/d. Will try inc to 30 mg/d.  D/w pt withdrawal effects with abrupt discontinuation.  Referred to Psychiatry.  Pt talks to someone who is a friend of her daughter which has helped.   "It comes and goes". She has been meditating which helps. "My son is a trigger".       Glaucoma - Fu by Ophtho. Needs referral to new Ophtho.      HLD - Improved since resuming Atorvastatin.      Hypercalcemia - 10.5 - 1/14/22  Fu by Dr. Jarek Davison.  Vit D low normal - 24- low. iPTH - 118- elev. ionized calcium - 1.39 - wnl - 1/14/22.  Note - HCTZ prev stopped.      DM - II  - Dx '02 - HA1c  - 8.6 - uncontrolled on 1/14/22. Pt stopped  trulicity 1.5mg wkly due to finances. Pt on  Metformin  mg twice daily, Prandin 1 mg TID with meals, and lantus 20 units QD.  She reports " "she was not fu for appts during the pandemic. Fu by Dr. Tillye. Doing better since changed to Metformin ER - no further diarrhea.        Vit D def - 24 - 1/14/22. Pt not on suppl. She is fu by endo. Rec Ergocalciferol 50,000 units once weekly x 8 wks.  When complete she should start OTC Vit D 3 2000 units one daily.      Mixed urinary Incontinence - Cystocele - Fu by  UroGyn,  NP - Alicia Monroy . He Rx Myrbetriq but pt can't afford. She prefers not to go back to Dr. Dinh.  She had  Prev bladder suspension in '11. Checked MRI Brain to r/o  NPH with abn gait, imbalance, memory issues, and Incontinence.  No evid of this on MRI      Diaphragmatic hernia - No issues at present.  Will cont to monitor. She does have GERD. Rec smaller more freq meals.      Obesity - BMI - 31.84 - Pt denies snoring. Rec low carb diet.  She reports dec appetite. She has a shake in Am, Salad or sandwich at lunch and sometimes skips dinner or has fruit.     Diverticulosis - No issues at present. Diarrhea resolved with changing to Metformin ER.       Memory issues - no evid of NPH on MRI. Pt reports imbalance, Incontinence, and memory issues. She has known Mixed urinary incontinence and had a prev bladder supsension. Carotid U/S - 6/1/21 - no evid of sig stenosis. Vitamin B1 level and it was normal.  Vitamin b12 was elevated. Her folic acid was normal. "It's better.  It comes and goes". She started reading again and feels this has helped.      Imbalance - "It's better".   I'm not wobbling as much any more and haven't fallen or had dizziness.  My mood swings are gone and it's much better".  I suspect this has improved since resuming her SSRI.      Fatty Liver - Noted on CT abd/pelvis -2/22/20.   Rec she  limit tylenol and alcohol.  Rec diet and exercise for wt loss   As discussed at visit there is a potential for cirrhosis.      Suspected lipoma within the right lower thoracic chest wall overlying the right 7th rib anterior inferiorly - " seen on CT abd/pelvis 2/22/20.     Small fat containing umbilical hernia and Small fat containing left inguinal hernia - Noted on chart review. Not palpated on today's exam.      Atherosclerosis aorta - Seen on prev CT scans.  Cont Statin.     Rectocele - Fu by Uro/GYN. Planning for Pelvic Floor PTx.      Noncompliance - Pt ran out of her meds and had not fu during the pandemic. She is doing better with this.     HCM - Flu -10/27/21  Tdap - ? At [facility];  PCV 13 - 10/24/19;  PVX 23 - 3/1/21;  Shingrix -#1 - 10/24/19 and #2 -12/30/19- she thinks she had shingles in Jan. 2022 to Left shoulder/flank x 2 wks;  COVID - 19 Vaccine (Moderna) #1 - 2/15/21; #2 - 3/15/21; # 3 - 10/27/21; Hep C Screen -6/8/21 - neg.; C-scope - 7/25/18 - hemorrhoids and polyps - repeat 5 yrs At [facility]; PAP - 6/28/21 - + ASCUS; MGM - 6/10/21- repeat 1 yr;  DEXA - has order from Endo; Prev PCP - Dr Benito at [facility] [place]; Endo - Dr Gonzales; Ophtho - Dr. Mccurdy in [place]- retired - needs a new Ophtho referral; GI - Dr. Jarvis; Uro/GYN - Dr. Dinh; OB/GYN - Dr. Frank    Patient Care Team:  Farzana Dorman MD as PCP - General (Internal Medicine)  Ivy Wharton MD (Family Medicine)  Crystal Almanza RD, CDE as Dietitian (Diabetes)  Heriberto Man MD as Consulting Physician (Gastroenterology)     Health Maintenance:  Immunization History   Administered Date(s) Administered    COVID-19, MRNA, LN-S, PF (MODERNA FULL 0.5 ML DOSE) 02/15/2021, 03/15/2021, 10/27/2021    Influenza (FLUAD) - Quadrivalent - Adjuvanted - PF *Preferred* (65+) 10/27/2021    Influenza - High Dose - PF (65 years and older) 10/24/2019    Influenza - Intradermal - Quadrivalent - PF 11/27/2013    Influenza - Quadrivalent - High Dose - PF (65 years and older) 09/08/2020    Influenza - Quadrivalent - PF *Preferred* (6 months and older) 10/15/2018    Influenza - Trivalent (ADULT) 10/24/2011, 10/17/2014, 10/23/2017    Influenza - Trivalent - PF (ADULT) 10/25/2016     Pneumococcal Conjugate - 13 Valent 10/24/2019    Pneumococcal Polysaccharide - 23 Valent 2015, 2021    Zoster Recombinant 10/24/2019, 2019      Health Maintenance   Topic Date Due    Foot Exam  Never done    TETANUS VACCINE  Never done    DEXA Scan  2014    Hemoglobin A1c  2022    Mammogram  06/10/2022    Lipid Panel  2022    Eye Exam  10/12/2022    Low Dose Statin  2023    Hepatitis C Screening  Completed        Past Medical History:  Past Medical History:   Diagnosis Date    Anxiety     Bilateral leg edema 2021    Depression     Diabetes mellitus, type 2     Diverticulitis     GERD (gastroesophageal reflux disease)     Glaucoma     Hyperlipidemia     Hypertension     Nicotine dependence in remission     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       Past Surgical History:   has a past surgical history that includes vaginal mesh; Bladder suspension (); Glaucoma Laser Sx ou; Tonsillectomy; Cholecystectomy; Cataract extraction, bilateral; Oophorectomy; Eye surgery; Hysteroscopy with dilation and curettage of uterus (N/A, 2021); and Cold knife conization of cervix (N/A, 2021).    Family History:  family history includes Cancer (age of onset: 68) in her father; Cataracts in her father and mother; Diabetes in her sister; Hypertension in her mother; Pancreatic cancer in her brother; Ulcers in her brother.     Social History:  Social History     Tobacco Use    Smoking status: Former Smoker     Packs/day: 2.00     Years: 25.00     Pack years: 50.00     Quit date:      Years since quittin.1    Smokeless tobacco: Never Used   Substance Use Topics    Alcohol use: Not Currently     Comment: social     Drug use: Never       Review of Systems   Constitutional: Positive for diaphoresis. Negative for chills and fever.        Not to the point she changes her clothes or sheets       HENT: Negative for sore throat.    Respiratory: Negative for  "cough, chest tightness and shortness of breath.    Cardiovascular: Negative for chest pain.   Gastrointestinal: Positive for abdominal pain and constipation. Negative for blood in stool and diarrhea.        She takes stool softeners which have helped.   LLQ pain - 8/10 in severity x 8 days- comes nad goes.  Last BM yesterday - hard stools.    Genitourinary: Positive for bladder incontinence and vaginal bleeding. Negative for dysuria, frequency and hematuria.        +spotting since ranout of HRT patches 2 wks ago.    Musculoskeletal: Positive for arthralgias. Negative for myalgias.        Left leg.    Neurological: Negative for dizziness and headaches.   Psychiatric/Behavioral: Positive for dysphoric mood. Negative for suicidal ideas. The patient is not nervous/anxious.         Medications:    Current Outpatient Medications:     blood sugar diagnostic Strp, To check BG 2 times daily, to use with insurance preferred meter, Disp: 200 strip, Rfl: 3    blood-glucose meter kit, To check BG 2 times daily, to use with insurance preferred meter, Disp: 1 each, Rfl: 0    CONTOUR TEST STRIPS Strp, USE TO TEST BLOOD SUGAR TWICE DAILY, Disp: 50 strip, Rfl: 3    estradioL (ESTRACE) 0.01 % (0.1 mg/gram) vaginal cream, Use 1 gram of estrogen cream in vagina nightly for 2 weeks, then twice a week thereafter. (Patient taking differently: Use 1 gram of estrogen cream in vagina nightly for 2 weeks, then twice a week thereafter.), Disp: 42.5 g, Rfl: 3    insulin needles, disposable, (RELION PEN NEEDLES) 32 x 5/32 " Ndle, Use as directed, Disp: 50 each, Rfl: 6    latanoprost 0.005 % ophthalmic solution, , Disp: , Rfl:     atorvastatin (LIPITOR) 80 MG tablet, Take 1 tablet (80 mg total) by mouth once daily., Disp: 90 tablet, Rfl: 3    ergocalciferol (ERGOCALCIFEROL) 50,000 unit Cap, Take 1 capsule by mouth once weekly for 8 weeks, Disp: 8 capsule, Rfl: 0    estradiol-norethindrone (COMBIPATCH) 0.05-0.25 mg/24 hr, Place 1 patch " "onto the skin twice a week. (Patient not taking: Reported on 3/8/2022), Disp: 8 patch, Rfl: 6    furosemide (LASIX) 20 MG tablet, Take 1 tablet (20 mg total) by mouth once daily., Disp: 90 tablet, Rfl: 1    lancets 33 gauge Misc, by Misc.(Non-Drug; Combo Route) route. True plus, Disp: , Rfl:     lancets Misc, To check BG 2 times daily, to use with insurance preferred meter (Patient not taking: Reported on 3/8/2022), Disp: 200 each, Rfl: 11    losartan (COZAAR) 100 MG tablet, Take 1 tablet (100 mg total) by mouth once daily., Disp: 90 tablet, Rfl: 1    metFORMIN (GLUCOPHAGE-XR) 500 MG ER 24hr tablet, TAKE 1 TABLET BY MOUTH TWICE DAILY, Disp: 180 tablet, Rfl: 1    pantoprazole (PROTONIX) 40 MG tablet, Take 1 tablet (40 mg total) by mouth once daily., Disp: 30 tablet, Rfl: 2    pantoprazole (PROTONIX) 40 MG tablet, Take 1 tablet (40 mg total) by mouth once daily., Disp: 30 tablet, Rfl: 2    paroxetine (PAXIL) 30 MG tablet, Take 1 tablet (30 mg total) by mouth once daily., Disp: 90 tablet, Rfl: 1    potassium chloride SA (K-DUR,KLOR-CON) 10 MEQ tablet, Take 1 tablet (10 mEq total) by mouth once daily., Disp: 90 tablet, Rfl: 1    repaglinide (PRANDIN) 1 MG tablet, Take 1 tablet (1 mg total) by mouth 3 (three) times daily before meals., Disp: 270 tablet, Rfl: 3    TRULICITY 1.5 mg/0.5 mL pen injector, Inject 1.5 mg into the skin once a week., Disp: 12 pen, Rfl: 3     Allergies:  Review of patient's allergies indicates:  No Known Allergies    Physical Exam      Vital Signs  Temp: 97.8 °F (36.6 °C)  Pulse: 97  Resp: 17  SpO2: 98 %  BP: 117/65  BP Location: Right arm  Patient Position: Sitting  Pain Score: 0-No pain  Height and Weight  Height: 5' 7" (170.2 cm)  Weight: 92.2 kg (203 lb 4.2 oz)  BSA (Calculated - sq m): 2.09 sq meters  BMI (Calculated): 31.8  Weight in (lb) to have BMI = 25: 159.3      Patient Position: Sitting      Physical Exam  Vitals reviewed.   Constitutional:       General: She is not in acute " distress.     Appearance: Normal appearance. She is not ill-appearing, toxic-appearing or diaphoretic.   HENT:      Head: Normocephalic and atraumatic.      Right Ear: Tympanic membrane normal.      Ears:      Comments: Unable to visualize Left TM  Eyes:      Extraocular Movements: Extraocular movements intact.      Conjunctiva/sclera: Conjunctivae normal.      Pupils: Pupils are equal, round, and reactive to light.   Neck:      Vascular: No carotid bruit.   Cardiovascular:      Rate and Rhythm: Normal rate and regular rhythm.      Pulses: Normal pulses.      Heart sounds: Normal heart sounds. No murmur heard.  Pulmonary:      Effort: No respiratory distress.      Breath sounds: Normal breath sounds. No wheezing or rhonchi.   Abdominal:      Palpations: Abdomen is soft.      Tenderness: There is abdominal tenderness. There is no right CVA tenderness, left CVA tenderness or guarding.      Comments: LLQ TTP radiates to RUQ   Musculoskeletal:         General: Normal range of motion.   Skin:     General: Skin is warm and dry.   Neurological:      General: No focal deficit present.      Mental Status: She is alert and oriented to person, place, and time.   Psychiatric:         Mood and Affect: Mood normal.         Behavior: Behavior normal.          Laboratory:  CBC:  Recent Labs   Lab 08/14/19  1218 02/22/20  0947 04/06/21  0940   WBC 7.24 6.84 8.19   RBC 4.78 5.05 4.66   Hemoglobin 12.2 12.8 12.6   Hematocrit 38.5 42.8 39.5   Platelets 346 319 357   MCV 81 L 85 85   MCH 25.5 L 25.3 L 27.0   MCHC 31.7 L 29.9 L 31.9 L       CMP:  Recent Labs   Lab 07/09/21  0839 07/19/21  0832 10/08/21  0932 01/14/22  0929   Glucose 206 H   < > 158 H 143 H   Calcium 11.4 H   < > 10.0 10.5   Albumin 4.0  --  3.8 4.3   Total Protein 7.9  --  7.6 7.7   Sodium 137   < > 141 143   Potassium 3.8   < > 3.6 4.7   CO2 25   < > 24 25   Chloride 97   < > 106 105   BUN 16   < > 9 12   Creatinine 0.8   < > 0.8 0.8   Alkaline Phosphatase 87  --  100  100   ALT 14  --  18 20   AST 25  --  25 26   Total Bilirubin 0.5  --  0.4 0.7    < > = values in this interval not displayed.           URINALYSIS:  Recent Labs   Lab 08/14/19  1218 02/11/20  1700 02/22/20  0955 04/06/21  0913   Color, UA Yellow  --  Straw Yellow   Specific Gravity, UA <=1.005 A  --  1.015 1.025   pH, UA 7.0   < > 6.0 7.0   Protein, UA Negative  --  Negative Negative   Bacteria Rare  --  Rare  --    Nitrite, UA Negative  --  Negative Negative   Leukocytes, UA Negative  --  Trace A Negative   Urobilinogen, UA Negative  --   --  Negative   Hyaline Casts, UA 1  --   --   --     < > = values in this interval not displayed.        LIPIDS:  Recent Labs   Lab 04/06/21  0940 07/19/21  0832   TSH 2.100  --    HDL 37 L 32 L   Cholesterol 253 H 155   Triglycerides 397 H 151 H   LDL Cholesterol 136.6 92.8   HDL/Cholesterol Ratio 14.6 L 20.6   Non-HDL Cholesterol 216 123   Total Cholesterol/HDL Ratio 6.8 H 4.8       TSH:  Recent Labs   Lab 04/06/21  0940   TSH 2.100       A1C:  Recent Labs   Lab 04/06/21  0940 07/09/21  0839 10/08/21  0932 01/14/22  0929   Hemoglobin A1C 8.2 H 9.5 H 10.6 H 8.6 H       Urine Microalbumin/Cr:  Recent Labs   Lab 04/06/21  0913   Microalb/Creat Ratio 8.8         Other:   Recent Labs   Lab 04/06/21  0940 06/08/21  0953 06/28/21  1140 01/14/22  0929   Estradiol  --   --  13  --    Vit D, 25-Hydroxy 30  --   --  24 L   Vitamin B-12 >1400 H  >2000 H >2000 H  --   --    Ferritin  --   --   --  34     Recent Labs   Lab 06/08/21  0953   Hepatitis C Ab Negative       Assessment/Plan     Dee Hamilton is a 66 y.o.female with:    Left lower quadrant pain  -     CT Abdomen Pelvis With Contrast; Future; Expected date: 03/08/2022  - Pt with TTP in LLQ that radiates to RUQ.  Will check CT abd/pelvis. DDX incl Diverticulitis, colitis, constipation. Do not suspect renal stone.     Hyperlipidemia associated with type 2 diabetes mellitus  -     atorvastatin (LIPITOR) 80 MG tablet; Take 1  tablet (80 mg total) by mouth once daily.  Dispense: 90 tablet; Refill: 3  - Stable.  Cont current regimen.    Type 2 diabetes mellitus with hyperglycemia, with long-term current use of insulin  -     Discontinue: metFORMIN (GLUCOPHAGE-XR) 500 MG ER 24hr tablet; TAKE 1 TABLET BY MOUTH TWICE DAILY  Dispense: 180 tablet; Refill: 1  - Improved but not yet controlled.  Fu by Dr. Jarek Davison.     Hypertension, unspecified type  -     furosemide (LASIX) 20 MG tablet; Take 1 tablet (20 mg total) by mouth once daily.  Dispense: 90 tablet; Refill: 1  -     losartan (COZAAR) 100 MG tablet; Take 1 tablet (100 mg total) by mouth once daily.  Dispense: 90 tablet; Refill: 1  -     Creatinine, serum; Future; Expected date: 03/08/2022  - Controlled.  Cont current.     Bilateral leg edema  -     furosemide (LASIX) 20 MG tablet; Take 1 tablet (20 mg total) by mouth once daily.  Dispense: 90 tablet; Refill: 1  - Stable.  Cont current regimen.    Anxiety  -     Ambulatory referral/consult to Primary Care Behavioral Health (Non-Opioids); Future; Expected date: 03/15/2022  -     paroxetine (PAXIL) 30 MG tablet; Take 1 tablet (30 mg total) by mouth once daily.  Dispense: 90 tablet; Refill: 1  - Uncontrolled. Will inc Paroxetine from 20 to 30 mg/d. Refer to Jonna NICOLE.     Major depressive disorder with current active episode, unspecified depression episode severity, unspecified whether recurrent  -     Ambulatory referral/consult to Primary Care Behavioral Health (Non-Opioids); Future; Expected date: 03/15/2022  -     paroxetine (PAXIL) 30 MG tablet; Take 1 tablet (30 mg total) by mouth once daily.  Dispense: 90 tablet; Refill: 1  - Uncontrolled. Will inc Paroxetine from 20 to 30 mg/d. Refer to Jonna NICOLE.     Glaucoma, unspecified glaucoma type, unspecified laterality  -     Ambulatory referral/consult to Ophthalmology; Future; Expected date: 03/15/2022    Vitamin D insufficiency  -     ergocalciferol (ERGOCALCIFEROL)  50,000 unit Cap; Take 1 capsule by mouth once weekly for 8 weeks  Dispense: 8 capsule; Refill: 0  - Rec ergo x 8 wks as above.     On potassium wasting diuretic therapy  -     potassium chloride SA (K-DUR,KLOR-CON) 10 MEQ tablet; Take 1 tablet (10 mEq total) by mouth once daily.  Dispense: 90 tablet; Refill: 1  - Cont current.     Vaginal spotting  - She ran out of her HRT patch and plans to d/w her OB, Dr. Frank and I alerted Dr. Frank.    Constipation, unspecified constipation type  - cont stool softeners and to adeq hydrate. Unsure if her abcd pain is related to her constipation or not.     Encounter for vision screening  -     Cancel: Ambulatory referral/consult to Optometry; Future; Expected date: 03/15/2022    Screening mammogram, encounter for  -     Mammo Digital Screening Bilat w/ Dionisio; Future; Expected date: 06/10/2022         Chronic conditions status updated as per HPI.  Other than changes above, cont current medications and maintain follow up with specialists.  Follow up in about 3 months (around 6/8/2022).      Farzana Dorman MD  [facility]

## 2022-03-07 ENCOUNTER — PATIENT MESSAGE (OUTPATIENT)
Dept: ENDOCRINOLOGY | Facility: CLINIC | Age: 67
End: 2022-03-07
Payer: MEDICARE

## 2022-03-08 ENCOUNTER — TELEPHONE (OUTPATIENT)
Dept: BEHAVIORAL HEALTH | Facility: CLINIC | Age: 67
End: 2022-03-08
Payer: MEDICARE

## 2022-03-08 ENCOUNTER — OFFICE VISIT (OUTPATIENT)
Dept: PRIMARY CARE CLINIC | Facility: CLINIC | Age: 67
End: 2022-03-08
Payer: MEDICARE

## 2022-03-08 VITALS
TEMPERATURE: 98 F | RESPIRATION RATE: 17 BRPM | HEART RATE: 97 BPM | DIASTOLIC BLOOD PRESSURE: 65 MMHG | WEIGHT: 203.25 LBS | HEIGHT: 67 IN | SYSTOLIC BLOOD PRESSURE: 117 MMHG | OXYGEN SATURATION: 98 % | BODY MASS INDEX: 31.9 KG/M2

## 2022-03-08 DIAGNOSIS — E11.65 TYPE 2 DIABETES MELLITUS WITH HYPERGLYCEMIA, WITH LONG-TERM CURRENT USE OF INSULIN: ICD-10-CM

## 2022-03-08 DIAGNOSIS — H40.9 GLAUCOMA, UNSPECIFIED GLAUCOMA TYPE, UNSPECIFIED LATERALITY: ICD-10-CM

## 2022-03-08 DIAGNOSIS — Z79.899 ON POTASSIUM WASTING DIURETIC THERAPY: ICD-10-CM

## 2022-03-08 DIAGNOSIS — F32.9 MAJOR DEPRESSIVE DISORDER WITH CURRENT ACTIVE EPISODE, UNSPECIFIED DEPRESSION EPISODE SEVERITY, UNSPECIFIED WHETHER RECURRENT: ICD-10-CM

## 2022-03-08 DIAGNOSIS — E11.69 HYPERLIPIDEMIA ASSOCIATED WITH TYPE 2 DIABETES MELLITUS: ICD-10-CM

## 2022-03-08 DIAGNOSIS — R10.32 LEFT LOWER QUADRANT PAIN: Primary | ICD-10-CM

## 2022-03-08 DIAGNOSIS — R60.0 BILATERAL LEG EDEMA: ICD-10-CM

## 2022-03-08 DIAGNOSIS — N93.9 VAGINAL SPOTTING: ICD-10-CM

## 2022-03-08 DIAGNOSIS — Z79.4 TYPE 2 DIABETES MELLITUS WITH HYPERGLYCEMIA, WITH LONG-TERM CURRENT USE OF INSULIN: ICD-10-CM

## 2022-03-08 DIAGNOSIS — F41.9 ANXIETY: ICD-10-CM

## 2022-03-08 DIAGNOSIS — E78.5 HYPERLIPIDEMIA ASSOCIATED WITH TYPE 2 DIABETES MELLITUS: ICD-10-CM

## 2022-03-08 DIAGNOSIS — E55.9 VITAMIN D INSUFFICIENCY: ICD-10-CM

## 2022-03-08 DIAGNOSIS — I10 HYPERTENSION, UNSPECIFIED TYPE: ICD-10-CM

## 2022-03-08 DIAGNOSIS — Z12.31 SCREENING MAMMOGRAM, ENCOUNTER FOR: ICD-10-CM

## 2022-03-08 DIAGNOSIS — Z01.00 ENCOUNTER FOR VISION SCREENING: ICD-10-CM

## 2022-03-08 DIAGNOSIS — K59.00 CONSTIPATION, UNSPECIFIED CONSTIPATION TYPE: ICD-10-CM

## 2022-03-08 DIAGNOSIS — E11.65 TYPE 2 DIABETES MELLITUS WITH HYPERGLYCEMIA, UNSPECIFIED WHETHER LONG TERM INSULIN USE: ICD-10-CM

## 2022-03-08 PROCEDURE — 99999 PR PBB SHADOW E&M-EST. PATIENT-LVL V: CPT | Mod: PBBFAC,,, | Performed by: INTERNAL MEDICINE

## 2022-03-08 PROCEDURE — 99999 PR PBB SHADOW E&M-EST. PATIENT-LVL V: ICD-10-PCS | Mod: PBBFAC,,, | Performed by: INTERNAL MEDICINE

## 2022-03-08 PROCEDURE — 99214 OFFICE O/P EST MOD 30 MIN: CPT | Mod: S$GLB,,, | Performed by: INTERNAL MEDICINE

## 2022-03-08 PROCEDURE — 99214 PR OFFICE/OUTPT VISIT, EST, LEVL IV, 30-39 MIN: ICD-10-PCS | Mod: S$GLB,,, | Performed by: INTERNAL MEDICINE

## 2022-03-08 RX ORDER — DULAGLUTIDE 1.5 MG/.5ML
1.5 INJECTION, SOLUTION SUBCUTANEOUS WEEKLY
Qty: 12 PEN | Refills: 3 | Status: SHIPPED | OUTPATIENT
Start: 2022-03-08 | End: 2022-06-28

## 2022-03-08 RX ORDER — PANTOPRAZOLE SODIUM 40 MG/1
40 TABLET, DELAYED RELEASE ORAL DAILY
Qty: 30 TABLET | Refills: 2 | Status: SHIPPED | OUTPATIENT
Start: 2022-03-08 | End: 2022-12-31 | Stop reason: SDUPTHER

## 2022-03-08 RX ORDER — REPAGLINIDE 1 MG/1
1 TABLET ORAL
Qty: 270 TABLET | Refills: 3 | Status: SHIPPED | OUTPATIENT
Start: 2022-03-08 | End: 2023-05-01 | Stop reason: SDUPTHER

## 2022-03-08 RX ORDER — ERGOCALCIFEROL 1.25 MG/1
CAPSULE ORAL
Qty: 8 CAPSULE | Refills: 0 | Status: SHIPPED | OUTPATIENT
Start: 2022-03-08 | End: 2022-06-09

## 2022-03-08 RX ORDER — PAROXETINE 30 MG/1
30 TABLET, FILM COATED ORAL DAILY
Qty: 90 TABLET | Refills: 1 | Status: SHIPPED | OUTPATIENT
Start: 2022-03-08 | End: 2022-06-09 | Stop reason: SDUPTHER

## 2022-03-08 RX ORDER — POTASSIUM CHLORIDE 750 MG/1
10 TABLET, EXTENDED RELEASE ORAL DAILY
Qty: 90 TABLET | Refills: 1 | Status: SHIPPED | OUTPATIENT
Start: 2022-03-08 | End: 2022-12-31 | Stop reason: SDUPTHER

## 2022-03-08 RX ORDER — METFORMIN HYDROCHLORIDE 500 MG/1
TABLET, EXTENDED RELEASE ORAL
Qty: 180 TABLET | Refills: 1 | Status: SHIPPED | OUTPATIENT
Start: 2022-03-08 | End: 2022-03-08 | Stop reason: SDUPTHER

## 2022-03-08 RX ORDER — METFORMIN HYDROCHLORIDE 500 MG/1
TABLET, EXTENDED RELEASE ORAL
Qty: 180 TABLET | Refills: 1 | Status: SHIPPED | OUTPATIENT
Start: 2022-03-08 | End: 2022-12-15 | Stop reason: SDUPTHER

## 2022-03-08 RX ORDER — ATORVASTATIN CALCIUM 80 MG/1
80 TABLET, FILM COATED ORAL DAILY
Qty: 90 TABLET | Refills: 3 | Status: SHIPPED | OUTPATIENT
Start: 2022-03-08 | End: 2023-02-24 | Stop reason: SDUPTHER

## 2022-03-08 RX ORDER — FUROSEMIDE 20 MG/1
20 TABLET ORAL DAILY
Qty: 90 TABLET | Refills: 1 | Status: SHIPPED | OUTPATIENT
Start: 2022-03-08 | End: 2022-06-09 | Stop reason: SDUPTHER

## 2022-03-08 RX ORDER — LOSARTAN POTASSIUM 100 MG/1
100 TABLET ORAL DAILY
Qty: 90 TABLET | Refills: 1 | Status: SHIPPED | OUTPATIENT
Start: 2022-03-08 | End: 2022-06-09 | Stop reason: SDUPTHER

## 2022-03-10 ENCOUNTER — PATIENT MESSAGE (OUTPATIENT)
Dept: BEHAVIORAL HEALTH | Facility: CLINIC | Age: 67
End: 2022-03-10
Payer: MEDICARE

## 2022-03-10 ENCOUNTER — TELEPHONE (OUTPATIENT)
Dept: BEHAVIORAL HEALTH | Facility: CLINIC | Age: 67
End: 2022-03-10
Payer: MEDICARE

## 2022-03-11 ENCOUNTER — TELEPHONE (OUTPATIENT)
Dept: BEHAVIORAL HEALTH | Facility: CLINIC | Age: 67
End: 2022-03-11
Payer: MEDICARE

## 2022-03-11 NOTE — PROGRESS NOTES
CHW reached out to patient to complete intake and schedule initial [facility] visit, no answer, left VM.

## 2022-03-14 ENCOUNTER — PATIENT MESSAGE (OUTPATIENT)
Dept: BEHAVIORAL HEALTH | Facility: CLINIC | Age: 67
End: 2022-03-14
Payer: MEDICARE

## 2022-03-14 ENCOUNTER — TELEPHONE (OUTPATIENT)
Dept: BEHAVIORAL HEALTH | Facility: CLINIC | Age: 67
End: 2022-03-14
Payer: MEDICARE

## 2022-03-14 NOTE — PROGRESS NOTES
Contacted patient no answer left VM. Sent Zamzeet message to have PCP refer her in the future.

## 2022-03-23 ENCOUNTER — LAB VISIT (OUTPATIENT)
Dept: LAB | Facility: HOSPITAL | Age: 67
End: 2022-03-23
Attending: INTERNAL MEDICINE
Payer: MEDICARE

## 2022-03-23 DIAGNOSIS — I10 HYPERTENSION, UNSPECIFIED TYPE: ICD-10-CM

## 2022-03-23 LAB
CREAT SERPL-MCNC: 0.8 MG/DL (ref 0.5–1.4)
EST. GFR  (AFRICAN AMERICAN): >60 ML/MIN/1.73 M^2
EST. GFR  (NON AFRICAN AMERICAN): >60 ML/MIN/1.73 M^2

## 2022-03-23 PROCEDURE — 36415 COLL VENOUS BLD VENIPUNCTURE: CPT | Mod: PN | Performed by: INTERNAL MEDICINE

## 2022-03-23 PROCEDURE — 82565 ASSAY OF CREATININE: CPT | Performed by: INTERNAL MEDICINE

## 2022-03-25 ENCOUNTER — HOSPITAL ENCOUNTER (OUTPATIENT)
Dept: RADIOLOGY | Facility: OTHER | Age: 67
Discharge: HOME | End: 2022-03-25
Attending: INTERNAL MEDICINE
Payer: MEDICARE

## 2022-03-25 DIAGNOSIS — R10.32 LEFT LOWER QUADRANT PAIN: ICD-10-CM

## 2022-03-25 PROCEDURE — 74177 CT ABDOMEN PELVIS WITH CONTRAST: ICD-10-PCS | Mod: 26,,, | Performed by: RADIOLOGY

## 2022-03-25 PROCEDURE — 74177 CT ABD & PELVIS W/CONTRAST: CPT | Mod: TC

## 2022-03-25 PROCEDURE — 74177 CT ABD & PELVIS W/CONTRAST: CPT | Mod: 26,,, | Performed by: RADIOLOGY

## 2022-03-25 PROCEDURE — 25500020 PHARM REV CODE 255: Performed by: INTERNAL MEDICINE

## 2022-03-25 PROCEDURE — A9698 NON-RAD CONTRAST MATERIALNOC: HCPCS | Performed by: INTERNAL MEDICINE

## 2022-03-25 RX ADMIN — IOHEXOL 100 ML: 350 INJECTION, SOLUTION INTRAVENOUS at 10:03

## 2022-03-25 RX ADMIN — IOHEXOL 1000 ML: 12 SOLUTION ORAL at 09:03

## 2022-03-25 NOTE — PROGRESS NOTES
I reviewed her CT of her Abdomen/pelvis. Called pt.  No answer.  Left message to return call.  Please inform pt -  Punctate low-density foci in both kidneys too small to adequately characterize.  We could check an Ultrasound in the future to re-evaluate. No acute process seen to explain her LLQ pain.  Colonic diverticulosis with no evidence for acute diverticulitis. No further recommendations at this time.     Dr. BRUSH

## 2022-03-28 ENCOUNTER — PATIENT MESSAGE (OUTPATIENT)
Dept: PRIMARY CARE CLINIC | Facility: CLINIC | Age: 67
End: 2022-03-28
Payer: MEDICARE

## 2022-03-31 ENCOUNTER — PATIENT OUTREACH (OUTPATIENT)
Dept: ADMINISTRATIVE | Facility: OTHER | Age: 67
End: 2022-03-31
Payer: MEDICARE

## 2022-04-01 ENCOUNTER — OFFICE VISIT (OUTPATIENT)
Dept: OPHTHALMOLOGY | Facility: CLINIC | Age: 67
End: 2022-04-01
Payer: MEDICARE

## 2022-04-01 DIAGNOSIS — Z96.1 PSEUDOPHAKIA OF BOTH EYES: ICD-10-CM

## 2022-04-01 DIAGNOSIS — H52.4 PRESBYOPIA OF BOTH EYES: ICD-10-CM

## 2022-04-01 DIAGNOSIS — H40.1111 PRIMARY OPEN ANGLE GLAUCOMA (POAG) OF RIGHT EYE, MILD STAGE: ICD-10-CM

## 2022-04-01 DIAGNOSIS — H40.022 OPEN ANGLE WITH BORDERLINE FINDINGS, HIGH RISK, LEFT: Primary | ICD-10-CM

## 2022-04-01 DIAGNOSIS — E11.9 DIABETES MELLITUS TYPE 2 WITHOUT RETINOPATHY: ICD-10-CM

## 2022-04-01 DIAGNOSIS — H04.123 DRY EYE SYNDROME OF BOTH EYES: ICD-10-CM

## 2022-04-01 PROCEDURE — 99999 PR PBB SHADOW E&M-EST. PATIENT-LVL III: ICD-10-PCS | Mod: PBBFAC,,, | Performed by: STUDENT IN AN ORGANIZED HEALTH CARE EDUCATION/TRAINING PROGRAM

## 2022-04-01 PROCEDURE — 76514 PR  US, EYE, FOR CORNEAL THICKNESS: ICD-10-PCS | Mod: S$GLB,,, | Performed by: STUDENT IN AN ORGANIZED HEALTH CARE EDUCATION/TRAINING PROGRAM

## 2022-04-01 PROCEDURE — 76514 ECHO EXAM OF EYE THICKNESS: CPT | Mod: S$GLB,,, | Performed by: STUDENT IN AN ORGANIZED HEALTH CARE EDUCATION/TRAINING PROGRAM

## 2022-04-01 PROCEDURE — 92020 GONIOSCOPY: CPT | Mod: S$GLB,,, | Performed by: STUDENT IN AN ORGANIZED HEALTH CARE EDUCATION/TRAINING PROGRAM

## 2022-04-01 PROCEDURE — 99999 PR PBB SHADOW E&M-EST. PATIENT-LVL III: CPT | Mod: PBBFAC,,, | Performed by: STUDENT IN AN ORGANIZED HEALTH CARE EDUCATION/TRAINING PROGRAM

## 2022-04-01 PROCEDURE — 99204 OFFICE O/P NEW MOD 45 MIN: CPT | Mod: S$GLB,,, | Performed by: STUDENT IN AN ORGANIZED HEALTH CARE EDUCATION/TRAINING PROGRAM

## 2022-04-01 PROCEDURE — 92133 POSTERIOR SEGMENT OCT OPTIC NERVE(OCULAR COHERENCE TOMOGRAPHY) - OU - BOTH EYES: ICD-10-PCS | Mod: S$GLB,,, | Performed by: STUDENT IN AN ORGANIZED HEALTH CARE EDUCATION/TRAINING PROGRAM

## 2022-04-01 PROCEDURE — 99204 PR OFFICE/OUTPT VISIT, NEW, LEVL IV, 45-59 MIN: ICD-10-PCS | Mod: S$GLB,,, | Performed by: STUDENT IN AN ORGANIZED HEALTH CARE EDUCATION/TRAINING PROGRAM

## 2022-04-01 PROCEDURE — 92020 PR SPECIAL EYE EVAL,GONIOSCOPY: ICD-10-PCS | Mod: S$GLB,,, | Performed by: STUDENT IN AN ORGANIZED HEALTH CARE EDUCATION/TRAINING PROGRAM

## 2022-04-01 PROCEDURE — 92133 CPTRZD OPH DX IMG PST SGM ON: CPT | Mod: S$GLB,,, | Performed by: STUDENT IN AN ORGANIZED HEALTH CARE EDUCATION/TRAINING PROGRAM

## 2022-04-01 RX ORDER — TRAVOPROST OPHTHALMIC SOLUTION 0.04 MG/ML
1 SOLUTION OPHTHALMIC NIGHTLY
Qty: 7.5 EACH | Refills: 3 | Status: SHIPPED | OUTPATIENT
Start: 2022-04-01 | End: 2023-04-20 | Stop reason: SDUPTHER

## 2022-04-01 NOTE — PROGRESS NOTES
Requested updates within Care Everywhere.  Patient's chart was reviewed for overdue SARABJTI topics.  Health maintenance:updated  Immunizations:reconciled   Legacy:   Media:  Orders placed:  Tasked appts:  Labs Linked:  Upcoming appt:Ophthalmology 4/1/22, Mammogram 6/13/22

## 2022-04-01 NOTE — PROGRESS NOTES
Subjective:       Patient ID: Dee Hamilton is a 66 y.o. female.    Chief Complaint: Concerns About Ocular Health and Glaucoma Suspect    HPI     Patient here today to establish care with new ophthalmologist   Ref by: Dr. Dorman (PCP)     Pt states she gets cloudy VA and floaters occasionally     S/P CE/IOL OU about 2 yrs ago  PATRICIA --> punctal plugs OU placed last yr    Travatan QHS OU    Hemoglobin A1C       Date                     Value               Ref Range             Status                01/14/2022               8.6 (H)             4.0 - 5.6 %           Final                       10/08/2021               10.6 (H)            4.0 - 5.6 %           Final                       07/09/2021               9.5 (H)             4.0 - 5.6 %           Final                  Last edited by Arleen Powers on 4/1/2022  8:38 AM. (History)               Assessment & Plan   Open angle with borderline findings, high risk, left  -     travoprost (TRAVATAN Z) 0.004 % ophthalmic solution; Place 1 drop into both eyes every evening.  Dispense: 6 each; Refill: 3    Primary open angle glaucoma (POAG) of right eye, mild stage  -     Ambulatory referral/consult to Ophthalmology  -     Posterior Segment OCT Optic Nerve- Both eyes  -     Darling Visual Field - OU - Extended - Both Eyes; Future  -     Color Fundus Photography - OU - Both Eyes; Future  -     travoprost (TRAVATAN Z) 0.004 % ophthalmic solution; Place 1 drop into both eyes every evening.  Dispense: 6 each; Refill: 3    Presbyopia of both eyes    Pseudophakia of both eyes    Dry eye syndrome of both eyes    Diabetes mellitus type 2 without retinopathy    POAG mild OD // GS high risk OS  -Fhx (-), Steroids (-),Trauma(-)  -Drops: Travatan Z qHS OU  -Drop intolerance/contraindication: -  -Laser: -  -Surgeries: CE IOL OU  -CCT: 539 // 535  -Gonio: SS OU    Discussed imaging and interpretation with patient.     4/22 RNFL B G/TS/TI/NI OD // full OS    IOP a little high  today. Pt reports usually is 14. Will recheck and determine if need further IOP lowering. Briefly discussed SLT.   Need HVF for staging  Ok to Continue present management with drops for now  Refill Zbigniew w 90-day       PCO OU  OD>OS  VA still good and no symptoms  YPC when symptomatic      Pseudophakia  Lenses WC, monitor    PATRICIA  Punctal plugs  Looks great    DM2 without retinopathy  Yearly DFE  Glycemic control      PLAN  Continue present management with drops for now  Refill Zbigniew 90 day  HVF 24-2 and IOP check next visit    RTC 4 months HVF 24-2 and IOP check, optos    Kirti Dawn M.D., M.S.  Department of Ophthalmology   Division of Glaucoma Surgery  [facility]

## 2022-04-07 ENCOUNTER — PATIENT MESSAGE (OUTPATIENT)
Dept: BEHAVIORAL HEALTH | Facility: CLINIC | Age: 67
End: 2022-04-07
Payer: MEDICARE

## 2022-04-08 ENCOUNTER — TELEPHONE (OUTPATIENT)
Dept: BEHAVIORAL HEALTH | Facility: CLINIC | Age: 67
End: 2022-04-08
Payer: MEDICARE

## 2022-04-08 NOTE — PROGRESS NOTES
Behavioral Health Community Health Worker  Initial Assessment  Completed by: Joie Christy    Date:  4/8/2022    Patient Enrollment in Behavioral Health Program:  · Patient verbalized understanding of Behavioral Health Integration services to include:  · Patient understands that CHW, LCSW, PharmD and consulting Psychiatrist are members of the care team working collaboratively with his/her primary care provider: Yes  · Patient understands that activation of their [facility] patient portal account is required for accessing the full scope of team services: Yes  · Patient understands that some counseling sessions may occur via video: Yes  · Clinic visits with the psychiatrist may be subject to a co-pay based on your insurance: Yes  · Patient consents to enroll in BHI program: Yes    Assessments     Single Item Health Literacy Scale:  · How often do you need to have someone help you read instructions, pamphlets or other written material from your doctor or pharmacy?: Never    Promis 10:  · Promis 10 Responses  · In general, would you say your health is: Fair  · In general, would you say your quality of life is: Poor  · In general, how would you rate your physical health?: Fair  · In general, how would you rate your mental health, including your mood and your ability to think?: Poor  · In general, how would you rate your satisfaction with your social activities and relationships?: Poor  · In general, please rate how well you carry out your usual social activities and roles. (This includes activities at home, at work and in your community, and responsibilities as a parent, child, spouse, employee, friend, etc.): Poor  · To what extent are you able to carry out your everyday physical activities such as walking, climbing stairs, carrying groceries, or moving a chair? : Completely  · How often have you been bothered by emotional problems such as feeling anxious, depressed or irritable?: Always  · In the past 7 days, how would  "you rate your fatigue on average?: Moderate  · In the past 7 days, on a scale of 0 to 10 (where 0 is no pain and 10 is the worst pain imaginable) how would you rate your pain on average?: 5  · Global Physical Health: 15  · Global Mental health Score: 8    Depression PHQ:  PHQ9 2022   Total Score 18         Generalized Anxiety Disorder 7-Item Scale:  GAD7 2022   1. Feeling nervous, anxious, or on edge? 1   2. Not being able to stop or control worrying? 3   3. Worrying too much about different things? 1   4. Trouble relaxing? 1   5. Being so restless that it is hard to sit still? 1   6. Becoming easily annoyed or irritable? 2   7. Feeling afraid as if something awful might happen? 2   8. If you checked off any problems, how difficult have these problems made it for you to do your work, take care of things at home, or get along with other people? 2   REENA-7 Score 11       History     Social History     Socioeconomic History    Marital status:    Occupational History     Employer: [employer]   Tobacco Use    Smoking status: Former Smoker     Packs/day: 2.00     Years: 25.00     Pack years: 50.00     Quit date:      Years since quittin.2    Smokeless tobacco: Never Used   Substance and Sexual Activity    Alcohol use: Not Currently     Comment: social     Drug use: Never    Sexual activity: Not Currently     Partners: Male     Comment: works at [employer], 3 grown kids    Social History Narrative    ** Merged History Encounter **            Call Summary     Patient was referred to the BHI (Non-opioid) program by Primary Care Provider, Dr. Farzana Dorman.  CHW contacted Dee Hamilton who reports depression and anxiety that limits her activities of daily living (ADLs).   Patient scored "18" on the PHQ9 and "11" on the REENA 7. Based on these scores patient is eligible for the Behavioral Health Integration (Non-opioid) Program. CHW completed the intake and scheduled an office " appointment for patient with Jonna Kendall LCSW, on 04/14/22 at 9:30am.

## 2022-04-13 ENCOUNTER — TELEPHONE (OUTPATIENT)
Dept: BEHAVIORAL HEALTH | Facility: CLINIC | Age: 67
End: 2022-04-13
Payer: MEDICARE

## 2022-04-13 NOTE — PROGRESS NOTES
CHW reached out to pt to remind her of office appointment with Jonna Kendall LCSW, on tomorrow. No answer, left VM of office appointment.

## 2022-04-14 ENCOUNTER — TELEPHONE (OUTPATIENT)
Dept: BEHAVIORAL HEALTH | Facility: CLINIC | Age: 67
End: 2022-04-14
Payer: MEDICARE

## 2022-04-14 NOTE — PROGRESS NOTES
CHW reached out to new pt to rescheduled office appointment with Jonna Kendall LCSW, pt reschedule appointment for 04/21/22 at 10:30am.

## 2022-04-20 ENCOUNTER — TELEPHONE (OUTPATIENT)
Dept: BEHAVIORAL HEALTH | Facility: CLINIC | Age: 67
End: 2022-04-20
Payer: MEDICARE

## 2022-04-20 NOTE — PROGRESS NOTES
CHW reached out to new pt to remind her of office appointment with Jonna Kendall LCSW, on tomorrow. No answer, left VM of the appointment.

## 2022-04-21 ENCOUNTER — CLINICAL SUPPORT (OUTPATIENT)
Dept: BEHAVIORAL HEALTH | Facility: CLINIC | Age: 67
End: 2022-04-21
Payer: MEDICARE

## 2022-04-21 DIAGNOSIS — F41.1 GAD (GENERALIZED ANXIETY DISORDER): ICD-10-CM

## 2022-04-21 DIAGNOSIS — F41.9 ANXIETY: ICD-10-CM

## 2022-04-21 DIAGNOSIS — F32.9 MAJOR DEPRESSIVE DISORDER WITH CURRENT ACTIVE EPISODE, UNSPECIFIED DEPRESSION EPISODE SEVERITY, UNSPECIFIED WHETHER RECURRENT: ICD-10-CM

## 2022-04-21 PROCEDURE — 99999 PR PBB SHADOW E&M-EST. PATIENT-LVL I: ICD-10-PCS | Mod: PBBFAC,,, | Performed by: SOCIAL WORKER

## 2022-04-21 PROCEDURE — 90791 PSYCH DIAGNOSTIC EVALUATION: CPT | Mod: S$GLB,,, | Performed by: SOCIAL WORKER

## 2022-04-21 PROCEDURE — 99999 PR PBB SHADOW E&M-EST. PATIENT-LVL I: CPT | Mod: PBBFAC,,, | Performed by: SOCIAL WORKER

## 2022-04-21 PROCEDURE — 90791 PR PSYCHIATRIC DIAGNOSTIC EVALUATION: ICD-10-PCS | Mod: S$GLB,,, | Performed by: SOCIAL WORKER

## 2022-04-21 NOTE — PROGRESS NOTES
"  Primary Care Behavioral Health: Initial  Patient Name: Dee Hamilton  Date:  2022  Site:  [facility]  Referral source: Farzana Dorman MD    Chief complaint/reason for encounter:      History of present illness:  Ms. Dee Hamilton is a 66 y.o. Not  or /a female referred by Farzana Dorman MD.  Patient was seen, examined and chart was reviewed.  Here haven't had time to grieve. Lost friend of 40 yrs, pt reporting depression and not wanting to do anything. Pt is fixated on wanting a dog. Pt presents that pt is being denied things that being pleasure to life by . Pt denies ability to control own life. Pt reports never had  discussions about expectations before moving in together.  Pt states feels like in assisted, believes has no empathy,  has some good points.  Pt states "I feel like I settled." Pt states "I need a service dog,  says if I get a dog, I'll have to move."    Discussed going to spend time at no kill shelter, pt states would get too attached and "it is not what I want."      History  Family of Origin- Pt grew up in home with both parents 3 sisters and 2 brothers (both ). Mom alcoholic, dad beat mom, maternal grandmother checked in on childnre often and pt moved in with GM in . Mom had 4 sisters, youngest sister did a lot for pt Pt had 2 yrs of college, got pregnant, dropped out and eventually went to business school.    Marriages-  I st marriage 27 yrs- 3 kids- (47B, 45G, 42B) dtr and youngest son in [place], oldest here but is Jehovah Witness and won't talk to pt.  2 nd marriage (current) 10 yrs- he has dtr (living with son) and son. Pt and current  met at work, entergy .  was , both retired.      Pt states that being in a Bible study is helping, pt states  believes in God (God is here with me), Religious not Jew believer. God offers support and keeps pt moving forward.     Discussed " "BHI program, Pt is short term therapy (3-6 months) with initial appointments being approximately 60 minutes and follow up appointments approximately 30 minutes, usually every 2-3 weeks. This program is solution focused and goal oriented. The goal of the program is to reduce symptoms and create a "tool box" of resources to help the pt control, reduce, or eliminate symptoms. The LCSW works with the PCP and psychiatrist as a team to help the pt achieve the best results.       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         Current Outpatient Medications:     atorvastatin (LIPITOR) 80 MG tablet, Take 1 tablet (80 mg total) by mouth once daily., Disp: 90 tablet, Rfl: 3    blood sugar diagnostic Strp, To check BG 2 times daily, to use with insurance preferred meter, Disp: 200 strip, Rfl: 3    blood-glucose meter kit, To check BG 2 times daily, to use with insurance preferred meter, Disp: 1 each, Rfl: 0    CONTOUR TEST STRIPS Strp, USE TO TEST BLOOD SUGAR TWICE DAILY, Disp: 50 strip, Rfl: 3    ergocalciferol (ERGOCALCIFEROL) 50,000 unit Cap, Take 1 capsule by mouth once weekly for 8 weeks (Patient taking differently: Take 1 capsule by mouth once weekly for 8 weeks), Disp: 8 capsule, Rfl: 0    estradioL (ESTRACE) 0.01 % (0.1 mg/gram) vaginal cream, Use 1 gram of estrogen cream in vagina nightly for 2 weeks, then twice a week thereafter. (Patient taking differently: Use 1 gram of estrogen cream in vagina nightly for 2 weeks, then twice a week thereafter.), Disp: 42.5 g, Rfl: 3    estradiol-norethindrone (COMBIPATCH) 0.05-0.25 mg/24 hr, Place 1 patch onto the skin twice a week., Disp: 8 patch, Rfl: 6    furosemide (LASIX) 20 MG tablet, " "Take 1 tablet (20 mg total) by mouth once daily., Disp: 90 tablet, Rfl: 1    insulin needles, disposable, (RELION PEN NEEDLES) 32 x 5/32 " Ndle, Use as directed, Disp: 50 each, Rfl: 6    lancets 33 gauge Misc, by Misc.(Non-Drug; Combo Route) route. True plus, Disp: , Rfl:     lancets Misc, To check BG 2 times daily, to use with insurance preferred meter, Disp: 200 each, Rfl: 11    losartan (COZAAR) 100 MG tablet, Take 1 tablet (100 mg total) by mouth once daily., Disp: 90 tablet, Rfl: 1    metFORMIN (GLUCOPHAGE-XR) 500 MG ER 24hr tablet, TAKE 1 TABLET BY MOUTH TWICE DAILY (Patient taking differently: TAKE 1 TABLET BY MOUTH TWICE DAILY), Disp: 180 tablet, Rfl: 1    pantoprazole (PROTONIX) 40 MG tablet, Take 1 tablet (40 mg total) by mouth once daily., Disp: 30 tablet, Rfl: 2    pantoprazole (PROTONIX) 40 MG tablet, Take 1 tablet (40 mg total) by mouth once daily., Disp: 30 tablet, Rfl: 2    paroxetine (PAXIL) 30 MG tablet, Take 1 tablet (30 mg total) by mouth once daily., Disp: 90 tablet, Rfl: 1    potassium chloride SA (K-DUR,KLOR-CON) 10 MEQ tablet, Take 1 tablet (10 mEq total) by mouth once daily., Disp: 90 tablet, Rfl: 1    repaglinide (PRANDIN) 1 MG tablet, Take 1 tablet (1 mg total) by mouth 3 (three) times daily before meals., Disp: 270 tablet, Rfl: 3    travoprost (TRAVATAN Z) 0.004 % ophthalmic solution, Place 1 drop into both eyes every evening., Disp: 7.5 each, Rfl: 3    TRULICITY 1.5 mg/0.5 mL pen injector, Inject 1.5 mg into the skin once a week., Disp: 12 pen, Rfl: 3    Psychiatric history:  Previous diagnosis: depression  Previous medications:xanax- paxil 2 yrs, increased 2 months ago, anxious when got , thinks he is a narrissist - worked together-  at same time, moved in with him and his dtr, all , no friends  Previous hospitalizations: Patient   History of outpatient treatment: Patient saw therapist after [place], year, depression, lost brother in [place], never " found  Previous suicide attempt:  Patient denies.  Family history of psychiatric illness:  Patient denies    Current and past substance use:  Alcohol:  Denied current use.  Denied history of abuse or dependency.  Drugs:  Denied current use.  Denied history of abuse or dependency.  Tobacco:  Denied current use.  Caffeine:  Denied current use.    Psychiatric symptoms:  Depression:  Denied.  She denied episodes of depressed mood or depression-related anhedonia, lack of motivation, lethargy, difficulty concentrating, feelings of worthlessness or guilt, hopelessness, appetite changes, or psychomotor changes.  She denied suicidal ideation.  Laura/Hypomania:  Denied.  She denied periods of elevated mood or abnormally increased energy or goal-directed activity.  Anxiety:  Denied.  She denied experiencing excessive, exaggerated anxiety that was unmanageable.  Thoughts:  Denied delusions, hallucinations.  Suicidal thoughts/behaviors: Patient denied suicidal and homicidal ideation, plan and intent.  Patient noted agreement to call 911/and or present to the ED if she experienced suicidal or homicidal ideation, plan or intent.    Self-injury:  Patient denies.  Sleep: bad, tv on, on ish, nap during the day, no energy,   Trauma: denies      Mental Status Exam:  General appearance:  appears stated age, neatly dressed, well groomed  Speech:  Clear and intelligible, normal rate, normal tone, normal pitch, normal volume  Level of cooperation:  cooperative  Thought processes:  Linear, logical, goal-directed  Mood:  Generally euthymic some nervousness and restlessness noted at times  Thought content:  Relevant and appropriate, no illusions, no visual hallucinations, no auditory hallucinations, no delusions, no active or passive homicidal thoughts, no active or passive suicidal ideation, no obsessions, no compulsions, no violence  Affect:  appropriate  Orientation:  oriented to person, place, situation and  date  Memory/Attention/Concentration:  No gross cognitive deficits made evident during conversation.  Judgment and insight: fair  Language:  intact    Impression: Pt exhibits signs of REENA and MMD. Pt will benefit from therapies to help assist in tools to help control anxiety and to empower pt to make decisions to help pt.     Goals: sleep hygiene, meditiaton and breathing; Think what are goals you want set- stay or leave- what has to happen    Diagnosis:  1. Anxiety  Ambulatory referral/consult to Primary Care Behavioral Health (Non-Opioids)   2. Major depressive disorder with current active episode, unspecified depression episode severity, unspecified whether recurrent  Ambulatory referral/consult to Primary Care Behavioral Health (Non-Opioids)   3. REENA (generalized anxiety disorder)          Plan:  Continue individual therapy    Length of Session: 60 minutes

## 2022-04-29 ENCOUNTER — PATIENT MESSAGE (OUTPATIENT)
Dept: BEHAVIORAL HEALTH | Facility: CLINIC | Age: 67
End: 2022-04-29
Payer: MEDICARE

## 2022-04-29 ENCOUNTER — PATIENT OUTREACH (OUTPATIENT)
Dept: BEHAVIORAL HEALTH | Facility: CLINIC | Age: 67
End: 2022-04-29
Payer: MEDICARE

## 2022-05-10 ENCOUNTER — TELEPHONE (OUTPATIENT)
Dept: BEHAVIORAL HEALTH | Facility: CLINIC | Age: 67
End: 2022-05-10
Payer: MEDICARE

## 2022-05-10 ENCOUNTER — PES CALL (OUTPATIENT)
Dept: ADMINISTRATIVE | Facility: CLINIC | Age: 67
End: 2022-05-10
Payer: MEDICARE

## 2022-05-10 ENCOUNTER — PATIENT MESSAGE (OUTPATIENT)
Dept: BEHAVIORAL HEALTH | Facility: CLINIC | Age: 67
End: 2022-05-10
Payer: MEDICARE

## 2022-05-11 ENCOUNTER — TELEPHONE (OUTPATIENT)
Dept: BEHAVIORAL HEALTH | Facility: CLINIC | Age: 67
End: 2022-05-11
Payer: MEDICARE

## 2022-05-11 NOTE — PROGRESS NOTES
CHW received call from pt who is back from [place], [place]. Rescheduled office visit for patient with Jonna Kendall LCSW on 5/31/22 at 11:00am.

## 2022-05-25 ENCOUNTER — OFFICE VISIT (OUTPATIENT)
Dept: UROGYNECOLOGY | Facility: CLINIC | Age: 67
End: 2022-05-25
Payer: MEDICARE

## 2022-05-25 VITALS
BODY MASS INDEX: 31.49 KG/M2 | SYSTOLIC BLOOD PRESSURE: 123 MMHG | HEIGHT: 67 IN | DIASTOLIC BLOOD PRESSURE: 73 MMHG | WEIGHT: 200.63 LBS

## 2022-05-25 DIAGNOSIS — K59.00 CONSTIPATION, UNSPECIFIED CONSTIPATION TYPE: ICD-10-CM

## 2022-05-25 DIAGNOSIS — R27.8 DYSSYNERGIA: ICD-10-CM

## 2022-05-25 DIAGNOSIS — R10.2 PELVIC PAIN: Primary | ICD-10-CM

## 2022-05-25 DIAGNOSIS — F41.9 ANXIETY: ICD-10-CM

## 2022-05-25 DIAGNOSIS — N93.9 ABNORMAL UTERINE BLEEDING (AUB): ICD-10-CM

## 2022-05-25 DIAGNOSIS — M79.18 MYALGIA OF PELVIC FLOOR: ICD-10-CM

## 2022-05-25 DIAGNOSIS — E11.65 TYPE 2 DIABETES MELLITUS WITH HYPERGLYCEMIA, WITH LONG-TERM CURRENT USE OF INSULIN: ICD-10-CM

## 2022-05-25 DIAGNOSIS — N39.41 URGE URINARY INCONTINENCE: ICD-10-CM

## 2022-05-25 DIAGNOSIS — N81.6 RECTOCELE: ICD-10-CM

## 2022-05-25 DIAGNOSIS — N87.1 DYSPLASIA OF CERVIX, HIGH GRADE CIN 2: ICD-10-CM

## 2022-05-25 DIAGNOSIS — Z79.4 TYPE 2 DIABETES MELLITUS WITH HYPERGLYCEMIA, WITH LONG-TERM CURRENT USE OF INSULIN: ICD-10-CM

## 2022-05-25 PROCEDURE — 87624 HPV HI-RISK TYP POOLED RSLT: CPT | Performed by: PHYSICIAN ASSISTANT

## 2022-05-25 PROCEDURE — 99214 OFFICE O/P EST MOD 30 MIN: CPT | Mod: 25,S$GLB,, | Performed by: OBSTETRICS & GYNECOLOGY

## 2022-05-25 PROCEDURE — 51701 PR INSERTION OF NON-INDWELLING BLADDER CATHETERIZATION FOR RESIDUAL UR: ICD-10-PCS | Mod: S$GLB,,, | Performed by: OBSTETRICS & GYNECOLOGY

## 2022-05-25 PROCEDURE — 99999 PR PBB SHADOW E&M-EST. PATIENT-LVL V: ICD-10-PCS | Mod: PBBFAC,,, | Performed by: OBSTETRICS & GYNECOLOGY

## 2022-05-25 PROCEDURE — 99999 PR PBB SHADOW E&M-EST. PATIENT-LVL V: CPT | Mod: PBBFAC,,, | Performed by: OBSTETRICS & GYNECOLOGY

## 2022-05-25 PROCEDURE — 88141 CYTOPATH C/V INTERPRET: CPT | Mod: ,,, | Performed by: PATHOLOGY

## 2022-05-25 PROCEDURE — 87801 DETECT AGNT MULT DNA AMPLI: CPT | Performed by: OBSTETRICS & GYNECOLOGY

## 2022-05-25 PROCEDURE — 51701 INSERT BLADDER CATHETER: CPT | Mod: S$GLB,,, | Performed by: OBSTETRICS & GYNECOLOGY

## 2022-05-25 PROCEDURE — 88141 PR  CYTOPATH CERV/VAG INTERPRET: ICD-10-PCS | Mod: ,,, | Performed by: PATHOLOGY

## 2022-05-25 PROCEDURE — 87481 CANDIDA DNA AMP PROBE: CPT | Mod: 59 | Performed by: OBSTETRICS & GYNECOLOGY

## 2022-05-25 PROCEDURE — 99214 PR OFFICE/OUTPT VISIT, EST, LEVL IV, 30-39 MIN: ICD-10-PCS | Mod: 25,S$GLB,, | Performed by: OBSTETRICS & GYNECOLOGY

## 2022-05-25 PROCEDURE — 88175 CYTOPATH C/V AUTO FLUID REDO: CPT | Performed by: PATHOLOGY

## 2022-05-25 PROCEDURE — 87086 URINE CULTURE/COLONY COUNT: CPT | Performed by: OBSTETRICS & GYNECOLOGY

## 2022-05-25 RX ORDER — AMITRIPTYLINE HYDROCHLORIDE 10 MG/1
10 TABLET, FILM COATED ORAL NIGHTLY
Qty: 45 TABLET | Refills: 11 | Status: SHIPPED | OUTPATIENT
Start: 2022-05-25 | End: 2023-08-15

## 2022-05-25 NOTE — PROGRESS NOTES
[facility] - UROGYNECOLOGY  [address] [address]  May 25, 2022     Dee Luongaux  [id number]  1955    UROGYN FOLLOW-UP  2022     66 y.o. female,   presents for urogyn follow up. She c/o   Chief Complaint   Patient presents with    Consult    .     Last HPI from :  1)  UI:  (--) LASHA  (had sling placed)  (+) UUI  Very rarely for several years.   (--) pads: Daytime frequency: Q 2 hours.  Nocturia: Yes: 1/night. Limits fluids prior to bedtime.   (--) dysuria,  (--) hematuria,  (--) frequent UTIs.  (+) complete bladder emptying.  Complains of inconsistent urine flow     2)  POP:  Present. above introitus.  Symptoms:(--)    (--) vaginal bleeding. (--) vaginal discharge. (+) sexually active.  (+) dyspareunia--with initial penetration (+)  Vaginal dryness.  (--) vaginal estrogen use.      3)  BM:  (+) constipation/straining.  (--) chronic diarrhea. (--) hematochezia.  (--) fecal incontinence.  (--) fecal smearing/urgency.  (--) incomplete evacuation. Taking metamucil 2 capfuls daily and miralax prn. Splints to have a bowel movements     DATE OF PROCEDURE:  2011  TITLE OF OPERATION:   1.  Anterior repair.  2.  Placement of transobturator mid-urethral sling, TOT ([facility]).  3.  Cystourethroscopy.       Changes from last visit:  Using vaginal estrogen cream every other night. Taking stool softener (colace) once daily and metamucil 1 tsp every day.     Past Medical History:   Diagnosis Date    Anxiety     Bilateral leg edema 2021    Depression     Diabetes mellitus, type 2     Diverticulitis     GERD (gastroesophageal reflux disease)     Glaucoma     Hyperlipidemia     Hypertension     Nicotine dependence in remission     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       Past Surgical History:   Procedure Laterality Date    BLADDER SUSPENSION      CATARACT EXTRACTION, BILATERAL      CHOLECYSTECTOMY      COLD KNIFE CONIZATION OF CERVIX N/A 2021     Procedure: CONE BIOPSY, CERVIX, USING COLD KNIFE;  Surgeon: Sheeba Frank MD;  Location: [facility];  Service: OB/GYN;  Laterality: N/A;    EYE SURGERY      Glaucoma Laser Sx ou      HYSTEROSCOPY WITH DILATION AND CURETTAGE OF UTERUS N/A 2021    Procedure: HYSTEROSCOPY, WITH DILATION AND CURETTAGE OF UTERUS;  Surgeon: Sheeba Frank MD;  Location[facility];  Service: OB/GYN;  Laterality: N/A;    OOPHORECTOMY      TONSILLECTOMY      vaginal mesh         Family History   Problem Relation Age of Onset    Diabetes Sister     Cataracts Mother     Hypertension Mother     Cancer Father 68        Jaw Bone    Cataracts Father     Pancreatic cancer Brother     Ulcers Brother     Amblyopia Neg Hx     Blindness Neg Hx     Glaucoma Neg Hx     Macular degeneration Neg Hx     Retinal detachment Neg Hx     Strabismus Neg Hx     Stroke Neg Hx     Thyroid disease Neg Hx     Breast cancer Neg Hx     Colon cancer Neg Hx     Ovarian cancer Neg Hx        Social History     Socioeconomic History    Marital status:    Occupational History     Employer: [employer]   Tobacco Use    Smoking status: Former Smoker     Packs/day: 2.00     Years: 25.00     Pack years: 50.00     Quit date:      Years since quittin.4    Smokeless tobacco: Never Used   Substance and Sexual Activity    Alcohol use: Not Currently     Comment: social     Drug use: Never    Sexual activity: Not Currently     Partners: Male     Comment: works at [employer], 3 grown kids    Social History Narrative    ** Merged History Encounter **            Current Outpatient Medications   Medication Sig Dispense Refill    atorvastatin (LIPITOR) 80 MG tablet Take 1 tablet (80 mg total) by mouth once daily. 90 tablet 3    CONTOUR TEST STRIPS Strp USE TO TEST BLOOD SUGAR TWICE DAILY 50 strip 3    estradioL (ESTRACE) 0.01 % (0.1 mg/gram) vaginal cream Use 1 gram of estrogen cream in vagina nightly for 2 weeks, then twice a  week thereafter. (Patient taking differently: Use 1 gram of estrogen cream in vagina nightly for 2 weeks, then twice a week thereafter.) 42.5 g 3    estradiol-norethindrone (COMBIPATCH) 0.05-0.25 mg/24 hr Place 1 patch onto the skin twice a week. 8 patch 6    furosemide (LASIX) 20 MG tablet Take 1 tablet (20 mg total) by mouth once daily. 90 tablet 1    lancets 33 gauge Misc by Misc.(Non-Drug; Combo Route) route. True plus      losartan (COZAAR) 100 MG tablet Take 1 tablet (100 mg total) by mouth once daily. 90 tablet 1    metFORMIN (GLUCOPHAGE-XR) 500 MG ER 24hr tablet TAKE 1 TABLET BY MOUTH TWICE DAILY (Patient taking differently: TAKE 1 TABLET BY MOUTH TWICE DAILY) 180 tablet 1    pantoprazole (PROTONIX) 40 MG tablet Take 1 tablet (40 mg total) by mouth once daily. 30 tablet 2    pantoprazole (PROTONIX) 40 MG tablet Take 1 tablet (40 mg total) by mouth once daily. 30 tablet 2    paroxetine (PAXIL) 30 MG tablet Take 1 tablet (30 mg total) by mouth once daily. 90 tablet 1    potassium chloride SA (K-DUR,KLOR-CON) 10 MEQ tablet Take 1 tablet (10 mEq total) by mouth once daily. 90 tablet 1    repaglinide (PRANDIN) 1 MG tablet Take 1 tablet (1 mg total) by mouth 3 (three) times daily before meals. 270 tablet 3    travoprost (TRAVATAN Z) 0.004 % ophthalmic solution Place 1 drop into both eyes every evening. 7.5 each 3    TRULICITY 1.5 mg/0.5 mL pen injector Inject 1.5 mg into the skin once a week. 12 pen 3    amitriptyline (ELAVIL) 10 MG tablet Take 1 tablet (10 mg total) by mouth nightly. Can increase by 10 mg per week at nights as needed for max of 5 tabs (50 mg) per night. 45 tablet 11    blood sugar diagnostic Strp To check BG 2 times daily, to use with insurance preferred meter 200 strip 3    blood-glucose meter kit To check BG 2 times daily, to use with insurance preferred meter 1 each 0    ergocalciferol (ERGOCALCIFEROL) 50,000 unit Cap Take 1 capsule by mouth once weekly for 8 weeks (Patient  "taking differently: Take 1 capsule by mouth once weekly for 8 weeks) 8 capsule 0    insulin needles, disposable, (RELION PEN NEEDLES) 32 x 5/32 " Ndle Use as directed 50 each 6    lancets Misc To check BG 2 times daily, to use with insurance preferred meter 200 each 11    LIDOCAINE 2 %, VALIUM 5 MG, BACLOFEN 4 % SUPPOSITORY Place 1 suppository rectally 2 (two) times daily. 20 each 5     No current facility-administered medications for this visit.       Review of patient's allergies indicates:  No Known Allergies    OB History        3    Para   3    Term   3            AB        Living   3       SAB        IAB        Ectopic        Multiple        Live Births                      Well Woman  No LMP recorded. Patient is postmenopausal.   Pap: CHUN 2-3 2021 cone biopsy  Mammo: 2021  Colonoscopy: 2018 -- repeat in 5 years ()  Dexa:    ROS  As per HPI.      Physical Exam  /73   Ht 5' 7" (1.702 m)   Wt 91 kg (200 lb 9.9 oz)   BMI 31.42 kg/m²   General: alert and oriented, no acute distress  Respiratory: normal respiratory effort  Abd: soft, non-tender, non-distended  Pelvic:  Ext. Genitalia: normal external genitalia. Normal bartholin's and skeens glands  Vagina: -- atrophy. Normal vaginal mucosa without lesions. No discharge noted.   Non-tender bladder base without palpable mass. Tender levators bilaterally (R>L)--palpation of these triggers pain.   Cervix: no bleeding following Pap, no cervical motion tenderness and no lesions  Uterus:  uterus is normal size, shape, consistency and nontender   Urethra: no masses or tenderness  Urethral meatus: no lesions, caruncle or prolapse.    Cough test negative    PVR: 10    POP-Q (same as previous):  Aa -3; Ba -3; C -5; Ap 0; Bp 0; D -7.  Genital hiatus 3, perineal body 2 total vaginal length 8.    Impression  1. Pelvic pain  LIDOCAINE 2 %, VALIUM 5 MG, BACLOFEN 4 % SUPPOSITORY    amitriptyline (ELAVIL) 10 MG tablet    US Pelvis Comp with Transvag " "NON-OB (xpd    Urine culture    Vaginosis Screen by DNA Probe   2. Dyssynergia  amitriptyline (ELAVIL) 10 MG tablet   3. Anxiety  amitriptyline (ELAVIL) 10 MG tablet   4. Abnormal uterine bleeding (AUB)  US Pelvis Comp with Transvag NON-OB (xpd    Liquid-Based Pap Smear, Screening   5. Type 2 diabetes mellitus with hyperglycemia, with long-term current use of insulin  Hemoglobin A1C   6. Dysplasia of cervix, high grade CHUN 2  Liquid-Based Pap Smear, Screening    HPV High Risk Genotypes, PCR   7. Rectocele     8. Constipation, unspecified constipation type     9. Myalgia of pelvic floor     10. Urge urinary incontinence       We reviewed the above issues and discussed options for short-term versus long-term management of her problems.     Plan:   1. Rectocele stage 2/ constipation  --continue to control bowel movements  --increase stool softener and fiber (see below)   --start pelvic floor PT with  BERLIN Cline: (p) [phone].  Or [phone].      2. Vaginal atrophy (dryness):  Continue to 1 gram of estrogen cream in vagina 2-3 nights per week     3. 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 Can increase by 10 mg each week as needed for max of 5 pills (50 mg) per night.     4. Constipation:  -- Controlling constipation may help bladder urgency/leakage and fiber may better control cholesterol and blood glucose.   -- Increase daily fiber.  Start taking 1 tsp of fiber powder (psyllium or other sugar-free powder, ex. Benefiber or Metamucil) or fiber gummies or fiber pills.  Mix in 8 oz of water.  Take x 3-5 days.  Then, increase fiber by 1 tsp every 3-5 days until stool is easy to pass.  Stop and continue at that dose.   **Do not exceed 6 tsps/day.   -- May also use over the counter stool softener (ex Colace) 1-2 x/day.  **AVOID laxatives.  -- Can also try "Natrual Calm" magnesium ~400 mg per night (helps with sleep, anxiety, and constipation)    5. " Pain  -- Start pelvic floor PT. Call number above to make appt.   -- Start elavil as above.   -- Start vaginal suppositories as needed up to two times daily. Especially before or after PT sessions. May make you sleepy.   -- pelvic US ordered for pain and vaginal spotting. Please schedule.     Vaginal suppositories  lidocaine 2%, valium 5 mg, baclofen 4% suppository  Disp: #20  Sig:  Apply 1 suppository vaginally 30 minutes before and/or after PT sessions or BID as needed for pain.   Refills: 5    [facility] (they deliver)  [address], [place] [address] [phone]    6. Abnormal bleeding/spotting  -- history of CHUN 2-3 last year  -- pap and HPV test today  -- follow up with Dr. Frank    7.  Will message Dr. Frank once pap smear results are back. Needs hyst for CIN2/3 but also needs to get straining with BMs controlled 1st.     Approx 30 min spent in consult, 90% in discussion.     Patient seen with Fabien Robison PA-C  Division of Female Pelvic Medicine and Reconstructive Surgery  Department of Obstetrics & Gynecology  [facility] [place], [place]

## 2022-05-25 NOTE — PATIENT INSTRUCTIONS
"1. Rectocele stage 2/ constipation  --continue to control bowel movements  --increase stool softener and fiber (see below)   --start pelvic floor PT with  BERLIN Cline: (p) [phone].  Or [phone].      2. Vaginal atrophy (dryness):  Continue to 1 gram of estrogen cream in vagina 2-3 nights per week     3. 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   -- Start Elavil 10 mg at night. Can increase by 10 mg each week as needed for max of 5 pills (50 mg) per night.     4. Constipation:  -- Controlling constipation may help bladder urgency/leakage and fiber may better control cholesterol and blood glucose.   -- Increase daily fiber.  Start taking 1 tsp of fiber powder (psyllium or other sugar-free powder, ex. Benefiber or Metamucil) or fiber gummies or fiber pills.  Mix in 8 oz of water.  Take x 3-5 days.  Then, increase fiber by 1 tsp every 3-5 days until stool is easy to pass.  Stop and continue at that dose.   **Do not exceed 6 tsps/day.   -- May also use over the counter stool softener (ex Colace) 1-2 x/day.  **AVOID laxatives.  -- Can also try "Natrual Calm" magnesium ~400 mg per night (helps with sleep, anxiety, and constipation)    5. Pain  -- Start pelvic floor PT. Call number above to make appt.   -- Start elavil as above.   -- Start vaginal suppositories as needed up to two times daily. Especially before or after PT sessions. May make you sleepy.   [facility] (they deliver)  [address], [place] [address] [phone]      1. Rectocele stage 2/ constipation  --continue to control bowel movements  --increase stool softener and fiber (see below)   --start pelvic floor PT with  BERLIN Cline: (p) [phone].  Or [phone].      2. Vaginal atrophy (dryness):  Continue to 1 gram of estrogen cream in vagina 2-3 nights per week     3. 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 Can increase by 10 mg each week " "as needed for max of 5 pills (50 mg) per night.     4. Constipation:  -- Controlling constipation may help bladder urgency/leakage and fiber may better control cholesterol and blood glucose.   -- Increase daily fiber.  Start taking 1 tsp of fiber powder (psyllium or other sugar-free powder, ex. Benefiber or Metamucil) or fiber gummies or fiber pills.  Mix in 8 oz of water.  Take x 3-5 days.  Then, increase fiber by 1 tsp every 3-5 days until stool is easy to pass.  Stop and continue at that dose.   **Do not exceed 6 tsps/day.   -- May also use over the counter stool softener (ex Colace) 1-2 x/day.  **AVOID laxatives.  -- Can also try "Natrual Calm" magnesium ~400 mg per night (helps with sleep, anxiety, and constipation)    5. Pain  -- Start pelvic floor PT. Call number above to make appt.   -- Start elavil as above.   -- Start vaginal suppositories as needed up to two times daily. Especially before or after PT sessions. May make you sleepy.   -- pelvic US ordered for pain and vaginal spotting. Please schedule.     6. Abnormal bleeding/spotting  -- history of CHUN 2-3 last year  -- pap and HPV test today  -- follow up with Dr. Frank  "

## 2022-05-26 ENCOUNTER — TELEPHONE (OUTPATIENT)
Dept: UROGYNECOLOGY | Facility: CLINIC | Age: 67
End: 2022-05-26
Payer: MEDICARE

## 2022-05-26 NOTE — TELEPHONE ENCOUNTER
----- Message from Rojas Coleman MD sent at 5/25/2022  9:51 PM CDT -----  Regarding: please call in the following Rx for patient to [facility]  Please call in the following Rx to [facility]:    Vaginal suppositories  lidocaine 2%, valium 5 mg, baclofen 4% suppository  Disp: #20  Sig:  Apply 1 suppository vaginally 30 minutes before and/or after PT sessions or BID as needed for pain.   Refills: 5    [facility] (they deliver)  [address], [place] [address]  [phone]

## 2022-05-26 NOTE — TELEPHONE ENCOUNTER
----- Message from Rojas Coleman MD sent at 5/25/2022  9:51 PM CDT -----  Regarding: please call in the following Rx for patient to [facility]  Please call in the following Rx to [facility]:    Vaginal suppositories  lidocaine 2%, valium 5 mg, baclofen 4% suppository  Disp: #20  Sig:  Apply 1 suppository vaginally 30 minutes before and/or after PT sessions or BID as needed for pain.   Refills: 5    [facility] (they deliver)  [address], [place] [address]  [phone]

## 2022-05-26 NOTE — TELEPHONE ENCOUNTER
MD Bianca Mccoy MA; JOAQUIN MARIN Staff  Please call in the following Rx to [facility]:     Vaginal suppositories   lidocaine 2%, valium 5 mg, baclofen 4% suppository   Disp: #20   Sig:  Apply 1 suppository vaginally 30 minutes before and/or after PT sessions or BID as needed for pain.   Refills: 5     [facility] (they deliver)   [address] [place] [address] [phone]     Prescription was called in; spoke with Timothy at [facility] compound department.    Bianca ANDERSON MA

## 2022-05-26 NOTE — TELEPHONE ENCOUNTER
----- Message from Rojas Coleman MD sent at 5/25/2022  9:51 PM CDT -----  Regarding: please call in the following Rx for patient to [facility]  Please call in the following Rx to [facility]:    Vaginal suppositories  lidocaine 2%, valium 5 mg, baclofen 4% suppository  Disp: #20  Sig:  Apply 1 suppository vaginally 30 minutes before and/or after PT sessions or BID as needed for pain.   Refills: 5    [facility] (they deliver)  [address], [place] [address]  [phone]

## 2022-05-27 ENCOUNTER — TELEPHONE (OUTPATIENT)
Dept: ENDOCRINOLOGY | Facility: CLINIC | Age: 67
End: 2022-05-27
Payer: MEDICARE

## 2022-05-27 ENCOUNTER — TELEPHONE (OUTPATIENT)
Dept: BEHAVIORAL HEALTH | Facility: CLINIC | Age: 67
End: 2022-05-27
Payer: MEDICARE

## 2022-05-27 LAB
BACTERIAL VAGINOSIS DNA: NEGATIVE
CANDIDA GLABRATA DNA: NEGATIVE
CANDIDA KRUSEI DNA: NEGATIVE
CANDIDA RRNA VAG QL PROBE: NEGATIVE
HPV HR 12 DNA SPEC QL NAA+PROBE: NEGATIVE
HPV16 AG SPEC QL: NEGATIVE
HPV18 DNA SPEC QL NAA+PROBE: NEGATIVE
T VAGINALIS RRNA GENITAL QL PROBE: NEGATIVE

## 2022-05-27 NOTE — PROGRESS NOTES
Behavioral Health Community Health Worker  Follow-Up  Completed by: Joie Christy    Date:  5/27/2022    Patient Enrollment in Behavioral Health Program:  · Dee Hamilton was enrolled in the Behavioral Health Program on 04/08/2022    Assessments     Promis 10:  PROMIS-10 Questionnaire Scores 4/8/2022   Global Physical Health 15   Global Mental health Score 8       Depression PHQ:  PHQ9 5/27/2022   Total Score 9       Generalized Anxiety Disorder 7-Item Scale:  GAD7 5/27/2022   1. Feeling nervous, anxious, or on edge? 1   2. Not being able to stop or control worrying? 3   3. Worrying too much about different things? 0   4. Trouble relaxing? 0   5. Being so restless that it is hard to sit still? 0   6. Becoming easily annoyed or irritable? 0   7. Feeling afraid as if something awful might happen? 1   8. If you checked off any problems, how difficult have these problems made it for you to do your work, take care of things at home, or get along with other people? 1   REENA-7 Score 5       Patients' Global Impression of Change (PGIC) Scale:  Since beginning treatment at this clinic, how would you describe the change (if any) in ACTIVITY LIMITATIONS, SYMPTOMS, EMOTIONS, and OVERALL QUALITY OF LIFE, related to your painful condition?  No Value exists for the : [facility]#38223      In a similar way, please check the number below that matches your degree of change since beginning care at this clinic (Much better (0) - Much Worse (10)): No Value exists for the : [facility]#16784        Much Better                                     No Change                                    Much Worse                        -----------------------------------------------------------------------------                        0       1       2       3       4       5       6       7      8       9      10                     Call Summary     Patient's assessments were updated.  Pt began in [facility] on 4/8/22.

## 2022-05-28 LAB — BACTERIA UR CULT: NO GROWTH

## 2022-05-30 ENCOUNTER — PATIENT MESSAGE (OUTPATIENT)
Dept: ADMINISTRATIVE | Facility: HOSPITAL | Age: 67
End: 2022-05-30
Payer: MEDICARE

## 2022-05-31 ENCOUNTER — HOSPITAL ENCOUNTER (OUTPATIENT)
Dept: RADIOLOGY | Facility: OTHER | Age: 67
Discharge: HOME | End: 2022-05-31
Attending: PHYSICIAN ASSISTANT
Payer: MEDICARE

## 2022-05-31 DIAGNOSIS — R10.2 PELVIC PAIN: ICD-10-CM

## 2022-05-31 DIAGNOSIS — N93.9 ABNORMAL UTERINE BLEEDING (AUB): ICD-10-CM

## 2022-05-31 LAB
FINAL PATHOLOGIC DIAGNOSIS: NORMAL
Lab: NORMAL

## 2022-05-31 PROCEDURE — 76856 US EXAM PELVIC COMPLETE: CPT | Mod: 26,,, | Performed by: RADIOLOGY

## 2022-05-31 PROCEDURE — 76830 TRANSVAGINAL US NON-OB: CPT | Mod: 26,,, | Performed by: RADIOLOGY

## 2022-05-31 PROCEDURE — 76830 US PELVIS COMP WITH TRANSVAG NON-OB (XPD): ICD-10-PCS | Mod: 26,,, | Performed by: RADIOLOGY

## 2022-05-31 PROCEDURE — 76856 US PELVIS COMP WITH TRANSVAG NON-OB (XPD): ICD-10-PCS | Mod: 26,,, | Performed by: RADIOLOGY

## 2022-05-31 PROCEDURE — 76830 TRANSVAGINAL US NON-OB: CPT | Mod: TC

## 2022-06-01 ENCOUNTER — TELEPHONE (OUTPATIENT)
Dept: OBSTETRICS AND GYNECOLOGY | Facility: CLINIC | Age: 67
End: 2022-06-01
Payer: MEDICARE

## 2022-06-01 NOTE — TELEPHONE ENCOUNTER
----- Message from Sheeba Frank MD sent at 5/31/2022  6:13 PM CDT -----  Can you get this patient scheduled for an annual after 6/28. Thanks   ----- Message -----  From: Rojas Coleman MD  Sent: 5/31/2022   5:52 PM CDT  To: Sheeba Frank MD, #    Hey Sheeba:  This patient saw us for pelvic floor issues. I know she had CKC with you 9/2021 with +margins. Looks like ? Never followed up.  We did a pap/HPV (normal/neg).  Would you be able to see her to determine continued follow up plan for this? Just let us know. Thanks!    Fabien: Can we please make sure she's using vaginal estrogen?  Thanks!    Called pt to schedule wwe. Pt verbalized understanding

## 2022-06-06 ENCOUNTER — TELEPHONE (OUTPATIENT)
Dept: BEHAVIORAL HEALTH | Facility: CLINIC | Age: 67
End: 2022-06-06
Payer: MEDICARE

## 2022-06-06 ENCOUNTER — PES CALL (OUTPATIENT)
Dept: ADMINISTRATIVE | Facility: CLINIC | Age: 67
End: 2022-06-06
Payer: MEDICARE

## 2022-06-06 NOTE — PROGRESS NOTES
CHW reached out to pt to remind her of office appointment with Jonna Kendall LCSW, on tomorrow. No answer, left VM of the appointment.

## 2022-06-06 NOTE — PROGRESS NOTES
"[facility] Note    Chief Complaint      Chief Complaint   Patient presents with    Follow-up       History of Present Illness      Dee Hamilton is a 66 y.o.  F with Anxiety, Depression, Glaucoma, HLD, Hypercalcemia. Last visit - 3/8/22.    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 Frank.  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 ECC neg. EMBx insufficient. Pt is s/p hysteroscopy/D&C and CKC 9/29/21.  Has upcoming fu to discuss poss. Hys. + Spotting. CT abd/Pelvis - 3/25/22 -Punctate low-density foci in both kidneys too small to adequately characterize.   Colonic diverticulosis with no evidence for acute diverticulitis. No further recommendations Pelvic U/S - 5/31/22- wnl     Anxiety - She is on Paroxetine 30 mg/d.   D/w pt withdrawal effects with abrupt discontinuation. Referred to Psychiatry but she never got to go because her appt got cancelled twice at [facility].   "I go down a rabbit hole every now and then". It's depression more so than Anxiety because she doesn't go out much".  Pt talks to someone who is a friend of her daughter which has helped. She would like a support animal but her  does not like dogs. Playing with the neighbor's dog is helpful.  Fu by Jonna NICOLE. "It's getting better".      Depression - She is on Paroxetine 20 mg/d. Will try inc to 30 mg/d.  D/w pt withdrawal effects with abrupt discontinuation.  Referred to Psychiatry.  Pt talks to someone who is a friend of her daughter which has helped.   "It comes and goes". She has been meditating which helps. "My son is a trigger".       Glaucoma - Fu by Adilson. Fu by Dr. Nereyda De Anda in Apr.  Planning for visual field tsting in Aug.      HLD - Improved since resuming Atorvastatin.      Hypercalcemia/Hyperparathyroidism - 10.5 - 1/14/22  Fu by Dr. Jarek Davison.  Vit D low normal - 24- low. iPTH - 118- elev. ionized calcium - 1.39 - wnl - 1/14/22.  Note - HCTZ prev stopped.      DM - II  - Dx '02 - HA1c  - 8.6 - " "uncontrolled on 1/14/22. Pt back on trulicity 1.5mg wkly . Pt on  Metformin  mg twice daily, Prandin 1 mg TID with meals, and lantus 36 units QHS.  She reports she was not fu for appts during the pandemic. Fu by Dr. Tilley. Doing better since changed to Metformin ER - no further diarrhea.   Has fu 6/22/22.     Vit D def - 24 - 1/14/22. Pt not on suppl. She is fu by endo. She completed Ergocalciferol 50,000 units once weekly x 8 wks.  Pt now on OTC Vit D 3 2000 units one daily.      Mixed urinary Incontinence - Cystocele - Fu by  UroGyn, Dr. Coleman.  Myrbetriq- couldn't afford. She prefers not to go back to Dr. Dinh.  She had  Prev bladder suspension in '11. Checked MRI Brain to r/o  NPH with abn gait, imbalance, memory issues, and Incontinence.  No evid of this on MRI. Pt on Elavil 10 mg QHS.  Referred to Pelvic Floor PTx.      Diaphragmatic hernia - No issues at present.  Will cont to monitor. She does have GERD. Rec smaller more freq meals.      Obesity - BMI - 31.46 - Pt denies snoring. Rec low carb diet.  She reports dec appetite. She has a shake in Am, Salad or sandwich at lunch and sometimes skips dinner or has fruit.      Diverticulosis - No issues at present. Diarrhea resolved with changing to Metformin ER.       Memory issues - no evid of NPH on MRI. Pt reports imbalance, Incontinence, and memory issues. She has known Mixed urinary incontinence and had a prev bladder supsension. Carotid U/S - 6/1/21 - no evid of sig stenosis. Vitamin B1 level and it was normal.  Vitamin b12 was elevated. Her folic acid was normal. "It's better.  It comes and goes". She started reading again and feels this has helped.  She reports difficulty with word finding the past 2 wks. She has been reading and playing games more to help.  has noticed she is more forgetful in doing chores around the house (like emptying the  or taking the clothes out of the dryer".      Imbalance - "It's better".   I'm not " "wobbling as much any more and haven't fallen or had dizziness.  My mood swings are gone and it's much better".  I suspect this has improved since resuming her SSRI.      Fatty Liver - Noted on CT abd/pelvis -2/22/20.   Rec she  limit tylenol and alcohol.  Rec diet and exercise for wt loss   As discussed at visit there is a potential for cirrhosis.      Suspected lipoma within the right lower thoracic chest wall overlying the right 7th rib anterior inferiorly - seen on CT abd/pelvis 2/22/20.     Small fat containing umbilical hernia and Small fat containing left inguinal hernia - Noted on chart review. Not palpated on today's exam.      Atherosclerosis aorta - Seen on prev CT scans.  Cont Statin.      Rectocele - Fu by Uro/GYN. Planning for Pelvic Floor PTx.      Noncompliance - Pt ran out of her meds and had not fu during the pandemic. She is doing better with this.     HCM - Flu -10/27/21;  [facility] - ? [facility];  PCV 13 - 10/24/19;  PVX 23 - 3/1/21;  Shingrix -#1 - 10/24/19 and #2 -12/30/19- she thinks she had shingles in Jan. 2022 to Left shoulder/flank x 2 wks;  COVID - 19 Vaccine (Moderna) #1 - 2/15/21; #2 - 3/15/21; # 3 - 10/27/21; # 4 ;  Hep C Screen -6/8/21 - neg. +h/o CHUN II; C-scope - 7/25/18 - hemorrhoids and polyps - repeat 5 yrs At [facility]; PAP - 5/25/22 - neg. ; MGM - 6/10/21- repeat 1 yr;  DEXA - has order from Endo; Prev PCP - Dr Benito at [facility] [place]; Endo - Dr Gonzales; Ophtho - Dr. Mccurdy in [place]- retired - needs a new Ophtho referral; GI - Dr. Jarvis; Uro/GYN - ; OB/GYN - Dr. Frank      Patient Care Team:  Farzana Dorman MD as PCP - General (Internal Medicine)  Ivy Wharton MD (Family Medicine)  Crystal Almanza RD, CDE as Dietitian (Diabetes)  Heriberto Man MD as Consulting Physician (Gastroenterology)     Health Maintenance:  Immunization History   Administered Date(s) Administered    COVID-19, MRNA, LN-S, PF (MODERNA FULL 0.5 ML DOSE) 02/15/2021, 03/15/2021, 10/27/2021    " Influenza (FLUAD) - Quadrivalent - Adjuvanted - PF *Preferred* (65+) 10/27/2021    Influenza - High Dose - PF (65 years and older) 10/24/2019    Influenza - Intradermal - Quadrivalent - PF 11/27/2013    Influenza - Quadrivalent - High Dose - PF (65 years and older) 09/08/2020    Influenza - Quadrivalent - PF *Preferred* (6 months and older) 10/15/2018    Influenza - Trivalent (ADULT) 10/24/2011, 10/17/2014, 10/23/2017    Influenza - Trivalent - PF (ADULT) 10/25/2016    Pneumococcal Conjugate - 13 Valent 10/24/2019    Pneumococcal Polysaccharide - 23 Valent 11/16/2015, 03/01/2021    Zoster Recombinant 10/24/2019, 12/30/2019      Health Maintenance   Topic Date Due    Foot Exam  Never done    TETANUS VACCINE  Never done    DEXA Scan  09/01/2014    Hemoglobin A1c  04/14/2022    Mammogram  06/10/2022    Lipid Panel  07/19/2022    Eye Exam  10/12/2022    Hepatitis C Screening  Completed        Past Medical History:  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       Past Surgical History:   has a past surgical history that includes vaginal mesh; Bladder suspension (12/11); Glaucoma Laser Sx ou; Tonsillectomy; Cholecystectomy; Cataract extraction, bilateral; Oophorectomy; Eye surgery; Hysteroscopy with dilation and curettage of uterus (N/A, 9/29/2021); and Cold knife conization of cervix (N/A, 9/29/2021).    Family History:  family history includes Cancer (age of onset: 68) in her father; Cataracts in her father and mother; Diabetes in her sister; Hypertension in her mother; Pancreatic cancer in her brother; Ulcers in her brother.     Social History:  Social History     Tobacco Use    Smoking status: Former Smoker     Packs/day:  "2.00     Years: 25.00     Pack years: 50.00     Quit date:      Years since quittin.4    Smokeless tobacco: Never Used   Substance Use Topics    Alcohol use: Not Currently     Comment: social     Drug use: Never       Review of Systems   Constitutional: Positive for diaphoresis. Negative for chills and fever.   HENT: Negative for hearing loss.    Eyes: Negative for visual disturbance.   Respiratory: Negative for chest tightness and shortness of breath.    Cardiovascular: Positive for leg swelling. Negative for chest pain and palpitations.        Ankles   Gastrointestinal: Positive for abdominal distention, abdominal pain, constipation and diarrhea. Negative for nausea, vomiting and reflux.   Endocrine: Negative for cold intolerance and heat intolerance.   Genitourinary: Positive for frequency. Negative for bladder incontinence and dysuria.   Musculoskeletal: Positive for back pain. Negative for arthralgias and myalgias.        Shoulders and low back - does not radiate   Neurological: Positive for memory loss. Negative for weakness and numbness.   Psychiatric/Behavioral: Positive for dysphoric mood. The patient is not nervous/anxious.         "It's better".        Medications:    Current Outpatient Medications:     amitriptyline (ELAVIL) 10 MG tablet, Take 1 tablet (10 mg total) by mouth nightly. Can increase by 10 mg per week at nights as needed for max of 5 tabs (50 mg) per night., Disp: 45 tablet, Rfl: 11    atorvastatin (LIPITOR) 80 MG tablet, Take 1 tablet (80 mg total) by mouth once daily., Disp: 90 tablet, Rfl: 3    CONTOUR TEST STRIPS Strp, USE TO TEST BLOOD SUGAR TWICE DAILY, Disp: 50 strip, Rfl: 3    lancets 33 gauge Misc, by Misc.(Non-Drug; Combo Route) route. True plus, Disp: , Rfl:     LIDOCAINE 2 %, VALIUM 5 MG, BACLOFEN 4 % SUPPOSITORY, Place 1 suppository rectally 2 (two) times daily., Disp: 20 each, Rfl: 5    metFORMIN (GLUCOPHAGE-XR) 500 MG ER 24hr tablet, TAKE 1 TABLET BY MOUTH " "TWICE DAILY (Patient taking differently: TAKE 1 TABLET BY MOUTH TWICE DAILY), Disp: 180 tablet, Rfl: 1    pantoprazole (PROTONIX) 40 MG tablet, Take 1 tablet (40 mg total) by mouth once daily., Disp: 30 tablet, Rfl: 2    pantoprazole (PROTONIX) 40 MG tablet, Take 1 tablet (40 mg total) by mouth once daily., Disp: 30 tablet, Rfl: 2    potassium chloride SA (K-DUR,KLOR-CON) 10 MEQ tablet, Take 1 tablet (10 mEq total) by mouth once daily., Disp: 90 tablet, Rfl: 1    repaglinide (PRANDIN) 1 MG tablet, Take 1 tablet (1 mg total) by mouth 3 (three) times daily before meals., Disp: 270 tablet, Rfl: 3    travoprost (TRAVATAN Z) 0.004 % ophthalmic solution, Place 1 drop into both eyes every evening., Disp: 7.5 each, Rfl: 3    TRULICITY 1.5 mg/0.5 mL pen injector, Inject 1.5 mg into the skin once a week., Disp: 12 pen, Rfl: 3    blood sugar diagnostic Strp, To check BG 2 times daily, to use with insurance preferred meter (Patient not taking: Reported on 6/9/2022), Disp: 200 strip, Rfl: 3    blood-glucose meter kit, To check BG 2 times daily, to use with insurance preferred meter (Patient not taking: Reported on 6/9/2022), Disp: 1 each, Rfl: 0    estradioL (ESTRACE) 0.01 % (0.1 mg/gram) vaginal cream, Use 1 gram of estrogen cream in vagina nightly for 2 weeks, then twice a week thereafter. (Patient not taking: Reported on 6/9/2022), Disp: 42.5 g, Rfl: 3    furosemide (LASIX) 20 MG tablet, Take 1 tablet (20 mg total) by mouth once daily., Disp: 90 tablet, Rfl: 1    insulin (LANTUS SOLOSTAR U-100 INSULIN) glargine 100 units/mL (3mL) SubQ pen, 36 SC u QHS, Disp: , Rfl: 0    insulin needles, disposable, (RELION PEN NEEDLES) 32 x 5/32 " Ndle, Use as directed (Patient not taking: Reported on 6/9/2022), Disp: 50 each, Rfl: 6    lancets Misc, To check BG 2 times daily, to use with insurance preferred meter (Patient not taking: Reported on 6/9/2022), Disp: 200 each, Rfl: 11    losartan (COZAAR) 100 MG tablet, Take 1 " "tablet (100 mg total) by mouth once daily., Disp: 90 tablet, Rfl: 1    paroxetine (PAXIL) 30 MG tablet, Take 1 tablet (30 mg total) by mouth once daily., Disp: 90 tablet, Rfl: 1     Allergies:  Review of patient's allergies indicates:  No Known Allergies    Physical Exam      Vital Signs  Temp: 98 °F (36.7 °C)  Pulse: 100  Resp: 18  SpO2: 97 %  BP: 125/65  BP Location: Right arm  Patient Position: Sitting  Pain Score: 0-No pain  Height and Weight  Height: 5' 7" (170.2 cm)  Weight: 91.1 kg (200 lb 13.4 oz)  BSA (Calculated - sq m): 2.08 sq meters  BMI (Calculated): 31.4  Weight in (lb) to have BMI = 25: 159.3      Patient Position: Sitting      Physical Exam  Vitals reviewed.   Constitutional:       General: She is not in acute distress.     Appearance: Normal appearance. She is not ill-appearing, toxic-appearing or diaphoretic.   HENT:      Head: Normocephalic and atraumatic.      Ears:      Comments: Left cerumen impaction;  RT TM - wnl  Eyes:      Extraocular Movements: Extraocular movements intact.      Conjunctiva/sclera: Conjunctivae normal.      Pupils: Pupils are equal, round, and reactive to light.   Neck:      Vascular: No carotid bruit.   Cardiovascular:      Rate and Rhythm: Normal rate and regular rhythm.      Heart sounds: Murmur heard.      Comments: III/VI Sys murmur at LUSB  Pulmonary:      Effort: Pulmonary effort is normal. No respiratory distress.      Breath sounds: Normal breath sounds.   Abdominal:      General: Bowel sounds are normal. There is no distension.      Palpations: Abdomen is soft.      Tenderness: There is no abdominal tenderness. There is no guarding or rebound.   Skin:     General: Skin is warm and dry.   Neurological:      General: No focal deficit present.      Mental Status: She is alert and oriented to person, place, and time.   Psychiatric:         Mood and Affect: Mood normal.         Behavior: Behavior normal.          Laboratory:  CBC:  Recent Labs   Lab 08/14/19  1218 " 02/22/20  0947 04/06/21  0940   WBC 7.24 6.84 8.19   RBC 4.78 5.05 4.66   Hemoglobin 12.2 12.8 12.6   Hematocrit 38.5 42.8 39.5   Platelets 346 319 357   MCV 81 L 85 85   MCH 25.5 L 25.3 L 27.0   MCHC 31.7 L 29.9 L 31.9 L       CMP:  Recent Labs   Lab 07/09/21  0839 07/19/21  0832 10/08/21  0932 01/14/22  0929 03/23/22  1156   Glucose 206 H   < > 158 H 143 H  --    Calcium 11.4 H   < > 10.0 10.5  --    Albumin 4.0  --  3.8 4.3  --    Total Protein 7.9  --  7.6 7.7  --    Sodium 137   < > 141 143  --    Potassium 3.8   < > 3.6 4.7  --    CO2 25   < > 24 25  --    Chloride 97   < > 106 105  --    BUN 16   < > 9 12  --    Creatinine 0.8   < > 0.8 0.8 0.8   Alkaline Phosphatase 87  --  100 100  --    ALT 14  --  18 20  --    AST 25  --  25 26  --    Total Bilirubin 0.5  --  0.4 0.7  --     < > = values in this interval not displayed.       URINALYSIS:  Recent Labs   Lab 08/14/19  1218 02/11/20  1700 02/22/20  0955 04/06/21  0913   Color, UA Yellow  --  Straw Yellow   Specific Gravity, UA <=1.005 A  --  1.015 1.025   pH, UA 7.0   < > 6.0 7.0   Protein, UA Negative  --  Negative Negative   Bacteria Rare  --  Rare  --    Nitrite, UA Negative  --  Negative Negative   Leukocytes, UA Negative  --  Trace A Negative   Urobilinogen, UA Negative  --   --  Negative   Hyaline Casts, UA 1  --   --   --     < > = values in this interval not displayed.        LIPIDS:  Recent Labs   Lab 04/06/21  0940 07/19/21  0832   TSH 2.100  --    HDL 37 L 32 L   Cholesterol 253 H 155   Triglycerides 397 H 151 H   LDL Cholesterol 136.6 92.8   HDL/Cholesterol Ratio 14.6 L 20.6   Non-HDL Cholesterol 216 123   Total Cholesterol/HDL Ratio 6.8 H 4.8       TSH:  Recent Labs   Lab 04/06/21  0940   TSH 2.100       A1C:  Recent Labs   Lab 04/06/21  0940 07/09/21  0839 10/08/21  0932 01/14/22  0929   Hemoglobin A1C 8.2 H 9.5 H 10.6 H 8.6 H       Urine Microalbumin/Cr:  Recent Labs   Lab 04/06/21  0913   Microalb/Creat Ratio 8.8         Other:   Recent Labs    Lab 04/06/21  0940 06/08/21  0953 06/28/21  1140 01/14/22  0929   Estradiol  --   --  13  --    Vit D, 25-Hydroxy 30  --   --  24 L   Vitamin B-12 >1400 H  >2000 H >2000 H  --   --    Ferritin  --   --   --  34     Recent Labs   Lab 06/08/21  0953   Hepatitis C Ab Negative       Assessment/Plan     Dee Hamilton is a 66 y.o.female with:    Hypertension, unspecified type  -     losartan (COZAAR) 100 MG tablet; Take 1 tablet (100 mg total) by mouth once daily.  Dispense: 90 tablet; Refill: 1  -     furosemide (LASIX) 20 MG tablet; Take 1 tablet (20 mg total) by mouth once daily.  Dispense: 90 tablet; Refill: 1  - Controlled.  Cont current.     Type 2 diabetes mellitus with hyperglycemia, with long-term current use of insulin  - Improved. Fu by Endo.     Hyperlipidemia associated with type 2 diabetes mellitus  - Improved. Cont current.     Bilateral leg edema  -     furosemide (LASIX) 20 MG tablet; Take 1 tablet (20 mg total) by mouth once daily.  Dispense: 90 tablet; Refill: 1  - Stable.  Cont current regimen.    Major depressive disorder with current active episode, unspecified depression episode severity, unspecified whether recurrent  -     paroxetine (PAXIL) 30 MG tablet; Take 1 tablet (30 mg total) by mouth once daily.  Dispense: 90 tablet; Refill: 1  - Stable.  Cont current regimen.    Hyperparathyroidism  - Fu y Endo.     Hypercalcemia  - Avoid Calcium supp and HCTZ.  Fu by Endo.     Fatty liver  - Cont to limit tylenol and alcohol.  Rec diet and exercise for wt loss.  As discussed at visit there is a potential for cirrhosis.      Memory impairment  - Fu 8 wks for an MMSE. Bring diary of the things she forgets.    Anxiety  -     paroxetine (PAXIL) 30 MG tablet; Take 1 tablet (30 mg total) by mouth once daily.  Dispense: 90 tablet; Refill: 1  - Stable.  Cont current regimen.    Impacted cerumen of left ear  -     Ambulatory referral/consult to ENT; Future; Expected date: 06/16/2022  - Refer to ENT for  removal.    Class 1 obesity due to excess calories with serious comorbidity and body mass index (BMI) of 31.0 to 31.9 in adult  - Cont low carb diet.        Chronic conditions status updated as per HPI.  Other than changes above, cont current medications and maintain follow up with specialists.  Follow up in about 8 weeks (around 8/4/2022) for an MMSE.      Farzana Dorman MD  [facility]

## 2022-06-07 ENCOUNTER — CLINICAL SUPPORT (OUTPATIENT)
Dept: BEHAVIORAL HEALTH | Facility: CLINIC | Age: 67
End: 2022-06-07
Payer: MEDICARE

## 2022-06-07 DIAGNOSIS — F41.1 GAD (GENERALIZED ANXIETY DISORDER): Primary | ICD-10-CM

## 2022-06-07 PROCEDURE — 90832 PSYTX W PT 30 MINUTES: CPT | Mod: S$GLB,,, | Performed by: SOCIAL WORKER

## 2022-06-07 PROCEDURE — 90832 PR PSYCHOTHERAPY W/PATIENT, 30 MIN: ICD-10-PCS | Mod: S$GLB,,, | Performed by: SOCIAL WORKER

## 2022-06-07 NOTE — PROGRESS NOTES
Individual Psychotherapy (LCSW/PhD)  Dee DUPONT Alfonso,  2022    Site:  [place]         Therapeutic Intervention: Met with patient for individual psychotherapy.    Chief complaint/reason for encounter: anxiety     Interval history and content of current session:   Pt reports is doing better, counting to 10 to calm down before speaking.  and pt are doing breathing exercises together.  Pt spoke with , said feel like in a corner, was ready to leave  and move. Pt explained that  is beating up with words,  says negative things.  asked pt to stay. Both are watching words.     admits being jealous of the dog next door, pt plays with dog next door. Pt states that not only does  not want a dog in the house, does not want to play with neighbors dog.      Pt went to [place] for family .      has been sick, pt been nursing  back to health.    Pt feels more stable, no ups and downs.      Sleep is better following sleep hygiene, goes to bed at 9 and up at 6.    Pt is stable at this time, will call psych to establish care if needed.     Terminate therapy.         Treatment plan:  · Target symptoms: anxiety   · Why chosen therapy is appropriate versus another modality: relevant to diagnosis, patient responds to this modality, evidence based practice  · Outcome monitoring methods: self-report, checklist/rating scale  · Therapeutic intervention type: insight oriented psychotherapy, behavior modifying psychotherapy, supportive psychotherapy    Risk parameters:  Patient reports no suicidal ideation  Patient reports no homicidal ideation  Patient reports no self-injurious behavior  Patient reports no violent behavior    Verbal deficits: None    Patient's response to intervention:  The patient's response to intervention is accepting.    Progress toward goals and other mental status changes:  The patient's progress toward goals is fair .    Diagnosis:      ICD-10-CM ICD-9-CM   1. REENA (generalized anxiety disorder)  F41.1 300.02       Plan: terminate therapy    Return to clinic: terminate therapy    Length of Service (minutes): 30

## 2022-06-08 ENCOUNTER — PATIENT MESSAGE (OUTPATIENT)
Dept: BEHAVIORAL HEALTH | Facility: CLINIC | Age: 67
End: 2022-06-08
Payer: MEDICARE

## 2022-06-09 ENCOUNTER — OFFICE VISIT (OUTPATIENT)
Dept: PRIMARY CARE CLINIC | Facility: CLINIC | Age: 67
End: 2022-06-09
Payer: MEDICARE

## 2022-06-09 VITALS
TEMPERATURE: 98 F | OXYGEN SATURATION: 97 % | RESPIRATION RATE: 18 BRPM | HEIGHT: 67 IN | WEIGHT: 200.81 LBS | BODY MASS INDEX: 31.52 KG/M2 | HEART RATE: 100 BPM | DIASTOLIC BLOOD PRESSURE: 65 MMHG | SYSTOLIC BLOOD PRESSURE: 125 MMHG

## 2022-06-09 DIAGNOSIS — E11.65 TYPE 2 DIABETES MELLITUS WITH HYPERGLYCEMIA, WITH LONG-TERM CURRENT USE OF INSULIN: ICD-10-CM

## 2022-06-09 DIAGNOSIS — E21.3 HYPERPARATHYROIDISM: ICD-10-CM

## 2022-06-09 DIAGNOSIS — E78.5 HYPERLIPIDEMIA ASSOCIATED WITH TYPE 2 DIABETES MELLITUS: ICD-10-CM

## 2022-06-09 DIAGNOSIS — E11.69 HYPERLIPIDEMIA ASSOCIATED WITH TYPE 2 DIABETES MELLITUS: ICD-10-CM

## 2022-06-09 DIAGNOSIS — F41.9 ANXIETY: ICD-10-CM

## 2022-06-09 DIAGNOSIS — E83.52 HYPERCALCEMIA: ICD-10-CM

## 2022-06-09 DIAGNOSIS — E66.09 CLASS 1 OBESITY DUE TO EXCESS CALORIES WITH SERIOUS COMORBIDITY AND BODY MASS INDEX (BMI) OF 31.0 TO 31.9 IN ADULT: ICD-10-CM

## 2022-06-09 DIAGNOSIS — K76.0 FATTY LIVER: ICD-10-CM

## 2022-06-09 DIAGNOSIS — H61.22 IMPACTED CERUMEN OF LEFT EAR: ICD-10-CM

## 2022-06-09 DIAGNOSIS — R60.0 BILATERAL LEG EDEMA: ICD-10-CM

## 2022-06-09 DIAGNOSIS — F32.9 MAJOR DEPRESSIVE DISORDER WITH CURRENT ACTIVE EPISODE, UNSPECIFIED DEPRESSION EPISODE SEVERITY, UNSPECIFIED WHETHER RECURRENT: ICD-10-CM

## 2022-06-09 DIAGNOSIS — Z79.4 TYPE 2 DIABETES MELLITUS WITH HYPERGLYCEMIA, WITH LONG-TERM CURRENT USE OF INSULIN: ICD-10-CM

## 2022-06-09 DIAGNOSIS — R41.3 MEMORY IMPAIRMENT: ICD-10-CM

## 2022-06-09 DIAGNOSIS — I10 HYPERTENSION, UNSPECIFIED TYPE: Primary | ICD-10-CM

## 2022-06-09 PROCEDURE — 99999 PR PBB SHADOW E&M-EST. PATIENT-LVL V: ICD-10-PCS | Mod: PBBFAC,,, | Performed by: INTERNAL MEDICINE

## 2022-06-09 PROCEDURE — 99214 OFFICE O/P EST MOD 30 MIN: CPT | Mod: S$GLB,,, | Performed by: INTERNAL MEDICINE

## 2022-06-09 PROCEDURE — 99214 PR OFFICE/OUTPT VISIT, EST, LEVL IV, 30-39 MIN: ICD-10-PCS | Mod: S$GLB,,, | Performed by: INTERNAL MEDICINE

## 2022-06-09 PROCEDURE — 99999 PR PBB SHADOW E&M-EST. PATIENT-LVL V: CPT | Mod: PBBFAC,,, | Performed by: INTERNAL MEDICINE

## 2022-06-09 RX ORDER — FUROSEMIDE 20 MG/1
20 TABLET ORAL DAILY
Qty: 90 TABLET | Refills: 1 | Status: SHIPPED | OUTPATIENT
Start: 2022-06-09 | End: 2022-12-15 | Stop reason: SDUPTHER

## 2022-06-09 RX ORDER — INSULIN GLARGINE 100 [IU]/ML
INJECTION, SOLUTION SUBCUTANEOUS
Refills: 0
Start: 2022-06-09 | End: 2023-02-24 | Stop reason: SDUPTHER

## 2022-06-09 RX ORDER — LOSARTAN POTASSIUM 100 MG/1
100 TABLET ORAL DAILY
Qty: 90 TABLET | Refills: 1 | Status: SHIPPED | OUTPATIENT
Start: 2022-06-09 | End: 2022-12-15 | Stop reason: SDUPTHER

## 2022-06-09 RX ORDER — PAROXETINE 30 MG/1
30 TABLET, FILM COATED ORAL DAILY
Qty: 90 TABLET | Refills: 1 | Status: SHIPPED | OUTPATIENT
Start: 2022-06-09 | End: 2022-12-15 | Stop reason: SDUPTHER

## 2022-06-22 ENCOUNTER — LAB VISIT (OUTPATIENT)
Dept: LAB | Facility: OTHER | Age: 67
End: 2022-06-22
Attending: INTERNAL MEDICINE
Payer: MEDICARE

## 2022-06-22 ENCOUNTER — OFFICE VISIT (OUTPATIENT)
Dept: ENDOCRINOLOGY | Facility: CLINIC | Age: 67
End: 2022-06-22
Payer: MEDICARE

## 2022-06-22 VITALS
HEART RATE: 115 BPM | DIASTOLIC BLOOD PRESSURE: 67 MMHG | BODY MASS INDEX: 30.76 KG/M2 | HEIGHT: 67 IN | SYSTOLIC BLOOD PRESSURE: 133 MMHG | WEIGHT: 196 LBS

## 2022-06-22 DIAGNOSIS — E66.09 CLASS 1 OBESITY DUE TO EXCESS CALORIES WITH SERIOUS COMORBIDITY AND BODY MASS INDEX (BMI) OF 30.0 TO 30.9 IN ADULT: ICD-10-CM

## 2022-06-22 DIAGNOSIS — E55.9 VITAMIN D INSUFFICIENCY: ICD-10-CM

## 2022-06-22 DIAGNOSIS — E21.3 HYPERPARATHYROIDISM: ICD-10-CM

## 2022-06-22 DIAGNOSIS — E11.65 TYPE 2 DIABETES MELLITUS WITH HYPERGLYCEMIA, WITH LONG-TERM CURRENT USE OF INSULIN: ICD-10-CM

## 2022-06-22 DIAGNOSIS — E21.3 HYPERPARATHYROIDISM: Primary | ICD-10-CM

## 2022-06-22 DIAGNOSIS — Z79.4 TYPE 2 DIABETES MELLITUS WITH HYPERGLYCEMIA, WITH LONG-TERM CURRENT USE OF INSULIN: ICD-10-CM

## 2022-06-22 DIAGNOSIS — E11.69 HYPERLIPIDEMIA ASSOCIATED WITH TYPE 2 DIABETES MELLITUS: ICD-10-CM

## 2022-06-22 DIAGNOSIS — R00.0 TACHYCARDIA: ICD-10-CM

## 2022-06-22 DIAGNOSIS — E78.5 HYPERLIPIDEMIA ASSOCIATED WITH TYPE 2 DIABETES MELLITUS: ICD-10-CM

## 2022-06-22 LAB
ALBUMIN SERPL BCP-MCNC: 4.1 G/DL (ref 3.5–5.2)
ALP SERPL-CCNC: 118 U/L (ref 55–135)
ALT SERPL W/O P-5'-P-CCNC: 23 U/L (ref 10–44)
ANION GAP SERPL CALC-SCNC: 9 MMOL/L (ref 8–16)
AST SERPL-CCNC: 26 U/L (ref 10–40)
BILIRUB SERPL-MCNC: 0.4 MG/DL (ref 0.1–1)
BUN SERPL-MCNC: 8 MG/DL (ref 8–23)
CA-I BLDV-SCNC: 1.19 MMOL/L (ref 1.06–1.42)
CALCIUM SERPL-MCNC: 10.3 MG/DL (ref 8.7–10.5)
CHLORIDE SERPL-SCNC: 103 MMOL/L (ref 95–110)
CO2 SERPL-SCNC: 27 MMOL/L (ref 23–29)
CREAT SERPL-MCNC: 0.8 MG/DL (ref 0.5–1.4)
EST. GFR  (AFRICAN AMERICAN): >60 ML/MIN/1.73 M^2
EST. GFR  (NON AFRICAN AMERICAN): >60 ML/MIN/1.73 M^2
ESTIMATED AVG GLUCOSE: 266 MG/DL (ref 68–131)
GLUCOSE SERPL-MCNC: 241 MG/DL (ref 70–110)
HBA1C MFR BLD: 10.9 % (ref 4–5.6)
MAGNESIUM SERPL-MCNC: 1.9 MG/DL (ref 1.6–2.6)
POTASSIUM SERPL-SCNC: 4.3 MMOL/L (ref 3.5–5.1)
PROT SERPL-MCNC: 7.7 G/DL (ref 6–8.4)
PTH-INTACT SERPL-MCNC: 94.2 PG/ML (ref 9–77)
SODIUM SERPL-SCNC: 139 MMOL/L (ref 136–145)
TSH SERPL DL<=0.005 MIU/L-ACNC: 0.87 UIU/ML (ref 0.4–4)

## 2022-06-22 PROCEDURE — 99214 PR OFFICE/OUTPT VISIT, EST, LEVL IV, 30-39 MIN: ICD-10-PCS | Mod: S$GLB,,, | Performed by: INTERNAL MEDICINE

## 2022-06-22 PROCEDURE — 83970 ASSAY OF PARATHORMONE: CPT | Performed by: INTERNAL MEDICINE

## 2022-06-22 PROCEDURE — 83735 ASSAY OF MAGNESIUM: CPT | Performed by: INTERNAL MEDICINE

## 2022-06-22 PROCEDURE — 84443 ASSAY THYROID STIM HORMONE: CPT | Performed by: INTERNAL MEDICINE

## 2022-06-22 PROCEDURE — 36415 COLL VENOUS BLD VENIPUNCTURE: CPT | Performed by: INTERNAL MEDICINE

## 2022-06-22 PROCEDURE — 99214 OFFICE O/P EST MOD 30 MIN: CPT | Mod: S$GLB,,, | Performed by: INTERNAL MEDICINE

## 2022-06-22 PROCEDURE — 82330 ASSAY OF CALCIUM: CPT | Performed by: INTERNAL MEDICINE

## 2022-06-22 PROCEDURE — 80053 COMPREHEN METABOLIC PANEL: CPT | Performed by: INTERNAL MEDICINE

## 2022-06-22 PROCEDURE — 83036 HEMOGLOBIN GLYCOSYLATED A1C: CPT | Performed by: INTERNAL MEDICINE

## 2022-06-22 RX ORDER — ERGOCALCIFEROL 1.25 MG/1
50000 CAPSULE ORAL
Qty: 12 CAPSULE | Refills: 1 | Status: SHIPPED | OUTPATIENT
Start: 2022-06-22 | End: 2023-04-18

## 2022-06-22 NOTE — ASSESSMENT & PLAN NOTE
Reviewed goals of therapy - to get the best control we can without hypoglycemia. Goal A1C <7.5   last a1c remains above goal. No recent labs.   recheck when able.    Not checking sugar much, though glucose usually high when she does check    Medication changes:    continue metformin. 500 mg BID. Diarrhea with higher doses.  Back on trulicity. Continue, likely increase if a1c still above goal  Continue insulin. Same dose. Take consistently   continue Prandin 1 mg TIDWM    Reviewed patient's current insulin regimen. Clarified proper insulin dose and timing in relation to meals, etc. Insulin injection sites and proper rotation instructed.     -Advised occasional self blood glucose monitoring. Patient encouraged to document glucose results and bring them to every clinic visit.   -Hypoglycemia precautions discussed. Instructed on precautions before driving.    -Close adherence to lifestyle changes recommended.    -Periodic follow ups for eye evaluations, foot care suggested.

## 2022-06-22 NOTE — PROGRESS NOTES
Subjective:      Chief Complaint: Diabetes    HPI: Dee Hamilton is a 66 y.o. female who is here for a follow-up evaluation for diabetes. Last seen 10/15/2021    For hypercalcemia:    Last labs:  Lab Results   Component Value Date    CALCIUM 10.5 2022    ALBUMIN 4.3 2022    CREATININE 0.8 2022    [id number] 24 (L) 2022    .9 (H) 2022   ionized calcium normal    Prior labs:        Lab Results   Component Value Date     CALCIUM 11.5 (H) 2021     ALBUMIN 4.0 2021     CREATININE 0.7 2021     [id number] 30 2021     PTH 54.7 2021      Pt was on Losartan-HCTZ. Now just losartan.   Pt is taking biotin lately.     With regards to the diabetes:  Diagnosed with T2DM since around      Known complications:     Retinopathy? No    Nephropathy? No    Neuropathy? Yes    CAD? No    CVA? No     Current regimen:   Metformin 500 mg BID   Incretin: Trulicity 1.5 mg per week   Basal insulin: Lantus 36 units qHS   prandin 1 mg with meals     Missed doses? yes, often. Not taking lantus lately     Prior treatments:   bydureon   tradjenta     Self-Monitored blood glucose.  # times a day testin/day  Log reviewed: No    Pre breakfast: mostly 200s    Hypoglycemic events? None lately.    Current Symptoms  No   Yes  []    [x]  Polydipsia  []    [x]  Polyuria  []    [x]  Vision changes. floaters  [x]    []  Nausea  [x]    []  Diarrhea  [x]    []  Weight gain  []    [x]  Weight loss, 5 lbs since last appointment.    No kidney stones  No falls/fractures  +fatigue    Sometimes right sided flank pains, improved.  Back pains  +constipation    Diabetes Management Status    Statin: Taking  ACE/ARB: Taking    Screening or Prevention Patient's value Goal Complete/Controlled?   HgA1C Testing and Control   Lab Results   Component Value Date    HGBA1C 8.6 (H) 2022      q6months/Less than 7.5% No   Lipid profile : 2021 Annually Yes   LDL control Lab Results    Component Value Date    LDLCALC 92.8 07/19/2021    Annually/Less than 100 mg/dl  Yes   Nephropathy screening Lab Results   Component Value Date    MICALBCREAT 8.8 04/06/2021     Lab Results   Component Value Date    CREATININE 0.8 03/23/2022     Lab Results   Component Value Date    PROTEINUA Negative 04/06/2021    Annually No   Blood pressure BP Readings from Last 1 Encounters:   06/22/22 133/67    Less than 140/90 Yes   Dilated retinal exam : 10/12/2021 Annually Yes   Foot exam   Most Recent Foot Exam Date: Not Found Annually No     Reviewed past medical, family, social history and updated as appropriate.    Review of Systems  As above    Objective:     Vitals:    06/22/22 1029   BP: 133/67   Pulse: (!) 115     BP Readings from Last 5 Encounters:   06/22/22 133/67   06/09/22 125/65   05/25/22 123/73   03/08/22 117/65   01/20/22 (!) 135/59     Physical Exam  Vitals reviewed.   Constitutional:       General: She is not in acute distress.     Appearance: She is obese.   Cardiovascular:      Rate and Rhythm: Regular rhythm. Tachycardia present.      Heart sounds: Normal heart sounds.   Pulmonary:      Effort: Pulmonary effort is normal.   Musculoskeletal:         General: Swelling (in ankles bilaterally) present.        Wt Readings from Last 10 Encounters:   06/22/22 1029 88.9 kg (196 lb)   06/09/22 1001 91.1 kg (200 lb 13.4 oz)   05/25/22 1102 91 kg (200 lb 9.9 oz)   03/08/22 0957 92.2 kg (203 lb 4.2 oz)   01/20/22 0901 93.2 kg (205 lb 6.4 oz)   12/10/21 1054 95.1 kg (209 lb 10.5 oz)   12/09/21 1649 94.2 kg (207 lb 10.8 oz)   10/15/21 0859 91.6 kg (201 lb 15.1 oz)   09/29/21 1020 90.3 kg (199 lb)   09/27/21 1300 90.3 kg (199 lb)   09/27/21 0959 90.3 kg (199 lb)     Lab Results   Component Value Date    HGBA1C 8.6 (H) 01/14/2022     Lab Results   Component Value Date    CHOL 155 07/19/2021    HDL 32 (L) 07/19/2021    LDLCALC 92.8 07/19/2021    TRIG 151 (H) 07/19/2021    CHOLHDL 20.6 07/19/2021     Lab Results    Component Value Date     01/14/2022    K 4.7 01/14/2022     01/14/2022    CO2 25 01/14/2022     (H) 01/14/2022    BUN 12 01/14/2022    CREATININE 0.8 03/23/2022    CALCIUM 10.5 01/14/2022    PROT 7.7 01/14/2022    ALBUMIN 4.3 01/14/2022    BILITOT 0.7 01/14/2022    ALKPHOS 100 01/14/2022    AST 26 01/14/2022    ALT 20 01/14/2022    ANIONGAP 13 01/14/2022    ESTGFRAFRICA >60.0 03/23/2022    EGFRNONAA >60.0 03/23/2022    TSH 2.100 04/06/2021      Lab Results   Component Value Date    MICALBCREAT 8.8 04/06/2021       Assessment/Plan:     Type 2 diabetes mellitus with hyperglycemia  Reviewed goals of therapy - to get the best control we can without hypoglycemia. Goal A1C <7.5   last a1c remains above goal. No recent labs.   recheck when able.    Not checking sugar much, though glucose usually high when she does check    Medication changes:    continue metformin. 500 mg BID. Diarrhea with higher doses.  Back on trulicity. Continue, likely increase if a1c still above goal  Continue insulin. Same dose. Take consistently   continue Prandin 1 mg TIDWM    Reviewed patient's current insulin regimen. Clarified proper insulin dose and timing in relation to meals, etc. Insulin injection sites and proper rotation instructed.     -Advised occasional self blood glucose monitoring. Patient encouraged to document glucose results and bring them to every clinic visit.   -Hypoglycemia precautions discussed. Instructed on precautions before driving.    -Close adherence to lifestyle changes recommended.    -Periodic follow ups for eye evaluations, foot care suggested.        Hyperlipidemia associated with type 2 diabetes mellitus  Lab Results   Component Value Date    LDLCALC 92.8 07/19/2021     LDL at goal   continue statin   monitor yearly      Hyperparathyroidism  Calcium improved back to normal on last few checks   but now PTH high   - pt was on biotin, which can falsely increase PTH   vitamin D was also low (24)  on last check  PTH still high on repeat a few months ago, though ca normal.  Continue to monitor.   take vitamin D supplement   check DXA to r/o osteoporosis.    Consider 24 hour urine testing as well.    Avoid thiazides and dehydration since that can make high Ca worse      Class 1 obesity due to excess calories with serious comorbidity and body mass index (BMI) of 30.0 to 30.9 in adult  Body mass index is 30.7 kg/m².   complicated by HTN, HLD, diabetes   continue glp1   encourage pt to work on healthy diet, increase exercise as tolerated.   monitor weight      tachycardia: check TSH. Otherwise f/u with PCP    Follow up in about 3 months (around 9/22/2022) for lab review, further monitoring.      Alex Tilley MD  Endocrinology

## 2022-06-22 NOTE — ASSESSMENT & PLAN NOTE
Lab Results   Component Value Date    LDLCALC 92.8 07/19/2021     LDL at goal   continue statin   monitor yearly

## 2022-06-22 NOTE — PATIENT INSTRUCTIONS
For the diabetes:     Recheck blood test  For now continue trulicity once a week   Metformin twice a day   Prandin (repaglinide) 3 times a day before meals   And insulin. 36 units once a day.    Will see how blood test is looking, may need higher dose trulicity.    For calcium, PTH:    Take vitamin D once a week   Check bone density   Repeat blood test

## 2022-06-22 NOTE — ASSESSMENT & PLAN NOTE
Calcium improved back to normal on last few checks   but now PTH high   - pt was on biotin, which can falsely increase PTH   vitamin D was also low (24) on last check  PTH still high on repeat a few months ago, though ca normal.  Continue to monitor.   take vitamin D supplement   check DXA to r/o osteoporosis.    Consider 24 hour urine testing as well.    Avoid thiazides and dehydration since that can make high Ca worse

## 2022-06-22 NOTE — ASSESSMENT & PLAN NOTE
Body mass index is 30.7 kg/m².   complicated by HTN, HLD, diabetes   continue glp1   encourage pt to work on healthy diet, increase exercise as tolerated.   monitor weight

## 2022-06-28 DIAGNOSIS — E11.65 TYPE 2 DIABETES MELLITUS WITH HYPERGLYCEMIA, WITH LONG-TERM CURRENT USE OF INSULIN: ICD-10-CM

## 2022-06-28 DIAGNOSIS — Z79.4 TYPE 2 DIABETES MELLITUS WITH HYPERGLYCEMIA, WITH LONG-TERM CURRENT USE OF INSULIN: ICD-10-CM

## 2022-06-28 DIAGNOSIS — E21.3 HYPERPARATHYROIDISM: Primary | ICD-10-CM

## 2022-06-28 RX ORDER — DULAGLUTIDE 3 MG/.5ML
3 INJECTION, SOLUTION SUBCUTANEOUS
Qty: 12 PEN | Refills: 3 | Status: SHIPPED | OUTPATIENT
Start: 2022-06-28 | End: 2023-08-15 | Stop reason: SDUPTHER

## 2022-07-05 ENCOUNTER — PES CALL (OUTPATIENT)
Dept: ADMINISTRATIVE | Facility: CLINIC | Age: 67
End: 2022-07-05
Payer: MEDICARE

## 2022-07-05 ENCOUNTER — OFFICE VISIT (OUTPATIENT)
Dept: OBSTETRICS AND GYNECOLOGY | Facility: CLINIC | Age: 67
End: 2022-07-05
Payer: MEDICARE

## 2022-07-05 VITALS
SYSTOLIC BLOOD PRESSURE: 137 MMHG | DIASTOLIC BLOOD PRESSURE: 74 MMHG | HEIGHT: 67 IN | BODY MASS INDEX: 31.45 KG/M2 | WEIGHT: 200.38 LBS

## 2022-07-05 DIAGNOSIS — R10.2 PELVIC PAIN: ICD-10-CM

## 2022-07-05 DIAGNOSIS — Z12.39 ENCOUNTER FOR SCREENING FOR MALIGNANT NEOPLASM OF BREAST, UNSPECIFIED SCREENING MODALITY: ICD-10-CM

## 2022-07-05 DIAGNOSIS — D06.9 CIN III (CERVICAL INTRAEPITHELIAL NEOPLASIA GRADE III) WITH SEVERE DYSPLASIA: ICD-10-CM

## 2022-07-05 DIAGNOSIS — Z01.419 WELL WOMAN EXAM WITH ROUTINE GYNECOLOGICAL EXAM: Primary | ICD-10-CM

## 2022-07-05 PROCEDURE — 88305 TISSUE EXAM BY PATHOLOGIST: CPT | Performed by: PATHOLOGY

## 2022-07-05 PROCEDURE — 88305 TISSUE EXAM BY PATHOLOGIST: ICD-10-PCS | Mod: 26,,, | Performed by: PATHOLOGY

## 2022-07-05 PROCEDURE — 88342 IMHCHEM/IMCYTCHM 1ST ANTB: CPT | Mod: 26,,, | Performed by: PATHOLOGY

## 2022-07-05 PROCEDURE — 88305 TISSUE EXAM BY PATHOLOGIST: CPT | Mod: 26,,, | Performed by: PATHOLOGY

## 2022-07-05 PROCEDURE — 99999 PR PBB SHADOW E&M-EST. PATIENT-LVL IV: CPT | Mod: PBBFAC,,, | Performed by: OBSTETRICS & GYNECOLOGY

## 2022-07-05 PROCEDURE — 88342 IMHCHEM/IMCYTCHM 1ST ANTB: CPT | Mod: 59 | Performed by: PATHOLOGY

## 2022-07-05 PROCEDURE — 88342 CHG IMMUNOCYTOCHEMISTRY: ICD-10-PCS | Mod: 26,,, | Performed by: PATHOLOGY

## 2022-07-05 PROCEDURE — 57454 COLPOSCOPY: ICD-10-PCS | Mod: S$GLB,,, | Performed by: OBSTETRICS & GYNECOLOGY

## 2022-07-05 PROCEDURE — G0101 CA SCREEN;PELVIC/BREAST EXAM: HCPCS | Mod: S$GLB,,, | Performed by: OBSTETRICS & GYNECOLOGY

## 2022-07-05 PROCEDURE — 57454 BX/CURETT OF CERVIX W/SCOPE: CPT | Mod: S$GLB,,, | Performed by: OBSTETRICS & GYNECOLOGY

## 2022-07-05 PROCEDURE — G0101 PR CA SCREEN;PELVIC/BREAST EXAM: ICD-10-PCS | Mod: S$GLB,,, | Performed by: OBSTETRICS & GYNECOLOGY

## 2022-07-05 PROCEDURE — 99999 PR PBB SHADOW E&M-EST. PATIENT-LVL IV: ICD-10-PCS | Mod: PBBFAC,,, | Performed by: OBSTETRICS & GYNECOLOGY

## 2022-07-05 NOTE — Clinical Note
Can you call patient and schedule her DXA scan? PCP ordered it but she said she needed to reschedule. Thanks

## 2022-07-05 NOTE — PROGRESS NOTES
Subjective:       Patient ID: Dee Hamilton is a 66 y.o. female.    Chief Complaint:  Well Woman      History of Present Illness  Gynecologic Exam  The patient's primary symptoms include pelvic pain and vaginal bleeding. The patient's pertinent negatives include no genital itching, genital lesions, genital odor, genital rash, missed menses or vaginal discharge. This is a recurrent problem. The problem occurs intermittently. The problem has been waxing and waning. The pain is moderate. The problem affects the right side. She is not pregnant. Associated symptoms include abdominal pain, back pain, constipation and flank pain. Pertinent negatives include no anorexia, chills, diarrhea, discolored urine, dysuria, fever, frequency, headaches, hematuria, nausea, painful intercourse, rash, urgency or vomiting. The vaginal discharge was normal. The vaginal bleeding is spotting. She has not been passing clots. She has not been passing tissue. The symptoms are aggravated by bowel movements. The treatment provided moderate relief. No, her partner does not have an STD. She uses nothing for contraception. Her past medical history is significant for an abdominal surgery, an ectopic pregnancy, a gynecological surgery, menorrhagia, miscarriage and ovarian cysts.       66 y.o. female  here for annual.     She is postmenopausal. Reports decreased libido and increased hot flushes/night sweats. She takes paxil 30 mg daily for depression.   denies break through bleeding.   denies vaginal itching or irritation.  denies vaginal discharge.    She is sexually active.  She uses no method for contraception.    History of abnormal pap: Yes:  2021 AGC pap/HPV non 16/18  2021 Colpo CHUN II ecto, ECC benign, EMBx insufficient  2021 CKC with CHUN II-III ectocervix with positive margins; post-CKC ECC benign; D&C benign  2022 NILM pap/HPV neg  Last Pap: as above  Last MM2021 - Birads 1  Last Colonoscopy: 2018 - benign,  repeat 5 years  Last DXA: ordered by PCP    Family History   Problem Relation Age of Onset    Diabetes Sister     Cataracts Mother     Hypertension Mother     Cancer Father 68        Jaw Bone    Cataracts Father     Pancreatic cancer Brother     Ulcers Brother     Amblyopia Neg Hx     Blindness Neg Hx     Glaucoma Neg Hx     Macular degeneration Neg Hx     Retinal detachment Neg Hx     Strabismus Neg Hx     Stroke Neg Hx     Thyroid disease Neg Hx     Breast cancer Neg Hx     Colon cancer Neg Hx     Ovarian cancer Neg Hx        GYN & OB History  No LMP recorded. Patient is postmenopausal.   Date of Last Pap: 2022    OB History    Para Term  AB Living   3 3 3     3   SAB IAB Ectopic Multiple Live Births                  # Outcome Date GA Lbr Tavo/2nd Weight Sex Delivery Anes PTL Lv   3 Term            2 Term            1 Term                Review of Systems  Review of Systems   Constitutional: Negative for chills, diaphoresis, fatigue and fever.   Respiratory: Negative for cough and shortness of breath.    Cardiovascular: Negative for chest pain and palpitations.   Gastrointestinal: Positive for abdominal pain and constipation. Negative for anorexia, diarrhea, nausea and vomiting.   Genitourinary: Positive for decreased libido, flank pain, menorrhagia and pelvic pain. Negative for dysmenorrhea, dysuria, frequency, hematuria, menstrual problem, missed menses, urgency, vaginal bleeding, vaginal discharge and vaginal pain.   Musculoskeletal: Positive for back pain. Negative for myalgias.   Integumentary:  Negative for rash, acne, breast mass, nipple discharge and breast skin changes.   Neurological: Negative for headaches.   Psychiatric/Behavioral: Negative for depression. The patient is not nervous/anxious.    Breast: Negative for mass, mastodynia, nipple discharge and skin changes          Objective:    Physical Exam:   Constitutional: She is oriented to person, place, and time.  She appears well-developed and well-nourished. No distress.    HENT:   Head: Normocephalic and atraumatic.    Eyes: EOM are normal.    Neck: No thyromegaly present.     Pulmonary/Chest: Effort normal. She exhibits no mass and no tenderness. Right breast exhibits no inverted nipple, no mass, no nipple discharge, no skin change, no tenderness and no swelling. Left breast exhibits no inverted nipple, no mass, no nipple discharge, no skin change, no tenderness and no swelling. Breasts are symmetrical.        Abdominal: Soft. She exhibits no distension and no mass. There is no abdominal tenderness. There is no rebound and no guarding. No hernia.     Genitourinary:    Genitourinary Comments: Normal external female genitalia; vagina rugated, normal; cervix normal, no masses; uterus small mobile nontender; no adnexal masses palpated; rectal deferred               Musculoskeletal: Normal range of motion and moves all extremeties.       Neurological: She is alert and oriented to person, place, and time.    Skin: Skin is warm. No rash noted.    Psychiatric: She has a normal mood and affect. Her behavior is normal. Judgment and thought content normal.          Assessment:        1. Well woman exam with routine gynecological exam    2. Encounter for screening for malignant neoplasm of breast, unspecified screening modality    3. CHUN III (cervical intraepithelial neoplasia grade III) with severe dysplasia              Plan:      Dee was seen today for consult.    Diagnoses and all orders for this visit:    Well woman exam with routine gynecological exam  - Pap guidelines discussed. Pap/HPV recently done and both negative. Given prior +margin from [facility], recommended colposcopy/ECC, done today.l  - Breast cancer screening - MMG ordered.   - Colon cancer screening - up to date.   - Bone density screening - ordered by PCP.   - Contraception - N/A.  - STD screening - declined.  - Decreased libido - reassurance. Briefly discussed  medications but patient declined.   - Perimenopause - currently taking paxil 30 mg for depression. Consider effexor. Given other comorbidities, would need longer discussion prior to initiation of HT. Consider referral to hormone clinic.     Encounter for screening for malignant neoplasm of breast, unspecified screening modality  -     Mammo Digital Screening Bilat w/ Dionisio; Future    CHUN III (cervical intraepithelial neoplasia grade III) with severe dysplasia        Orders Placed This Encounter   Procedures    Colposcopy    Mammo Digital Screening Bilat w/ Dionisio       Follow up in about 1 year (around 7/5/2023) for annual.

## 2022-07-05 NOTE — PROCEDURES
Colposcopy    Date/Time: 7/5/2022 1:15 PM  Performed by: Sheeba Frank MD  Authorized by: Sheeba Frank MD     Consent Done?:  Yes (Written)  Timeout:Immediately prior to procedure a time out was called to verify the correct patient, procedure, equipment, support staff and site/side marked as required  Prep:Patient was prepped and draped in the usual sterile fashion  Assistants?: Yes    List of assistants:  Sabino FISH was present for the entire procedure.    Colposcopy Site:  Cervix  Position:  Supine   Patient was prepped and draped in the normal sterile fashion.  Acrowhite Lesion? Yes (mild at 6:00)    Atypical Vessels: No    Transformation Zone Adequate?: Yes    Biopsy?: Yes         Location:  Cervix ((6 00))  ECC Performed?: Yes    LEEP Performed?: No    Estimated blood loss (cc):  0   Patient tolerated the procedure well with no immediate complications.   Post-operative instructions were provided for the patient.   Patient was discharged and will follow up if any complications occur

## 2022-07-07 ENCOUNTER — HOSPITAL ENCOUNTER (OUTPATIENT)
Dept: RADIOLOGY | Facility: CLINIC | Age: 67
Discharge: HOME | End: 2022-07-07
Attending: INTERNAL MEDICINE
Payer: MEDICARE

## 2022-07-07 ENCOUNTER — TELEPHONE (OUTPATIENT)
Dept: OBSTETRICS AND GYNECOLOGY | Facility: CLINIC | Age: 67
End: 2022-07-07
Payer: MEDICARE

## 2022-07-07 DIAGNOSIS — Z12.31 SCREENING MAMMOGRAM, ENCOUNTER FOR: ICD-10-CM

## 2022-07-07 PROCEDURE — 77063 BREAST TOMOSYNTHESIS BI: CPT | Mod: TC,PO

## 2022-07-07 PROCEDURE — 77063 MAMMO DIGITAL SCREENING BILAT WITH TOMO: ICD-10-PCS | Mod: 26,,, | Performed by: RADIOLOGY

## 2022-07-07 PROCEDURE — 77067 SCR MAMMO BI INCL CAD: CPT | Mod: TC,PO

## 2022-07-07 PROCEDURE — 77063 BREAST TOMOSYNTHESIS BI: CPT | Mod: 26,,, | Performed by: RADIOLOGY

## 2022-07-07 PROCEDURE — 77067 MAMMO DIGITAL SCREENING BILAT WITH TOMO: ICD-10-PCS | Mod: 26,,, | Performed by: RADIOLOGY

## 2022-07-07 PROCEDURE — 77067 SCR MAMMO BI INCL CAD: CPT | Mod: 26,,, | Performed by: RADIOLOGY

## 2022-07-07 NOTE — TELEPHONE ENCOUNTER
----- Message from Sabino Ybarra MA sent at 7/5/2022  5:02 PM CDT -----  Sheeba Frank MD  P Zac Aly Staff  Can you call patient and schedule her DXA scan? PCP ordered it but she said she needed to reschedule. Thanks

## 2022-07-07 NOTE — PROGRESS NOTES
I sent pt a my chart message -  I reviewed your Mammogram -   The breasts have scattered areas of fibroglandular density. There is no evidence of suspicious masses, microcalcifications or architectural distortion.  Impression:   No mammographic evidence of malignancy.  Routine screening mammogram in 1 year is recommended.     Dr. BRUSH

## 2022-07-14 ENCOUNTER — PES CALL (OUTPATIENT)
Dept: ADMINISTRATIVE | Facility: CLINIC | Age: 67
End: 2022-07-14
Payer: MEDICARE

## 2022-07-15 LAB
FINAL PATHOLOGIC DIAGNOSIS: NORMAL
Lab: NORMAL

## 2022-08-01 ENCOUNTER — TELEPHONE (OUTPATIENT)
Dept: OBSTETRICS AND GYNECOLOGY | Facility: CLINIC | Age: 67
End: 2022-08-01
Payer: MEDICARE

## 2022-08-01 ENCOUNTER — TELEPHONE (OUTPATIENT)
Dept: ADMINISTRATIVE | Facility: CLINIC | Age: 67
End: 2022-08-01
Payer: MEDICARE

## 2022-08-01 NOTE — TELEPHONE ENCOUNTER
Called pt, no answer; left message informing pt I was calling to remind pt of her in office EAWV on 8/3/22 and to return my call if she had any questions; left my name and number

## 2022-08-01 NOTE — TELEPHONE ENCOUNTER
Called pt to inform her that Dr. Frank sent her a message via [employer]. Also to inform her that Dr. Frank recommends repeating pap in one year. Pt verbalized understanding

## 2022-08-11 ENCOUNTER — OFFICE VISIT (OUTPATIENT)
Dept: PRIMARY CARE CLINIC | Facility: CLINIC | Age: 67
End: 2022-08-11
Payer: MEDICARE

## 2022-08-11 VITALS
RESPIRATION RATE: 16 BRPM | HEART RATE: 93 BPM | WEIGHT: 203.06 LBS | DIASTOLIC BLOOD PRESSURE: 68 MMHG | HEIGHT: 67 IN | TEMPERATURE: 99 F | OXYGEN SATURATION: 98 % | BODY MASS INDEX: 31.87 KG/M2 | SYSTOLIC BLOOD PRESSURE: 126 MMHG

## 2022-08-11 DIAGNOSIS — I10 HYPERTENSION, UNSPECIFIED TYPE: ICD-10-CM

## 2022-08-11 DIAGNOSIS — E21.3 HYPERPARATHYROIDISM: ICD-10-CM

## 2022-08-11 DIAGNOSIS — R41.3 MEMORY IMPAIRMENT: Primary | ICD-10-CM

## 2022-08-11 DIAGNOSIS — Z79.4 TYPE 2 DIABETES MELLITUS WITH DIABETIC NEUROPATHY, WITH LONG-TERM CURRENT USE OF INSULIN: ICD-10-CM

## 2022-08-11 DIAGNOSIS — E11.65 TYPE 2 DIABETES MELLITUS WITH HYPERGLYCEMIA, WITH LONG-TERM CURRENT USE OF INSULIN: ICD-10-CM

## 2022-08-11 DIAGNOSIS — Z79.4 TYPE 2 DIABETES MELLITUS WITH HYPERGLYCEMIA, WITH LONG-TERM CURRENT USE OF INSULIN: ICD-10-CM

## 2022-08-11 DIAGNOSIS — E11.40 TYPE 2 DIABETES MELLITUS WITH DIABETIC NEUROPATHY, WITH LONG-TERM CURRENT USE OF INSULIN: ICD-10-CM

## 2022-08-11 PROCEDURE — 3046F PR MOST RECENT HEMOGLOBIN A1C LEVEL > 9.0%: ICD-10-PCS | Mod: CPTII,S$GLB,, | Performed by: INTERNAL MEDICINE

## 2022-08-11 PROCEDURE — 3288F PR FALLS RISK ASSESSMENT DOCUMENTED: ICD-10-PCS | Mod: CPTII,S$GLB,, | Performed by: INTERNAL MEDICINE

## 2022-08-11 PROCEDURE — 3074F SYST BP LT 130 MM HG: CPT | Mod: CPTII,S$GLB,, | Performed by: INTERNAL MEDICINE

## 2022-08-11 PROCEDURE — 3046F HEMOGLOBIN A1C LEVEL >9.0%: CPT | Mod: CPTII,S$GLB,, | Performed by: INTERNAL MEDICINE

## 2022-08-11 PROCEDURE — 99214 OFFICE O/P EST MOD 30 MIN: CPT | Mod: S$GLB,,, | Performed by: INTERNAL MEDICINE

## 2022-08-11 PROCEDURE — 3008F BODY MASS INDEX DOCD: CPT | Mod: CPTII,S$GLB,, | Performed by: INTERNAL MEDICINE

## 2022-08-11 PROCEDURE — 3060F POS MICROALBUMINURIA REV: CPT | Mod: CPTII,S$GLB,, | Performed by: INTERNAL MEDICINE

## 2022-08-11 PROCEDURE — 3066F NEPHROPATHY DOC TX: CPT | Mod: CPTII,S$GLB,, | Performed by: INTERNAL MEDICINE

## 2022-08-11 PROCEDURE — 1160F RVW MEDS BY RX/DR IN RCRD: CPT | Mod: CPTII,S$GLB,, | Performed by: INTERNAL MEDICINE

## 2022-08-11 PROCEDURE — 3066F PR DOCUMENTATION OF TREATMENT FOR NEPHROPATHY: ICD-10-PCS | Mod: CPTII,S$GLB,, | Performed by: INTERNAL MEDICINE

## 2022-08-11 PROCEDURE — 3078F PR MOST RECENT DIASTOLIC BLOOD PRESSURE < 80 MM HG: ICD-10-PCS | Mod: CPTII,S$GLB,, | Performed by: INTERNAL MEDICINE

## 2022-08-11 PROCEDURE — 3060F PR POS MICROALBUMINURIA RESULT DOCUMENTED/REVIEW: ICD-10-PCS | Mod: CPTII,S$GLB,, | Performed by: INTERNAL MEDICINE

## 2022-08-11 PROCEDURE — 99999 PR PBB SHADOW E&M-EST. PATIENT-LVL V: ICD-10-PCS | Mod: PBBFAC,,, | Performed by: INTERNAL MEDICINE

## 2022-08-11 PROCEDURE — 1101F PT FALLS ASSESS-DOCD LE1/YR: CPT | Mod: CPTII,S$GLB,, | Performed by: INTERNAL MEDICINE

## 2022-08-11 PROCEDURE — 1159F PR MEDICATION LIST DOCUMENTED IN MEDICAL RECORD: ICD-10-PCS | Mod: CPTII,S$GLB,, | Performed by: INTERNAL MEDICINE

## 2022-08-11 PROCEDURE — 3008F PR BODY MASS INDEX (BMI) DOCUMENTED: ICD-10-PCS | Mod: CPTII,S$GLB,, | Performed by: INTERNAL MEDICINE

## 2022-08-11 PROCEDURE — 1126F AMNT PAIN NOTED NONE PRSNT: CPT | Mod: CPTII,S$GLB,, | Performed by: INTERNAL MEDICINE

## 2022-08-11 PROCEDURE — 99214 PR OFFICE/OUTPT VISIT, EST, LEVL IV, 30-39 MIN: ICD-10-PCS | Mod: S$GLB,,, | Performed by: INTERNAL MEDICINE

## 2022-08-11 PROCEDURE — 1126F PR PAIN SEVERITY QUANTIFIED, NO PAIN PRESENT: ICD-10-PCS | Mod: CPTII,S$GLB,, | Performed by: INTERNAL MEDICINE

## 2022-08-11 PROCEDURE — 1101F PR PT FALLS ASSESS DOC 0-1 FALLS W/OUT INJ PAST YR: ICD-10-PCS | Mod: CPTII,S$GLB,, | Performed by: INTERNAL MEDICINE

## 2022-08-11 PROCEDURE — 3288F FALL RISK ASSESSMENT DOCD: CPT | Mod: CPTII,S$GLB,, | Performed by: INTERNAL MEDICINE

## 2022-08-11 PROCEDURE — 3078F DIAST BP <80 MM HG: CPT | Mod: CPTII,S$GLB,, | Performed by: INTERNAL MEDICINE

## 2022-08-11 PROCEDURE — 4010F ACE/ARB THERAPY RXD/TAKEN: CPT | Mod: CPTII,S$GLB,, | Performed by: INTERNAL MEDICINE

## 2022-08-11 PROCEDURE — 1160F PR REVIEW ALL MEDS BY PRESCRIBER/CLIN PHARMACIST DOCUMENTED: ICD-10-PCS | Mod: CPTII,S$GLB,, | Performed by: INTERNAL MEDICINE

## 2022-08-11 PROCEDURE — 4010F PR ACE/ARB THEARPY RXD/TAKEN: ICD-10-PCS | Mod: CPTII,S$GLB,, | Performed by: INTERNAL MEDICINE

## 2022-08-11 PROCEDURE — 3074F PR MOST RECENT SYSTOLIC BLOOD PRESSURE < 130 MM HG: ICD-10-PCS | Mod: CPTII,S$GLB,, | Performed by: INTERNAL MEDICINE

## 2022-08-11 PROCEDURE — 1159F MED LIST DOCD IN RCRD: CPT | Mod: CPTII,S$GLB,, | Performed by: INTERNAL MEDICINE

## 2022-08-11 PROCEDURE — 99999 PR PBB SHADOW E&M-EST. PATIENT-LVL V: CPT | Mod: PBBFAC,,, | Performed by: INTERNAL MEDICINE

## 2022-08-11 RX ORDER — GABAPENTIN 100 MG/1
100 CAPSULE ORAL NIGHTLY
Qty: 90 CAPSULE | Refills: 0 | Status: SHIPPED | OUTPATIENT
Start: 2022-08-11 | End: 2022-12-31 | Stop reason: SDUPTHER

## 2022-08-11 NOTE — PROGRESS NOTES
"[facility] Note    Chief Complaint      Chief Complaint   Patient presents with    Follow-up       History of Present Illness      Dee Hamilton is a 67 y.o. F with Anxiety, Depression, Glaucoma, HLD, Hypercalcemia. Last visit - 6/9/22.     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 Frank.  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 ECC neg. EMBx insufficient. Pt is s/p hysteroscopy/D&C and CKC 9/29/21.  Has upcoming fu to discuss poss. Hys. + Spotting. CT abd/Pelvis - 3/25/22 -Punctate low-density foci in both kidneys too small to adequately characterize.   Colonic diverticulosis with no evidence for acute diverticulitis. No further recommendations Pelvic U/S - 5/31/22- wnl      Anxiety - She is on Paroxetine 30 mg/d.   D/w pt withdrawal effects with abrupt discontinuation. Referred to Psychiatry but she never got to go because her appt got cancelled twice at [facility].   "I go down a rabbit hole every now and then". It's depression more so than Anxiety because she doesn't go out much".  Pt talks to someone who is a friend of her daughter which has helped. She would like a support animal but her  does not like dogs. Playing with the neighbor's dog is helpful.  Prev fu by Jonna NICOLE. "It's getting better".      Depression - She is on Paroxetine 20 mg/d. Will try inc to 30 mg/d.  D/w pt withdrawal effects with abrupt discontinuation.  Referred to Psychiatry.  Pt talks to someone who is a friend of her daughter which has helped.   "It comes and goes". She has been meditating which helps. "My son is a trigger".       Glaucoma - Fu by [facility]. Fu by Dr. Nereyda De Anda in Apr.  Planning for visual field testing in Aug.      HLD - Improved since resuming Atorvastatin.      Hypercalcemia/Hyperparathyroidism -  Fu by Dr. Jarek Davison.  Vit D low normal - 24- low. ionized calcium - 1.39 - wnl - 1/14/22. iPTH - 94 and calcium - 10.3 - 6/22/22. Note - HCTZ prev stopped.      DM - II  - Dx '02 - HA1c " " -10.9 in June.  Pt back on trulicity and it was inc to 3 mg wkly per Endo. Pt on  Metformin  mg twice daily, Prandin 1 mg TID with meals, and lantus 36 units QHS.  She reports she was not fu for appts during the pandemic. Fu by Dr. Tilley- has fu in Sept. Doing better since changed to Metformin ER - no further diarrhea.    She c/o burning in her feet to her knees.  Can try a trial of Gabapentin 100 mg QHs.      Vit D def - 24 - 1/14/22. Pt not on suppl. She is fu by endo. She completed Ergocalciferol 50,000 units once weekly x 8 wks.  Pt now on OTC Vit D 3 2000 units one daily.      Mixed urinary Incontinence - Cystocele - Fu by  UroGyn, Dr. Coleman. Saleembetriq- couldn't afford. She prefers not to go back to Dr. Dinh.  She had  Prev bladder suspension in '11. Checked MRI Brain to r/o  NPH with abn gait, imbalance, memory issues, and Incontinence.  No evid of this on MRI. Pt on Elavil 10 mg QHS.  Referred to Pelvic Floor PTx.      Diaphragmatic hernia - No issues at present.  Will cont to monitor. She does have GERD. Rec smaller more freq meals.      Obesity - BMI - 31.80 - Pt denies snoring. Rec low carb diet.  She reports dec appetite. She has a shake in Am, Salad or sandwich at lunch and sometimes skips dinner or has fruit.      Diverticulosis - No issues at present. Diarrhea resolved with changing to Metformin ER.       Memory issues - no evid of NPH on MRI. Pt reports imbalance, Incontinence, and memory issues. She has known Mixed urinary incontinence and had a prev bladder supsension. Carotid U/S - 6/1/21 - no evid of sig stenosis. Vitamin B1 level and it was normal.  Vitamin b12 was elevated. Her folic acid was normal. "It's better.  It comes and goes". She started reading again and feels this has helped.  She reports difficulty with word finding the past 2 wks. She has been reading and playing games more to help.  has noticed she is more forgetful in doing chores around the house (like " "emptying the  or taking the clothes out of the dryer". Pt scored 27/30 on MMSE - 8/11/22. If someone has to ask her how she is she has to stop and think about how to respond so she has the words in her mind to answer correctly.     Imbalance -  I'm not wobbling as much any more and haven't fallen or had dizziness.  My mood swings are gone and it's much better .  I suspect this has improved since resuming her SSRI. "It doesn't happen that often".  She has not been missing doses.      Fatty Liver - Noted on CT abd/pelvis -2/22/20.   Rec she  limit tylenol and alcohol.  Rec diet and exercise for wt loss   As discussed at visit there is a potential for cirrhosis.      Suspected lipoma within the right lower thoracic chest wall overlying the right 7th rib anterior inferiorly - seen on CT abd/pelvis 2/22/20.     Small fat containing umbilical hernia and Small fat containing left inguinal hernia - Noted on chart review. Not palpated on today's exam.      Atherosclerosis aorta - Seen on prev CT scans.  Cont Statin.      Rectocele - Fu by Uro/GYN. Planning for Pelvic Floor PTx.      Noncompliance - Pt ran out of her meds and had not fu during the pandemic. She is doing better with this.     HCM - Flu -10/27/21;  Tdap - ? At [facility];  PCV 13 - 10/24/19;  PVX 23 - 3/1/21;  Shingrix -#1 - 10/24/19 and #2 -12/30/19- she thinks she had shingles in Jan. 2022 to Left shoulder/flank x 2 wks;  COVID - 19 Vaccine (Moderna) #1 - 2/15/21; #2 - 3/15/21; # 3 - 10/27/21; # 4 ;  Hep C Screen -6/8/21 - neg. +h/o CHUN II; C-scope - 7/25/18 - hemorrhoids and polyps - repeat 5 yrs At [facility]; PAP - 5/25/22 - neg.; MGM - 7/7/22 - repeat 1 yr;  DEXA - has order from Endo; Prev PCP - Dr Benito at [facility] [place]; Endo - Dr Gonzales; [facility]- retired - needs a new Ophtho referral; GI - Dr. Jarvis; Uro/GYN - ; OB/GYN - Dr. Frank       Patient Care Team:  Farzana Dorman MD as PCP - General (Internal " Medicine)  Ivy Wharton MD (Family Medicine)  Crystal Almanza RD, CDE as Dietitian (Diabetes)  Heriberto Man MD as Consulting Physician (Gastroenterology)     Health Maintenance:  Immunization History   Administered Date(s) Administered    COVID-19, MRNA, LN-S, PF (MODERNA FULL 0.5 ML DOSE) 02/15/2021, 03/15/2021, 10/27/2021    Influenza (FLUAD) - Quadrivalent - Adjuvanted - PF *Preferred* (65+) 10/27/2021    Influenza - High Dose - PF (65 years and older) 10/24/2019    Influenza - Intradermal - Quadrivalent - PF 11/27/2013    Influenza - Quadrivalent - High Dose - PF (65 years and older) 09/08/2020    Influenza - Quadrivalent - PF *Preferred* (6 months and older) 10/15/2018    Influenza - Trivalent (ADULT) 10/24/2011, 10/17/2014, 10/23/2017    Influenza - Trivalent - PF (ADULT) 10/25/2016    Pneumococcal Conjugate - 13 Valent 10/24/2019    Pneumococcal Polysaccharide - 23 Valent 11/16/2015, 03/01/2021    Zoster Recombinant 10/24/2019, 12/30/2019      Health Maintenance   Topic Date Due    Foot Exam  Never done    TETANUS VACCINE  Never done    DEXA Scan  09/01/2014    Lipid Panel  07/19/2022    Hemoglobin A1c  09/22/2022    Eye Exam  10/12/2022    Mammogram  07/07/2023    Hepatitis C Screening  Completed        Past Medical History:  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       Past Surgical History:   has a past surgical history that includes vaginal mesh; Bladder suspension (12/11); Glaucoma Laser Sx ou; Tonsillectomy; Cholecystectomy; Cataract extraction, bilateral; Oophorectomy; Eye surgery; Hysteroscopy with dilation and curettage of uterus (N/A, 9/29/2021); and Cold knife conization of cervix (N/A,  2021).    Family History:  family history includes Cancer (age of onset: 68) in her father; Cataracts in her father and mother; Diabetes in her sister; Hypertension in her mother; Pancreatic cancer in her brother; Ulcers in her brother.     Social History:  Social History     Tobacco Use    Smoking status: Former Smoker     Packs/day: 2.00     Years: 25.00     Pack years: 50.00     Quit date:      Years since quittin.6    Smokeless tobacco: Never Used   Substance Use Topics    Alcohol use: Not Currently     Comment: social     Drug use: Never       Review of Systems   Musculoskeletal:        Burning in feet and legs to her knees   Neurological: Positive for memory loss.        Medications:    Current Outpatient Medications:     amitriptyline (ELAVIL) 10 MG tablet, Take 1 tablet (10 mg total) by mouth nightly. Can increase by 10 mg per week at nights as needed for max of 5 tabs (50 mg) per night., Disp: 45 tablet, Rfl: 11    atorvastatin (LIPITOR) 80 MG tablet, Take 1 tablet (80 mg total) by mouth once daily., Disp: 90 tablet, Rfl: 3    blood sugar diagnostic Strp, To check BG 2 times daily, to use with insurance preferred meter, Disp: 200 strip, Rfl: 3    CONTOUR TEST STRIPS Strp, USE TO TEST BLOOD SUGAR TWICE DAILY, Disp: 50 strip, Rfl: 3    dulaglutide (TRULICITY) 3 mg/0.5 mL pen injector, Inject 3 mg into the skin every 7 days., Disp: 12 pen, Rfl: 3    ergocalciferol (ERGOCALCIFEROL) 50,000 unit Cap, Take 1 capsule (50,000 Units total) by mouth every 7 days., Disp: 12 capsule, Rfl: 1    estradioL (ESTRACE) 0.01 % (0.1 mg/gram) vaginal cream, Use 1 gram of estrogen cream in vagina nightly for 2 weeks, then twice a week thereafter. (Patient taking differently: Place vaginally every 7 days. Use 1 gram of estrogen cream in vagina nightly for 2 weeks, then twice a week thereafter.), Disp: 42.5 g, Rfl: 3    furosemide (LASIX) 20 MG tablet, Take 1 tablet (20 mg total) by mouth once daily.,  "Disp: 90 tablet, Rfl: 1    insulin (LANTUS SOLOSTAR U-100 INSULIN) glargine 100 units/mL (3mL) SubQ pen, 36 SC u QHS, Disp: , Rfl: 0    LIDOCAINE 2 %, VALIUM 5 MG, BACLOFEN 4 % SUPPOSITORY, Place 1 suppository vaginally 2 (two) times daily as needed (pelvic pain)., Disp: 20 each, Rfl: 5    losartan (COZAAR) 100 MG tablet, Take 1 tablet (100 mg total) by mouth once daily., Disp: 90 tablet, Rfl: 1    metFORMIN (GLUCOPHAGE-XR) 500 MG ER 24hr tablet, TAKE 1 TABLET BY MOUTH TWICE DAILY (Patient taking differently: TAKE 1 TABLET BY MOUTH TWICE DAILY), Disp: 180 tablet, Rfl: 1    pantoprazole (PROTONIX) 40 MG tablet, Take 1 tablet (40 mg total) by mouth once daily., Disp: 30 tablet, Rfl: 2    paroxetine (PAXIL) 30 MG tablet, Take 1 tablet (30 mg total) by mouth once daily., Disp: 90 tablet, Rfl: 1    potassium chloride SA (K-DUR,KLOR-CON) 10 MEQ tablet, Take 1 tablet (10 mEq total) by mouth once daily., Disp: 90 tablet, Rfl: 1    repaglinide (PRANDIN) 1 MG tablet, Take 1 tablet (1 mg total) by mouth 3 (three) times daily before meals., Disp: 270 tablet, Rfl: 3    travoprost (TRAVATAN Z) 0.004 % ophthalmic solution, Place 1 drop into both eyes every evening., Disp: 7.5 each, Rfl: 3    blood-glucose meter kit, To check BG 2 times daily, to use with insurance preferred meter (Patient not taking: Reported on 6/9/2022), Disp: 1 each, Rfl: 0    gabapentin (NEURONTIN) 100 MG capsule, Take 1 capsule (100 mg total) by mouth every evening., Disp: 90 capsule, Rfl: 0    insulin needles, disposable, (RELION PEN NEEDLES) 32 x 5/32 " Ndle, Use as directed (Patient not taking: Reported on 6/9/2022), Disp: 50 each, Rfl: 6    lancets 33 gauge Misc, by Misc.(Non-Drug; Combo Route) route. True plus, Disp: , Rfl:     lancets Misc, To check BG 2 times daily, to use with insurance preferred meter (Patient not taking: Reported on 6/9/2022), Disp: 200 each, Rfl: 11    pantoprazole (PROTONIX) 40 MG tablet, Take 1 tablet (40 mg total) " "by mouth once daily., Disp: 30 tablet, Rfl: 2     Allergies:  Review of patient's allergies indicates:  No Known Allergies    Physical Exam      Vital Signs  Temp: 98.6 °F (37 °C)  Temp src: Oral  Pulse: 93  Resp: 16  SpO2: 98 %  BP: 126/68  BP Location: Right arm  Patient Position: Sitting  Pain Score: 0-No pain  Height and Weight  Height: 5' 7" (170.2 cm)  Weight: 92.1 kg (203 lb 0.7 oz)  BSA (Calculated - sq m): 2.09 sq meters  BMI (Calculated): 31.8  Weight in (lb) to have BMI = 25: 159.3      Patient Position: Sitting      Physical Exam  Vitals reviewed.   Constitutional:       General: She is not in acute distress.     Appearance: Normal appearance. She is not ill-appearing, toxic-appearing or diaphoretic.   HENT:      Head: Normocephalic and atraumatic.   Pulmonary:      Effort: No respiratory distress.   Neurological:      General: No focal deficit present.      Mental Status: She is alert and oriented to person, place, and time.   Psychiatric:         Mood and Affect: Mood normal.         Behavior: Behavior normal.            MMSE 8/11/2022   What is the (year), (season), (date), (day), (month)? 4   Where are we (state), (country), (town or city), (hospital), (floor)? 5   Name 3 common objects (eg. "apple", "table", "thaddeus"). Take 1 second to say each. Then ask the patient to repeat all 3. Give 1 point for each correct answer. Then repeat them until he/she learns all 3. Count trials and record. 3   Serial 7's backwards. Stop after 5 answers. (100,93,86,79,72) or alternatively  spell "WORLD" backwards. (D..L..R..O..W). The score is the number of letters in correct order. 5   Ask for the 3 common objects named earlier in the exam. Give 1 point for each correct answer. 3   Name a "pencil" and "watch." 2   Repeat the following: "No ifs, ands, or buts." 0   Follow a 3-stage command: "Take a paper in your right hand, fold it in half, & put it on the floor." 3   Read and obey the following: (see paper exam) 1 "   Write a sentence. 1   Copy the following design: (see paper exam) 0   Total MMSE Score 27   Some recent data might be hidden     Total MMSE Score: 27    Laboratory:  CBC:  Recent Labs   Lab 08/14/19  1218 02/22/20  0947 04/06/21  0940   WBC 7.24 6.84 8.19   RBC 4.78 5.05 4.66   Hemoglobin 12.2 12.8 12.6   Hematocrit 38.5 42.8 39.5   Platelets 346 319 357   MCV 81 L 85 85   MCH 25.5 L 25.3 L 27.0   MCHC 31.7 L 29.9 L 31.9 L       CMP:  Recent Labs   Lab 10/08/21  0932 01/14/22  0929 03/23/22  1156 06/22/22  1110   Glucose 158 H 143 H  --  241 H   Calcium 10.0 10.5  --  10.3   Albumin 3.8 4.3  --  4.1   Total Protein 7.6 7.7  --  7.7   Sodium 141 143  --  139   Potassium 3.6 4.7  --  4.3   CO2 24 25  --  27   Chloride 106 105  --  103   BUN 9 12  --  8   Creatinine 0.8 0.8   < > 0.8   Alkaline Phosphatase 100 100  --  118   ALT 18 20  --  23   AST 25 26  --  26   Total Bilirubin 0.4 0.7  --  0.4    < > = values in this interval not displayed.           URINALYSIS:  Recent Labs   Lab 08/14/19  1218 02/11/20  1700 02/22/20  0955 04/06/21  0913   Color, UA Yellow  --  Straw Yellow   Specific Gravity, UA <=1.005 A  --  1.015 1.025   pH, UA 7.0   < > 6.0 7.0   Protein, UA Negative  --  Negative Negative   Bacteria Rare  --  Rare  --    Nitrite, UA Negative  --  Negative Negative   Leukocytes, UA Negative  --  Trace A Negative   Urobilinogen, UA Negative  --   --  Negative   Hyaline Casts, UA 1  --   --   --     < > = values in this interval not displayed.        LIPIDS:  Recent Labs   Lab 04/06/21  0940 07/19/21  0832 06/22/22  1110   TSH 2.100  --  0.871   HDL 37 L 32 L  --    Cholesterol 253 H 155  --    Triglycerides 397 H 151 H  --    LDL Cholesterol 136.6 92.8  --    HDL/Cholesterol Ratio 14.6 L 20.6  --    Non-HDL Cholesterol 216 123  --    Total Cholesterol/HDL Ratio 6.8 H 4.8  --        TSH:  Recent Labs   Lab 04/06/21  0940 06/22/22  1110   TSH 2.100 0.871       A1C:  Recent Labs   Lab 04/06/21  0940  07/09/21  0839 10/08/21  0932 01/14/22  0929 06/22/22  1110   Hemoglobin A1C 8.2 H 9.5 H 10.6 H 8.6 H 10.9 H       Urine Microalbumin/Cr:  Recent Labs   Lab 04/06/21  0913 06/22/22  1228   Microalb/Creat Ratio 8.8 56.5 H         Other:   Recent Labs   Lab 04/06/21  0940 06/08/21  0953 06/28/21  1140 01/14/22  0929   Estradiol  --   --  13  --    Vit D, 25-Hydroxy 30  --   --  24 L   Vitamin B-12 >1400 H  >2000 H >2000 H  --   --    Ferritin  --   --   --  34     Recent Labs   Lab 06/08/21  0953   Hepatitis C Ab Negative       Assessment/Plan     Dee Hamilton is a 67 y.o.female with:    Memory impairment  - She will keep a diary and bring to visit next time. She score 27/30 on MMSE today.     Type 2 diabetes mellitus with diabetic neuropathy, with long-term current use of insulin  -     gabapentin (NEURONTIN) 100 MG capsule; Take 1 capsule (100 mg total) by mouth every evening.  Dispense: 90 capsule; Refill: 0  - Uncontrolled.  Will add gabapentin.     Hyperparathyroidism  - Stable.  Cont current regimen.    Hypertension, unspecified type  - Controlled.  Cont current.     Type 2 diabetes mellitus with hyperglycemia, with long-term current use of insulin  - Uncontrolled. Fu by Endo. Her Trulicity was just inc       Chronic conditions status updated as per HPI.  Other than changes above, cont current medications and maintain follow up with specialists.  Follow up in about 3 months (around 11/11/2022) for fu chronic issues or sooner if needed.      Farzana Dorman MD  [facility]

## 2022-08-16 ENCOUNTER — OFFICE VISIT (OUTPATIENT)
Dept: OTOLARYNGOLOGY | Facility: CLINIC | Age: 67
End: 2022-08-16
Payer: MEDICARE

## 2022-08-16 VITALS — TEMPERATURE: 99 F | WEIGHT: 199.5 LBS | BODY MASS INDEX: 31.31 KG/M2 | HEIGHT: 67 IN

## 2022-08-16 DIAGNOSIS — H61.22 IMPACTED CERUMEN OF LEFT EAR: Primary | ICD-10-CM

## 2022-08-16 PROCEDURE — 3008F PR BODY MASS INDEX (BMI) DOCUMENTED: ICD-10-PCS | Mod: CPTII,S$GLB,, | Performed by: PHYSICIAN ASSISTANT

## 2022-08-16 PROCEDURE — 3008F BODY MASS INDEX DOCD: CPT | Mod: CPTII,S$GLB,, | Performed by: PHYSICIAN ASSISTANT

## 2022-08-16 PROCEDURE — 3288F PR FALLS RISK ASSESSMENT DOCUMENTED: ICD-10-PCS | Mod: CPTII,S$GLB,, | Performed by: PHYSICIAN ASSISTANT

## 2022-08-16 PROCEDURE — 1126F PR PAIN SEVERITY QUANTIFIED, NO PAIN PRESENT: ICD-10-PCS | Mod: CPTII,S$GLB,, | Performed by: PHYSICIAN ASSISTANT

## 2022-08-16 PROCEDURE — 3288F FALL RISK ASSESSMENT DOCD: CPT | Mod: CPTII,S$GLB,, | Performed by: PHYSICIAN ASSISTANT

## 2022-08-16 PROCEDURE — 99999 PR PBB SHADOW E&M-EST. PATIENT-LVL IV: CPT | Mod: PBBFAC,,, | Performed by: PHYSICIAN ASSISTANT

## 2022-08-16 PROCEDURE — 1101F PT FALLS ASSESS-DOCD LE1/YR: CPT | Mod: CPTII,S$GLB,, | Performed by: PHYSICIAN ASSISTANT

## 2022-08-16 PROCEDURE — 3066F PR DOCUMENTATION OF TREATMENT FOR NEPHROPATHY: ICD-10-PCS | Mod: CPTII,S$GLB,, | Performed by: PHYSICIAN ASSISTANT

## 2022-08-16 PROCEDURE — 99203 PR OFFICE/OUTPT VISIT, NEW, LEVL III, 30-44 MIN: ICD-10-PCS | Mod: S$GLB,,, | Performed by: PHYSICIAN ASSISTANT

## 2022-08-16 PROCEDURE — 3046F HEMOGLOBIN A1C LEVEL >9.0%: CPT | Mod: CPTII,S$GLB,, | Performed by: PHYSICIAN ASSISTANT

## 2022-08-16 PROCEDURE — 1160F RVW MEDS BY RX/DR IN RCRD: CPT | Mod: CPTII,S$GLB,, | Performed by: PHYSICIAN ASSISTANT

## 2022-08-16 PROCEDURE — 3066F NEPHROPATHY DOC TX: CPT | Mod: CPTII,S$GLB,, | Performed by: PHYSICIAN ASSISTANT

## 2022-08-16 PROCEDURE — 3060F POS MICROALBUMINURIA REV: CPT | Mod: CPTII,S$GLB,, | Performed by: PHYSICIAN ASSISTANT

## 2022-08-16 PROCEDURE — 99203 OFFICE O/P NEW LOW 30 MIN: CPT | Mod: S$GLB,,, | Performed by: PHYSICIAN ASSISTANT

## 2022-08-16 PROCEDURE — 99999 PR PBB SHADOW E&M-EST. PATIENT-LVL IV: ICD-10-PCS | Mod: PBBFAC,,, | Performed by: PHYSICIAN ASSISTANT

## 2022-08-16 PROCEDURE — 1159F MED LIST DOCD IN RCRD: CPT | Mod: CPTII,S$GLB,, | Performed by: PHYSICIAN ASSISTANT

## 2022-08-16 PROCEDURE — 4010F ACE/ARB THERAPY RXD/TAKEN: CPT | Mod: CPTII,S$GLB,, | Performed by: PHYSICIAN ASSISTANT

## 2022-08-16 PROCEDURE — 1159F PR MEDICATION LIST DOCUMENTED IN MEDICAL RECORD: ICD-10-PCS | Mod: CPTII,S$GLB,, | Performed by: PHYSICIAN ASSISTANT

## 2022-08-16 PROCEDURE — 1101F PR PT FALLS ASSESS DOC 0-1 FALLS W/OUT INJ PAST YR: ICD-10-PCS | Mod: CPTII,S$GLB,, | Performed by: PHYSICIAN ASSISTANT

## 2022-08-16 PROCEDURE — 1160F PR REVIEW ALL MEDS BY PRESCRIBER/CLIN PHARMACIST DOCUMENTED: ICD-10-PCS | Mod: CPTII,S$GLB,, | Performed by: PHYSICIAN ASSISTANT

## 2022-08-16 PROCEDURE — 4010F PR ACE/ARB THEARPY RXD/TAKEN: ICD-10-PCS | Mod: CPTII,S$GLB,, | Performed by: PHYSICIAN ASSISTANT

## 2022-08-16 PROCEDURE — 3060F PR POS MICROALBUMINURIA RESULT DOCUMENTED/REVIEW: ICD-10-PCS | Mod: CPTII,S$GLB,, | Performed by: PHYSICIAN ASSISTANT

## 2022-08-16 PROCEDURE — 3046F PR MOST RECENT HEMOGLOBIN A1C LEVEL > 9.0%: ICD-10-PCS | Mod: CPTII,S$GLB,, | Performed by: PHYSICIAN ASSISTANT

## 2022-08-16 PROCEDURE — 1126F AMNT PAIN NOTED NONE PRSNT: CPT | Mod: CPTII,S$GLB,, | Performed by: PHYSICIAN ASSISTANT

## 2022-08-16 NOTE — PROGRESS NOTES
[facility]    Subjective:      Patient: Dee Hamilton Patient PCP: Farzana Dorman MD         :  1955     Sex:  female      MRN:  [id number]          Date of Visit: 2022      Chief Complaint: Cerumen impaction    Patient ID: Dee Hamilton is a 67 y.o. female who was referred to me by Dr. Farzana Dorman in consultation for cerumen impaction. Pt states that cerumen was noticed at her family medicine appt and she is following up in clinic for cerumen removal. Pt denies fever/chills, ear pain or drainage. There is not a prior history of ear surgery.      Past Medical History  She has a past medical history of Anxiety, Bilateral leg edema, Depression, Diabetes mellitus, type 2, Diverticulitis, GERD (gastroesophageal reflux disease), Glaucoma, Hyperlipidemia, Hypertension, Nicotine dependence in remission, and Senile nuclear sclerosis.    Family History  Her family history includes Cancer (age of onset: 68) in her father; Cataracts in her father and mother; Diabetes in her sister; Hypertension in her mother; Pancreatic cancer in her brother; Ulcers in her brother.    Past Surgical History:   Procedure Laterality Date    BLADDER SUSPENSION      CATARACT EXTRACTION, BILATERAL      CHOLECYSTECTOMY      COLD KNIFE CONIZATION OF CERVIX N/A 2021    Procedure: CONE BIOPSY, CERVIX, USING COLD KNIFE;  Surgeon: Sheeba Frank MD;  Location: [facility];  Service: OB/GYN;  Laterality: N/A;    EYE SURGERY      Glaucoma Laser Sx ou      HYSTEROSCOPY WITH DILATION AND CURETTAGE OF UTERUS N/A 2021    Procedure: HYSTEROSCOPY, WITH DILATION AND CURETTAGE OF UTERUS;  Surgeon: Sheeba Frank MD;  Location: [facility];  Service: OB/GYN;  Laterality: N/A;    OOPHORECTOMY      TONSILLECTOMY      vaginal mesh       Social History     Tobacco Use    Smoking status: Former Smoker     Packs/day: 2.00     Years: 25.00     Pack years: 50.00     Quit date:      Years since quittin.6     Smokeless tobacco: Never Used   Substance and Sexual Activity    Alcohol use: Not Currently     Comment: social     Drug use: Never    Sexual activity: Not Currently     Partners: Male     Comment: works at [employer], 3 grown kids      Medications  She has a current medication list which includes the following prescription(s): amitriptyline, atorvastatin, blood sugar diagnostic, blood-glucose meter, contour test strips, trulicity, ergocalciferol, estradiol, furosemide, gabapentin, lantus solostar u-100 insulin, pen needle, diabetic, lancets, lancets, lidocaine, losartan, metformin, pantoprazole, paroxetine, potassium chloride sa, repaglinide, and travoprost.    Review of patient's allergies indicates:  No Known Allergies  All medications, allergies, and past history have been reviewed.    Objective:      Vitals:  Vitals - 1 value per visit 8/11/2022 8/16/2022 8/16/2022   SYSTOLIC 126 - -   DIASTOLIC 68 - -   Pulse 93 - -   Temp 98.6 - 98.7   Resp 16 - -   SPO2 98 - -   Weight (lb) 203.04 - 199.52   Weight (kg) 92.1 - 90.5   Height 67 - 67   BMI (Calculated) 31.8 - 31.2   VISIT REPORT - - -   Pain Score  - 0 -   Some recent data might be hidden       Body surface area is 2.07 meters squared.    Physical Exam  Constitutional:       General: She is not in acute distress.     Appearance: Normal appearance. She is not ill-appearing.   HENT:      Head: Normocephalic and atraumatic.      Right Ear: Tympanic membrane, ear canal and external ear normal.      Left Ear: Tympanic membrane, ear canal and external ear normal. There is impacted cerumen.      Ears:      Comments: Small amount of peripheral right EAC cerumen suctioned in office.     Complete cerumen impaction of the left ear cleared with suction and ear curette.     Nose: Septal deviation present.      Mouth/Throat:      Lips: Pink. No lesions.      Mouth: Mucous membranes are moist. No oral lesions.      Tongue: No lesions.      Palate: No lesions.       Pharynx: Oropharynx is clear. Uvula midline. No pharyngeal swelling, oropharyngeal exudate, posterior oropharyngeal erythema or uvula swelling.   Eyes:      General:         Right eye: No discharge.         Left eye: No discharge.      Extraocular Movements: Extraocular movements intact.      Conjunctiva/sclera: Conjunctivae normal.   Pulmonary:      Effort: Pulmonary effort is normal.   Neurological:      General: No focal deficit present.      Mental Status: She is alert and oriented to person, place, and time. Mental status is at baseline.   Psychiatric:         Mood and Affect: Mood normal.         Behavior: Behavior normal.         Thought Content: Thought content normal.         Judgment: Judgment normal.     Ear Cerumen Removal     Date/Time: 8/16/2022 10:15 AM  Performed by: Gerald Diaz PA-C  Authorized by: Gerald Diaz PA-C      Consent Done?:  Yes (Verbal)     Local anesthetic:  None  Location details:  Left ear  Procedure type comment:  Curette and suction  Cerumen  Removal Results:  Cerumen completely removed  Patient tolerance:  Patient tolerated the procedure well with no immediate complications      Procedure Note:     The patient was brought to the minor procedure room and placed under the operating microscope. Using suction and curette, the patient's cerumen impaction was removed from left EAC. The tympanic membrane was evaluated and was unremarkable. The patient tolerated the procedure well. There were no complications.    Labs:  WBC   Date Value Ref Range Status   04/06/2021 8.19 3.90 - 12.70 K/uL Final     Platelets   Date Value Ref Range Status   04/06/2021 357 150 - 450 K/uL Final     Creatinine   Date Value Ref Range Status   06/22/2022 0.8 0.5 - 1.4 mg/dL Final     TSH   Date Value Ref Range Status   06/22/2022 0.871 0.400 - 4.000 uIU/mL Final     Glucose   Date Value Ref Range Status   06/22/2022 241 (H) 70 - 110 mg/dL Final     Hemoglobin A1C   Date Value Ref Range  Status   06/22/2022 10.9 (H) 4.0 - 5.6 % Final     Comment:     ADA Screening Guidelines:  5.7-6.4%  Consistent with prediabetes  >or=6.5%  Consistent with diabetes    High levels of fetal hemoglobin interfere with the HbA1C  assay. Heterozygous hemoglobin variants (HbS, HgC, etc)do  not significantly interfere with this assay.   However, presence of multiple variants may affect accuracy.       All lab results and imaging results have been reviewed.    Assessment:        ICD-10-CM ICD-9-CM   1. Impacted cerumen of left ear  H61.22 380.4            Plan:      Impacted cerumen of left ear  -     Ear Cerumen Removal performed in office. Please see procedure note above. Pt advised against q-tip use and is to follow up as needed for ENT issues/concerns.

## 2022-08-16 NOTE — PROCEDURES
Ear Cerumen Removal    Date/Time: 8/16/2022 10:15 AM  Performed by: Gerald Diaz PA-C  Authorized by: Gerald Diaz PA-C     Consent Done?:  Yes (Verbal)    Local anesthetic:  None  Location details:  Left ear  Procedure type comment:  Curette and suction  Cerumen  Removal Results:  Cerumen completely removed  Patient tolerance:  Patient tolerated the procedure well with no immediate complications     Procedure Note:    The patient was brought to the minor procedure room and placed under the operating microscope. Using suction and curette, the patient's cerumen impaction was removed from left EAC. The tympanic membrane was evaluated and was unremarkable. The patient tolerated the procedure well. There were no complications.

## 2022-08-18 ENCOUNTER — PES CALL (OUTPATIENT)
Dept: ADMINISTRATIVE | Facility: CLINIC | Age: 67
End: 2022-08-18
Payer: MEDICARE

## 2022-08-31 ENCOUNTER — PATIENT MESSAGE (OUTPATIENT)
Dept: NEUROLOGY | Facility: CLINIC | Age: 67
End: 2022-08-31
Payer: MEDICARE

## 2022-08-31 ENCOUNTER — TELEPHONE (OUTPATIENT)
Dept: ENDOCRINOLOGY | Facility: CLINIC | Age: 67
End: 2022-08-31
Payer: MEDICARE

## 2022-09-06 ENCOUNTER — PATIENT MESSAGE (OUTPATIENT)
Dept: ADMINISTRATIVE | Facility: HOSPITAL | Age: 67
End: 2022-09-06
Payer: MEDICARE

## 2022-09-14 ENCOUNTER — LAB VISIT (OUTPATIENT)
Dept: LAB | Facility: OTHER | Age: 67
End: 2022-09-14
Attending: INTERNAL MEDICINE
Payer: MEDICARE

## 2022-09-14 ENCOUNTER — OFFICE VISIT (OUTPATIENT)
Dept: INTERNAL MEDICINE | Facility: CLINIC | Age: 67
End: 2022-09-14
Payer: MEDICARE

## 2022-09-14 VITALS
WEIGHT: 196.44 LBS | HEART RATE: 80 BPM | BODY MASS INDEX: 29.77 KG/M2 | SYSTOLIC BLOOD PRESSURE: 116 MMHG | OXYGEN SATURATION: 100 % | DIASTOLIC BLOOD PRESSURE: 60 MMHG | HEIGHT: 68 IN

## 2022-09-14 DIAGNOSIS — R10.32 LEFT LOWER QUADRANT PAIN: ICD-10-CM

## 2022-09-14 DIAGNOSIS — E66.09 CLASS 1 OBESITY DUE TO EXCESS CALORIES WITH SERIOUS COMORBIDITY AND BODY MASS INDEX (BMI) OF 30.0 TO 30.9 IN ADULT: ICD-10-CM

## 2022-09-14 DIAGNOSIS — E11.69 HYPERLIPIDEMIA ASSOCIATED WITH TYPE 2 DIABETES MELLITUS: ICD-10-CM

## 2022-09-14 DIAGNOSIS — H40.1111 PRIMARY OPEN ANGLE GLAUCOMA (POAG) OF RIGHT EYE, MILD STAGE: ICD-10-CM

## 2022-09-14 DIAGNOSIS — R87.619 ATYPICAL GLANDULAR CELLS ON CERVICAL PAP SMEAR: ICD-10-CM

## 2022-09-14 DIAGNOSIS — E11.65 TYPE 2 DIABETES MELLITUS WITH HYPERGLYCEMIA, WITH LONG-TERM CURRENT USE OF INSULIN: ICD-10-CM

## 2022-09-14 DIAGNOSIS — R27.8 DYSSYNERGIA: ICD-10-CM

## 2022-09-14 DIAGNOSIS — N93.9 ABNORMAL UTERINE BLEEDING (AUB): ICD-10-CM

## 2022-09-14 DIAGNOSIS — N30.80 CYSTITIS CYSTICA: ICD-10-CM

## 2022-09-14 DIAGNOSIS — Z78.0 POSTMENOPAUSAL: ICD-10-CM

## 2022-09-14 DIAGNOSIS — E55.9 HYPOVITAMINOSIS D: ICD-10-CM

## 2022-09-14 DIAGNOSIS — E11.9 TYPE 2 DIABETES MELLITUS WITHOUT COMPLICATION: ICD-10-CM

## 2022-09-14 DIAGNOSIS — N81.11 CYSTOCELE, MIDLINE: ICD-10-CM

## 2022-09-14 DIAGNOSIS — N39.46 MIXED INCONTINENCE URGE AND STRESS (MALE)(FEMALE): ICD-10-CM

## 2022-09-14 DIAGNOSIS — Z98.890 STATUS POST HYSTEROSCOPY: ICD-10-CM

## 2022-09-14 DIAGNOSIS — Z79.4 TYPE 2 DIABETES MELLITUS WITH HYPERGLYCEMIA, WITH LONG-TERM CURRENT USE OF INSULIN: ICD-10-CM

## 2022-09-14 DIAGNOSIS — Z79.899 ON POTASSIUM WASTING DIURETIC THERAPY: ICD-10-CM

## 2022-09-14 DIAGNOSIS — H40.10X0 ADVANCED OPEN-ANGLE GLAUCOMA, UNSPECIFIED GLAUCOMA STAGE: ICD-10-CM

## 2022-09-14 DIAGNOSIS — R10.2 PELVIC PAIN: ICD-10-CM

## 2022-09-14 DIAGNOSIS — F41.9 ANXIETY: ICD-10-CM

## 2022-09-14 DIAGNOSIS — H61.22 IMPACTED CERUMEN OF LEFT EAR: ICD-10-CM

## 2022-09-14 DIAGNOSIS — Z00.00 ENCOUNTER FOR PREVENTIVE HEALTH EXAMINATION: Primary | ICD-10-CM

## 2022-09-14 DIAGNOSIS — K76.0 FATTY LIVER: ICD-10-CM

## 2022-09-14 DIAGNOSIS — K59.00 CONSTIPATION, UNSPECIFIED CONSTIPATION TYPE: ICD-10-CM

## 2022-09-14 DIAGNOSIS — E83.52 HYPERCALCEMIA: ICD-10-CM

## 2022-09-14 DIAGNOSIS — E21.3 HYPERPARATHYROIDISM: ICD-10-CM

## 2022-09-14 DIAGNOSIS — F32.9 MAJOR DEPRESSIVE DISORDER WITH CURRENT ACTIVE EPISODE, UNSPECIFIED DEPRESSION EPISODE SEVERITY, UNSPECIFIED WHETHER RECURRENT: ICD-10-CM

## 2022-09-14 DIAGNOSIS — M79.18 MYALGIA OF PELVIC FLOOR: ICD-10-CM

## 2022-09-14 DIAGNOSIS — N39.41 URGE URINARY INCONTINENCE: ICD-10-CM

## 2022-09-14 DIAGNOSIS — R41.3 MEMORY IMPAIRMENT: ICD-10-CM

## 2022-09-14 DIAGNOSIS — F41.1 GAD (GENERALIZED ANXIETY DISORDER): ICD-10-CM

## 2022-09-14 DIAGNOSIS — R60.0 BILATERAL LEG EDEMA: ICD-10-CM

## 2022-09-14 DIAGNOSIS — Z98.890 HISTORY OF CONIZATION OF CERVIX: ICD-10-CM

## 2022-09-14 DIAGNOSIS — R26.89 IMBALANCE: ICD-10-CM

## 2022-09-14 DIAGNOSIS — I10 HYPERTENSION, UNSPECIFIED TYPE: ICD-10-CM

## 2022-09-14 DIAGNOSIS — K44.9 DIAPHRAGMATIC HERNIA WITHOUT OBSTRUCTION AND WITHOUT GANGRENE: ICD-10-CM

## 2022-09-14 DIAGNOSIS — R19.7 DIARRHEA, UNSPECIFIED TYPE: ICD-10-CM

## 2022-09-14 DIAGNOSIS — N87.1 DYSPLASIA OF CERVIX, HIGH GRADE CIN 2: ICD-10-CM

## 2022-09-14 DIAGNOSIS — E78.5 HYPERLIPIDEMIA ASSOCIATED WITH TYPE 2 DIABETES MELLITUS: ICD-10-CM

## 2022-09-14 LAB
CHOLEST SERPL-MCNC: 194 MG/DL (ref 120–199)
CHOLEST/HDLC SERPL: 6.1 {RATIO} (ref 2–5)
HDLC SERPL-MCNC: 32 MG/DL (ref 40–75)
HDLC SERPL: 16.5 % (ref 20–50)
LDLC SERPL CALC-MCNC: 107.6 MG/DL (ref 63–159)
NONHDLC SERPL-MCNC: 162 MG/DL
TRIGL SERPL-MCNC: 272 MG/DL (ref 30–150)

## 2022-09-14 PROCEDURE — 3046F HEMOGLOBIN A1C LEVEL >9.0%: CPT | Mod: CPTII,S$GLB,, | Performed by: NURSE PRACTITIONER

## 2022-09-14 PROCEDURE — 3060F PR POS MICROALBUMINURIA RESULT DOCUMENTED/REVIEW: ICD-10-PCS | Mod: CPTII,S$GLB,, | Performed by: NURSE PRACTITIONER

## 2022-09-14 PROCEDURE — 80061 LIPID PANEL: CPT | Performed by: INTERNAL MEDICINE

## 2022-09-14 PROCEDURE — 3060F POS MICROALBUMINURIA REV: CPT | Mod: CPTII,S$GLB,, | Performed by: NURSE PRACTITIONER

## 2022-09-14 PROCEDURE — 3078F PR MOST RECENT DIASTOLIC BLOOD PRESSURE < 80 MM HG: ICD-10-PCS | Mod: CPTII,S$GLB,, | Performed by: NURSE PRACTITIONER

## 2022-09-14 PROCEDURE — 1159F MED LIST DOCD IN RCRD: CPT | Mod: CPTII,S$GLB,, | Performed by: NURSE PRACTITIONER

## 2022-09-14 PROCEDURE — 99999 PR PBB SHADOW E&M-EST. PATIENT-LVL V: CPT | Mod: PBBFAC,,, | Performed by: NURSE PRACTITIONER

## 2022-09-14 PROCEDURE — 1101F PR PT FALLS ASSESS DOC 0-1 FALLS W/OUT INJ PAST YR: ICD-10-PCS | Mod: CPTII,S$GLB,, | Performed by: NURSE PRACTITIONER

## 2022-09-14 PROCEDURE — 1101F PT FALLS ASSESS-DOCD LE1/YR: CPT | Mod: CPTII,S$GLB,, | Performed by: NURSE PRACTITIONER

## 2022-09-14 PROCEDURE — 3074F PR MOST RECENT SYSTOLIC BLOOD PRESSURE < 130 MM HG: ICD-10-PCS | Mod: CPTII,S$GLB,, | Performed by: NURSE PRACTITIONER

## 2022-09-14 PROCEDURE — 1170F FXNL STATUS ASSESSED: CPT | Mod: CPTII,S$GLB,, | Performed by: NURSE PRACTITIONER

## 2022-09-14 PROCEDURE — 3288F PR FALLS RISK ASSESSMENT DOCUMENTED: ICD-10-PCS | Mod: CPTII,S$GLB,, | Performed by: NURSE PRACTITIONER

## 2022-09-14 PROCEDURE — 3288F FALL RISK ASSESSMENT DOCD: CPT | Mod: CPTII,S$GLB,, | Performed by: NURSE PRACTITIONER

## 2022-09-14 PROCEDURE — 1170F PR FUNCTIONAL STATUS ASSESSED: ICD-10-PCS | Mod: CPTII,S$GLB,, | Performed by: NURSE PRACTITIONER

## 2022-09-14 PROCEDURE — 3008F PR BODY MASS INDEX (BMI) DOCUMENTED: ICD-10-PCS | Mod: CPTII,S$GLB,, | Performed by: NURSE PRACTITIONER

## 2022-09-14 PROCEDURE — 3046F PR MOST RECENT HEMOGLOBIN A1C LEVEL > 9.0%: ICD-10-PCS | Mod: CPTII,S$GLB,, | Performed by: NURSE PRACTITIONER

## 2022-09-14 PROCEDURE — 3074F SYST BP LT 130 MM HG: CPT | Mod: CPTII,S$GLB,, | Performed by: NURSE PRACTITIONER

## 2022-09-14 PROCEDURE — 4010F ACE/ARB THERAPY RXD/TAKEN: CPT | Mod: CPTII,S$GLB,, | Performed by: NURSE PRACTITIONER

## 2022-09-14 PROCEDURE — 36415 COLL VENOUS BLD VENIPUNCTURE: CPT | Performed by: INTERNAL MEDICINE

## 2022-09-14 PROCEDURE — 4010F PR ACE/ARB THEARPY RXD/TAKEN: ICD-10-PCS | Mod: CPTII,S$GLB,, | Performed by: NURSE PRACTITIONER

## 2022-09-14 PROCEDURE — 99999 PR PBB SHADOW E&M-EST. PATIENT-LVL V: ICD-10-PCS | Mod: PBBFAC,,, | Performed by: NURSE PRACTITIONER

## 2022-09-14 PROCEDURE — 1159F PR MEDICATION LIST DOCUMENTED IN MEDICAL RECORD: ICD-10-PCS | Mod: CPTII,S$GLB,, | Performed by: NURSE PRACTITIONER

## 2022-09-14 PROCEDURE — 3066F PR DOCUMENTATION OF TREATMENT FOR NEPHROPATHY: ICD-10-PCS | Mod: CPTII,S$GLB,, | Performed by: NURSE PRACTITIONER

## 2022-09-14 PROCEDURE — G0439 PR MEDICARE ANNUAL WELLNESS SUBSEQUENT VISIT: ICD-10-PCS | Mod: S$GLB,,, | Performed by: NURSE PRACTITIONER

## 2022-09-14 PROCEDURE — 3078F DIAST BP <80 MM HG: CPT | Mod: CPTII,S$GLB,, | Performed by: NURSE PRACTITIONER

## 2022-09-14 PROCEDURE — 3008F BODY MASS INDEX DOCD: CPT | Mod: CPTII,S$GLB,, | Performed by: NURSE PRACTITIONER

## 2022-09-14 PROCEDURE — 1160F RVW MEDS BY RX/DR IN RCRD: CPT | Mod: CPTII,S$GLB,, | Performed by: NURSE PRACTITIONER

## 2022-09-14 PROCEDURE — 3066F NEPHROPATHY DOC TX: CPT | Mod: CPTII,S$GLB,, | Performed by: NURSE PRACTITIONER

## 2022-09-14 PROCEDURE — G0439 PPPS, SUBSEQ VISIT: HCPCS | Mod: S$GLB,,, | Performed by: NURSE PRACTITIONER

## 2022-09-14 PROCEDURE — 1160F PR REVIEW ALL MEDS BY PRESCRIBER/CLIN PHARMACIST DOCUMENTED: ICD-10-PCS | Mod: CPTII,S$GLB,, | Performed by: NURSE PRACTITIONER

## 2022-09-14 NOTE — PROGRESS NOTES
"  Dee Hamilton presented for a  Medicare AWV and comprehensive Health Risk Assessment today. The following components were reviewed and updated:    Medical history  Family History  Social history  Allergies and Current Medications  Health Risk Assessment  Health Maintenance  Care Team         ** See Completed Assessments for Annual Wellness Visit within the encounter summary.**         The following assessments were completed:  Living Situation  CAGE  Depression Screening  Timed Get Up and Go  Whisper Test  Cognitive Function Screening  Nutrition Screening  ADL Screening  PAQ Screening          Vitals:    09/14/22 1134   BP: 116/60   BP Location: Left arm   Patient Position: Sitting   BP Method: Large (Manual)   Pulse: 80   SpO2: 100%   Weight: 89.1 kg (196 lb 6.9 oz)   Height: 5' 8" (1.727 m)     Body mass index is 29.87 kg/m².    Physical Exam  Vitals reviewed.   Constitutional:       Appearance: She is well-developed. She is obese.   HENT:      Head: Normocephalic and atraumatic. Not macrocephalic and not microcephalic. No raccoon eyes, Carmichael's sign, abrasion, contusion, right periorbital erythema, left periorbital erythema or laceration. Hair is normal.      Right Ear: No decreased hearing noted. No laceration, drainage, swelling or tenderness. No middle ear effusion. No foreign body. No mastoid tenderness. No hemotympanum. Tympanic membrane is not injected, scarred, perforated, erythematous, retracted or bulging. Tympanic membrane has normal mobility.      Left Ear: No decreased hearing noted. No laceration, drainage, swelling or tenderness.  No middle ear effusion. No foreign body. No mastoid tenderness. No hemotympanum. Tympanic membrane is not injected, scarred, perforated, erythematous, retracted or bulging. Tympanic membrane has normal mobility.      Nose: Nose normal. No nasal deformity, laceration or mucosal edema.      Mouth/Throat:      Pharynx: Uvula midline.   Eyes:      General: Lids are " normal. No scleral icterus.     Conjunctiva/sclera: Conjunctivae normal.   Neck:      Thyroid: No thyroid mass or thyromegaly.      Trachea: Trachea normal.   Pulmonary:      Effort: Pulmonary effort is normal. No respiratory distress.      Breath sounds: Normal breath sounds.   Abdominal:      Palpations: Abdomen is soft.   Musculoskeletal:         General: Normal range of motion.      Cervical back: Normal range of motion and neck supple. No edema, erythema or rigidity. No spinous process tenderness or muscular tenderness. Normal range of motion.   Lymphadenopathy:      Head:      Right side of head: No submental, submandibular, tonsillar, preauricular or posterior auricular adenopathy.      Left side of head: No submental, submandibular, tonsillar, preauricular, posterior auricular or occipital adenopathy.      Cervical: No cervical adenopathy.   Skin:     General: Skin is warm and dry.   Neurological:      Mental Status: She is alert and oriented to person, place, and time.   Psychiatric:         Behavior: Behavior normal.         Thought Content: Thought content normal.         Judgment: Judgment normal.             Diagnoses and health risks identified today and associated recommendations/orders:    1. Encounter for preventive health examination  Annual Health Risk Assessment (HRA) visit today.  Counseling and referral of health maintenance and preventative health measures performed.  Patient given annual wellness paperwork to take home.  Encouraged to return in 1 year for subsequent HRA visit.     2. Major depressive disorder with current active episode, unspecified depression episode severity, unspecified whether recurrent  Chronic. Stable. Continue current treatment plan as previously prescribed by PCP.    3. REENA (generalized anxiety disorder)  Chronic. Stable. Continue current treatment plan as previously prescribed by PCP.    4. Dyssynergia  Chronic. Stable. Continue current treatment plan as previously  prescribed by PCP.    5. Memory impairment  Chronic. Stable. Continue current treatment plan as previously prescribed by PCP.    6. Anxiety  Chronic. Stable. Continue current treatment plan as previously prescribed by PCP.    7. Primary open angle glaucoma (POAG) of right eye, mild stage  Chronic. Stable. Continue current treatment plan as previously prescribed by PCP.    8. Advanced open-angle glaucoma, unspecified glaucoma stage  Chronic. Stable. Continue current treatment plan as previously prescribed by PCP.    9. Impacted cerumen of left ear  Chronic. Stable. Continue current treatment plan as previously prescribed by PCP.    10. Hypertension, unspecified type  Chronic. Stable. Controlled. Encouraged to increase exercise as tolerated (moderate-intensity aerobic activity and muscle-strengthening activities) improve diet to heart healthy, low sodium diet.  Continue current treatment plan as previously prescribed by PCP.    11. Hyperlipidemia associated with type 2 diabetes mellitus  Chronic. Stable. Continue current treatment plan as previously prescribed by PCP.    12. Urge urinary incontinence  Chronic. Stable. Continue current treatment plan as previously prescribed by PCP.    13. Status post hysteroscopy D&C   Chronic. Stable. Continue current treatment plan as previously prescribed by PCP. From      14. Postmenopausal  Chronic. Stable. Continue current treatment plan as previously prescribed by PCP.    5. On potassium wasting diuretic therapy  Chronic. Stable. Continue current treatment plan as previously prescribed by PCP.    16. Myalgia of pelvic floor  Chronic. Stable. Continue current treatment plan as previously prescribed by PCP.    17. Mixed incontinence urge and stress (male)(female)  Chronic. Stable. Continue current treatment plan as previously prescribed by PCP.    18. Hypercalcemia  Chronic. Stable. Continue current treatment plan as previously prescribed by PCP.    19. History of conization of  cervix  Chronic. Stable. Continue current treatment plan as previously prescribed by PCP.    20. Dysplasia of cervix, high grade CHUN 2  Chronic. Stable. Continue current treatment plan as previously prescribed by PCP.    21. Cystocele, midline  Chronic. Stable. Continue current treatment plan as previously prescribed by PCP.    22. Cystitis cystica  Chronic. Stable. Continue current treatment plan as previously prescribed by PCP.    23. Atypical glandular cells on cervical Pap smear  Chronic. Stable. Continue current treatment plan as previously prescribed by PCP.    24. Abnormal uterine bleeding (AUB)  Chronic. Stable. Continue current treatment plan as previously prescribed by PCP.    25. Type 2 diabetes mellitus with hyperglycemia, with long-term current use of insulin  Chronic. Stable. Uncontrolled. Last Hgb A1c=10.9 from 6/22/22. Continue current treatment plan as previously prescribed by PCP.  - Ambulatory referral/consult to Podiatry; Future    26. Hypovitaminosis D  Chronic. Stable. Continue current treatment plan as previously prescribed by PCP.    27. Hyperparathyroidism  Chronic. Stable. Last PTH=94.2 from 6/22/22. Continue current treatment plan as previously prescribed by PCP.    28. Class 1 obesity due to excess calories with serious comorbidity and body mass index (BMI) of 30.0 to 30.9 in adult  Chronic. Stable. Continue current treatment plan as previously prescribed by PCP.    29. Pelvic pain  Chronic. Stable. Continue current treatment plan as previously prescribed by PCP.    30. Left lower quadrant pain  Chronic. Stable. Continue current treatment plan as previously prescribed by PCP.    31. Fatty liver  Chronic. Stable. Continue current treatment plan as previously prescribed by PCP.    32. Diarrhea, unspecified type  Chronic. Stable. Continue current treatment plan as previously prescribed by PCP.    33. Diaphragmatic hernia without obstruction and without gangrene  Chronic. Stable. Continue  current treatment plan as previously prescribed by PCP.    34. Constipation, unspecified constipation type  Chronic. Stable. Continue current treatment plan as previously prescribed by PCP.    35. Imbalance  Chronic. Stable. Continue current treatment plan as previously prescribed by PCP.    36. Bilateral leg edema  Chronic. Stable. Continue current treatment plan as previously prescribed by PCP.      Provided Dee with a 5-10 year written screening schedule and personal prevention plan. Recommendations were developed using the USPSTF age appropriate recommendations. Education, counseling, and referrals were provided as needed. After Visit Summary printed and given to patient which includes a list of additional screenings\tests needed.      I offered to discuss end of life issues, including information on how to make advance directives that the patient could use to name someone who would make medical decisions on their behalf if they became too ill to make themselves.    ___Patient declined  _X_Patient is interested, I provided paper work and offered to discuss.    Follow up in about 1 year (around 9/14/2023).    Emerson Hidalgo NP  I offered to discuss advanced care planning, including how to pick a person who would make decisions for you if you were unable to make them for yourself, called a health care power of , and what kind of decisions you might make such as use of life sustaining treatments such as ventilators and tube feeding when faced with a life limiting illness recorded on a living will that they will need to know. (How you want to be cared for as you near the end of your natural life)     X Patient is interested in learning more about how to make advanced directives.  I provided them paperwork and offered to discuss this with them.

## 2022-09-14 NOTE — PATIENT INSTRUCTIONS
Counseling and Referral of Other Preventative  (Italic type indicates deductible and co-insurance are waived)    Patient Name: Dee Hamilton  Today's Date: 9/14/2022    Health Maintenance       Date Due Completion Date    Foot Exam Never done ---    TETANUS VACCINE Never done ---    DEXA Scan 09/01/2014 9/1/2011    COVID-19 Vaccine (4 - Booster for Moderna series) 02/27/2022 10/27/2021    Lipid Panel 07/19/2022 7/19/2021    Influenza Vaccine (1) 09/01/2022 10/27/2021    Eye Exam 10/12/2022 10/12/2021    Override on 4/13/2021: Done (sometime march/april 2021)    Hemoglobin A1c 09/22/2022 6/22/2022    Diabetes Urine Screening 06/22/2023 6/22/2022    Mammogram 07/07/2023 7/7/2022    Colorectal Cancer Screening 07/25/2023 7/25/2018    Override on 9/1/2008: Done (at [facility]  no poyps need repeat in 10 yrs )        No orders of the defined types were placed in this encounter.    The following information is provided to all patients.  This information is to help you find resources for any of the problems found today that may be affecting your health:                Living healthy guide: www.[facility].[place].gov      Understanding Diabetes: www.diabetes.org      Eating healthy: www.cdc.gov/healthyweight      CDC home safety checklist: www.cdc.gov/steadi/patient.html      Agency on Aging: www.goea.[place].[facility]      Alcoholics anonymous (AA): www.aa.org      Physical Activity: www.jacki.nih.gov/vd3bidf      Tobacco use: www.quitwithusla.org

## 2022-09-15 ENCOUNTER — TELEPHONE (OUTPATIENT)
Dept: ENDOCRINOLOGY | Facility: CLINIC | Age: 67
End: 2022-09-15
Payer: MEDICARE

## 2022-09-15 ENCOUNTER — IMMUNIZATION (OUTPATIENT)
Dept: PHARMACY | Facility: CLINIC | Age: 67
End: 2022-09-15
Payer: MEDICARE

## 2022-09-15 NOTE — TELEPHONE ENCOUNTER
----- Message from Alex Tilley MD sent at 9/15/2022 12:54 PM CDT -----  Please check with lab why they vijay a1c and vitamin D but not the PTH, CMP, calcium.  Please contact pt to let her know:   1. Vitamin D normal   2. A1C improving, 10.1, but still high   3. Encourage pt to reach out if interested in re-scheduling her follow-up appointment.

## 2022-09-16 NOTE — PROGRESS NOTES
Please inform pt - I reviewed your labs. Your cholesterol remains high on Atorvastatin 80mg daily.  This is the max dose of this medication. We could consider changing you to Crestor 40 mg QHS or possibly adding zetia to the Atorvastatin that you are already taking. Your goal LDL ( bad cholesterol) is < 70. If you are willing to do so I would rec we add the Zetia.     Dr BRUSH

## 2022-09-28 DIAGNOSIS — Z79.4 TYPE 2 DIABETES MELLITUS WITH DIABETIC NEUROPATHY, WITH LONG-TERM CURRENT USE OF INSULIN: ICD-10-CM

## 2022-09-28 DIAGNOSIS — E11.40 TYPE 2 DIABETES MELLITUS WITH DIABETIC NEUROPATHY, WITH LONG-TERM CURRENT USE OF INSULIN: ICD-10-CM

## 2022-09-29 DIAGNOSIS — R39.9 UTI SYMPTOMS: Primary | ICD-10-CM

## 2022-09-29 DIAGNOSIS — R10.2 PELVIC PAIN: ICD-10-CM

## 2022-09-29 RX ORDER — GABAPENTIN 100 MG/1
100 CAPSULE ORAL NIGHTLY
Qty: 90 CAPSULE | Refills: 0 | OUTPATIENT
Start: 2022-09-29 | End: 2023-09-29

## 2022-09-29 NOTE — TELEPHONE ENCOUNTER
No new care gaps identified.  [facility] [place] Embedded Care Gaps. Reference number: [id number]. 9/28/2022   11:50:51 PM CDT

## 2022-09-29 NOTE — TELEPHONE ENCOUNTER
LVM for patient to call back regarding frequency of us of vaginal suppositories and to set up follow up visit.

## 2022-10-03 ENCOUNTER — TELEPHONE (OUTPATIENT)
Dept: ENDOCRINOLOGY | Facility: CLINIC | Age: 67
End: 2022-10-03
Payer: MEDICARE

## 2022-10-03 ENCOUNTER — PATIENT MESSAGE (OUTPATIENT)
Dept: OPHTHALMOLOGY | Facility: CLINIC | Age: 67
End: 2022-10-03
Payer: MEDICARE

## 2022-10-03 ENCOUNTER — TELEPHONE (OUTPATIENT)
Dept: OPHTHALMOLOGY | Facility: CLINIC | Age: 67
End: 2022-10-03
Payer: MEDICARE

## 2022-10-03 NOTE — TELEPHONE ENCOUNTER
Spoke with patient to let her know that once her paperwork is received and filled out by the provider we will fax it back.

## 2022-10-03 NOTE — TELEPHONE ENCOUNTER
----- Message from Jaquelin Hidalgo sent at 10/3/2022  3:29 PM CDT -----  Regarding: Medication  Pt is requesting a call back regarding Paper Work for Renew Medication .  Pt states it will be fax as soon as Possible       Pt@ [phone]

## 2022-10-03 NOTE — TELEPHONE ENCOUNTER
----- Message from Pau Verduzco sent at 10/3/2022  3:04 PM CDT -----  Patient is calling stating that something fell in her eye. She stated that it's clearing up, but she'd like to get it checked as soon as possible. She would like to be seen midmorning.   Please contact patient at [phone]

## 2022-10-03 NOTE — TELEPHONE ENCOUNTER
Pt booked to see Dr. Grimes for new eyeglasses.   Pt states that her eye feels fine, she wants new eyeglasses.

## 2022-10-04 ENCOUNTER — OFFICE VISIT (OUTPATIENT)
Dept: PODIATRY | Facility: CLINIC | Age: 67
End: 2022-10-04
Payer: MEDICARE

## 2022-10-04 VITALS
SYSTOLIC BLOOD PRESSURE: 144 MMHG | HEIGHT: 68 IN | DIASTOLIC BLOOD PRESSURE: 77 MMHG | BODY MASS INDEX: 29.2 KG/M2 | WEIGHT: 192.69 LBS | HEART RATE: 102 BPM

## 2022-10-04 DIAGNOSIS — Z79.4 TYPE 2 DIABETES MELLITUS WITH HYPERGLYCEMIA, WITH LONG-TERM CURRENT USE OF INSULIN: ICD-10-CM

## 2022-10-04 DIAGNOSIS — E11.65 TYPE 2 DIABETES MELLITUS WITH HYPERGLYCEMIA, WITH LONG-TERM CURRENT USE OF INSULIN: ICD-10-CM

## 2022-10-04 DIAGNOSIS — E11.42 DIABETIC POLYNEUROPATHY ASSOCIATED WITH TYPE 2 DIABETES MELLITUS: Primary | ICD-10-CM

## 2022-10-04 PROCEDURE — 1159F MED LIST DOCD IN RCRD: CPT | Mod: CPTII,S$GLB,, | Performed by: PODIATRIST

## 2022-10-04 PROCEDURE — 3060F PR POS MICROALBUMINURIA RESULT DOCUMENTED/REVIEW: ICD-10-PCS | Mod: CPTII,S$GLB,, | Performed by: PODIATRIST

## 2022-10-04 PROCEDURE — 3060F POS MICROALBUMINURIA REV: CPT | Mod: CPTII,S$GLB,, | Performed by: PODIATRIST

## 2022-10-04 PROCEDURE — 3077F SYST BP >= 140 MM HG: CPT | Mod: CPTII,S$GLB,, | Performed by: PODIATRIST

## 2022-10-04 PROCEDURE — 1126F AMNT PAIN NOTED NONE PRSNT: CPT | Mod: CPTII,S$GLB,, | Performed by: PODIATRIST

## 2022-10-04 PROCEDURE — 99203 OFFICE O/P NEW LOW 30 MIN: CPT | Mod: S$GLB,,, | Performed by: PODIATRIST

## 2022-10-04 PROCEDURE — 4010F PR ACE/ARB THEARPY RXD/TAKEN: ICD-10-PCS | Mod: CPTII,S$GLB,, | Performed by: PODIATRIST

## 2022-10-04 PROCEDURE — 99999 PR PBB SHADOW E&M-EST. PATIENT-LVL IV: CPT | Mod: PBBFAC,,, | Performed by: PODIATRIST

## 2022-10-04 PROCEDURE — 3078F PR MOST RECENT DIASTOLIC BLOOD PRESSURE < 80 MM HG: ICD-10-PCS | Mod: CPTII,S$GLB,, | Performed by: PODIATRIST

## 2022-10-04 PROCEDURE — 3008F PR BODY MASS INDEX (BMI) DOCUMENTED: ICD-10-PCS | Mod: CPTII,S$GLB,, | Performed by: PODIATRIST

## 2022-10-04 PROCEDURE — 1126F PR PAIN SEVERITY QUANTIFIED, NO PAIN PRESENT: ICD-10-PCS | Mod: CPTII,S$GLB,, | Performed by: PODIATRIST

## 2022-10-04 PROCEDURE — 99999 PR PBB SHADOW E&M-EST. PATIENT-LVL IV: ICD-10-PCS | Mod: PBBFAC,,, | Performed by: PODIATRIST

## 2022-10-04 PROCEDURE — 99203 PR OFFICE/OUTPT VISIT, NEW, LEVL III, 30-44 MIN: ICD-10-PCS | Mod: S$GLB,,, | Performed by: PODIATRIST

## 2022-10-04 PROCEDURE — 3046F HEMOGLOBIN A1C LEVEL >9.0%: CPT | Mod: CPTII,S$GLB,, | Performed by: PODIATRIST

## 2022-10-04 PROCEDURE — 3008F BODY MASS INDEX DOCD: CPT | Mod: CPTII,S$GLB,, | Performed by: PODIATRIST

## 2022-10-04 PROCEDURE — 3077F PR MOST RECENT SYSTOLIC BLOOD PRESSURE >= 140 MM HG: ICD-10-PCS | Mod: CPTII,S$GLB,, | Performed by: PODIATRIST

## 2022-10-04 PROCEDURE — 3066F NEPHROPATHY DOC TX: CPT | Mod: CPTII,S$GLB,, | Performed by: PODIATRIST

## 2022-10-04 PROCEDURE — 4010F ACE/ARB THERAPY RXD/TAKEN: CPT | Mod: CPTII,S$GLB,, | Performed by: PODIATRIST

## 2022-10-04 PROCEDURE — 1159F PR MEDICATION LIST DOCUMENTED IN MEDICAL RECORD: ICD-10-PCS | Mod: CPTII,S$GLB,, | Performed by: PODIATRIST

## 2022-10-04 PROCEDURE — 3078F DIAST BP <80 MM HG: CPT | Mod: CPTII,S$GLB,, | Performed by: PODIATRIST

## 2022-10-04 PROCEDURE — 3046F PR MOST RECENT HEMOGLOBIN A1C LEVEL > 9.0%: ICD-10-PCS | Mod: CPTII,S$GLB,, | Performed by: PODIATRIST

## 2022-10-04 PROCEDURE — 3066F PR DOCUMENTATION OF TREATMENT FOR NEPHROPATHY: ICD-10-PCS | Mod: CPTII,S$GLB,, | Performed by: PODIATRIST

## 2022-10-04 NOTE — PROGRESS NOTES
Diabetic foot exam Subjective:      Patient ID: Dee Hamilton is a 67 y.o. female.    Chief Complaint:   Diabetic Foot Exam (Pcp-Lakia 8/11/22)    Dee is a 67 y.o. female who presents to the clinic upon referral from Dr. Hidalgo  for evaluation and treatment of diabetic feet. Dee has a past medical history of Anxiety, Bilateral leg edema (6/8/2021), Depression, Diabetes mellitus, type 2, Diverticulitis, GERD (gastroesophageal reflux disease), Glaucoma, Hyperlipidemia, Hypertension, Nicotine dependence in remission, and Senile nuclear sclerosis (10/19/2012). Patient relates no major problem with feet. Only complaints today consist of diabetic foot exam.    Patient relates she does have neuropathy that comes and goes she will get it every few months  No smoking  No issues with shoe gear    Patient relates that she does not get pedicures    She relates she is doing better with her diet and medication so hope that her new A1c will be improved  PCP: Farzana Dorman MD    Date Last Seen by PCP: 8/11/22    Current shoe gear: Casual shoes    Hemoglobin A1C   Date Value Ref Range Status   09/14/2022 10.1 (H) 4.0 - 5.6 % Final     Comment:     ADA Screening Guidelines:  5.7-6.4%  Consistent with prediabetes  >or=6.5%  Consistent with diabetes    High levels of fetal hemoglobin interfere with the HbA1C  assay. Heterozygous hemoglobin variants (HbS, HgC, etc)do  not significantly interfere with this assay.   However, presence of multiple variants may affect accuracy.     06/22/2022 10.9 (H) 4.0 - 5.6 % Final     Comment:     ADA Screening Guidelines:  5.7-6.4%  Consistent with prediabetes  >or=6.5%  Consistent with diabetes    High levels of fetal hemoglobin interfere with the HbA1C  assay. Heterozygous hemoglobin variants (HbS, HgC, etc)do  not significantly interfere with this assay.   However, presence of multiple variants may affect accuracy.     01/14/2022 8.6 (H) 4.0 - 5.6 % Final     Comment:      ADA Screening Guidelines:  5.7-6.4%  Consistent with prediabetes  >or=6.5%  Consistent with diabetes    High levels of fetal hemoglobin interfere with the HbA1C  assay. Heterozygous hemoglobin variants (HbS, HgC, etc)do  not significantly interfere with this assay.   However, presence of multiple variants may affect accuracy.            Past Medical History:   Diagnosis Date    Anxiety     Bilateral leg edema 6/8/2021    Depression     Diabetes mellitus, type 2     Diverticulitis     GERD (gastroesophageal reflux disease)     Glaucoma     Hyperlipidemia     Hypertension     Nicotine dependence in remission     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     Past Surgical History:   Procedure Laterality Date    BLADDER SUSPENSION  12/2011    CATARACT EXTRACTION, BILATERAL      CHOLECYSTECTOMY      COLD KNIFE CONIZATION OF CERVIX N/A 09/29/2021    Procedure: CONE BIOPSY, CERVIX, USING COLD KNIFE;  Surgeon: Sheeba Frank MD;  Location: [facility];  Service: OB/GYN;  Laterality: N/A;    EYE SURGERY      Glaucoma Laser Sx ou      HYSTEROSCOPY WITH DILATION AND CURETTAGE OF UTERUS N/A 09/29/2021    Procedure: HYSTEROSCOPY, WITH DILATION AND CURETTAGE OF UTERUS;  Surgeon: Sheeba Frank MD;  Location: [facility];  Service: OB/GYN;  Laterality: N/A;    OOPHORECTOMY      TONSILLECTOMY      TUBAL LIGATION      vaginal mesh       Current Outpatient Medications on File Prior to Visit   Medication Sig Dispense Refill    amitriptyline (ELAVIL) 10 MG tablet Take 1 tablet (10 mg total) by mouth nightly. Can increase by 10 mg per week at nights as needed for max of 5 tabs (50 mg) per night. 45 tablet 11    atorvastatin (LIPITOR) 80 MG tablet Take 1 tablet (80 mg total) by mouth once daily. 90 tablet 3    blood sugar diagnostic Strp To check BG 2 times daily, to use with insurance preferred meter 200 strip 3    blood-glucose meter kit To check BG 2 times daily, to use with insurance preferred meter 1 each 0    CONTOUR TEST STRIPS Strp USE  "TO TEST BLOOD SUGAR TWICE DAILY 50 strip 3    dulaglutide (TRULICITY) 3 mg/0.5 mL pen injector Inject 3 mg into the skin every 7 days. 12 pen 3    ergocalciferol (ERGOCALCIFEROL) 50,000 unit Cap Take 1 capsule (50,000 Units total) by mouth every 7 days. 12 capsule 1    estradioL (ESTRACE) 0.01 % (0.1 mg/gram) vaginal cream Use 1 gram of estrogen cream in vagina nightly for 2 weeks, then twice a week thereafter. 42.5 g 3    furosemide (LASIX) 20 MG tablet Take 1 tablet (20 mg total) by mouth once daily. 90 tablet 1    gabapentin (NEURONTIN) 100 MG capsule Take 1 capsule (100 mg total) by mouth every evening. 90 capsule 0    insulin (LANTUS SOLOSTAR U-100 INSULIN) glargine 100 units/mL (3mL) SubQ pen 36 SC u QHS  0    insulin needles, disposable, (RELION PEN NEEDLES) 32 x 5/32 " Ndle Use as directed 50 each 6    lancets 33 gauge Misc by Misc.(Non-Drug; Combo Route) route. True plus      lancets Misc To check BG 2 times daily, to use with insurance preferred meter 200 each 11    LIDOCAINE 2 %, VALIUM 5 MG, BACLOFEN 4 % SUPPOSITORY Place 1 suppository vaginally 2 (two) times daily as needed (pelvic pain). 20 each 5    losartan (COZAAR) 100 MG tablet Take 1 tablet (100 mg total) by mouth once daily. 90 tablet 1    metFORMIN (GLUCOPHAGE-XR) 500 MG ER 24hr tablet TAKE 1 TABLET BY MOUTH TWICE DAILY (Patient taking differently: TAKE 1 TABLET BY MOUTH TWICE DAILY) 180 tablet 1    pantoprazole (PROTONIX) 40 MG tablet Take 1 tablet (40 mg total) by mouth once daily. 30 tablet 2    paroxetine (PAXIL) 30 MG tablet Take 1 tablet (30 mg total) by mouth once daily. 90 tablet 1    potassium chloride SA (K-DUR,KLOR-CON M) 10 MEQ tablet Take 1 tablet (10 mEq total) by mouth once daily. 90 tablet 1    repaglinide (PRANDIN) 1 MG tablet Take 1 tablet (1 mg total) by mouth 3 (three) times daily before meals. 270 tablet 3    travoprost (TRAVATAN Z) 0.004 % ophthalmic solution Place 1 drop into both eyes every evening. 7.5 each 3     No " "current facility-administered medications on file prior to visit.     Review of patient's allergies indicates:  No Known Allergies    Review of Systems   Constitutional: Negative for chills, decreased appetite, fever, malaise/fatigue, night sweats, weight gain and weight loss.   Cardiovascular:  Negative for chest pain, claudication, dyspnea on exertion, leg swelling, palpitations and syncope.   Respiratory:  Negative for cough and shortness of breath.    Endocrine: Negative for cold intolerance and heat intolerance.   Hematologic/Lymphatic: Negative for bleeding problem. Does not bruise/bleed easily.   Skin:  Negative for color change, dry skin, flushing, itching, nail changes, poor wound healing, rash, skin cancer, suspicious lesions and unusual hair distribution.   Musculoskeletal:  Negative for arthritis, back pain, falls, gout, joint pain, joint swelling, muscle cramps, muscle weakness, myalgias, neck pain and stiffness.   Gastrointestinal:  Negative for diarrhea, nausea and vomiting.   Neurological:  Positive for paresthesias (Occasional). Negative for dizziness, focal weakness, light-headedness, numbness, tremors, vertigo and weakness.   Psychiatric/Behavioral:  Negative for altered mental status and depression. The patient does not have insomnia.    Allergic/Immunologic: Negative.          Objective:       Vitals:    10/04/22 1329   BP: (!) 144/77   Pulse: 102   Weight: 87.4 kg (192 lb 10.9 oz)   Height: 5' 8" (1.727 m)   PainSc: 0-No pain   87.4 kg (192 lb 10.9 oz)     Physical Exam  Vitals reviewed.   Constitutional:       General: She is not in acute distress.     Appearance: She is well-developed. She is not ill-appearing, toxic-appearing or diaphoretic.      Comments: Proper supportive shoegear      Cardiovascular:      Pulses:           Dorsalis pedis pulses are 2+ on the right side and 2+ on the left side.        Posterior tibial pulses are 1+ on the right side and 1+ on the left side. "   Musculoskeletal:         General: No tenderness.      Right lower le+ Edema present.      Left lower le+ Edema present.      Right ankle: Normal.      Right Achilles Tendon: Normal.      Left ankle: Normal.      Left Achilles Tendon: Normal.      Right foot: Decreased range of motion. Deformity, bunion and prominent metatarsal heads present. No tenderness or bony tenderness.      Left foot: Decreased range of motion. Deformity, bunion and prominent metatarsal heads present. No tenderness or bony tenderness.      Comments: Flexible pes planus foot type w/ medial arch collapse and mild gastroc equinus       Reducible extensor and flexor contractures at the MTPJ and PIPJ of toes 2-5, bilat.       No pop       Feet:      Right foot:      Protective Sensation: 10 sites tested.  10 sites sensed.      Skin integrity: Dry skin present. No ulcer, blister, skin breakdown, erythema, warmth or callus.      Toenail Condition: Right toenails are normal.      Left foot:      Protective Sensation: 10 sites tested.  10 sites sensed.      Skin integrity: Dry skin present. No ulcer, blister, skin breakdown, erythema, warmth or callus.      Toenail Condition: Left toenails are normal.      Comments: SWM intact    No open lesions or soi    Mild xerosis      Skin:     General: Skin is warm and dry.      Capillary Refill: Capillary refill takes 2 to 3 seconds.      Coloration: Skin is not pale.      Findings: No erythema or rash.   Neurological:      Mental Status: She is alert and oriented to person, place, and time.      Gait: Gait is intact.   Psychiatric:         Attention and Perception: Attention normal.         Mood and Affect: Mood normal.         Speech: Speech normal.         Behavior: Behavior normal.         Thought Content: Thought content normal.         Cognition and Memory: Cognition normal.         Judgment: Judgment normal.             Assessment:       Encounter Diagnoses   Name Primary?    Type 2 diabetes  mellitus with hyperglycemia, with long-term current use of insulin     Diabetic polyneuropathy associated with type 2 diabetes mellitus Yes         Plan:       Dee was seen today for diabetic foot exam.    Diagnoses and all orders for this visit:    Diabetic polyneuropathy associated with type 2 diabetes mellitus    Type 2 diabetes mellitus with hyperglycemia, with long-term current use of insulin  -     Ambulatory referral/consult to Podiatry    I counseled the patient on her conditions, their implications and medical management.    Diabetic foot eval    - Shoe inspection. Diabetic Foot Education. Patient reminded of the importance of good nutrition and blood sugar control to help prevent podiatric complications of diabetes. Patient instructed on proper foot hygeine. We discussed wearing proper shoe gear, daily foot inspections, never walking without protective shoe gear, never putting sharp instruments to feet, routine podiatric nail visits every 2-3 months.      Patient doing well    Reviewed proper shoe gear    Unclear if patient has neuropathy symptoms that are occasional are from her back or from her diabetes    Recommend following up yearly sooner if needed        Follow up in about 1 year (around 10/4/2023), or if symptoms worsen or fail to improve.

## 2022-10-05 DIAGNOSIS — E11.40 TYPE 2 DIABETES MELLITUS WITH DIABETIC NEUROPATHY, WITH LONG-TERM CURRENT USE OF INSULIN: ICD-10-CM

## 2022-10-05 DIAGNOSIS — Z79.4 TYPE 2 DIABETES MELLITUS WITH DIABETIC NEUROPATHY, WITH LONG-TERM CURRENT USE OF INSULIN: ICD-10-CM

## 2022-10-05 NOTE — TELEPHONE ENCOUNTER
Pt. Stated she is only using suppositories once a week but wants to ensure she does not run out. Pt. Understands rx will be sent to [facility]. Additionally pt. Stated she began having dysuria,burning with urination and frequent urination on yesterday and has been taking Azo. Pt. Requesting abx.UTI protocol given to pt.the patient. Replied she will be leaving out of town on Friday and would like sometime called in before then. I informed pt. That a message will be sent to provider for further advise. Pt. Verbalized understanding and appreciation

## 2022-10-06 ENCOUNTER — IMMUNIZATION (OUTPATIENT)
Dept: PHARMACY | Facility: CLINIC | Age: 67
End: 2022-10-06
Payer: MEDICARE

## 2022-10-06 DIAGNOSIS — Z23 NEED FOR VACCINATION: Primary | ICD-10-CM

## 2022-10-06 RX ORDER — NITROFURANTOIN 25; 75 MG/1; MG/1
100 CAPSULE ORAL 2 TIMES DAILY
Qty: 10 CAPSULE | Refills: 0 | Status: SHIPPED | OUTPATIENT
Start: 2022-10-06 | End: 2022-10-16

## 2022-10-06 RX ORDER — GABAPENTIN 100 MG/1
100 CAPSULE ORAL NIGHTLY
Qty: 90 CAPSULE | Refills: 0 | OUTPATIENT
Start: 2022-10-06 | End: 2023-10-06

## 2022-10-06 RX ORDER — PHENAZOPYRIDINE HYDROCHLORIDE 200 MG/1
200 TABLET, FILM COATED ORAL 3 TIMES DAILY PRN
Qty: 10 TABLET | Refills: 0 | Status: SHIPPED | OUTPATIENT
Start: 2022-10-06

## 2022-10-06 NOTE — TELEPHONE ENCOUNTER
No new care gaps identified.  [facility] Embedded Care Gaps. Reference number: [id number]. 10/05/2022   7:58:26 PM CDT

## 2022-10-06 NOTE — TELEPHONE ENCOUNTER
Spoke with patient, stated urine culture orders have been placed to do the culture prior to starting the antibiotics. Patient verbalized understanding, call ended

## 2022-10-11 ENCOUNTER — LAB VISIT (OUTPATIENT)
Dept: LAB | Facility: HOSPITAL | Age: 67
End: 2022-10-11
Attending: INTERNAL MEDICINE
Payer: MEDICARE

## 2022-10-11 DIAGNOSIS — R10.2 PELVIC PAIN: ICD-10-CM

## 2022-10-11 PROCEDURE — 81001 URINALYSIS AUTO W/SCOPE: CPT | Performed by: PHYSICIAN ASSISTANT

## 2022-10-11 PROCEDURE — 87086 URINE CULTURE/COLONY COUNT: CPT | Performed by: PHYSICIAN ASSISTANT

## 2022-10-12 ENCOUNTER — PATIENT MESSAGE (OUTPATIENT)
Dept: UROGYNECOLOGY | Facility: CLINIC | Age: 67
End: 2022-10-12
Payer: MEDICARE

## 2022-10-12 LAB
BACTERIA #/AREA URNS AUTO: ABNORMAL /HPF
BILIRUB UR QL STRIP: NEGATIVE
CLARITY UR REFRACT.AUTO: ABNORMAL
COLOR UR AUTO: YELLOW
GLUCOSE UR QL STRIP: ABNORMAL
HGB UR QL STRIP: ABNORMAL
KETONES UR QL STRIP: NEGATIVE
LEUKOCYTE ESTERASE UR QL STRIP: ABNORMAL
MICROSCOPIC COMMENT: ABNORMAL
NITRITE UR QL STRIP: NEGATIVE
NON-SQ EPI CELLS #/AREA URNS AUTO: 11 /HPF
PH UR STRIP: 6 [PH] (ref 5–8)
PROT UR QL STRIP: NEGATIVE
RBC #/AREA URNS AUTO: >100 /HPF (ref 0–4)
SP GR UR STRIP: 1.02 (ref 1–1.03)
SQUAMOUS #/AREA URNS AUTO: 47 /HPF
URN SPEC COLLECT METH UR: ABNORMAL
WBC #/AREA URNS AUTO: >100 /HPF (ref 0–5)
WBC CLUMPS UR QL AUTO: ABNORMAL
YEAST UR QL AUTO: ABNORMAL

## 2022-10-13 ENCOUNTER — PATIENT MESSAGE (OUTPATIENT)
Dept: UROGYNECOLOGY | Facility: CLINIC | Age: 67
End: 2022-10-13
Payer: MEDICARE

## 2022-10-13 LAB
BACTERIA UR CULT: NORMAL
BACTERIA UR CULT: NORMAL

## 2022-12-15 DIAGNOSIS — F32.9 MAJOR DEPRESSIVE DISORDER WITH CURRENT ACTIVE EPISODE, UNSPECIFIED DEPRESSION EPISODE SEVERITY, UNSPECIFIED WHETHER RECURRENT: ICD-10-CM

## 2022-12-15 DIAGNOSIS — E11.65 TYPE 2 DIABETES MELLITUS WITH HYPERGLYCEMIA, WITH LONG-TERM CURRENT USE OF INSULIN: Primary | ICD-10-CM

## 2022-12-15 DIAGNOSIS — R60.0 BILATERAL LEG EDEMA: ICD-10-CM

## 2022-12-15 DIAGNOSIS — Z79.4 TYPE 2 DIABETES MELLITUS WITH HYPERGLYCEMIA, WITH LONG-TERM CURRENT USE OF INSULIN: Primary | ICD-10-CM

## 2022-12-15 DIAGNOSIS — F41.9 ANXIETY: ICD-10-CM

## 2022-12-15 DIAGNOSIS — I10 HYPERTENSION, UNSPECIFIED TYPE: ICD-10-CM

## 2022-12-15 RX ORDER — LOSARTAN POTASSIUM 100 MG/1
100 TABLET ORAL DAILY
Qty: 90 TABLET | Refills: 0 | Status: SHIPPED | OUTPATIENT
Start: 2022-12-15 | End: 2023-02-24 | Stop reason: SDUPTHER

## 2022-12-15 RX ORDER — FUROSEMIDE 20 MG/1
20 TABLET ORAL DAILY
Qty: 90 TABLET | Refills: 0 | Status: SHIPPED | OUTPATIENT
Start: 2022-12-15 | End: 2023-02-24 | Stop reason: SDUPTHER

## 2022-12-15 RX ORDER — PAROXETINE 30 MG/1
30 TABLET, FILM COATED ORAL DAILY
Qty: 90 TABLET | Refills: 0 | Status: SHIPPED | OUTPATIENT
Start: 2022-12-15 | End: 2023-02-24 | Stop reason: SDUPTHER

## 2022-12-15 NOTE — TELEPHONE ENCOUNTER
Refill Routing Note   Medication(s) are not appropriate for processing by [facility] for the following reason(s):      - Required vitals are abnormal    [facility] action(s):  Defer Medication-related problems identified: Requires labs        Medication reconciliation completed: No     Appointments  past 12m or future 3m with PCP    Date Provider   Last Visit   8/11/2022 Farzana Dorman MD   Next Visit   2/24/2023 Farzana Dorman MD   ED visits in past 90 days: 0        Note composed:4:53 PM 12/15/2022

## 2022-12-16 RX ORDER — METFORMIN HYDROCHLORIDE 500 MG/1
TABLET, EXTENDED RELEASE ORAL
Qty: 180 TABLET | Refills: 1 | Status: SHIPPED | OUTPATIENT
Start: 2022-12-16 | End: 2023-04-18

## 2022-12-31 DIAGNOSIS — Z79.4 TYPE 2 DIABETES MELLITUS WITH DIABETIC NEUROPATHY, WITH LONG-TERM CURRENT USE OF INSULIN: ICD-10-CM

## 2022-12-31 DIAGNOSIS — Z79.899 ON POTASSIUM WASTING DIURETIC THERAPY: ICD-10-CM

## 2022-12-31 DIAGNOSIS — E11.40 TYPE 2 DIABETES MELLITUS WITH DIABETIC NEUROPATHY, WITH LONG-TERM CURRENT USE OF INSULIN: ICD-10-CM

## 2022-12-31 DIAGNOSIS — R10.2 PELVIC PAIN: ICD-10-CM

## 2022-12-31 DIAGNOSIS — R39.9 UTI SYMPTOMS: ICD-10-CM

## 2023-01-01 RX ORDER — POTASSIUM CHLORIDE 750 MG/1
10 TABLET, EXTENDED RELEASE ORAL DAILY
Qty: 90 TABLET | Refills: 1 | Status: SHIPPED | OUTPATIENT
Start: 2023-01-01 | End: 2023-02-24 | Stop reason: SDUPTHER

## 2023-01-01 NOTE — TELEPHONE ENCOUNTER
No new care gaps identified.  [facility] Embedded Care Gaps. Reference number: [id number]. 12/31/2022   7:29:58 PM CST

## 2023-01-01 NOTE — TELEPHONE ENCOUNTER
Refill Decision Note   Dee Alfonso  is requesting a refill authorization.  Brief Assessment and Rationale for Refill:  Approve     Medication Therapy Plan:       Medication Reconciliation Completed: No   Comments:     No Care Gaps recommended.     Note composed:5:36 PM 01/01/2023

## 2023-01-01 NOTE — TELEPHONE ENCOUNTER
No new care gaps identified.  [facility] Embedded Care Gaps. Reference number: [id number]. 12/31/2022   7:28:47 PM CST

## 2023-01-03 RX ORDER — GABAPENTIN 100 MG/1
100 CAPSULE ORAL NIGHTLY
Qty: 90 CAPSULE | Refills: 0 | Status: SHIPPED | OUTPATIENT
Start: 2023-01-03 | End: 2023-02-24 | Stop reason: SDUPTHER

## 2023-01-03 RX ORDER — PANTOPRAZOLE SODIUM 40 MG/1
40 TABLET, DELAYED RELEASE ORAL DAILY
Qty: 30 TABLET | Refills: 2 | Status: SHIPPED | OUTPATIENT
Start: 2023-01-03 | End: 2023-02-17 | Stop reason: SDUPTHER

## 2023-01-04 ENCOUNTER — OFFICE VISIT (OUTPATIENT)
Dept: OPTOMETRY | Facility: CLINIC | Age: 68
End: 2023-01-04
Payer: MEDICARE

## 2023-01-04 DIAGNOSIS — H52.02 HYPEROPIA WITH ASTIGMATISM AND PRESBYOPIA, LEFT: ICD-10-CM

## 2023-01-04 DIAGNOSIS — H52.11 MYOPIA WITH ASTIGMATISM AND PRESBYOPIA, RIGHT: Primary | ICD-10-CM

## 2023-01-04 DIAGNOSIS — H52.4 HYPEROPIA WITH ASTIGMATISM AND PRESBYOPIA, LEFT: ICD-10-CM

## 2023-01-04 DIAGNOSIS — H04.123 DRY EYE SYNDROME OF BOTH EYES: ICD-10-CM

## 2023-01-04 DIAGNOSIS — H52.4 MYOPIA WITH ASTIGMATISM AND PRESBYOPIA, RIGHT: Primary | ICD-10-CM

## 2023-01-04 DIAGNOSIS — H52.202 HYPEROPIA WITH ASTIGMATISM AND PRESBYOPIA, LEFT: ICD-10-CM

## 2023-01-04 DIAGNOSIS — H52.201 MYOPIA WITH ASTIGMATISM AND PRESBYOPIA, RIGHT: Primary | ICD-10-CM

## 2023-01-04 PROCEDURE — 92015 PR REFRACTION: ICD-10-PCS | Mod: S$GLB,,, | Performed by: OPTOMETRIST

## 2023-01-04 PROCEDURE — 1101F PT FALLS ASSESS-DOCD LE1/YR: CPT | Mod: CPTII,S$GLB,, | Performed by: OPTOMETRIST

## 2023-01-04 PROCEDURE — 1159F PR MEDICATION LIST DOCUMENTED IN MEDICAL RECORD: ICD-10-PCS | Mod: CPTII,S$GLB,, | Performed by: OPTOMETRIST

## 2023-01-04 PROCEDURE — 1159F MED LIST DOCD IN RCRD: CPT | Mod: CPTII,S$GLB,, | Performed by: OPTOMETRIST

## 2023-01-04 PROCEDURE — 99999 PR PBB SHADOW E&M-EST. PATIENT-LVL III: CPT | Mod: PBBFAC,,, | Performed by: OPTOMETRIST

## 2023-01-04 PROCEDURE — 1126F AMNT PAIN NOTED NONE PRSNT: CPT | Mod: CPTII,S$GLB,, | Performed by: OPTOMETRIST

## 2023-01-04 PROCEDURE — 99999 PR PBB SHADOW E&M-EST. PATIENT-LVL III: ICD-10-PCS | Mod: PBBFAC,,, | Performed by: OPTOMETRIST

## 2023-01-04 PROCEDURE — 1126F PR PAIN SEVERITY QUANTIFIED, NO PAIN PRESENT: ICD-10-PCS | Mod: CPTII,S$GLB,, | Performed by: OPTOMETRIST

## 2023-01-04 PROCEDURE — 3288F FALL RISK ASSESSMENT DOCD: CPT | Mod: CPTII,S$GLB,, | Performed by: OPTOMETRIST

## 2023-01-04 PROCEDURE — 1160F RVW MEDS BY RX/DR IN RCRD: CPT | Mod: CPTII,S$GLB,, | Performed by: OPTOMETRIST

## 2023-01-04 PROCEDURE — 92015 DETERMINE REFRACTIVE STATE: CPT | Mod: S$GLB,,, | Performed by: OPTOMETRIST

## 2023-01-04 PROCEDURE — 3288F PR FALLS RISK ASSESSMENT DOCUMENTED: ICD-10-PCS | Mod: CPTII,S$GLB,, | Performed by: OPTOMETRIST

## 2023-01-04 PROCEDURE — 1160F PR REVIEW ALL MEDS BY PRESCRIBER/CLIN PHARMACIST DOCUMENTED: ICD-10-PCS | Mod: CPTII,S$GLB,, | Performed by: OPTOMETRIST

## 2023-01-04 PROCEDURE — 1101F PR PT FALLS ASSESS DOC 0-1 FALLS W/OUT INJ PAST YR: ICD-10-PCS | Mod: CPTII,S$GLB,, | Performed by: OPTOMETRIST

## 2023-01-04 RX ORDER — PHENAZOPYRIDINE HYDROCHLORIDE 200 MG/1
200 TABLET, FILM COATED ORAL 3 TIMES DAILY PRN
Qty: 10 TABLET | Refills: 0 | OUTPATIENT
Start: 2023-01-04

## 2023-01-04 NOTE — PROGRESS NOTES
HPI    NADIR: 04/22 with Dr. Dawn  Chief complaint (CC): Patient is here for refraction today.  Patient had   lost her most recent glasses and is wearing an older pair.  Patient has   been having trouble with near with her older glasses and would like a   current prescription for new ones.  Glasses? + 5 yrs. old  Contacts? -  H/o eye surgery, injections or laser: PC IOL OU  H/o eye injury: -  Known eye conditions? See above  Family h/o eye conditions? -  Eye gtts? Using [employer] OU Q HS, last used night before last      (-) Flashes (+)  Floaters (-) Mucous   (-)  Tearing (-) Itching (-) Burning   (-) Headaches (-) Eye Pain/discomfort (-) Irritation   (-)  Redness (-) Double vision (-) Blurry vision    Diabetic? +  yesterday  A1c? Hemoglobin A1C       Date                     Value               Ref Range             Status                09/14/2022               10.1 (H)            4.0 - 5.6 %           Final                 06/22/2022               10.9 (H)            4.0 - 5.6 %           Final                 01/14/2022               8.6 (H)             4.0 - 5.6 %           Final                    Last edited by Emma Weaver on 1/4/2023  1:20 PM.            Assessment /Plan     For exam results, see Encounter Report.      Myopia with astigmatism and presbyopia, right  Hyperopia with astigmatism and presbyopia, left  SRx released to patient. Patient educated on lens options. Normal ocular health. Cont w/ apptmt w/Dr Dawn on the 11th.     Dry eye syndrome of both eyes  Recommend Systane Ultra or Refresh Optive BID-TID OU to aid with symptoms of dry eyes.

## 2023-01-04 NOTE — TELEPHONE ENCOUNTER
Refill calling to [facility]    lidocaine 2%, valium 5 mg, baclofen 4% suppository  Disp: #20  Sig:  Apply 1 suppository vaginally 30 minutes before and/or after PT sessions or BID as needed for pain.   Refills: 5     [facility] (they deliver)  [address], [place] [address]  [phone]

## 2023-01-04 NOTE — TELEPHONE ENCOUNTER
lidocaine 2%, valium 5 mg, baclofen 4% suppository  Disp: #20  Sig:  Apply 1 suppository vaginally 30 minutes before and/or after PT sessions or BID as needed for pain.   Refills: 5     [facility] (they deliver)  [address], [place] [address] [phone]

## 2023-01-09 ENCOUNTER — TELEPHONE (OUTPATIENT)
Dept: GASTROENTEROLOGY | Facility: CLINIC | Age: 68
End: 2023-01-09
Payer: MEDICARE

## 2023-01-09 NOTE — TELEPHONE ENCOUNTER
----- Message from Josephine No MA sent at 1/3/2023  4:20 PM CST -----    ----- Message -----  From: Jacky Jarvis MD  Sent: 1/3/2023  10:30 AM CST  To: [facility] SMITH Staff    Please schedule a clinic visit with me (next available).

## 2023-01-11 ENCOUNTER — CLINICAL SUPPORT (OUTPATIENT)
Dept: OPHTHALMOLOGY | Facility: CLINIC | Age: 68
End: 2023-01-11
Payer: MEDICARE

## 2023-01-11 DIAGNOSIS — H40.1111 PRIMARY OPEN ANGLE GLAUCOMA (POAG) OF RIGHT EYE, MILD STAGE: ICD-10-CM

## 2023-02-07 ENCOUNTER — OFFICE VISIT (OUTPATIENT)
Dept: GASTROENTEROLOGY | Facility: CLINIC | Age: 68
End: 2023-02-07
Payer: MEDICARE

## 2023-02-07 ENCOUNTER — TELEPHONE (OUTPATIENT)
Dept: GASTROENTEROLOGY | Facility: CLINIC | Age: 68
End: 2023-02-07

## 2023-02-07 ENCOUNTER — LAB VISIT (OUTPATIENT)
Dept: LAB | Facility: HOSPITAL | Age: 68
End: 2023-02-07
Attending: INTERNAL MEDICINE
Payer: MEDICARE

## 2023-02-07 VITALS
WEIGHT: 177 LBS | BODY MASS INDEX: 27.78 KG/M2 | HEIGHT: 67 IN | SYSTOLIC BLOOD PRESSURE: 155 MMHG | HEART RATE: 75 BPM | DIASTOLIC BLOOD PRESSURE: 89 MMHG

## 2023-02-07 DIAGNOSIS — Z86.010 HISTORY OF COLON POLYPS: ICD-10-CM

## 2023-02-07 DIAGNOSIS — K21.9 GASTROESOPHAGEAL REFLUX DISEASE, UNSPECIFIED WHETHER ESOPHAGITIS PRESENT: Primary | ICD-10-CM

## 2023-02-07 DIAGNOSIS — K21.9 GASTROESOPHAGEAL REFLUX DISEASE, UNSPECIFIED WHETHER ESOPHAGITIS PRESENT: ICD-10-CM

## 2023-02-07 LAB — HCV AB SERPL QL IA: NORMAL

## 2023-02-07 PROCEDURE — 3077F PR MOST RECENT SYSTOLIC BLOOD PRESSURE >= 140 MM HG: ICD-10-PCS | Mod: CPTII,S$GLB,, | Performed by: INTERNAL MEDICINE

## 2023-02-07 PROCEDURE — 1101F PT FALLS ASSESS-DOCD LE1/YR: CPT | Mod: CPTII,S$GLB,, | Performed by: INTERNAL MEDICINE

## 2023-02-07 PROCEDURE — 4010F PR ACE/ARB THEARPY RXD/TAKEN: ICD-10-PCS | Mod: CPTII,S$GLB,, | Performed by: INTERNAL MEDICINE

## 2023-02-07 PROCEDURE — 3008F BODY MASS INDEX DOCD: CPT | Mod: CPTII,S$GLB,, | Performed by: INTERNAL MEDICINE

## 2023-02-07 PROCEDURE — 36415 COLL VENOUS BLD VENIPUNCTURE: CPT | Performed by: INTERNAL MEDICINE

## 2023-02-07 PROCEDURE — 1159F MED LIST DOCD IN RCRD: CPT | Mod: CPTII,S$GLB,, | Performed by: INTERNAL MEDICINE

## 2023-02-07 PROCEDURE — 3008F PR BODY MASS INDEX (BMI) DOCUMENTED: ICD-10-PCS | Mod: CPTII,S$GLB,, | Performed by: INTERNAL MEDICINE

## 2023-02-07 PROCEDURE — 1126F AMNT PAIN NOTED NONE PRSNT: CPT | Mod: CPTII,S$GLB,, | Performed by: INTERNAL MEDICINE

## 2023-02-07 PROCEDURE — 99214 OFFICE O/P EST MOD 30 MIN: CPT | Mod: S$GLB,,, | Performed by: INTERNAL MEDICINE

## 2023-02-07 PROCEDURE — 99999 PR PBB SHADOW E&M-EST. PATIENT-LVL IV: ICD-10-PCS | Mod: PBBFAC,,, | Performed by: INTERNAL MEDICINE

## 2023-02-07 PROCEDURE — 1126F PR PAIN SEVERITY QUANTIFIED, NO PAIN PRESENT: ICD-10-PCS | Mod: CPTII,S$GLB,, | Performed by: INTERNAL MEDICINE

## 2023-02-07 PROCEDURE — 1101F PR PT FALLS ASSESS DOC 0-1 FALLS W/OUT INJ PAST YR: ICD-10-PCS | Mod: CPTII,S$GLB,, | Performed by: INTERNAL MEDICINE

## 2023-02-07 PROCEDURE — 1159F PR MEDICATION LIST DOCUMENTED IN MEDICAL RECORD: ICD-10-PCS | Mod: CPTII,S$GLB,, | Performed by: INTERNAL MEDICINE

## 2023-02-07 PROCEDURE — 3079F PR MOST RECENT DIASTOLIC BLOOD PRESSURE 80-89 MM HG: ICD-10-PCS | Mod: CPTII,S$GLB,, | Performed by: INTERNAL MEDICINE

## 2023-02-07 PROCEDURE — 3077F SYST BP >= 140 MM HG: CPT | Mod: CPTII,S$GLB,, | Performed by: INTERNAL MEDICINE

## 2023-02-07 PROCEDURE — 4010F ACE/ARB THERAPY RXD/TAKEN: CPT | Mod: CPTII,S$GLB,, | Performed by: INTERNAL MEDICINE

## 2023-02-07 PROCEDURE — 99999 PR PBB SHADOW E&M-EST. PATIENT-LVL IV: CPT | Mod: PBBFAC,,, | Performed by: INTERNAL MEDICINE

## 2023-02-07 PROCEDURE — 3079F DIAST BP 80-89 MM HG: CPT | Mod: CPTII,S$GLB,, | Performed by: INTERNAL MEDICINE

## 2023-02-07 PROCEDURE — 3288F FALL RISK ASSESSMENT DOCD: CPT | Mod: CPTII,S$GLB,, | Performed by: INTERNAL MEDICINE

## 2023-02-07 PROCEDURE — 86803 HEPATITIS C AB TEST: CPT | Performed by: INTERNAL MEDICINE

## 2023-02-07 PROCEDURE — 3288F PR FALLS RISK ASSESSMENT DOCUMENTED: ICD-10-PCS | Mod: CPTII,S$GLB,, | Performed by: INTERNAL MEDICINE

## 2023-02-07 PROCEDURE — 99214 PR OFFICE/OUTPT VISIT, EST, LEVL IV, 30-39 MIN: ICD-10-PCS | Mod: S$GLB,,, | Performed by: INTERNAL MEDICINE

## 2023-02-07 NOTE — PROGRESS NOTES
Reason for visit:    HPI:  is a 67 year old lady last seen in Dec 2021. She had presented with changes in bowels and bloating. Bowels swing between constipation (pellet like stools and straining) and diarrhea (loose stools). No blood in stool. Drinks sufficient amounts of water but diet is very inconsistent with limited fiber. Takes Metamucil most days of the week. Has been having more heartburn and acid regurgitation. Takes TUMS which gives some relief. Has hiccups as well. No dysphagia or odynophagia. She has Obesity, Diverticulosis (h/o diverticulitis twice), GERD, Atypical cervical glandular cells on pap smear, DM2, Anxiety, Depression, Glaucoma, Cystocele with mixed urinary incontinence post surgery with improvement, Fatty liver, Atherosclerosis, HLD and Hypercalcemia.     Colonoscopy in 2018 revealed a few small polyps. Rare alcohol. Never had pancreatitis. No GI malignancy in the family. Her alternating constipation and diarrhea was suspected to be due to inconsistent diet and uncontrolled DM2. She was to continue Metamucil daily. Given her chronic GERD and history of colon polyps EGD/Colonoscopy was recommended. She was also asked to continue Pantoprazole 40 mg daily.     just got diagnosed with Hepatitis C.      Past medical, surgical, social and family history reviewed in epic    Medication allergies reviewed in epic    Review of systems:    Constitutional:  No fever, no chills, 25 lb weight loss, appetite is diminished  Eyes:  No visual changes or red eyes  ENT:  No odynophagia or hoarseness of voice  Cardiovascular:  No angina or palpitation  Respiratory:  No shortness breath or wheezing  Genitourinary:  No dysuria or frequency  Musculoskeletal:  No myalgias or arthralgias  Skin:  No pruritus or eczema  Neurologic:  No headache or seizures  Psychiatric:  No anxiety depression  Gastrointestinal:  See HPI    Physical exam:  Vitals see epic, awake, alert, oriented x3 in no acute  distress    Neck:  Supple, no carotid bruit, no cervical adenopathy  Abdomen:  Protuberant, soft, nontender, nondistended, no masses palpable, no hepatosplenomegaly detected, bowel sounds are normal, no ascites clinically detectable  Eyes:  Conjunctivae anicteric, not injected  ENT:  Oral mucosa moist  Cardiovascular:  S1, S2 normal, no murmurs, no gallops, no abdominal bruits heard  Respiratory:  Bilateral air entry equal, no rhonchi, no crackles, normal effort  Skin:  No palmar erythema or spider angiomata  Neurologic:  No asterixis or tremors  Psychiatric:  Affect appropriate, proper judgment, proper insight, oriented to place and time  Lower extremities:  No pedal edema    Recent labs (Sept 2021 HbA1c 10.1), imaging and endoscopy reports reviewed.      Impression: GERD, History of colon polyps, Alternating constipation and diarrhea,  recently diagnosed with Hepatitis C    Recommendations:     Schedule EGD and Colonoscopy  Citrucel 2 caplets with lunch.  Hep C antibody  Follow up with Endocrinology/PCP regarding DM2 control

## 2023-02-07 NOTE — TELEPHONE ENCOUNTER
----- Message from Jacky Jarvis MD sent at 2/7/2023  1:01 PM CST -----  Hepatitis C test is negative.

## 2023-02-07 NOTE — TELEPHONE ENCOUNTER
Spoke with patient , results given as written by Dr. Jarvis  Pt verbalizes understadning and appreciates the call.  Thanks MA

## 2023-02-10 ENCOUNTER — PATIENT OUTREACH (OUTPATIENT)
Dept: ADMINISTRATIVE | Facility: HOSPITAL | Age: 68
End: 2023-02-10
Payer: MEDICARE

## 2023-02-10 DIAGNOSIS — Z78.0 MENOPAUSE: Primary | ICD-10-CM

## 2023-02-10 NOTE — PROGRESS NOTES
Health Maintenance Due   Topic Date Due    TETANUS VACCINE  Never done    DEXA Scan  09/01/2014    Hemoglobin A1c  12/14/2022        Chart review done.   HM updated.   Immunizations reviewed & updated.   Care Everywhere updated.   [facility] reviewed   DIS reviewed   Orders entered:   DEXA

## 2023-02-17 DIAGNOSIS — R39.9 UTI SYMPTOMS: ICD-10-CM

## 2023-02-20 RX ORDER — PANTOPRAZOLE SODIUM 40 MG/1
40 TABLET, DELAYED RELEASE ORAL DAILY
Qty: 30 TABLET | Refills: 2 | Status: SHIPPED | OUTPATIENT
Start: 2023-02-20 | End: 2024-02-27 | Stop reason: SDUPTHER

## 2023-02-21 RX ORDER — PHENAZOPYRIDINE HYDROCHLORIDE 200 MG/1
200 TABLET, FILM COATED ORAL 3 TIMES DAILY PRN
Qty: 10 TABLET | Refills: 0 | OUTPATIENT
Start: 2023-02-21

## 2023-02-24 ENCOUNTER — PES CALL (OUTPATIENT)
Dept: ADMINISTRATIVE | Facility: CLINIC | Age: 68
End: 2023-02-24
Payer: MEDICARE

## 2023-02-24 ENCOUNTER — LAB VISIT (OUTPATIENT)
Dept: LAB | Facility: HOSPITAL | Age: 68
End: 2023-02-24
Attending: INTERNAL MEDICINE
Payer: MEDICARE

## 2023-02-24 ENCOUNTER — OFFICE VISIT (OUTPATIENT)
Dept: PRIMARY CARE CLINIC | Facility: CLINIC | Age: 68
End: 2023-02-24
Payer: MEDICARE

## 2023-02-24 VITALS
TEMPERATURE: 98 F | BODY MASS INDEX: 27.34 KG/M2 | HEIGHT: 67 IN | DIASTOLIC BLOOD PRESSURE: 74 MMHG | OXYGEN SATURATION: 99 % | HEART RATE: 99 BPM | SYSTOLIC BLOOD PRESSURE: 130 MMHG | RESPIRATION RATE: 18 BRPM | WEIGHT: 174.19 LBS

## 2023-02-24 DIAGNOSIS — E11.69 HYPERLIPIDEMIA ASSOCIATED WITH TYPE 2 DIABETES MELLITUS: ICD-10-CM

## 2023-02-24 DIAGNOSIS — Z79.899 ON POTASSIUM WASTING DIURETIC THERAPY: ICD-10-CM

## 2023-02-24 DIAGNOSIS — Z91.199 NONCOMPLIANCE: ICD-10-CM

## 2023-02-24 DIAGNOSIS — Z79.4 TYPE 2 DIABETES MELLITUS WITH DIABETIC NEUROPATHY, WITH LONG-TERM CURRENT USE OF INSULIN: Primary | ICD-10-CM

## 2023-02-24 DIAGNOSIS — I70.0 ATHEROSCLEROSIS OF AORTA: ICD-10-CM

## 2023-02-24 DIAGNOSIS — R41.3 MEMORY IMPAIRMENT: ICD-10-CM

## 2023-02-24 DIAGNOSIS — R63.4 UNEXPLAINED WEIGHT LOSS: ICD-10-CM

## 2023-02-24 DIAGNOSIS — E78.5 HYPERLIPIDEMIA ASSOCIATED WITH TYPE 2 DIABETES MELLITUS: ICD-10-CM

## 2023-02-24 DIAGNOSIS — E11.40 TYPE 2 DIABETES MELLITUS WITH DIABETIC NEUROPATHY, WITH LONG-TERM CURRENT USE OF INSULIN: Primary | ICD-10-CM

## 2023-02-24 DIAGNOSIS — E11.40 TYPE 2 DIABETES MELLITUS WITH DIABETIC NEUROPATHY, WITH LONG-TERM CURRENT USE OF INSULIN: ICD-10-CM

## 2023-02-24 DIAGNOSIS — F41.9 ANXIETY: ICD-10-CM

## 2023-02-24 DIAGNOSIS — I10 HYPERTENSION, UNSPECIFIED TYPE: ICD-10-CM

## 2023-02-24 DIAGNOSIS — F33.9 EPISODE OF RECURRENT MAJOR DEPRESSIVE DISORDER, UNSPECIFIED DEPRESSION EPISODE SEVERITY: ICD-10-CM

## 2023-02-24 DIAGNOSIS — Z79.4 TYPE 2 DIABETES MELLITUS WITH DIABETIC NEUROPATHY, WITH LONG-TERM CURRENT USE OF INSULIN: ICD-10-CM

## 2023-02-24 DIAGNOSIS — R60.0 BILATERAL LEG EDEMA: ICD-10-CM

## 2023-02-24 DIAGNOSIS — E21.3 HYPERPARATHYROIDISM: ICD-10-CM

## 2023-02-24 DIAGNOSIS — K76.0 FATTY LIVER: ICD-10-CM

## 2023-02-24 DIAGNOSIS — L72.0 EPIDERMAL INCLUSION CYST: ICD-10-CM

## 2023-02-24 LAB
ALBUMIN SERPL BCP-MCNC: 4 G/DL (ref 3.5–5.2)
ALBUMIN/CREAT UR: ABNORMAL UG/MG (ref 0–30)
ALP SERPL-CCNC: 132 U/L (ref 55–135)
ALT SERPL W/O P-5'-P-CCNC: 17 U/L (ref 10–44)
ANION GAP SERPL CALC-SCNC: 14 MMOL/L (ref 8–16)
AST SERPL-CCNC: 22 U/L (ref 10–40)
BASOPHILS # BLD AUTO: 0.04 K/UL (ref 0–0.2)
BASOPHILS NFR BLD: 0.6 % (ref 0–1.9)
BILIRUB SERPL-MCNC: 0.4 MG/DL (ref 0.1–1)
BUN SERPL-MCNC: 6 MG/DL (ref 8–23)
CALCIUM SERPL-MCNC: 10.4 MG/DL (ref 8.7–10.5)
CHLORIDE SERPL-SCNC: 98 MMOL/L (ref 95–110)
CO2 SERPL-SCNC: 26 MMOL/L (ref 23–29)
CREAT SERPL-MCNC: 0.9 MG/DL (ref 0.5–1.4)
CREAT UR-MCNC: 12 MG/DL (ref 15–325)
DIFFERENTIAL METHOD: ABNORMAL
EOSINOPHIL # BLD AUTO: 0.1 K/UL (ref 0–0.5)
EOSINOPHIL NFR BLD: 0.9 % (ref 0–8)
ERYTHROCYTE [DISTWIDTH] IN BLOOD BY AUTOMATED COUNT: 12.8 % (ref 11.5–14.5)
EST. GFR  (NO RACE VARIABLE): >60 ML/MIN/1.73 M^2
ESTIMATED AVG GLUCOSE: 295 MG/DL (ref 68–131)
GLUCOSE SERPL-MCNC: 304 MG/DL (ref 70–110)
HBA1C MFR BLD: 11.9 % (ref 4–5.6)
HCT VFR BLD AUTO: 44.7 % (ref 37–48.5)
HGB BLD-MCNC: 14 G/DL (ref 12–16)
IMM GRANULOCYTES # BLD AUTO: 0.01 K/UL (ref 0–0.04)
IMM GRANULOCYTES NFR BLD AUTO: 0.1 % (ref 0–0.5)
LYMPHOCYTES # BLD AUTO: 2.8 K/UL (ref 1–4.8)
LYMPHOCYTES NFR BLD: 41.3 % (ref 18–48)
MCH RBC QN AUTO: 27.6 PG (ref 27–31)
MCHC RBC AUTO-ENTMCNC: 31.3 G/DL (ref 32–36)
MCV RBC AUTO: 88 FL (ref 82–98)
MICROALBUMIN UR DL<=1MG/L-MCNC: <5 UG/ML
MONOCYTES # BLD AUTO: 0.3 K/UL (ref 0.3–1)
MONOCYTES NFR BLD: 4.6 % (ref 4–15)
NEUTROPHILS # BLD AUTO: 3.6 K/UL (ref 1.8–7.7)
NEUTROPHILS NFR BLD: 52.5 % (ref 38–73)
NRBC BLD-RTO: 0 /100 WBC
PLATELET # BLD AUTO: 346 K/UL (ref 150–450)
PMV BLD AUTO: 10.6 FL (ref 9.2–12.9)
POTASSIUM SERPL-SCNC: 3.3 MMOL/L (ref 3.5–5.1)
PROT SERPL-MCNC: 7.9 G/DL (ref 6–8.4)
RBC # BLD AUTO: 5.08 M/UL (ref 4–5.4)
SODIUM SERPL-SCNC: 138 MMOL/L (ref 136–145)
T4 FREE SERPL-MCNC: 0.96 NG/DL (ref 0.71–1.51)
TSH SERPL DL<=0.005 MIU/L-ACNC: 0.97 UIU/ML (ref 0.4–4)
WBC # BLD AUTO: 6.76 K/UL (ref 3.9–12.7)

## 2023-02-24 PROCEDURE — 1126F AMNT PAIN NOTED NONE PRSNT: CPT | Mod: CPTII,S$GLB,, | Performed by: INTERNAL MEDICINE

## 2023-02-24 PROCEDURE — 3078F PR MOST RECENT DIASTOLIC BLOOD PRESSURE < 80 MM HG: ICD-10-PCS | Mod: CPTII,S$GLB,, | Performed by: INTERNAL MEDICINE

## 2023-02-24 PROCEDURE — 3008F PR BODY MASS INDEX (BMI) DOCUMENTED: ICD-10-PCS | Mod: CPTII,S$GLB,, | Performed by: INTERNAL MEDICINE

## 2023-02-24 PROCEDURE — 83036 HEMOGLOBIN GLYCOSYLATED A1C: CPT | Performed by: INTERNAL MEDICINE

## 2023-02-24 PROCEDURE — 36415 COLL VENOUS BLD VENIPUNCTURE: CPT | Mod: PN | Performed by: INTERNAL MEDICINE

## 2023-02-24 PROCEDURE — 3008F BODY MASS INDEX DOCD: CPT | Mod: CPTII,S$GLB,, | Performed by: INTERNAL MEDICINE

## 2023-02-24 PROCEDURE — 3288F FALL RISK ASSESSMENT DOCD: CPT | Mod: CPTII,S$GLB,, | Performed by: INTERNAL MEDICINE

## 2023-02-24 PROCEDURE — 3075F SYST BP GE 130 - 139MM HG: CPT | Mod: CPTII,S$GLB,, | Performed by: INTERNAL MEDICINE

## 2023-02-24 PROCEDURE — 1101F PT FALLS ASSESS-DOCD LE1/YR: CPT | Mod: CPTII,S$GLB,, | Performed by: INTERNAL MEDICINE

## 2023-02-24 PROCEDURE — 3288F PR FALLS RISK ASSESSMENT DOCUMENTED: ICD-10-PCS | Mod: CPTII,S$GLB,, | Performed by: INTERNAL MEDICINE

## 2023-02-24 PROCEDURE — 1159F MED LIST DOCD IN RCRD: CPT | Mod: CPTII,S$GLB,, | Performed by: INTERNAL MEDICINE

## 2023-02-24 PROCEDURE — 1126F PR PAIN SEVERITY QUANTIFIED, NO PAIN PRESENT: ICD-10-PCS | Mod: CPTII,S$GLB,, | Performed by: INTERNAL MEDICINE

## 2023-02-24 PROCEDURE — 1101F PR PT FALLS ASSESS DOC 0-1 FALLS W/OUT INJ PAST YR: ICD-10-PCS | Mod: CPTII,S$GLB,, | Performed by: INTERNAL MEDICINE

## 2023-02-24 PROCEDURE — 85025 COMPLETE CBC W/AUTO DIFF WBC: CPT | Performed by: INTERNAL MEDICINE

## 2023-02-24 PROCEDURE — 99999 PR PBB SHADOW E&M-EST. PATIENT-LVL V: ICD-10-PCS | Mod: PBBFAC,,, | Performed by: INTERNAL MEDICINE

## 2023-02-24 PROCEDURE — 1159F PR MEDICATION LIST DOCUMENTED IN MEDICAL RECORD: ICD-10-PCS | Mod: CPTII,S$GLB,, | Performed by: INTERNAL MEDICINE

## 2023-02-24 PROCEDURE — 3075F PR MOST RECENT SYSTOLIC BLOOD PRESS GE 130-139MM HG: ICD-10-PCS | Mod: CPTII,S$GLB,, | Performed by: INTERNAL MEDICINE

## 2023-02-24 PROCEDURE — 99214 OFFICE O/P EST MOD 30 MIN: CPT | Mod: S$GLB,,, | Performed by: INTERNAL MEDICINE

## 2023-02-24 PROCEDURE — 1160F PR REVIEW ALL MEDS BY PRESCRIBER/CLIN PHARMACIST DOCUMENTED: ICD-10-PCS | Mod: CPTII,S$GLB,, | Performed by: INTERNAL MEDICINE

## 2023-02-24 PROCEDURE — 3078F DIAST BP <80 MM HG: CPT | Mod: CPTII,S$GLB,, | Performed by: INTERNAL MEDICINE

## 2023-02-24 PROCEDURE — 4010F PR ACE/ARB THEARPY RXD/TAKEN: ICD-10-PCS | Mod: CPTII,S$GLB,, | Performed by: INTERNAL MEDICINE

## 2023-02-24 PROCEDURE — 82570 ASSAY OF URINE CREATININE: CPT | Performed by: INTERNAL MEDICINE

## 2023-02-24 PROCEDURE — 84443 ASSAY THYROID STIM HORMONE: CPT | Performed by: INTERNAL MEDICINE

## 2023-02-24 PROCEDURE — 80053 COMPREHEN METABOLIC PANEL: CPT | Performed by: INTERNAL MEDICINE

## 2023-02-24 PROCEDURE — 99999 PR PBB SHADOW E&M-EST. PATIENT-LVL V: CPT | Mod: PBBFAC,,, | Performed by: INTERNAL MEDICINE

## 2023-02-24 PROCEDURE — 1160F RVW MEDS BY RX/DR IN RCRD: CPT | Mod: CPTII,S$GLB,, | Performed by: INTERNAL MEDICINE

## 2023-02-24 PROCEDURE — 4010F ACE/ARB THERAPY RXD/TAKEN: CPT | Mod: CPTII,S$GLB,, | Performed by: INTERNAL MEDICINE

## 2023-02-24 PROCEDURE — 99214 PR OFFICE/OUTPT VISIT, EST, LEVL IV, 30-39 MIN: ICD-10-PCS | Mod: S$GLB,,, | Performed by: INTERNAL MEDICINE

## 2023-02-24 PROCEDURE — 84439 ASSAY OF FREE THYROXINE: CPT | Performed by: INTERNAL MEDICINE

## 2023-02-24 RX ORDER — POTASSIUM CHLORIDE 750 MG/1
10 TABLET, EXTENDED RELEASE ORAL DAILY
Qty: 90 TABLET | Refills: 1 | Status: SHIPPED | OUTPATIENT
Start: 2023-02-24 | End: 2023-04-19 | Stop reason: SDUPTHER

## 2023-02-24 RX ORDER — GABAPENTIN 100 MG/1
100 CAPSULE ORAL NIGHTLY
Qty: 90 CAPSULE | Refills: 1 | Status: SHIPPED | OUTPATIENT
Start: 2023-02-24 | End: 2023-09-19 | Stop reason: SDUPTHER

## 2023-02-24 RX ORDER — INSULIN GLARGINE 100 [IU]/ML
INJECTION, SOLUTION SUBCUTANEOUS
Refills: 0
Start: 2023-02-24 | End: 2023-06-08

## 2023-02-24 RX ORDER — PAROXETINE HYDROCHLORIDE 20 MG/1
30 TABLET, FILM COATED ORAL DAILY
Qty: 90 TABLET | Refills: 1 | Status: SHIPPED | OUTPATIENT
Start: 2023-02-24 | End: 2023-09-18 | Stop reason: SDUPTHER

## 2023-02-24 RX ORDER — FUROSEMIDE 20 MG/1
20 TABLET ORAL DAILY
Qty: 90 TABLET | Refills: 1 | Status: SHIPPED | OUTPATIENT
Start: 2023-02-24 | End: 2023-06-08 | Stop reason: SDUPTHER

## 2023-02-24 RX ORDER — LOSARTAN POTASSIUM 100 MG/1
100 TABLET ORAL DAILY
Qty: 90 TABLET | Refills: 1 | Status: SHIPPED | OUTPATIENT
Start: 2023-02-24 | End: 2023-09-19 | Stop reason: SDUPTHER

## 2023-02-24 RX ORDER — ATORVASTATIN CALCIUM 80 MG/1
80 TABLET, FILM COATED ORAL DAILY
Qty: 90 TABLET | Refills: 3 | Status: SHIPPED | OUTPATIENT
Start: 2023-02-24 | End: 2023-06-06

## 2023-02-24 NOTE — PROGRESS NOTES
"[facility] Note    Chief Complaint      Chief Complaint   Patient presents with    Follow-up       History of Present Illness      Dee Hamilton is a 67 y.o.  F with Anxiety, Depression, Glaucoma, HLD, Hypercalcemia. Last visit - 8/11/22.    Noncompliance - Pt stopped her Metformin due to Ha and abd pain, Prandin.  "It just felt like every day I get up and I'm just there".  Sounds like she is depressed.  She is overdue for her Endo fu with Dr. Escoto. I will reach out to his office to see if he can get her in given she has stopped most of her meds.  We discussed the imp. Of compliance and calling with any concerns. She is not taking her Protonix G I just Rx for her. Will resume Prandin, and Lantus and will cont.Trulicitiy.  Will not resume Metformin at this time since it made her feel poorly.     Memory issues - I forget where I put things off and on. [facility]  Refer to[facility]uroPsych referral. She writes things in her note book. no evid of NPH on MRI. Pt reports imbalance, Incontinence, and memory issues. She has known Mixed urinary incontinence and had a prev bladder supsension. Carotid U/S - 6/1/21 - no evid of sig stenosis. Vitamin B1 level and it was normal.  Vitamin b12 was elevated. Her folic acid was normal.    has noticed she is more forgetful in doing chores around the house (like emptying the  or taking the clothes out of the dryer". Pt scored 27/30 on MMSE - 8/11/22. If someone has to ask her how she is she has to stop and think about how to respond so she has the words in her mind to answer correctly. "My tooth brush may be in the refrigerator.  Sometimes I have to remember how to drive so I stop driving".        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 ECC neg. EMBx insufficient. Pt is s/p hysteroscopy/D&C and CKC 9/29/21.  Has upcoming fu to discuss poss. Hys. + Spotting. CT abd/Pelvis - 3/25/22 -Punctate " "low-density foci in both kidneys too small to adequately characterize.   Colonic diverticulosis with no evidence for acute diverticulitis. No further recommendations Pelvic U/S - 5/31/22- wnl      Anxiety - She is off her Paroxetine 30 mg/d.   She reports she ran out but she had enough refills until Mar. D/w pt withdrawal effects with abrupt discontinuation. Referred to Psychiatry but she never got to go because her appt got cancelled [facility] at [facility].   "I go down a rabbit hole every now and then". It's depression more so than Anxiety because she doesn't go out much".  Pt talks to someone who is a friend of her daughter which has helped. She would like a support animal but her  does not like dogs. Playing with the neighbor's dog is helpful.  Prev fu by Jonna NICOLE.  Will refer again to Psychiatry. Her  is ill and she has a new grandson and she puts everyone before herself. Her  was just d/c from the hospital and she repors they are woking him up to see if he has Cancer.      Depression - She is not taking her Paroxetine 30 mg/d.  D/w pt withdrawal effects with abrupt discontinuation.  Referred to Psychiatry.  Pt talks to someone who is a friend of her daughter which has helped.   "It comes and goes". She has been meditating which helps. "My son is a trigger".   Will refer again for Depression.      Glaucoma - Fu by [facility]. Fu by Dr. Nereyda De Anda in Apr.  Planning for visual field testing in Aug.      HLD -  Cholesterol remains high on Atorvastatin 80mg daily.  This is the max dose of this medication. We could consider changing  to Crestor 40 mg QHS or possibly adding zetia to the Atorvastatin. Goal LDL (bad cholesterol) is < 70.       Hypercalcemia/Hyperparathyroidism -  Fu by Dr. Jarek Davison.  Vit D low normal - 24- low. ionized calcium - 1.19 - wnl and  iPTH - 94- 6/22/22. Vit D - 33 - 9/14/22 Note - HCTZ prev stopped.      DM - II  - Dx '02 - HA1c  -10.1 in sept.  Pt back on trulicity " "and it was inc to 3 mg wkly per Endo. Pt not taking her Metformin  mg twice daily, Prandin 1 mg TID with meals, or lantus 36 units QHS.  She is on Trulicity 3 mg/wk. She reports she was not fu for appts during the pandemic. Fu by Dr. Tilley- overdue for fu. Doing better since changed to Metformin ER - no further diarrhea.    She c/o burning in her feet to her knees.  Can try a trial of Gabapentin 100 mg QHs. Rec she resume her Prandin, And Insulin.  Will recheck Ha1c.  Will not resume Metformin if causing GI issues.     Unexplained wt loss - She reports she has been eating less and is not as hungry. "I don;t eat sweets anymore shelli I don't have a taste for it".  Wt down 19 lb since Aug. Planning for EGD/C-scope     Vit D def - 24 - 1/14/22. Pt not on suppl. She is fu by endo. She completed Ergocalciferol 50,000 units once weekly x 8 wks.  Pt now on OTC Vit D 3 2000 units one daily.      Mixed urinary Incontinence - Cystocele - Fu by  UroGyn, Dr. Coleman. Myrbetriq- couldn't afford. She prefers not to go back to Dr. Dinh.  She had  Prev bladder suspension in '11. Checked MRI Brain to r/o  NPH with abn gait, imbalance, memory issues, and Incontinence.  No evid of this on MRI. Pt asad takes her Elavil 10 mg QHS prn.  Referred to Pelvic Floor PTx.      Diaphragmatic hernia - No issues at present.  Will cont to monitor. She does have GERD. Rec smaller more freq meals.       Diverticulosis - No issues at present. Diarrhea resolved with changing to Metformin ER.       Imbalance -  I'm not wobbling as much any more and haven't fallen or had dizziness.  My mood swings are gone and it's much better .  I suspect this has improved since resuming her SSRI. "It doesn't happen that often".  She has not been missing doses.      Fatty Liver - Noted on CT abd/pelvis -2/22/20.   Rec she  limit tylenol and alcohol.  Rec diet and exercise for wt loss   As discussed at visit there is a potential for cirrhosis.      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     Small fat containing umbilical hernia and Small fat containing left inguinal hernia - Noted on chart review. Not palpated on today's exam.      Atherosclerosis aorta - Seen on prev CT scans.  Cont Statin.      Rectocele - Fu by Uro/GYN. Planning for Pelvic Floor PTx.      HCM - Flu -9/15/22;  Tdap - ? At [facility];  PCV 13 - 10/24/19;  PVX 23 - 3/1/21;  Shingrix -#1 - 10/24/19 and #2 -12/30/19- she thinks she had shingles in Jan. 2022 to Left shoulder/flank x 2 wks;  COVID - 19 Vaccine (Moderna) #1 - 2/15/21; #2 - 3/15/21; # 3 - 10/27/21; # 4 10/16/22;  Hep C Screen -2/7/23 - neg. +h/o CHUN II; C-scope - 7/25/18 - hemorrhoids and polyps - repeat 5 yrs At [facility] - planning for repeat with Dr. Jarvis; PAP - 5/25/22 - neg.; MGM - 7/7/22 - repeat 1 yr;  DEXA - has order from Endo; Prev P[place] - [place] Benito [place] [facility] [place] Endo - Dr Gonzales; Ophtho - Dr. Dawn; Optometry - Dr. Young; GI - Dr. Jarvis; Uro/GYN - ; OB/GYN - Dr. Frank; Podiatry - Dr. Young      Patient Care Team:  Farzana Dorman MD as PCP - General (Internal Medicine)  Ivy Wharton MD (Family Medicine)  Crystal Almanza, RD, CDE as Dietitian (Diabetes)  Heriberto Man MD as Consulting Physician (Gastroenterology)     Health Maintenance:  Immunization History   Administered Date(s) Administered    COVID-19, MRNA, LN-S, PF (MODERNA FULL 0.5 ML DOSE) 02/15/2021, 03/15/2021, 10/27/2021    COVID-19, mRNA, LNP-S, bivalent booster, PF (Moderna Omicron) 10/06/2022    Influenza (FLUAD) - Quadrivalent - Adjuvanted - PF *Preferred* (65+) 10/27/2021, 09/14/2022    Influenza - High Dose - PF (65 years and older) 10/24/2019    Influenza - Intradermal - Quadrivalent - PF 11/27/2013    Influenza - Quadrivalent - High Dose - PF (65 years and older) 09/08/2020    Influenza - Quadrivalent - PF *Preferred* (6 months and older) 10/15/2018     Influenza - Trivalent (ADULT) 10/24/2011, 10/17/2014, 10/23/2017    Influenza - Trivalent - PF (ADULT) 10/25/2016    Pneumococcal Conjugate - 13 Valent 10/24/2019    Pneumococcal Polysaccharide - 23 Valent 2015, 2021    Zoster Recombinant 10/24/2019, 2019      Health Maintenance   Topic Date Due    TETANUS VACCINE  Never done    DEXA Scan  2014    Hemoglobin A1c  2022    Eye Exam  2023    Mammogram  2023    Lipid Panel  2023    Foot Exam  10/04/2023    Hepatitis C Screening  Completed        Past Medical History:  Past Medical History:   Diagnosis Date    Anxiety     Bilateral leg edema 2021    Depression     Diabetes mellitus, type 2     Diverticulitis     GERD (gastroesophageal reflux disease)     Glaucoma     Hyperlipidemia     Hypertension     Nicotine dependence in remission     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       Past Surgical History:   has a past surgical history that includes vaginal mesh; Bladder suspension (2011); Glaucoma Laser Sx ou; Tonsillectomy; Cholecystectomy; Cataract extraction, bilateral; Oophorectomy; Eye surgery; Hysteroscopy with dilation and curettage of uterus (N/A, 2021); Cold knife conization of cervix (N/A, 2021); and Tubal ligation.    Family History:  family history includes Cancer in her brother; Cancer (age of onset: 68) in her father; Cataracts in her father, mother, and sister; Dementia in her mother; Diabetes in her daughter, sister, and son; Hypertension in her mother; No Known Problems in her son; Pancreatic cancer in her brother; Ulcers in her brother.     Social History:  Social History     Tobacco Use    Smoking status: Former     Packs/day: 2.00     Years: 25.00     Pack years: 50.00     Types: Cigarettes     Quit date:      Years since quittin.1    Smokeless tobacco: Never   Substance Use Topics    Alcohol use: Yes     Comment: social     Drug use: Never       Review of Systems  "  Constitutional:  Positive for diaphoresis. Negative for activity change, chills, fever and unexpected weight change.   HENT:  Negative for hearing loss, rhinorrhea and trouble swallowing.    Eyes:  Negative for discharge and visual disturbance.   Respiratory:  Negative for chest tightness and wheezing.    Cardiovascular:  Negative for chest pain and palpitations.   Gastrointestinal:  Positive for constipation. Negative for blood in stool, diarrhea, nausea and vomiting.   Endocrine: Positive for polydipsia.   Genitourinary:  Negative for bladder incontinence, difficulty urinating, dysuria, hematuria and menstrual problem.   Musculoskeletal:  Positive for arthralgias and joint swelling. Negative for neck pain.        Leg pains and foot cramps   Neurological:  Positive for numbness, headaches and memory loss. Negative for weakness.   Psychiatric/Behavioral:  Positive for confusion and dysphoric mood.         See HPI      Medications:    Current Outpatient Medications:     blood sugar diagnostic Strp, To check BG 2 times daily, to use with insurance preferred meter, Disp: 200 strip, Rfl: 3    blood-glucose meter kit, To check BG 2 times daily, to use with insurance preferred meter, Disp: 1 each, Rfl: 0    dulaglutide (TRULICITY) 3 mg/0.5 mL pen injector, Inject 3 mg into the skin every 7 days., Disp: 12 pen, Rfl: 3    insulin needles, disposable, (RELION PEN NEEDLES) 32 x 5/32 " Ndle, Use as directed, Disp: 50 each, Rfl: 6    lancets 33 gauge Misc, by Misc.(Non-Drug; Combo Route) route. True plus, Disp: , Rfl:     lancets Misc, To check BG 2 times daily, to use with insurance preferred meter, Disp: 200 each, Rfl: 11    LIDOCAINE 2 %, VALIUM 5 MG, BACLOFEN 4 % SUPPOSITORY, Place 1 suppository vaginally 2 (two) times daily as needed (pelvic pain)., Disp: 20 each, Rfl: 5    travoprost (TRAVATAN Z) 0.004 % ophthalmic solution, Place 1 drop into both eyes every evening., Disp: 7.5 each, Rfl: 3    amitriptyline (ELAVIL) 10 " MG tablet, Take 1 tablet (10 mg total) by mouth nightly. Can increase by 10 mg per week at nights as needed for max of 5 tabs (50 mg) per night. (Patient not taking: Reported on 2/24/2023), Disp: 45 tablet, Rfl: 11    atorvastatin (LIPITOR) 80 MG tablet, Take 1 tablet (80 mg total) by mouth once daily., Disp: 90 tablet, Rfl: 3    CONTOUR TEST STRIPS Strp, USE TO TEST BLOOD SUGAR TWICE DAILY (Patient not taking: Reported on 2/24/2023), Disp: 50 strip, Rfl: 3    ergocalciferol (ERGOCALCIFEROL) 50,000 unit Cap, Take 1 capsule (50,000 Units total) by mouth every 7 days., Disp: 12 capsule, Rfl: 1    estradioL (ESTRACE) 0.01 % (0.1 mg/gram) vaginal cream, Use 1 gram of estrogen cream in vagina nightly for 2 weeks, then twice a week thereafter. (Patient not taking: Reported on 2/24/2023), Disp: 42.5 g, Rfl: 3    furosemide (LASIX) 20 MG tablet, Take 1 tablet (20 mg total) by mouth once daily., Disp: 90 tablet, Rfl: 1    gabapentin (NEURONTIN) 100 MG capsule, Take 1 capsule (100 mg total) by mouth every evening., Disp: 90 capsule, Rfl: 1    insulin (LANTUS SOLOSTAR U-100 INSULIN) glargine 100 units/mL SubQ pen, 36 SC u QHS, Disp: , Rfl: 0    losartan (COZAAR) 100 MG tablet, Take 1 tablet (100 mg total) by mouth once daily., Disp: 90 tablet, Rfl: 1    metFORMIN (GLUCOPHAGE-XR) 500 MG ER 24hr tablet, TAKE 1 TABLET BY MOUTH TWICE DAILY (Patient not taking: Reported on 2/7/2023), Disp: 180 tablet, Rfl: 1    pantoprazole (PROTONIX) 40 MG tablet, Take 1 tablet (40 mg total) by mouth once daily., Disp: 30 tablet, Rfl: 2    paroxetine (PAXIL) 20 MG tablet, Take 1 & 1/2 tablets (30 mg total) by mouth once daily., Disp: 90 tablet, Rfl: 1    phenazopyridine (PYRIDIUM) 200 MG tablet, Take 1 tablet (200 mg total) by mouth 3 (three) times daily as needed for Pain. (Patient not taking: Reported on 2/24/2023), Disp: 10 tablet, Rfl: 0    potassium chloride SA (K-DUR,KLOR-CON M) 10 MEQ tablet, Take 1 tablet (10 mEq total) by mouth once  "daily., Disp: 90 tablet, Rfl: 1    repaglinide (PRANDIN) 1 MG tablet, Take 1 tablet (1 mg total) by mouth 3 (three) times daily before meals. (Patient not taking: Reported on 2/24/2023), Disp: 270 tablet, Rfl: 3     Allergies:  Review of patient's allergies indicates:  No Known Allergies    Physical Exam      Vital Signs  Temp: 98.2 °F (36.8 °C)  Temp Source: Oral  Pulse: 99  Resp: 18  SpO2: 99 %  BP: 130/74  BP Location: Right arm  Patient Position: Sitting  Pain Score: 0-No pain  Height and Weight  Height: 5' 7" (170.2 cm)  Weight: 79 kg (174 lb 2.6 oz)  BSA (Calculated - sq m): 1.93 sq meters  BMI (Calculated): 27.3  Weight in (lb) to have BMI = 25: 159.3      Patient Position: Sitting      Physical Exam  Vitals reviewed.   Constitutional:       General: She is not in acute distress.     Appearance: Normal appearance. She is not ill-appearing, toxic-appearing or diaphoretic.   HENT:      Head: Normocephalic and atraumatic.      Right Ear: Tympanic membrane normal.      Left Ear: Tympanic membrane normal.   Eyes:      Extraocular Movements: Extraocular movements intact.      Conjunctiva/sclera: Conjunctivae normal.      Pupils: Pupils are equal, round, and reactive to light.   Neck:      Vascular: No carotid bruit.   Cardiovascular:      Rate and Rhythm: Normal rate and regular rhythm.      Pulses: Normal pulses.      Heart sounds: Normal heart sounds.   Pulmonary:      Effort: No respiratory distress.      Breath sounds: Normal breath sounds. No wheezing.   Abdominal:      General: Bowel sounds are normal. There is no distension.      Palpations: Abdomen is soft.      Tenderness: There is no abdominal tenderness. There is no guarding or rebound.   Musculoskeletal:         General: Normal range of motion.   Skin:     General: Skin is warm.      Comments: Left neck - Epidermal inclusion cyst with open comedone.   Neurological:      General: No focal deficit present.      Mental Status: She is alert and oriented to " person, place, and time.   Psychiatric:         Mood and Affect: Mood normal.         Behavior: Behavior normal.        Laboratory:  CBC:  Recent Labs   Lab 04/06/21  0940   WBC 8.19   RBC 4.66   Hemoglobin 12.6   Hematocrit 39.5   Platelets 357   MCV 85   MCH 27.0   MCHC 31.9 L       CMP:  Recent Labs   Lab 10/08/21  0932 01/14/22  0929 03/23/22  1156 06/22/22  1110   Glucose 158 H 143 H  --  241 H   Calcium 10.0 10.5  --  10.3   Albumin 3.8 4.3  --  4.1   Total Protein 7.6 7.7  --  7.7   Sodium 141 143  --  139   Potassium 3.6 4.7  --  4.3   CO2 24 25  --  27   Chloride 106 105  --  103   BUN 9 12  --  8   Creatinine 0.8 0.8   < > 0.8   Alkaline Phosphatase 100 100  --  118   ALT 18 20  --  23   AST 25 26  --  26   Total Bilirubin 0.4 0.7  --  0.4    < > = values in this interval not displayed.       URINALYSIS:  Recent Labs   Lab 04/06/21  0913 10/11/22  1532   Color, UA Yellow Yellow   Specific Gravity, UA 1.025 1.025   pH, UA 7.0 6.0   Protein, UA Negative Negative   Bacteria  --  Many A   Nitrite, UA Negative Negative   Leukocytes, UA Negative 2+ A   Urobilinogen, UA Negative  --         LIPIDS:  Recent Labs   Lab 04/06/21  0940 07/19/21  0832 06/22/22  1110 09/14/22  1240   TSH 2.100  --  0.871  --    HDL 37 L 32 L  --  32 L   Cholesterol 253 H 155  --  194   Triglycerides 397 H 151 H  --  272 H   LDL Cholesterol 136.6 92.8  --  107.6   HDL/Cholesterol Ratio 14.6 L 20.6  --  16.5 L   Non-HDL Cholesterol 216 123  --  162   Total Cholesterol/HDL Ratio 6.8 H 4.8  --  6.1 H       TSH:  Recent Labs   Lab 04/06/21  0940 06/22/22  1110   TSH 2.100 0.871       A1C:  Recent Labs   Lab 04/06/21  0940 07/09/21  0839 10/08/21  0932 01/14/22  0929 06/22/22  1110 09/14/22  1240   Hemoglobin A1C 8.2 H 9.5 H 10.6 H 8.6 H 10.9 H 10.1 H       Urine Microalbumin/Cr:  Recent Labs   Lab 04/06/21  0913 06/22/22  1228   Microalb/Creat Ratio 8.8 56.5 H         Other:   Recent Labs   Lab 06/08/21  0953 06/28/21  1140 01/14/22  0929  09/14/22  1240   Estradiol  --  13  --   --    Vit D, 25-Hydroxy  --   --  24 L 33   Vitamin B-12 >2000 H  --   --   --    Ferritin  --   --  34  --      Recent Labs   Lab 02/07/23  1119   Hepatitis C Ab Non-reactive       Assessment/Plan     Dee Hamilton is a 67 y.o.female with:    Type 2 diabetes mellitus with diabetic neuropathy, with long-term current use of insulin  -     gabapentin (NEURONTIN) 100 MG capsule; Take 1 capsule (100 mg total) by mouth every evening.  Dispense: 90 capsule; Refill: 1  -     Hemoglobin A1C; Future; Expected date: 02/24/2023  -     Comprehensive Metabolic Panel; Future; Expected date: 02/24/2023  -     Microalbumin/Creatinine Ratio, Urine  - Uncontroleld. Pt not complaint with meds.  Resume meds as abov.e Cont to hold metformin due to GI s.E. Repeat labs.  Get back in with endo.  Discussed imp. Of compliance with pt.  Also d/w pt imp. of calling with medication issues.    Hypertension, unspecified type  -     losartan (COZAAR) 100 MG tablet; Take 1 tablet (100 mg total) by mouth once daily.  Dispense: 90 tablet; Refill: 1  -     furosemide (LASIX) 20 MG tablet; Take 1 tablet (20 mg total) by mouth once daily.  Dispense: 90 tablet; Refill: 1  -     CBC Auto Differential; Future; Expected date: 02/24/2023  - Controlled.  Cont current.     Hyperlipidemia associated with type 2 diabetes mellitus  -     atorvastatin (LIPITOR) 80 MG tablet; Take 1 tablet (80 mg total) by mouth once daily.  Dispense: 90 tablet; Refill: 3  - Stable.  Cont current regimen.    Anxiety  -     paroxetine (PAXIL) 20 MG tablet; Take 1 & 1/2 tablets (30 mg total) by mouth once daily.  Dispense: 90 tablet; Refill: 1  - Resume Paxil at 20 mg/d.  D/w pt withdrawals if stops abruptly. Refer to Psych.     Episode of recurrent major depressive disorder, unspecified depression episode severity  -     paroxetine (PAXIL) 20 MG tablet; Take 1 & 1/2 tablets (30 mg total) by mouth once daily.  Dispense: 90 tablet;  Refill: 1  -     Ambulatory referral/consult to Psychiatry; Future; Expected date: 03/03/2023  - Resume Paxil at 20 mg/d.  D/w pt withdrawals if stops abruptly. Refer to Psych. Suspect noncompliance contributed to by her uncontrolled depression.     Bilateral leg edema  -     furosemide (LASIX) 20 MG tablet; Take 1 tablet (20 mg total) by mouth once daily.  Dispense: 90 tablet; Refill: 1  - Stable.  Cont current regimen.    Unexplained weight loss  -     TSH; Future; Expected date: 02/24/2023  -     T4, Free; Future; Expected date: 02/24/2023  - Check TFT's.  Pt sched for EGD/C-scope. Cont. To monitor.     Memory impairment  -     Ambulatory referral/consult to Adult Neuropsychology; Future; Expected date: 03/03/2023  - Refer to NeuroPsych for formal testing.     Epidermal inclusion cyst  -     Ambulatory referral/consult to Dermatology; Future; Expected date: 03/03/2023  -Refer to Derm. Pt reports it bothers her and is sometimes painful.     Atherosclerosis of aorta  - Rec better Glc control. Cont statin.     Hyperparathyroidism  - Stable.       On potassium wasting diuretic therapy  -     potassium chloride SA (K-DUR,KLOR-CON M) 10 MEQ tablet; Take 1 tablet (10 mEq total) by mouth once daily.  Dispense: 90 tablet; Refill: 1  - Stable.  Cont current regimen.    Fatty liver  - Rec limit tylenol and alcohol.  Rec diet and exercise for wt loss.       Noncompliance  - Rec compliance with all meds, labs, and fu.      Other orders  -     insulin (LANTUS SOLOSTAR U-100 INSULIN) glargine 100 units/mL SubQ pen; 36 SC u QHS; Refill: 0         Chronic conditions status updated as per HPI.  Other than changes above, cont current medications and maintain follow up with specialists.   Follow up in about 2 months (around 4/24/2023).      Farzana Dorman MD  [facility]

## 2023-02-27 ENCOUNTER — TELEPHONE (OUTPATIENT)
Dept: NEUROLOGY | Facility: CLINIC | Age: 68
End: 2023-02-27
Payer: MEDICARE

## 2023-02-27 DIAGNOSIS — E87.6 HYPOKALEMIA: Primary | ICD-10-CM

## 2023-02-27 NOTE — PROGRESS NOTES
I sent pt a my chart message -  I reviewed your  labs.  Your Ha1c was very uncontrolled at 11.9.  This is due to you not being on your medications. Since resuming these we will monitor your glucose readings at home and see if and how we need to further adjust. Your thyroid functions are normal.  Your kidney function and liver functions looked good.  You are not anemic. Your potassium was slightly low at 3.3.  Please take a 2nd dose of your potassium pill (so 2 tabs once daily or the next 3 days and then decrease back to one pill daily).   We will repeat your potassium in 1 wk. No further recommendations at this time.    Dr. BRUSH

## 2023-02-27 NOTE — PROGRESS NOTES
I sent pt a my chart message -  Good news! I reviewed your labs and you had no evidence of microalbuminuria (small protein in your urine from your Diabetes).       Dr. BRUSH

## 2023-03-02 ENCOUNTER — TELEPHONE (OUTPATIENT)
Dept: PRIMARY CARE CLINIC | Facility: CLINIC | Age: 68
End: 2023-03-02
Payer: MEDICARE

## 2023-03-02 NOTE — LETTER
"March 2, 2023    Dee Hamilton  [address] [address]             [facility]  [address] [facility] [address]  Phone: [phone]  Fax: [phone] Dear Mrs. Hamilton:    Below are the results from your recent visit:    Resulted Orders   Microalbumin/Creatinine Ratio, Urine   Result Value Ref Range    Microalbumin, Urine <5.0 ug/mL    Creatinine, Urine 12.0 (L) 15.0 - 325.0 mg/dL    Microalb/Creat Ratio Unable to calculate 0.0 - 30.0 ug/mg    Narrative    Specimen Source->Urine     We have tried to reach you several times to go over your results and 's recommendations. Per ,"I reviewed your  labs.  Your Ha1c was very uncontrolled at 11.9.  This is due to you not being on your medications. Since resuming these we will monitor your glucose readings at home and see if and how we need to further adjust. Your thyroid functions are normal.  Your kidney function and liver functions looked good.  You are not anemic. Your potassium was slightly low at 3.3.  Please take a 2nd dose of your potassium pill (so 2 tabs once daily or the next 3 days and then decrease back to one pill daily).   We will repeat your potassium in 1 week. No further recommendations at this time. You also had no evidence of protein in your urine for your diabetes." Please contact our office to schedule a lab appointment in 1 week.     Sincerely,        Farzana Dorman MD      "

## 2023-03-02 NOTE — TELEPHONE ENCOUNTER
----- Message from Farzana Dorman MD sent at 2/27/2023  5:48 AM CST -----  I sent pt a my chart message -  Good news! I reviewed your labs and you had no evidence of microalbuminuria (small protein in your urine from your Diabetes).       Dr. BRUSH

## 2023-03-10 ENCOUNTER — TELEPHONE (OUTPATIENT)
Dept: PRIMARY CARE CLINIC | Facility: CLINIC | Age: 68
End: 2023-03-10
Payer: MEDICARE

## 2023-03-10 NOTE — LETTER
March 10, 2023    Dee Hamilton  [address] [address] [id number]             [facility]  [address] [facility] [address]  Phone: [phone]  Fax: [phone] Dear Mrs. Dee Hamilton:    Below are the results from your recent visit:    Resulted Orders   DXA Bone Density Spine And Hip    Narrative    EXAMINATION:  DXA BONE DENSITY AXIAL SKELETON 1 OR MORE SITES    CLINICAL HISTORY:  Asymptomatic menopausal state    TECHNIQUE:  DXA scanning was performed over the left hip and lumbar spine.  Review of the images confirms satisfactory positioning and technique.    COMPARISON:  09/01/2011.    FINDINGS:  The L1 to L4 vertebral bone mineral density is equal to 0.831 g/cm squared with a T score of -2.0.    The left femoral neck bone mineral density is equal to 0.677 g/cm squared with a T score of -1.5.    The total hip bone mineral density is equal to 0.769 g/cm squared with a T score of -1.4.    There is a 9.7% risk of a major osteoporotic fracture and a 1.3% risk of hip fracture in the next 10 years (FRAX).      Impression    1.  Osteopenia    2. 9.7% risk of a major osteoporotic fracture and a 1.3% risk of hip fracture in the next 10 years (FRAX).    3.  Consider repeat examination in 2-3 years, as clinically warranted.    Consider FDA approved medical therapies in postmenopausal women and men aged 50 years and older, based on the following:    *A hip or vertebral (clinical or morphometric) fracture  *T score less than or equal to -2.5 at the femoral neck or spine after appropriate evaluation to exclude secondary causes.  *Low bone mass -- also known as osteopenia (T score between -1.0 and -2.5 at the femoral neck or spine) and a 10 year probability of hip fracture greater than or equal to 3% or a 10 year probability of major osteoporosis-related fracture greater than or equal to 20% based on the [place]-adapted WHO algorithm.  *Clinicians judgment and/or patient  preference may indicate treatment for people with 10 year fracture probabilities is above or below these levels.      Electronically signed by: Matias Wills MD  Date:    03/08/2023  Time:    12:58     I reviewed your  DEXA/Bone Density which showed osteopenia. I recommend you start over the coutner Calcium max 1000 mg/day and continue Vit D3 2000 units/day over the counter.  In addition, I recommend weight-bearing  exercise at least 30 minutes 3 times/week and ideally 5 times/week.  No specific intensity.  Walking is fine. I just recommend you pick a form of weight-bearing exercise you enjoy to facilitate long term compliance and benefit. We can repeat your DEXA in 2-3 years.       Sincerely,        Farzana Dorman MD

## 2023-03-10 NOTE — TELEPHONE ENCOUNTER
Lutherm x2 for pt to contact our office to discuss dexa. Mailed result letter with 's note to pts home.

## 2023-03-10 NOTE — TELEPHONE ENCOUNTER
----- Message from Farzana Dorman MD sent at 3/8/2023  2:21 PM CST -----  I sent pt a my chart message -  I reviewed your  DEXA/Bone Density which showed osteopenia. I recommend you start OTC Calcium max 1000 mg/d and continue Vit D3 2000 units/d OTC.  In addition, I rec weight-bearing  exercise at least 30 minutes 3 times/wk and ideally 5 times/wk.  No specific intensity.  Walking is fine. I just rec you pick a form of weight-bearing exercise you enjoy to facilitate long term compliance and benefit. We can repeat your DEXA in 2-3 yrs.    Dr. BRUSH

## 2023-03-14 ENCOUNTER — PATIENT MESSAGE (OUTPATIENT)
Dept: ADMINISTRATIVE | Facility: OTHER | Age: 68
End: 2023-03-14
Payer: MEDICARE

## 2023-04-03 ENCOUNTER — PATIENT MESSAGE (OUTPATIENT)
Dept: ADMINISTRATIVE | Facility: HOSPITAL | Age: 68
End: 2023-04-03
Payer: MEDICARE

## 2023-04-12 ENCOUNTER — PATIENT MESSAGE (OUTPATIENT)
Dept: INFECTIOUS DISEASES | Facility: CLINIC | Age: 68
End: 2023-04-12
Payer: MEDICARE

## 2023-04-17 ENCOUNTER — TELEPHONE (OUTPATIENT)
Dept: PRIMARY CARE CLINIC | Facility: CLINIC | Age: 68
End: 2023-04-17
Payer: MEDICARE

## 2023-04-17 NOTE — TELEPHONE ENCOUNTER
----- Message from Zafar Dalton sent at 4/17/2023 11:37 AM CDT -----  Contact: patient/ [phone]  Patient calling to speak with you regarding shingles coming back and painful  Please call back to assist [phone]

## 2023-04-18 ENCOUNTER — LAB VISIT (OUTPATIENT)
Dept: LAB | Facility: HOSPITAL | Age: 68
End: 2023-04-18
Attending: INTERNAL MEDICINE
Payer: MEDICARE

## 2023-04-18 ENCOUNTER — OFFICE VISIT (OUTPATIENT)
Dept: PRIMARY CARE CLINIC | Facility: CLINIC | Age: 68
End: 2023-04-18
Payer: MEDICARE

## 2023-04-18 VITALS
TEMPERATURE: 98 F | BODY MASS INDEX: 28.34 KG/M2 | HEART RATE: 88 BPM | OXYGEN SATURATION: 96 % | WEIGHT: 180.56 LBS | DIASTOLIC BLOOD PRESSURE: 78 MMHG | RESPIRATION RATE: 20 BRPM | SYSTOLIC BLOOD PRESSURE: 136 MMHG | HEIGHT: 67 IN

## 2023-04-18 DIAGNOSIS — E87.6 HYPOKALEMIA: ICD-10-CM

## 2023-04-18 DIAGNOSIS — E78.5 HYPERLIPIDEMIA ASSOCIATED WITH TYPE 2 DIABETES MELLITUS: ICD-10-CM

## 2023-04-18 DIAGNOSIS — R26.89 IMBALANCE: ICD-10-CM

## 2023-04-18 DIAGNOSIS — M85.80 OSTEOPENIA, UNSPECIFIED LOCATION: ICD-10-CM

## 2023-04-18 DIAGNOSIS — I10 HYPERTENSION, UNSPECIFIED TYPE: ICD-10-CM

## 2023-04-18 DIAGNOSIS — R42 DIZZINESS AND GIDDINESS: ICD-10-CM

## 2023-04-18 DIAGNOSIS — B37.2 CANDIDAL INTERTRIGO: ICD-10-CM

## 2023-04-18 DIAGNOSIS — E11.40 TYPE 2 DIABETES MELLITUS WITH DIABETIC NEUROPATHY, WITH LONG-TERM CURRENT USE OF INSULIN: Primary | ICD-10-CM

## 2023-04-18 DIAGNOSIS — Z79.4 TYPE 2 DIABETES MELLITUS WITH DIABETIC NEUROPATHY, WITH LONG-TERM CURRENT USE OF INSULIN: Primary | ICD-10-CM

## 2023-04-18 DIAGNOSIS — F41.9 ANXIETY: ICD-10-CM

## 2023-04-18 DIAGNOSIS — K76.0 FATTY LIVER: ICD-10-CM

## 2023-04-18 DIAGNOSIS — R41.3 MEMORY IMPAIRMENT: ICD-10-CM

## 2023-04-18 DIAGNOSIS — F33.9 EPISODE OF RECURRENT MAJOR DEPRESSIVE DISORDER, UNSPECIFIED DEPRESSION EPISODE SEVERITY: ICD-10-CM

## 2023-04-18 DIAGNOSIS — E11.69 HYPERLIPIDEMIA ASSOCIATED WITH TYPE 2 DIABETES MELLITUS: ICD-10-CM

## 2023-04-18 LAB
FOLATE SERPL-MCNC: 5.8 NG/ML (ref 4–24)
POTASSIUM SERPL-SCNC: 3.4 MMOL/L (ref 3.5–5.1)
VIT B12 SERPL-MCNC: 605 PG/ML (ref 210–950)

## 2023-04-18 PROCEDURE — 4010F ACE/ARB THERAPY RXD/TAKEN: CPT | Mod: CPTII,S$GLB,, | Performed by: INTERNAL MEDICINE

## 2023-04-18 PROCEDURE — 3066F NEPHROPATHY DOC TX: CPT | Mod: CPTII,S$GLB,, | Performed by: INTERNAL MEDICINE

## 2023-04-18 PROCEDURE — 82607 VITAMIN B-12: CPT | Performed by: INTERNAL MEDICINE

## 2023-04-18 PROCEDURE — 99214 OFFICE O/P EST MOD 30 MIN: CPT | Mod: S$GLB,,, | Performed by: INTERNAL MEDICINE

## 2023-04-18 PROCEDURE — 99214 PR OFFICE/OUTPT VISIT, EST, LEVL IV, 30-39 MIN: ICD-10-PCS | Mod: S$GLB,,, | Performed by: INTERNAL MEDICINE

## 2023-04-18 PROCEDURE — 3008F BODY MASS INDEX DOCD: CPT | Mod: CPTII,S$GLB,, | Performed by: INTERNAL MEDICINE

## 2023-04-18 PROCEDURE — 99999 PR PBB SHADOW E&M-EST. PATIENT-LVL V: CPT | Mod: PBBFAC,,, | Performed by: INTERNAL MEDICINE

## 2023-04-18 PROCEDURE — 1159F MED LIST DOCD IN RCRD: CPT | Mod: CPTII,S$GLB,, | Performed by: INTERNAL MEDICINE

## 2023-04-18 PROCEDURE — 3078F DIAST BP <80 MM HG: CPT | Mod: CPTII,S$GLB,, | Performed by: INTERNAL MEDICINE

## 2023-04-18 PROCEDURE — 1126F AMNT PAIN NOTED NONE PRSNT: CPT | Mod: CPTII,S$GLB,, | Performed by: INTERNAL MEDICINE

## 2023-04-18 PROCEDURE — 36415 COLL VENOUS BLD VENIPUNCTURE: CPT | Mod: PN | Performed by: INTERNAL MEDICINE

## 2023-04-18 PROCEDURE — 1159F PR MEDICATION LIST DOCUMENTED IN MEDICAL RECORD: ICD-10-PCS | Mod: CPTII,S$GLB,, | Performed by: INTERNAL MEDICINE

## 2023-04-18 PROCEDURE — 1101F PR PT FALLS ASSESS DOC 0-1 FALLS W/OUT INJ PAST YR: ICD-10-PCS | Mod: CPTII,S$GLB,, | Performed by: INTERNAL MEDICINE

## 2023-04-18 PROCEDURE — 3075F SYST BP GE 130 - 139MM HG: CPT | Mod: CPTII,S$GLB,, | Performed by: INTERNAL MEDICINE

## 2023-04-18 PROCEDURE — 3075F PR MOST RECENT SYSTOLIC BLOOD PRESS GE 130-139MM HG: ICD-10-PCS | Mod: CPTII,S$GLB,, | Performed by: INTERNAL MEDICINE

## 2023-04-18 PROCEDURE — 82746 ASSAY OF FOLIC ACID SERUM: CPT | Performed by: INTERNAL MEDICINE

## 2023-04-18 PROCEDURE — 3288F PR FALLS RISK ASSESSMENT DOCUMENTED: ICD-10-PCS | Mod: CPTII,S$GLB,, | Performed by: INTERNAL MEDICINE

## 2023-04-18 PROCEDURE — 3046F PR MOST RECENT HEMOGLOBIN A1C LEVEL > 9.0%: ICD-10-PCS | Mod: CPTII,S$GLB,, | Performed by: INTERNAL MEDICINE

## 2023-04-18 PROCEDURE — 3288F FALL RISK ASSESSMENT DOCD: CPT | Mod: CPTII,S$GLB,, | Performed by: INTERNAL MEDICINE

## 2023-04-18 PROCEDURE — 4010F PR ACE/ARB THEARPY RXD/TAKEN: ICD-10-PCS | Mod: CPTII,S$GLB,, | Performed by: INTERNAL MEDICINE

## 2023-04-18 PROCEDURE — 99999 PR PBB SHADOW E&M-EST. PATIENT-LVL V: ICD-10-PCS | Mod: PBBFAC,,, | Performed by: INTERNAL MEDICINE

## 2023-04-18 PROCEDURE — 3066F PR DOCUMENTATION OF TREATMENT FOR NEPHROPATHY: ICD-10-PCS | Mod: CPTII,S$GLB,, | Performed by: INTERNAL MEDICINE

## 2023-04-18 PROCEDURE — 3061F NEG MICROALBUMINURIA REV: CPT | Mod: CPTII,S$GLB,, | Performed by: INTERNAL MEDICINE

## 2023-04-18 PROCEDURE — 1126F PR PAIN SEVERITY QUANTIFIED, NO PAIN PRESENT: ICD-10-PCS | Mod: CPTII,S$GLB,, | Performed by: INTERNAL MEDICINE

## 2023-04-18 PROCEDURE — 3008F PR BODY MASS INDEX (BMI) DOCUMENTED: ICD-10-PCS | Mod: CPTII,S$GLB,, | Performed by: INTERNAL MEDICINE

## 2023-04-18 PROCEDURE — 1101F PT FALLS ASSESS-DOCD LE1/YR: CPT | Mod: CPTII,S$GLB,, | Performed by: INTERNAL MEDICINE

## 2023-04-18 PROCEDURE — 84132 ASSAY OF SERUM POTASSIUM: CPT | Performed by: INTERNAL MEDICINE

## 2023-04-18 PROCEDURE — 3046F HEMOGLOBIN A1C LEVEL >9.0%: CPT | Mod: CPTII,S$GLB,, | Performed by: INTERNAL MEDICINE

## 2023-04-18 PROCEDURE — 3061F PR NEG MICROALBUMINURIA RESULT DOCUMENTED/REVIEW: ICD-10-PCS | Mod: CPTII,S$GLB,, | Performed by: INTERNAL MEDICINE

## 2023-04-18 PROCEDURE — 3078F PR MOST RECENT DIASTOLIC BLOOD PRESSURE < 80 MM HG: ICD-10-PCS | Mod: CPTII,S$GLB,, | Performed by: INTERNAL MEDICINE

## 2023-04-18 RX ORDER — ERGOCALCIFEROL 1.25 MG/1
50000 CAPSULE ORAL
Qty: 3 CAPSULE | Refills: 3 | Status: SHIPPED | OUTPATIENT
Start: 2023-04-18 | End: 2024-03-26 | Stop reason: SDUPTHER

## 2023-04-18 RX ORDER — NYSTATIN 100000 [USP'U]/G
POWDER TOPICAL 4 TIMES DAILY
Qty: 60 G | Refills: 1 | Status: SHIPPED | OUTPATIENT
Start: 2023-04-18 | End: 2023-08-15

## 2023-04-18 NOTE — PROGRESS NOTES
"[facility] Note    Chief Complaint      Chief Complaint   Patient presents with    Herpes Zoster     Shingles rash on stomach, now cleared       History of Present Illness      Dee Hamilton is a 67 y.o.  F with Anxiety, Depression, Glaucoma, HLD, Hypercalcemia. Last visit - 2/24/23    Candida intertrigo- Rec drying well after bathing/showering.  Will Rx Nystatin powder and if no help will change to Clotrimazole cream.     Hypokalemia - Potassium was slightly low at 3.3. I had her take a 2nd dose of your potassium pill (so 2 tabs once daily or the next 3 days and then decrease back to one pill daily).   We will repeat your potassium.      Osteopenia - DEXA/Bone Density which showed osteopenia. I recommend you start OTC Calcium max 1000 mg/d and continue Vit D3 2000 units/d OTC.  In addition, I rec weight-bearing  exercise at least 30 minutes 3 times/wk and ideally 5 times/wk.  No specific intensity.  Walking is fine. I just rec you pick a form of weight-bearing exercise you enjoy to facilitate long term compliance and benefit. We can repeat DEXA in 2-3 yrs.     Noncompliance - Pt stopped her Metformin due to Ha and abd pain, Prandin.  "It just felt like every day I get up and I'm just there".  Sounds like she is depressed.  She is overdue for her Endo fu with Dr. Escoto. I will reach out to his office to see if he can get her in given she has stopped most of her meds.  We discussed the imp. Of compliance and calling with any concerns. She is not taking her Protonix G I just Rx for her. We resumed  Prandin, and Lantus and will cont.Trulicitiy.  Will not resume Metformin at this time since it made her feel poorly.      Memory issues - I forget where I put things off and on. Will cont to isaiahior.  Refer toNeuroPsych referral. She writes things in her note book. no evid of NPH on MRI. Pt reports imbalance, Incontinence, and memory issues. She has known Mixed urinary incontinence and had a prev " "bladder supsension. Carotid U/S - 6/1/21 - no evid of sig stenosis. Vitamin B1 level and it was normal.  Vitamin b12 was elevated. Her folic acid was normal.    has noticed she is more forgetful in doing chores around the house (like emptying the  or taking the clothes out of the dryer". Pt scored 27/30 on MMSE - 8/11/22. If someone has to ask her how she is she has to stop and think about how to respond so she has the words in her mind to answer correctly. "My tooth brush may be in the refrigerator.  Sometimes I have to remember how to drive so I stop driving". Note -she has multiple pill bottles for Atorvastatin/Repaglinide which are very full and it does not appear she has been taking these regularly.      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 ECC neg. EMBx insufficient. Pt is s/p hysteroscopy/D&C and CKC 9/29/21.  Has upcoming fu to discuss poss. Hys. + Spotting. CT abd/Pelvis - 3/25/22 -Punctate low-density foci in both kidneys too small to adequately characterize.   Colonic diverticulosis with no evidence for acute diverticulitis. No further recommendations Pelvic U/S - 5/31/22- wnl      Anxiety - She is back on her Paroxetine 30 mg/d.    D/w pt withdrawal effects with abrupt discontinuation. Referred to Psychiatry but she never got to go because her appt got cancelled twice at [facility].   "I go down a rabbit hole every now and then". It's depression more so than Anxiety because she doesn't go out much".  Pt talks to someone who is a friend of her daughter which has helped. She would like a support animal but her  does not like dogs. Playing with the neighbor's dog is helpful.  Prev fu by DUANEW, Jonna Kendall.  Referred to Psychiatry. Her  is ill and she has a new grandson and she puts everyone before herself. Her  was just dx with Cancer.      Depression - She is taking her Paroxetine 30 mg/d.  D/w pt withdrawal effects with abrupt " "discontinuation.  Referred to Psychiatry.  Pt talks to someone who is a friend of her daughter which has helped.   "It comes and goes". She has been meditating which helps. "My son is a trigger".         Glaucoma - Fu by [facility]. Fu by Dr. Nereyda De Anda in Apr.  Planning for visual field testing in Aug.      HLD -  Cholesterol remains high on Atorvastatin 80mg daily.  This is the max dose of this medication. We could consider changing  to Crestor 40 mg QHS or possibly adding zetia to the Atorvastatin. Goal LDL (bad cholesterol) is < 70.       Hypercalcemia/Hyperparathyroidism -  Fu by [facility], Dr. Tilley.  Vit D low normal - 24- low. ionized calcium - 1.19 - wnl and  iPTH - 94- 6/22/22. Vit D - 33 - 9/14/22 Note - HCTZ prev stopped.      DM - II  - Dx '02 - Ha1c was very uncontrolled at 11.9.  This is due to you not being on your medications. Since resuming these we will monitor your glucose readings at home and see if and how we need to further adjust.  Pt back on trulicity  3 mg wkly per Endo. Pt back  taking Prandin 1 mg TID with meals,  lantus 36 units QHS.  She reports she was not fu for appts during the pandemic. Fu by Dr. Tilley- overdue for fu. Doing better since changed to Metformin ER - no further diarrhea.  She c/o burning in her feet to her knees.  Pt on a trial of Gabapentin 100 mg QHs.   Will not resume Metformin if causing GI issues. no evidence of microalbuminuria.      Unexplained wt loss -Resolved - Up 6 lb since last visit.  She reports she has been eating less and is not as hungry. "I don;t eat sweets anymore shelli I don't have a taste for it".  Wt down 19 lb since Aug. Planning for EGD/C-scope     Vit D def - 24 - 1/14/22. Pt not on suppl. She is fu by endo. She completed Ergocalciferol 50,000 units once weekly x 8 wks.  Pt forgets to take her meds.  Will change to once monthly Ergo.      Mixed urinary Incontinence - Cystocele - Fu by  UroGyn, Dr. Coleman. Myrbetriq- couldn't afford. She prefers not to " go back to Dr. Dinh.  She had  Prev bladder suspension in '11. Checked MRI Brain to r/o  NPH with abn gait, imbalance, memory issues, and Incontinence.  No evid of this on MRI. Pt only takes her Elavil 10 mg QHS prn.  Referred to Pelvic Floor PTx.      Diaphragmatic hernia - No issues at present.  Will cont to monitor. She does have GERD. Rec smaller more freq meals.       Diverticulosis - No issues at present. Diarrhea resolved with changing to Metformin ER.       Imbalance -  I'm not wobbling as much any more and haven't fallen or had dizziness.  My mood swings are gone and it's much better .  I suspect this has improved since resuming her SSRI. She describes it as bouncing when she walks but then describes walking into things and being off balance. She states she has not been missing doses.      Fatty Liver - Noted on CT abd/pelvis -2/22/20.   Rec she  limit tylenol and alcohol.  Rec diet and exercise for wt loss   As discussed at visit there is a potential for cirrhosis.      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     Small fat containing umbilical hernia and Small fat containing left inguinal hernia - Noted on chart review. Not palpated on today's exam.      Atherosclerosis aorta - Seen on prev CT scans.  Cont Statin.      Rectocele - Fu by Uro/GYN. Planning for Pelvic Floor PTx.      HCM - Flu -9/15/22;  Tdap - ? At [facility];  PCV 13 - 10/24/19;  PVX 23 - 3/1/21;  Shingrix -#1 - 10/24/19 and #2 -12/30/19- she thinks she had shingles in Jan. 2022 to Left shoulder/flank x 2 wks;  COVID - 19 Vaccine (Moderna) #1 - 2/15/21; #2 - 3/15/21; # 3 - 10/27/21; # 4 10/16/22;  Hep C Screen -2/7/23 - neg. +h/o CHUN II; C-scope - 7/25/18 - hemorrhoids and polyps - repeat 5 yrs At [facility] - planning for repeat with Dr. Jarvis; PAP - 5/25/22 - neg.; MGM - 7/7/22 - repeat 1 yr;  DEXA - has order from Endo; [place] [facility] Endo - Dr Gonzales; Ophtho - Dr. Dawn; Optometry - Dr. Young; GI - Dr. Jarvis; Uro/GYN - ; OB/GYN - Dr. Frank; Podiatry - Dr. Young      Patient Care Team:  Farzana Dorman MD as PCP - General (Internal Medicine)  Ivy Wharton MD (Family Medicine)  Crystal Almanza RD, CDE as Dietitian (Diabetes)  Heriberto Man MD as Consulting Physician (Gastroenterology)     Health Maintenance:  Immunization History   Administered Date(s) Administered    COVID-19, MRNA, LN-S, PF (MODERNA FULL 0.5 ML DOSE) 02/15/2021, 03/15/2021, 10/27/2021    COVID-19, mRNA, LNP-S, bivalent booster, PF (Moderna Omicron) 10/06/2022    Influenza (FLUAD) - Quadrivalent - Adjuvanted - PF *Preferred* (65+) 10/27/2021, 09/14/2022    Influenza - High Dose - PF (65 years and older) 10/24/2019    Influenza - Intradermal - Quadrivalent - PF 11/27/2013    Influenza - Quadrivalent - High Dose - PF (65 years and older) 09/08/2020    Influenza - Quadrivalent - PF *Preferred* (6 months and older) 10/15/2018    Influenza - Trivalent (ADULT) 10/24/2011, 10/17/2014, 10/23/2017    Influenza - Trivalent - PF (ADULT) 10/25/2016    Pneumococcal Conjugate - 13 Valent 10/24/2019    Pneumococcal Polysaccharide - 23 Valent 11/16/2015, 03/01/2021    Zoster Recombinant 10/24/2019, 12/30/2019      Health Maintenance   Topic Date Due    TETANUS VACCINE  Never done    Eye Exam  04/01/2023    Hemoglobin A1c  05/24/2023    Mammogram  07/07/2023    Lipid Panel  09/14/2023    Foot Exam  10/04/2023    DEXA Scan  03/08/2026    Hepatitis C Screening  Completed        Past Medical History:  Past Medical History:   Diagnosis Date    Anxiety     Bilateral leg edema 6/8/2021    Depression     Diabetes mellitus, type 2     Diverticulitis     GERD (gastroesophageal reflux disease)     Glaucoma     Hyperlipidemia     Hypertension     Nicotine dependence in remission     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       Past Surgical History:   has a past surgical  history that includes vaginal mesh; Bladder suspension (2011); Glaucoma Laser Sx ou; Tonsillectomy; Cholecystectomy; Cataract extraction, bilateral; Oophorectomy; Eye surgery; Hysteroscopy with dilation and curettage of uterus (N/A, 2021); Cold knife conization of cervix (N/A, 2021); and Tubal ligation.    Family History:  family history includes Cancer in her brother; Cancer (age of onset: 68) in her father; Cataracts in her father, mother, and sister; Dementia in her mother; Diabetes in her daughter, sister, and son; Hypertension in her mother; No Known Problems in her son; Pancreatic cancer in her brother; Ulcers in her brother.     Social History:  Social History     Tobacco Use    Smoking status: Former     Packs/day: 2.00     Years: 25.00     Pack years: 50.00     Types: Cigarettes     Quit date:      Years since quittin.3    Smokeless tobacco: Never   Substance Use Topics    Alcohol use: Yes     Comment: social     Drug use: Never       Review of Systems   Constitutional:  Positive for diaphoresis. Negative for chills and fever.   HENT:  Negative for postnasal drip.    Eyes:  Negative for visual disturbance.   Respiratory:  Positive for cough. Negative for shortness of breath.         Dry cough  only at night.  told her about it.   Cardiovascular:  Negative for chest pain.   Gastrointestinal:  Positive for constipation. Negative for abdominal pain, diarrhea, vomiting and reflux.        Hard stool. Has a BM Q 2-3 days.  Rec inc hydration. Can try stool softener.    Neurological:  Positive for dizziness. Negative for headaches.        Imbalance   Psychiatric/Behavioral:  Positive for sleep disturbance.       Medications:    Current Outpatient Medications:     amitriptyline (ELAVIL) 10 MG tablet, Take 1 tablet (10 mg total) by mouth nightly. Can increase by 10 mg per week at nights as needed for max of 5 tabs (50 mg) per night., Disp: 45 tablet, Rfl: 11    atorvastatin (LIPITOR) 80  "MG tablet, Take 1 tablet (80 mg total) by mouth once daily., Disp: 90 tablet, Rfl: 3    blood sugar diagnostic Strp, To check BG 2 times daily, to use with insurance preferred meter, Disp: 200 strip, Rfl: 3    blood-glucose meter kit, To check BG 2 times daily, to use with insurance preferred meter, Disp: 1 each, Rfl: 0    CONTOUR TEST STRIPS Strp, USE TO TEST BLOOD SUGAR TWICE DAILY, Disp: 50 strip, Rfl: 3    dulaglutide (TRULICITY) 3 mg/0.5 mL pen injector, Inject 3 mg into the skin every 7 days., Disp: 12 pen, Rfl: 3    furosemide (LASIX) 20 MG tablet, Take 1 tablet (20 mg total) by mouth once daily., Disp: 90 tablet, Rfl: 1    gabapentin (NEURONTIN) 100 MG capsule, Take 1 capsule (100 mg total) by mouth every evening., Disp: 90 capsule, Rfl: 1    insulin (LANTUS SOLOSTAR U-100 INSULIN) glargine 100 units/mL SubQ pen, 36 SC u QHS, Disp: , Rfl: 0    insulin needles, disposable, (RELION PEN NEEDLES) 32 x 5/32 " Ndle, Use as directed, Disp: 50 each, Rfl: 6    lancets 33 gauge Misc, by Misc.(Non-Drug; Combo Route) route. True plus, Disp: , Rfl:     lancets Misc, To check BG 2 times daily, to use with insurance preferred meter, Disp: 200 each, Rfl: 11    losartan (COZAAR) 100 MG tablet, Take 1 tablet (100 mg total) by mouth once daily., Disp: 90 tablet, Rfl: 1    pantoprazole (PROTONIX) 40 MG tablet, Take 1 tablet (40 mg total) by mouth once daily., Disp: 30 tablet, Rfl: 2    paroxetine (PAXIL) 20 MG tablet, Take 1 & 1/2 tablets (30 mg total) by mouth once daily., Disp: 90 tablet, Rfl: 1    phenazopyridine (PYRIDIUM) 200 MG tablet, Take 1 tablet (200 mg total) by mouth 3 (three) times daily as needed for Pain., Disp: 10 tablet, Rfl: 0    potassium chloride SA (K-DUR,KLOR-CON M) 10 MEQ tablet, Take 1 tablet (10 mEq total) by mouth once daily., Disp: 90 tablet, Rfl: 1    repaglinide (PRANDIN) 1 MG tablet, Take 1 tablet (1 mg total) by mouth 3 (three) times daily before meals., Disp: 270 tablet, Rfl: 3    travoprost " "(TRAVATAN Z) 0.004 % ophthalmic solution, Place 1 drop into both eyes every evening., Disp: 7.5 each, Rfl: 3    ergocalciferol (ERGOCALCIFEROL) 50,000 unit Cap, Take 1 capsule (50,000 Units total) by mouth every 30 days., Disp: 3 capsule, Rfl: 3    estradioL (ESTRACE) 0.01 % (0.1 mg/gram) vaginal cream, Use 1 gram of estrogen cream in vagina nightly for 2 weeks, then twice a week thereafter., Disp: 42.5 g, Rfl: 3    LIDOCAINE 2 %, VALIUM 5 MG, BACLOFEN 4 % SUPPOSITORY, Place 1 suppository vaginally 2 (two) times daily as needed (pelvic pain)., Disp: 20 each, Rfl: 5    nystatin (MYCOSTATIN) powder, Apply topically 4 (four) times daily., Disp: 60 g, Rfl: 1     Allergies:  Review of patient's allergies indicates:  No Known Allergies    Physical Exam      Vital Signs  Temp: 98 °F (36.7 °C)  Pulse: 88  Resp: 20  SpO2: 96 %  BP: (!) 156/80  BP Location: Right arm  Pain Score: 0-No pain  Height and Weight  Height: 5' 7" (170.2 cm)  Weight: 81.9 kg (180 lb 8.9 oz)  BSA (Calculated - sq m): 1.97 sq meters  BMI (Calculated): 28.3  Weight in (lb) to have BMI = 25: 159.3             Physical Exam  Vitals reviewed.   Constitutional:       General: She is not in acute distress.     Appearance: Normal appearance. She is not ill-appearing, toxic-appearing or diaphoretic.   HENT:      Head: Normocephalic and atraumatic.      Right Ear: Tympanic membrane normal.      Ears:      Comments: Left TM unable to visualize due to cerumen impaction - unable to remove manually.   Eyes:      Conjunctiva/sclera: Conjunctivae normal.      Comments: Osei arcus senilis   Cardiovascular:      Rate and Rhythm: Normal rate and regular rhythm.      Pulses: Normal pulses.      Heart sounds: Normal heart sounds.   Pulmonary:      Effort: No respiratory distress.      Breath sounds: Normal breath sounds.   Abdominal:      General: Bowel sounds are normal.      Palpations: Abdomen is soft.      Tenderness: There is no abdominal tenderness. There is no " guarding or rebound.   Skin:     Comments: Slight hyperpigmented plaque with scale under rick breasts.    Neurological:      General: No focal deficit present.      Mental Status: She is alert and oriented to person, place, and time.      Comments: A&O x 4; Pt had difficult with CJ and finger to nose was off at first because she was not looking at my finger.  Neg Rhomberg.  Gait slow and steady.  No foot drop.    Psychiatric:         Mood and Affect: Mood normal.         Behavior: Behavior normal.        Laboratory:  CBC:  Recent Labs   Lab 04/06/21  0940 02/24/23  1207   WBC 8.19 6.76   RBC 4.66 5.08   Hemoglobin 12.6 14.0   Hematocrit 39.5 44.7   Platelets 357 346   MCV 85 88   MCH 27.0 27.6   MCHC 31.9 L 31.3 L       CMP:  Recent Labs   Lab 01/14/22  0929 03/23/22  1156 06/22/22  1110 02/24/23  1207   Glucose 143 H  --  241 H 304 H   Calcium 10.5  --  10.3 10.4   Albumin 4.3  --  4.1 4.0   Total Protein 7.7  --  7.7 7.9   Sodium 143  --  139 138   Potassium 4.7  --  4.3 3.3 L   CO2 25  --  27 26   Chloride 105  --  103 98   BUN 12  --  8 6 L   Creatinine 0.8   < > 0.8 0.9   Alkaline Phosphatase 100  --  118 132   ALT 20  --  23 17   AST 26  --  26 22   Total Bilirubin 0.7  --  0.4 0.4    < > = values in this interval not displayed.           URINALYSIS:  Recent Labs   Lab 04/06/21  0913 10/11/22  1532   Color, UA Yellow Yellow   Specific Gravity, UA 1.025 1.025   pH, UA 7.0 6.0   Protein, UA Negative Negative   Bacteria  --  Many A   Nitrite, UA Negative Negative   Leukocytes, UA Negative 2+ A   Urobilinogen, UA Negative  --         LIPIDS:  Recent Labs   Lab 04/06/21  0940 07/19/21  0832 06/22/22  1110 09/14/22  1240 02/24/23  1207   TSH 2.100  --  0.871  --  0.966   HDL 37 L 32 L  --  32 L  --    Cholesterol 253 H 155  --  194  --    Triglycerides 397 H 151 H  --  272 H  --    LDL Cholesterol 136.6 92.8  --  107.6  --    HDL/Cholesterol Ratio 14.6 L 20.6  --  16.5 L  --    Non-HDL Cholesterol 216 123  --  162   --    Total Cholesterol/HDL Ratio 6.8 H 4.8  --  6.1 H  --        TSH:  Recent Labs   Lab 04/06/21  0940 06/22/22  1110 02/24/23  1207   TSH 2.100 0.871 0.966       A1C:  Recent Labs   Lab 04/06/21  0940 07/09/21  0839 10/08/21  0932 01/14/22  0929 06/22/22  1110 09/14/22  1240 02/24/23  1207   Hemoglobin A1C 8.2 H 9.5 H 10.6 H 8.6 H 10.9 H 10.1 H 11.9 H       Urine Microalbumin/Cr:  Recent Labs   Lab 04/06/21  0913 06/22/22  1228 02/24/23  1159   Microalb/Creat Ratio 8.8 56.5 H Unable to calculate         Other:   Recent Labs   Lab 06/08/21  0953 06/28/21  1140 01/14/22  0929 09/14/22  1240   Estradiol  --  13  --   --    Vit D, 25-Hydroxy  --   --  24 L 33   Vitamin B-12 >2000 H  --   --   --    Ferritin  --   --  34  --      Recent Labs   Lab 02/07/23  1119   Hepatitis C Ab Non-reactive       Assessment/Plan     Dee Hamilton is a 67 y.o.female with:    Type 2 diabetes mellitus with diabetic neuropathy, with long-term current use of insulin  -     Hemoglobin A1C; Future; Expected date: 05/24/2023  -     Comprehensive Metabolic Panel; Future; Expected date: 05/24/2023  - Stable.  Cont current regimen. Will try to get her back in with Endo.     Hypertension, unspecified type  -     Comprehensive Metabolic Panel; Future; Expected date: 05/24/2023  - Controlled.  Cont current.     Hyperlipidemia associated with type 2 diabetes mellitus  -     Lipid Panel; Future; Expected date: 05/24/2023  - Stable.  Cont current regimen.    Hypokalemia  -     POTASSIUM; Future; Expected date: 04/18/2023  - Repeat level. Cont supple.     Candidal intertrigo  -     nystatin (MYCOSTATIN) powder; Apply topically 4 (four) times daily.  Dispense: 60 g; Refill: 1  - Try nysttin powder and if no better alert MD and will Rx Clotrimazole cream.     Imbalance  -     Folate; Future; Expected date: 04/18/2023  -     Vitamin B12; Future; Expected date: 04/18/2023  -Repeat B12, Folate. Check MRI brain.     Dizziness and giddiness  -     MRI  Brain With Contrast; Future; Expected date: 04/18/2023    Osteopenia, unspecified location  - Stable.  Cont current regimen.    Anxiety  - Stable.  Cont current regimen.    Episode of recurrent major depressive disorder, unspecified depression episode severity  - Stable.  Cont current regimen.    Memory impairment  - Will check MRI Brain.     Fatty liver  - Stable.  Cont current regimen.    Other orders  -     ergocalciferol (ERGOCALCIFEROL) 50,000 unit Cap; Take 1 capsule (50,000 Units total) by mouth every 30 days.  Dispense: 3 capsule; Refill: 3         Chronic conditions status updated as per HPI.  Other than changes above, cont current medications and maintain follow up with specialists. Fu in June.     Farzana Dorman MD  [facility]

## 2023-04-19 DIAGNOSIS — E87.6 HYPOKALEMIA: Primary | ICD-10-CM

## 2023-04-19 DIAGNOSIS — Z79.899 ON POTASSIUM WASTING DIURETIC THERAPY: ICD-10-CM

## 2023-04-19 RX ORDER — POTASSIUM CHLORIDE 20 MEQ/1
20 TABLET, EXTENDED RELEASE ORAL DAILY
Qty: 90 TABLET | Refills: 0 | Status: SHIPPED | OUTPATIENT
Start: 2023-04-19 | End: 2023-09-19 | Stop reason: SDUPTHER

## 2023-04-19 NOTE — PROGRESS NOTES
I sent pt a my chart message -  I reviewed your labs.  Your potassium is still slightly low but improved at 3.4  Your folic acid and vitamin b12 are normal. I will change your Potassium from 10 mEq daily to 20 meQ daily and send a new prescription to your pharmacy.  We can repeat your Potassium in 4 wks.   Dr. BRUSH

## 2023-04-20 DIAGNOSIS — H40.1111 PRIMARY OPEN ANGLE GLAUCOMA (POAG) OF RIGHT EYE, MILD STAGE: ICD-10-CM

## 2023-04-20 DIAGNOSIS — H40.022 OPEN ANGLE WITH BORDERLINE FINDINGS, HIGH RISK, LEFT: ICD-10-CM

## 2023-04-21 ENCOUNTER — PATIENT OUTREACH (OUTPATIENT)
Dept: ADMINISTRATIVE | Facility: HOSPITAL | Age: 68
End: 2023-04-21
Payer: MEDICARE

## 2023-04-21 RX ORDER — TRAVOPROST OPHTHALMIC SOLUTION 0.04 MG/ML
1 SOLUTION OPHTHALMIC NIGHTLY
Qty: 7.5 EACH | Refills: 3 | Status: SHIPPED | OUTPATIENT
Start: 2023-04-21 | End: 2023-08-23 | Stop reason: SDUPTHER

## 2023-04-21 NOTE — PROGRESS NOTES
Patient due for the following    TETANUS VACCINE     Eye Exam      E-faxed Dr. Mccurdy for eye exam records     Immunizations: reviewed and updated  Care Everywhere:triggered  Care Teams: up to date  Outreach: none needed

## 2023-04-21 NOTE — LETTER
AUTHORIZATION FOR RELEASE OF   CONFIDENTIAL INFORMATION    Dear Med Records,    We are seeing Dee Hamilton, date of birth 1955, in the clinic at [facility]. Farzana Dorman MD is the patient's PCP. Dee Hamilton has an outstanding lab/procedure at the time we reviewed her chart. In order to help keep her health information updated, she has authorized us to request the following medical record(s):        (  )  MAMMOGRAM                                      (  )  COLONOSCOPY      (  )  PAP SMEAR                                          (  )  OUTSIDE LAB RESULTS     (  )  DEXA SCAN                                          ( X )  EYE EXAM            (  )  FOOT EXAM                                          (  )  ENTIRE RECORD     (  )  OUTSIDE IMMUNIZATIONS                 (  )  _______________         Please fax records to [facility], Nicole Giambrone, MD, [phone]     If you have any questions, please contact Yulia Ybarra at [phone].           Patient Name: Dee Hamilton  : 1955  Patient Phone #: [phone]

## 2023-04-25 ENCOUNTER — PATIENT OUTREACH (OUTPATIENT)
Dept: ADMINISTRATIVE | Facility: HOSPITAL | Age: 68
End: 2023-04-25
Payer: MEDICARE

## 2023-04-28 ENCOUNTER — TELEPHONE (OUTPATIENT)
Dept: PRIMARY CARE CLINIC | Facility: CLINIC | Age: 68
End: 2023-04-28
Payer: MEDICARE

## 2023-04-28 DIAGNOSIS — Z79.4 TYPE 2 DIABETES MELLITUS WITH HYPERGLYCEMIA, WITH LONG-TERM CURRENT USE OF INSULIN: Primary | ICD-10-CM

## 2023-04-28 DIAGNOSIS — E11.65 TYPE 2 DIABETES MELLITUS WITH HYPERGLYCEMIA, WITH LONG-TERM CURRENT USE OF INSULIN: Primary | ICD-10-CM

## 2023-04-28 DIAGNOSIS — I10 HYPERTENSION, UNSPECIFIED TYPE: ICD-10-CM

## 2023-04-28 NOTE — TELEPHONE ENCOUNTER
----- Message from Renee Carlton sent at 4/28/2023  7:40 AM CDT -----  Contact: Pt Mobile [phone]  Patient is calling in regards to her saying that she's at the [facility] location and she would like for you put in an order for a Lipid Test, Hemoglobin Test, and a CMP. Patient said that she had some blood work done on this morning but these orders where not added to her other orders.

## 2023-04-28 NOTE — TELEPHONE ENCOUNTER
Pt needs a STAT BMP prior to being able to have the MRI with Contrast done this morning. Pt is currently at [facility] MRI awaiting check in. Order pended.

## 2023-05-01 DIAGNOSIS — E11.65 TYPE 2 DIABETES MELLITUS WITH HYPERGLYCEMIA, WITH LONG-TERM CURRENT USE OF INSULIN: ICD-10-CM

## 2023-05-01 DIAGNOSIS — Z79.4 TYPE 2 DIABETES MELLITUS WITH HYPERGLYCEMIA, WITH LONG-TERM CURRENT USE OF INSULIN: ICD-10-CM

## 2023-05-02 RX ORDER — REPAGLINIDE 1 MG/1
1 TABLET ORAL
Qty: 270 TABLET | Refills: 0 | Status: SHIPPED | OUTPATIENT
Start: 2023-05-02 | End: 2023-09-19 | Stop reason: SDUPTHER

## 2023-05-04 ENCOUNTER — LAB VISIT (OUTPATIENT)
Dept: LAB | Facility: HOSPITAL | Age: 68
End: 2023-05-04
Attending: INTERNAL MEDICINE
Payer: MEDICARE

## 2023-05-04 DIAGNOSIS — E87.6 HYPOKALEMIA: ICD-10-CM

## 2023-05-04 LAB — POTASSIUM SERPL-SCNC: 3.7 MMOL/L (ref 3.5–5.1)

## 2023-05-04 PROCEDURE — 36415 COLL VENOUS BLD VENIPUNCTURE: CPT | Mod: PN | Performed by: INTERNAL MEDICINE

## 2023-05-04 PROCEDURE — 84132 ASSAY OF SERUM POTASSIUM: CPT | Performed by: INTERNAL MEDICINE

## 2023-05-10 ENCOUNTER — PATIENT OUTREACH (OUTPATIENT)
Dept: ADMINISTRATIVE | Facility: HOSPITAL | Age: 68
End: 2023-05-10
Payer: MEDICARE

## 2023-05-10 ENCOUNTER — PATIENT MESSAGE (OUTPATIENT)
Dept: OPHTHALMOLOGY | Facility: CLINIC | Age: 68
End: 2023-05-10
Payer: MEDICARE

## 2023-05-10 ENCOUNTER — TELEPHONE (OUTPATIENT)
Dept: OPHTHALMOLOGY | Facility: CLINIC | Age: 68
End: 2023-05-10
Payer: MEDICARE

## 2023-05-10 NOTE — PROGRESS NOTES
Health Maintenance Due   Topic Date Due    TETANUS VACCINE  Never done     Chart reviewed.   Immunizations: Reconciled  Orders placed: N/A  Upcoming appts to satisfy SARABJIT topics: Endo- for Colonoscopy 5/31/2023, Labs 6/1/2023, Mammogram 7/10/2023

## 2023-05-10 NOTE — TELEPHONE ENCOUNTER
Pt states that she needs Rx for eyeglasses and also having floaters and dry eyes. Pt states that floaters are not new but seeing more frequently now. Pt denies any flashes. Pt advised of RD precautions and also advised to use artificial tears 3-4 times a day to help with the dry eyes. Pt saw  in January for refraction and so I will send glasses Rx to pt via the portal.

## 2023-05-31 ENCOUNTER — CLINICAL SUPPORT (OUTPATIENT)
Dept: ENDOSCOPY | Facility: HOSPITAL | Age: 68
End: 2023-05-31
Attending: INTERNAL MEDICINE
Payer: MEDICARE

## 2023-05-31 DIAGNOSIS — K21.9 GASTROESOPHAGEAL REFLUX DISEASE, UNSPECIFIED WHETHER ESOPHAGITIS PRESENT: ICD-10-CM

## 2023-05-31 DIAGNOSIS — Z86.010 HISTORY OF COLON POLYPS: ICD-10-CM

## 2023-05-31 NOTE — PLAN OF CARE
We attempted to reach you for your scheduled PAT appointment x2 calls but you were not available. Left voicemail messages. Please contact Endoscopy Scheduling at # [phone].

## 2023-06-01 ENCOUNTER — LAB VISIT (OUTPATIENT)
Dept: LAB | Facility: HOSPITAL | Age: 68
End: 2023-06-01
Attending: INTERNAL MEDICINE
Payer: MEDICARE

## 2023-06-01 ENCOUNTER — TELEPHONE (OUTPATIENT)
Dept: ENDOSCOPY | Facility: HOSPITAL | Age: 68
End: 2023-06-01
Payer: MEDICARE

## 2023-06-01 DIAGNOSIS — E78.5 HYPERLIPIDEMIA ASSOCIATED WITH TYPE 2 DIABETES MELLITUS: ICD-10-CM

## 2023-06-01 DIAGNOSIS — E11.69 HYPERLIPIDEMIA ASSOCIATED WITH TYPE 2 DIABETES MELLITUS: ICD-10-CM

## 2023-06-01 DIAGNOSIS — E11.40 TYPE 2 DIABETES MELLITUS WITH DIABETIC NEUROPATHY, WITH LONG-TERM CURRENT USE OF INSULIN: ICD-10-CM

## 2023-06-01 DIAGNOSIS — I10 HYPERTENSION, UNSPECIFIED TYPE: ICD-10-CM

## 2023-06-01 DIAGNOSIS — Z79.4 TYPE 2 DIABETES MELLITUS WITH DIABETIC NEUROPATHY, WITH LONG-TERM CURRENT USE OF INSULIN: ICD-10-CM

## 2023-06-01 LAB
ALBUMIN SERPL BCP-MCNC: 4 G/DL (ref 3.5–5.2)
ALP SERPL-CCNC: 107 U/L (ref 55–135)
ALT SERPL W/O P-5'-P-CCNC: 27 U/L (ref 10–44)
ANION GAP SERPL CALC-SCNC: 11 MMOL/L (ref 8–16)
AST SERPL-CCNC: 34 U/L (ref 10–40)
BILIRUB SERPL-MCNC: 0.6 MG/DL (ref 0.1–1)
BUN SERPL-MCNC: 13 MG/DL (ref 8–23)
CALCIUM SERPL-MCNC: 10.6 MG/DL (ref 8.7–10.5)
CHLORIDE SERPL-SCNC: 105 MMOL/L (ref 95–110)
CHOLEST SERPL-MCNC: 207 MG/DL (ref 120–199)
CHOLEST/HDLC SERPL: 5.2 {RATIO} (ref 2–5)
CO2 SERPL-SCNC: 24 MMOL/L (ref 23–29)
CREAT SERPL-MCNC: 0.7 MG/DL (ref 0.5–1.4)
EST. GFR  (NO RACE VARIABLE): >60 ML/MIN/1.73 M^2
ESTIMATED AVG GLUCOSE: 209 MG/DL (ref 68–131)
GLUCOSE SERPL-MCNC: 221 MG/DL (ref 70–110)
HBA1C MFR BLD: 8.9 % (ref 4–5.6)
HDLC SERPL-MCNC: 40 MG/DL (ref 40–75)
HDLC SERPL: 19.3 % (ref 20–50)
LDLC SERPL CALC-MCNC: 135 MG/DL (ref 63–159)
NONHDLC SERPL-MCNC: 167 MG/DL
POTASSIUM SERPL-SCNC: 4.6 MMOL/L (ref 3.5–5.1)
PROT SERPL-MCNC: 7.6 G/DL (ref 6–8.4)
SODIUM SERPL-SCNC: 140 MMOL/L (ref 136–145)
TRIGL SERPL-MCNC: 160 MG/DL (ref 30–150)

## 2023-06-01 PROCEDURE — 80053 COMPREHEN METABOLIC PANEL: CPT | Performed by: INTERNAL MEDICINE

## 2023-06-01 PROCEDURE — 80061 LIPID PANEL: CPT | Performed by: INTERNAL MEDICINE

## 2023-06-01 PROCEDURE — 83036 HEMOGLOBIN GLYCOSYLATED A1C: CPT | Performed by: INTERNAL MEDICINE

## 2023-06-01 PROCEDURE — 36415 COLL VENOUS BLD VENIPUNCTURE: CPT | Mod: PN | Performed by: INTERNAL MEDICINE

## 2023-06-01 NOTE — TELEPHONE ENCOUNTER
Medical assistant returned patient call that was left on voicemail no answer was not able to leave a voicemail

## 2023-06-02 NOTE — PROGRESS NOTES
Please inform pt - Yamila see if she has an appt with Endo yet  I reviewed your  labs.  Your Ha1c was 8.9 which is improved  from 11.9 but still uncontrolled.   I would like for you to increase your Lantus from 36 units daily to 39 units daily. Your Cholesterol was high despite being on Atorvastatin 80 mg daily.  We could consider changing this to crestor 40 mg/d or possibly add Zetia to your regimen  Have you been missing doses of the Atorvastatin? Your kidney function and liver functions looked good.  Your calcium was slightly high at 10.6. Please make sure you a not re not taking any Calcium supplements including Tums.   No further recommendations at this time.    Dr. BRUSH

## 2023-06-02 NOTE — TELEPHONE ENCOUNTER
I called pt twice to go over labs. Will try to send her a mychart msg since I see she is on [facility] often. I also see that she had an endo appt on 5/12/23 but she cancelled it. She does not have another appointment scheduled at this time.

## 2023-06-02 NOTE — TELEPHONE ENCOUNTER
----- Message from Farzana Dorman MD sent at 6/2/2023  1:11 PM CDT -----  Please inform pt - Yamila see if she has an appt with Endo yet  I reviewed your  labs.  Your Ha1c was 8.9 which is improved  from 11.9 but still uncontrolled.   I would like for you to increase your Lantus from 36 units daily to 39 units daily. Your Cholesterol was high despite being on Atorvastatin 80 mg daily.  We could consider changing this to crestor 40 mg/d or possibly add Zetia to your regimen  Have you been missing doses of the Atorvastatin? Your kidney function and liver functions looked good.  Your calcium was slightly high at 10.6. Please make sure you a not re not taking any Calcium supplements including Tums.   No further recommendations at this time.    Dr. BRUSH

## 2023-06-05 NOTE — PROGRESS NOTES
"[facility] Note    Chief Complaint      Chief Complaint   Patient presents with    Follow-up       History of Present Illness      Dee Hamilton is a 67 y.o.  F with Anxiety, Depression, Glaucoma, HLD, Hypercalcemia. Last visit - 4/18/23     Hypercalcemia - calcium was slightly high at 10.6. She was taking calcium but has since stopped this.     Candida intertrigo- Rec drying well after bathing/showering.  Will Rx Nystatin powder and if no help will change to Clotrimazole cream.      Hypokalemia - Potassium is  back to nl at 4.6.    I changed her Potassium from 10 mEq daily to 20 meQ daily and send a new prescription to your pharmacy.  We can repeat your Potassium in 4 wks.      Osteopenia - DEXA/Bone Density which showed osteopenia. I recommend you start OTC Calcium max 1000 mg/d and continue Vit D3 2000 units/d OTC.  In addition, I rec weight-bearing  exercise at least 30 minutes 3 times/wk and ideally 5 times/wk.  No specific intensity.  Walking is fine. I just rec you pick a form of weight-bearing exercise you enjoy to facilitate long term compliance and benefit. We can repeat DEXA in 2-3 yrs.     Noncompliance - Pt stopped her Metformin due to Ha and abd pain, Prandin.  "It just felt like every day I get up and I'm just there".  Sounds like she is depressed.  She is overdue for her Endo fu with Dr. Escoto. I will reach out to his office to see if he can get her in given she has stopped most of her meds.  We discussed the imp. Of compliance and calling with any concerns. She is not taking her Protonix G I just Rx for her. We resumed  Prandin, and Lantus and will cont.Trulicitiy.  Will not resume Metformin at this time since it made her feel poorly.      Memory issues - I forget where I put things off and on. Will cont to jack.  Refer toNeuroPsych referral. She writes things in her note book. no evid of NPH on MRI. Pt reports imbalance, Incontinence, and memory issues. She has known " "Mixed urinary incontinence and had a prev bladder supsension. Carotid U/S - 6/1/21 - no evid of sig stenosis. Vitamin B1 level and it was normal.  Vitamin b12 was elevated. Her folic acid was normal.    has noticed she is more forgetful in doing chores around the house (like emptying the  or taking the clothes out of the dryer". Pt scored 27/30 on MMSE - 8/11/22. If someone has to ask her how she is she has to stop and think about how to respond so she has the words in her mind to answer correctly. "My tooth brush may be in the refrigerator.  Sometimes I have to remember how to drive so I stop driving". Note -she has multiple pill bottles for Atorvastatin/Repaglinide which are very full and it does not appear she has been taking these regularly. Folic acid and vitamin b12 are normal.  MRI Brain - has not yet had.  was in hospital.      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 ECC neg. EMBx insufficient. Pt is s/p hysteroscopy/D&C and CKC 9/29/21.  Has upcoming fu to discuss poss. Hys. + Spotting. CT abd/Pelvis - 3/25/22 -Punctate low-density foci in both kidneys too small to adequately characterize.   Colonic diverticulosis with no evidence for acute diverticulitis. No further recommendations Pelvic U/S - 5/31/22- wnl      Anxiety - She is back on her Paroxetine 30 mg/d.    D/w pt withdrawal effects with abrupt discontinuation. Referred to Psychiatry but she never got to go because her appt got cancelled twice at [facility].   "I go down a rabbit hole every now and then". It's depression more so than Anxiety because she doesn't go out much".  Pt talks to someone who is a friend of her daughter which has helped. She would like a support animal but her  does not like dogs. Playing with the neighbor's dog is helpful.  Prev fu by DUANEW, Jonna Kendall.  Referred to Psychiatry. Her  is ill and she has a new grandson and she puts everyone before " "herself. Her  has Cancer and was recently hospitalized. .      Depression - She is taking her Paroxetine 30 mg/d.  D/w pt withdrawal effects with abrupt discontinuation.  Referred to Psychiatry.  Pt talks to someone who is a friend of her daughter which has helped.   "It comes and goes". She has been meditating which helps. "My son is a trigger".         Glaucoma - Fu by [facility]. Fu by Dr. Nereyda De Anda in Apr.  Planning for visual field testing in Aug.      HLD -  Cholesterol was high despite being on Atorvastatin 80 mg daily.  We changed this this to crestor 40 mg/d. If remains high can possibly add Zetia to her regimen     Hypercalcemia/Hyperparathyroidism -  Fu by [facility], Dr. Tilley.  Vit D low normal - 24- low. ionized calcium - 1.19 - wnl and  iPTH - 94- 6/22/22. Vit D - 33 - 9/14/22 Note - HCTZ prev stopped.      DM - II  - Dx '02 - Ha1c was 8.9 which is improved  from 11.9 but still uncontrolled. Has appt with Endo in July. I rec she increase  her Lantus from 36 units daily to 39 units daily.    Pt back on trulicity  3 mg wkly per [facility]. Pt also on  Prandin 1 mg TID with meals,  lantus 36 units QHS.  She reports she was not fu for appts during the pandemic. Fu by Dr. Tilley- overdue for fu. Doing better since changed to Metformin ER - no further diarrhea.  She c/o burning in her feet to her knees.  Pt on a trial of Gabapentin 100 mg QHs.   Will not resume Metformin if causing GI issues. no evidence of microalbuminuria.      Unexplained wt loss -Resolved - Up 6 lb since last visit.  She reports she has been eating less and is not as hungry. "I don;t eat sweets anymore shelli I don't have a taste for it".  Wt down 19 lb since Aug. Planning for EGD/C-scope     Vit D def - 24 - 1/14/22. Pt not on suppl. She is fu by alison. She completed Ergocalciferol 50,000 units once weekly x 8 wks.  Pt forgets to take her meds.  Will change to once monthly Ergo.      Mixed urinary Incontinence - Cystocele - Fu by  Dr. Alyse " Jared. Myrbetriq- couldn't afford. She prefers not to go back to Dr. Dinh.  She had  Prev bladder suspension in '11. Checked MRI Brain to r/o  NPH with abn gait, imbalance, memory issues, and Incontinence.  No evid of this on MRI. Pt only takes her Elavil 10 mg QHS prn.  Referred to Pelvic Floor PTx.      Diaphragmatic hernia - No issues at present.  Will cont to monitor. She does have GERD. Rec smaller more freq meals.       Diverticulosis - No issues at present. Diarrhea resolved with changing to Metformin ER.       Imbalance -  I'm not wobbling as much any more and haven't fallen or had dizziness.  My mood swings are gone and it's much better .  I suspect this has improved since resuming her SSRI. She describes it as bouncing when she walks but then describes walking into things and being off balance. She states she has not been missing doses. Await MRI Brain.      Fatty Liver - Noted on CT abd/pelvis -2/22/20.   Rec she  limit tylenol and alcohol.  Rec diet and exercise for wt loss   As discussed at visit there is a potential for cirrhosis.      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     Small fat containing umbilical hernia and Small fat containing left inguinal hernia - Noted on chart review. Not palpated on today's exam.      Atherosclerosis aorta - Seen on prev CT scans.  Cont Statin.      Rectocele - Fu by Uro/GYN. Planning for Pelvic Floor PTx.      HCM - Flu -9/15/22;  Tdap - ? At [facility];  PCV 13 - 10/24/19;  PVX 23 - 3/1/21;  Shingrix -#1 - 10/24/19 and #2 -12/30/19- she thinks she had shingles in Jan. 2022 to Left shoulder/flank x 2 wks;  COVID - 19 Vaccine (Moderna) #1 - 2/15/21; #2 - 3/15/21; # 3 - 10/27/21; # 4 10/16/22;  Hep C Screen -2/7/23 - neg. +h/o CHUN II; C-scope - 7/25/18 - hemorrhoids and polyps - repeat 5 yrs At [facility] - planning for repeat with Dr. Jarvis; PAP - 5/25/22 - neg.;  MGM - 7/7/22 - repeat 1 yr;  DEXA - has order from Endo; Prev PCP - Dr Benito at [facility]; Endo - Dr Gonzales; Ophtho - Dr. Dawn; Optometry - Dr. Young; GI - Dr. Jarvis; Uro/GYN - ; OB/GYN - Dr. Frank; Podiatry - Dr. Young    Patient Care Team:  Farzana Dorman MD as PCP - General (Internal Medicine)  Ivy Wharton MD (Family Medicine)  Crystal Almanza RD, CDE as Dietitian (Diabetes)  Heriberto Man MD as Consulting Physician (Gastroenterology)     Health Maintenance:  Immunization History   Administered Date(s) Administered    COVID-19, MRNA, LN-S, PF (MODERNA FULL 0.5 ML DOSE) 02/15/2021, 03/15/2021, 10/27/2021    COVID-19, mRNA, LNP-S, bivalent booster, PF (Moderna Omicron) 10/06/2022    Influenza (FLUAD) - Quadrivalent - Adjuvanted - PF *Preferred* (65+) 10/27/2021, 09/14/2022    Influenza - High Dose - PF (65 years and older) 10/24/2019    Influenza - Intradermal - Quadrivalent - PF 11/27/2013    Influenza - Quadrivalent - High Dose - PF (65 years and older) 09/08/2020    Influenza - Quadrivalent - PF *Preferred* (6 months and older) 10/15/2018    Influenza - Trivalent (ADULT) 10/24/2011, 10/17/2014, 10/23/2017    Influenza - Trivalent - PF (ADULT) 10/25/2016    Pneumococcal Conjugate - 13 Valent 10/24/2019    Pneumococcal Polysaccharide - 23 Valent 11/16/2015, 03/01/2021    Zoster Recombinant 10/24/2019, 12/30/2019      Health Maintenance   Topic Date Due    TETANUS VACCINE  Never done    Mammogram  08/04/2023 (Originally 7/7/2023)    Hemoglobin A1c  09/01/2023    Foot Exam  10/04/2023    Eye Exam  04/13/2024    Lipid Panel  06/01/2024    DEXA Scan  03/08/2026    Hepatitis C Screening  Completed        Past Medical History:  Past Medical History:   Diagnosis Date    Anxiety     Bilateral leg edema 6/8/2021    Depression     Diabetes mellitus, type 2     Diverticulitis     GERD (gastroesophageal reflux disease)     Glaucoma     Hyperlipidemia     Hypertension     Nicotine dependence in remission  "    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       Past Surgical History:   has a past surgical history that includes vaginal mesh; Bladder suspension (2011); Glaucoma Laser Sx ou; Tonsillectomy; Cholecystectomy; Cataract extraction, bilateral; Oophorectomy; Eye surgery; Hysteroscopy with dilation and curettage of uterus (N/A, 2021); Cold knife conization of cervix (N/A, 2021); and Tubal ligation.    Family History:  family history includes Cancer in her brother; Cancer (age of onset: 68) in her father; Cataracts in her father, mother, and sister; Dementia in her mother; Diabetes in her daughter, sister, and son; Hypertension in her mother; No Known Problems in her son; Pancreatic cancer in her brother; Ulcers in her brother.     Social History:  Social History     Tobacco Use    Smoking status: Former     Packs/day: 2.00     Years: 25.00     Pack years: 50.00     Types: Cigarettes     Quit date:      Years since quittin.4    Smokeless tobacco: Never   Substance Use Topics    Alcohol use: Yes     Comment: social     Drug use: Never       Review of Systems   Constitutional:  Positive for diaphoresis. Negative for chills and fever.   HENT:  Negative for hearing loss.    Eyes:  Negative for visual disturbance.   Respiratory:  Negative for shortness of breath.    Cardiovascular:  Negative for chest pain.   Gastrointestinal:  Positive for constipation. Negative for abdominal pain, diarrhea, nausea and vomiting.   Musculoskeletal:  Positive for back pain.        Chronic low back pain x 2 mos - goes down leg "like electricity".  Will refer to Back and spine.    Neurological:  Positive for dizziness. Negative for headaches.        When she turns while ambulating.    Psychiatric/Behavioral:  Positive for dysphoric mood. Negative for suicidal ideas. The patient is nervous/anxious.       Medications:    Current Outpatient Medications:     amitriptyline (ELAVIL) 10 MG tablet, Take 1 tablet (10 mg total) by " "mouth nightly. Can increase by 10 mg per week at nights as needed for max of 5 tabs (50 mg) per night., Disp: 45 tablet, Rfl: 11    blood sugar diagnostic Strp, To check BG 2 times daily, to use with insurance preferred meter, Disp: 200 strip, Rfl: 3    blood-glucose meter kit, To check BG 2 times daily, to use with insurance preferred meter, Disp: 1 each, Rfl: 0    CONTOUR TEST STRIPS Strp, USE TO TEST BLOOD SUGAR TWICE DAILY, Disp: 50 strip, Rfl: 3    dulaglutide (TRULICITY) 3 mg/0.5 mL pen injector, Inject 3 mg into the skin every 7 days., Disp: 12 pen, Rfl: 3    ergocalciferol (ERGOCALCIFEROL) 50,000 unit Cap, Take 1 capsule (50,000 Units total) by mouth every 30 days., Disp: 3 capsule, Rfl: 3    gabapentin (NEURONTIN) 100 MG capsule, Take 1 capsule (100 mg total) by mouth every evening., Disp: 90 capsule, Rfl: 1    insulin needles, disposable, (RELION PEN NEEDLES) 32 x 5/32 " Ndle, Use as directed, Disp: 50 each, Rfl: 6    lancets 33 gauge Misc, by Misc.(Non-Drug; Combo Route) route. True plus, Disp: , Rfl:     lancets Misc, To check BG 2 times daily, to use with insurance preferred meter, Disp: 200 each, Rfl: 11    LIDOCAINE 2 %, VALIUM 5 MG, BACLOFEN 4 % SUPPOSITORY, Place 1 suppository vaginally 2 (two) times daily as needed (pelvic pain)., Disp: 20 each, Rfl: 5    losartan (COZAAR) 100 MG tablet, Take 1 tablet (100 mg total) by mouth once daily., Disp: 90 tablet, Rfl: 1    nystatin (MYCOSTATIN) powder, Apply topically 4 (four) times daily., Disp: 60 g, Rfl: 1    pantoprazole (PROTONIX) 40 MG tablet, Take 1 tablet (40 mg total) by mouth once daily., Disp: 30 tablet, Rfl: 2    paroxetine (PAXIL) 20 MG tablet, Take 1 & 1/2 tablets (30 mg total) by mouth once daily., Disp: 90 tablet, Rfl: 1    potassium chloride SA (K-DUR,KLOR-CON) 20 MEQ tablet, Take 1 tablet (20 mEq total) by mouth once daily., Disp: 90 tablet, Rfl: 0    repaglinide (PRANDIN) 1 MG tablet, Take 1 tablet (1 mg total) by mouth 3 (three) times " "daily before meals., Disp: 270 tablet, Rfl: 0    rosuvastatin (CRESTOR) 40 MG Tab, Take 1 tablet (40 mg total) by mouth every evening., Disp: 90 tablet, Rfl: 0    travoprost (TRAVATAN Z) 0.004 % ophthalmic solution, Place 1 drop into both eyes every evening., Disp: 7.5 each, Rfl: 3    estradioL (ESTRACE) 0.01 % (0.1 mg/gram) vaginal cream, Use 1 gram of estrogen cream in vagina nightly for 2 weeks, then twice a week thereafter. (Patient not taking: Reported on 6/8/2023), Disp: 42.5 g, Rfl: 3    furosemide (LASIX) 20 MG tablet, Take 1 tablet (20 mg total) by mouth once daily., Disp: 90 tablet, Rfl: 1    insulin (LANTUS SOLOSTAR U-100 INSULIN) glargine 100 units/mL SubQ pen, 39 SC u QHS, Disp: , Rfl: 0    methylPREDNISolone (MEDROL DOSEPACK) 4 mg tablet, use as directed, Disp: 21 each, Rfl: 0    phenazopyridine (PYRIDIUM) 200 MG tablet, Take 1 tablet (200 mg total) by mouth 3 (three) times daily as needed for Pain. (Patient not taking: Reported on 6/8/2023), Disp: 10 tablet, Rfl: 0     Allergies:  Review of patient's allergies indicates:  No Known Allergies    Physical Exam      Vital Signs  Temp: 98.3 °F (36.8 °C)  Temp Source: Oral  Pulse: 103  Resp: 18  SpO2: 97 %  BP: 136/80  BP Location: Right arm  Patient Position: Sitting  Pain Score:   9  Pain Loc: Back  Height and Weight  Height: 5' 7" (170.2 cm)  Weight: 84.3 kg (185 lb 13.6 oz)  BSA (Calculated - sq m): 2 sq meters  BMI (Calculated): 29.1  Weight in (lb) to have BMI = 25: 159.3      Patient Position: Sitting      Physical Exam  Vitals reviewed.   Constitutional:       General: She is not in acute distress.     Appearance: Normal appearance. She is not ill-appearing, toxic-appearing or diaphoretic.   HENT:      Head: Normocephalic and atraumatic.      Mouth/Throat:      Mouth: Mucous membranes are moist.      Pharynx: No posterior oropharyngeal erythema.   Eyes:      Extraocular Movements: Extraocular movements intact.      Conjunctiva/sclera: Conjunctivae " normal.      Pupils: Pupils are equal, round, and reactive to light.   Cardiovascular:      Rate and Rhythm: Normal rate and regular rhythm.      Pulses: Normal pulses.      Heart sounds: Normal heart sounds.   Pulmonary:      Effort: No respiratory distress.      Breath sounds: Normal breath sounds.   Abdominal:      General: There is no distension.      Palpations: Abdomen is soft.      Tenderness: There is no abdominal tenderness.   Musculoskeletal:      Comments: Neg straight leg raise rick. + TTP over Left PSIS joint. No TTP over Lspine/T spine.    Neurological:      General: No focal deficit present.      Mental Status: She is alert and oriented to person, place, and time.   Psychiatric:         Mood and Affect: Mood normal.         Behavior: Behavior normal.        Laboratory:  CBC:  Recent Labs   Lab 04/06/21  0940 02/24/23  1207   WBC 8.19 6.76   RBC 4.66 5.08   Hemoglobin 12.6 14.0   Hematocrit 39.5 44.7   Platelets 357 346   MCV 85 88   MCH 27.0 27.6   MCHC 31.9 L 31.3 L       CMP:  Recent Labs   Lab 06/22/22  1110 02/24/23  1207 04/18/23  1023 06/01/23  1006   Glucose 241 H 304 H  --  221 H   Calcium 10.3 10.4  --  10.6 H   Albumin 4.1 4.0  --  4.0   Total Protein 7.7 7.9  --  7.6   Sodium 139 138  --  140   Potassium 4.3 3.3 L   < > 4.6   CO2 27 26  --  24   Chloride 103 98  --  105   BUN 8 6 L  --  13   Creatinine 0.8 0.9  --  0.7   Alkaline Phosphatase 118 132  --  107   ALT 23 17  --  27   AST 26 22  --  34   Total Bilirubin 0.4 0.4  --  0.6    < > = values in this interval not displayed.           URINALYSIS:  Recent Labs   Lab 04/06/21  0913 10/11/22  1532   Color, UA Yellow Yellow   Specific Gravity, UA 1.025 1.025   pH, UA 7.0 6.0   Protein, UA Negative Negative   Bacteria  --  Many A   Nitrite, UA Negative Negative   Leukocytes, UA Negative 2+ A   Urobilinogen, UA Negative  --         LIPIDS:  Recent Labs   Lab 04/06/21  0940 07/19/21  0832 06/22/22  1110 09/14/22  1240 02/24/23  1207  06/01/23  1006   TSH 2.100  --  0.871  --  0.966  --    HDL 37 L 32 L  --  32 L  --  40   Cholesterol 253 H 155  --  194  --  207 H   Triglycerides 397 H 151 H  --  272 H  --  160 H   LDL Cholesterol 136.6 92.8  --  107.6  --  135.0   HDL/Cholesterol Ratio 14.6 L 20.6  --  16.5 L  --  19.3 L   Non-HDL Cholesterol 216 123  --  162  --  167   Total Cholesterol/HDL Ratio 6.8 H 4.8  --  6.1 H  --  5.2 H       TSH:  Recent Labs   Lab 04/06/21  0940 06/22/22  1110 02/24/23  1207   TSH 2.100 0.871 0.966       A1C:  Recent Labs   Lab 04/06/21  0940 07/09/21  0839 10/08/21  0932 01/14/22  0929 06/22/22  1110 09/14/22  1240 02/24/23  1207 06/01/23  1006   Hemoglobin A1C 8.2 H 9.5 H 10.6 H 8.6 H 10.9 H 10.1 H 11.9 H 8.9 H       Urine Microalbumin/Cr:  Recent Labs   Lab 04/06/21  0913 06/22/22  1228 02/24/23  1159   Microalb/Creat Ratio 8.8 56.5 H Unable to calculate         Other:   Recent Labs   Lab 06/28/21  1140 01/14/22  0929 09/14/22  1240 04/18/23  1023   Estradiol 13  --   --   --    Vit D, 25-Hydroxy  --  24 L 33  --    Vitamin B-12  --   --   --  605   Ferritin  --  34  --   --      Recent Labs   Lab 02/07/23  1119   Hepatitis C Ab Non-reactive       Assessment/Plan     Dee Hamilton is a 67 y.o.female with:    Type 2 diabetes mellitus with hyperglycemia, with long-term current use of insulin  - Improved but not yet controlled. Inc lantus to 39 u/d.     Hypertension, unspecified type  -     furosemide (LASIX) 20 MG tablet; Take 1 tablet (20 mg total) by mouth once daily.  Dispense: 90 tablet; Refill: 1  -     Basic Metabolic Panel; Future; Expected date: 07/08/2023  - Controlled.  Cont current.     Hyperlipidemia associated with type 2 diabetes mellitus  - Uncontrolled. We changed this this to crestor 40 mg/d. If remains high can possibly add Zetia to her regimen    Bilateral leg edema  -     furosemide (LASIX) 20 MG tablet; Take 1 tablet (20 mg total) by mouth once daily.  Dispense: 90 tablet; Refill: 1  -  Stable.  Cont current regimen.    Hypokalemia  -     Basic Metabolic Panel; Future; Expected date: 07/08/2023  - Repeat K+. Cont suppl.    Hypercalcemia  -     Basic Metabolic Panel; Future; Expected date: 07/08/2023  -     PTH, Intact; Future; Expected date: 06/08/2023  -     Calcitriol; Future; Expected date: 06/08/2023  -  avoid calcium suppl. Repeat level. Check Vit D and iPTH.     Chronic low back pain, unspecified back pain laterality, unspecified whether sciatica present  -     Ambulatory referral/consult to Back & Spine Clinic; Future; Expected date: 06/15/2023  -     methylPREDNISolone (MEDROL DOSEPACK) 4 mg tablet; use as directed  Dispense: 21 each; Refill: 0  - Pt mentioned toward end of visit and was 20 min  late for appt.  Due to time constraint will refer to back and spine and try Medrol dose pack.     Imbalance  - Await MRI Brain.     On potassium wasting diuretic therapy  - Stable.  Cont current regimen.    Other orders  -     insulin (LANTUS SOLOSTAR U-100 INSULIN) glargine 100 units/mL SubQ pen; 39 SC u QHS; Refill: 0         Chronic conditions status updated as per HPI.  Other than changes above, cont current medications and maintain follow up with specialists.  Follow up in about 3 months (around 9/8/2023) for fu chronic issues or sooner if needed.      Farzana Dorman MD  [facility]

## 2023-06-06 RX ORDER — ROSUVASTATIN CALCIUM 40 MG/1
40 TABLET, COATED ORAL NIGHTLY
Qty: 90 TABLET | Refills: 0 | Status: SHIPPED | OUTPATIENT
Start: 2023-06-06 | End: 2023-09-19 | Stop reason: SDUPTHER

## 2023-06-06 NOTE — TELEPHONE ENCOUNTER
No care due was identified.  [facility] Embedded Care Due Messages. Reference number: [id number].   6/06/2023 3:53:57 PM CDT

## 2023-06-06 NOTE — TELEPHONE ENCOUNTER
Pt was informed and expressed understanding. She wiould like to try to switch to Crestor(states she takes her Atorvastatin everyday). Please sign pended rx.

## 2023-06-08 ENCOUNTER — OFFICE VISIT (OUTPATIENT)
Dept: PRIMARY CARE CLINIC | Facility: CLINIC | Age: 68
End: 2023-06-08
Payer: MEDICARE

## 2023-06-08 VITALS
DIASTOLIC BLOOD PRESSURE: 80 MMHG | OXYGEN SATURATION: 97 % | WEIGHT: 185.88 LBS | HEART RATE: 103 BPM | HEIGHT: 67 IN | BODY MASS INDEX: 29.17 KG/M2 | RESPIRATION RATE: 18 BRPM | TEMPERATURE: 98 F | SYSTOLIC BLOOD PRESSURE: 136 MMHG

## 2023-06-08 DIAGNOSIS — I10 HYPERTENSION, UNSPECIFIED TYPE: ICD-10-CM

## 2023-06-08 DIAGNOSIS — G89.29 CHRONIC LOW BACK PAIN, UNSPECIFIED BACK PAIN LATERALITY, UNSPECIFIED WHETHER SCIATICA PRESENT: ICD-10-CM

## 2023-06-08 DIAGNOSIS — R26.89 IMBALANCE: ICD-10-CM

## 2023-06-08 DIAGNOSIS — E11.65 TYPE 2 DIABETES MELLITUS WITH HYPERGLYCEMIA, WITH LONG-TERM CURRENT USE OF INSULIN: Primary | ICD-10-CM

## 2023-06-08 DIAGNOSIS — E78.5 HYPERLIPIDEMIA ASSOCIATED WITH TYPE 2 DIABETES MELLITUS: ICD-10-CM

## 2023-06-08 DIAGNOSIS — Z79.899 ON POTASSIUM WASTING DIURETIC THERAPY: ICD-10-CM

## 2023-06-08 DIAGNOSIS — Z79.4 TYPE 2 DIABETES MELLITUS WITH HYPERGLYCEMIA, WITH LONG-TERM CURRENT USE OF INSULIN: Primary | ICD-10-CM

## 2023-06-08 DIAGNOSIS — R60.0 BILATERAL LEG EDEMA: ICD-10-CM

## 2023-06-08 DIAGNOSIS — E87.6 HYPOKALEMIA: ICD-10-CM

## 2023-06-08 DIAGNOSIS — E83.52 HYPERCALCEMIA: ICD-10-CM

## 2023-06-08 DIAGNOSIS — E11.69 HYPERLIPIDEMIA ASSOCIATED WITH TYPE 2 DIABETES MELLITUS: ICD-10-CM

## 2023-06-08 DIAGNOSIS — M54.50 CHRONIC LOW BACK PAIN, UNSPECIFIED BACK PAIN LATERALITY, UNSPECIFIED WHETHER SCIATICA PRESENT: ICD-10-CM

## 2023-06-08 PROCEDURE — 99999 PR PBB SHADOW E&M-EST. PATIENT-LVL V: CPT | Mod: PBBFAC,,, | Performed by: INTERNAL MEDICINE

## 2023-06-08 PROCEDURE — 99214 OFFICE O/P EST MOD 30 MIN: CPT | Mod: S$GLB,,, | Performed by: INTERNAL MEDICINE

## 2023-06-08 PROCEDURE — 3066F NEPHROPATHY DOC TX: CPT | Mod: CPTII,S$GLB,, | Performed by: INTERNAL MEDICINE

## 2023-06-08 PROCEDURE — 1160F RVW MEDS BY RX/DR IN RCRD: CPT | Mod: CPTII,S$GLB,, | Performed by: INTERNAL MEDICINE

## 2023-06-08 PROCEDURE — 3008F BODY MASS INDEX DOCD: CPT | Mod: CPTII,S$GLB,, | Performed by: INTERNAL MEDICINE

## 2023-06-08 PROCEDURE — 1101F PR PT FALLS ASSESS DOC 0-1 FALLS W/OUT INJ PAST YR: ICD-10-PCS | Mod: CPTII,S$GLB,, | Performed by: INTERNAL MEDICINE

## 2023-06-08 PROCEDURE — 3066F PR DOCUMENTATION OF TREATMENT FOR NEPHROPATHY: ICD-10-PCS | Mod: CPTII,S$GLB,, | Performed by: INTERNAL MEDICINE

## 2023-06-08 PROCEDURE — 3008F PR BODY MASS INDEX (BMI) DOCUMENTED: ICD-10-PCS | Mod: CPTII,S$GLB,, | Performed by: INTERNAL MEDICINE

## 2023-06-08 PROCEDURE — 3052F PR MOST RECENT HEMOGLOBIN A1C LEVEL 8.0 - < 9.0%: ICD-10-PCS | Mod: CPTII,S$GLB,, | Performed by: INTERNAL MEDICINE

## 2023-06-08 PROCEDURE — 1101F PT FALLS ASSESS-DOCD LE1/YR: CPT | Mod: CPTII,S$GLB,, | Performed by: INTERNAL MEDICINE

## 2023-06-08 PROCEDURE — 1125F AMNT PAIN NOTED PAIN PRSNT: CPT | Mod: CPTII,S$GLB,, | Performed by: INTERNAL MEDICINE

## 2023-06-08 PROCEDURE — 3288F PR FALLS RISK ASSESSMENT DOCUMENTED: ICD-10-PCS | Mod: CPTII,S$GLB,, | Performed by: INTERNAL MEDICINE

## 2023-06-08 PROCEDURE — 3079F PR MOST RECENT DIASTOLIC BLOOD PRESSURE 80-89 MM HG: ICD-10-PCS | Mod: CPTII,S$GLB,, | Performed by: INTERNAL MEDICINE

## 2023-06-08 PROCEDURE — 3079F DIAST BP 80-89 MM HG: CPT | Mod: CPTII,S$GLB,, | Performed by: INTERNAL MEDICINE

## 2023-06-08 PROCEDURE — 3061F NEG MICROALBUMINURIA REV: CPT | Mod: CPTII,S$GLB,, | Performed by: INTERNAL MEDICINE

## 2023-06-08 PROCEDURE — 3288F FALL RISK ASSESSMENT DOCD: CPT | Mod: CPTII,S$GLB,, | Performed by: INTERNAL MEDICINE

## 2023-06-08 PROCEDURE — 3061F PR NEG MICROALBUMINURIA RESULT DOCUMENTED/REVIEW: ICD-10-PCS | Mod: CPTII,S$GLB,, | Performed by: INTERNAL MEDICINE

## 2023-06-08 PROCEDURE — 99999 PR PBB SHADOW E&M-EST. PATIENT-LVL V: ICD-10-PCS | Mod: PBBFAC,,, | Performed by: INTERNAL MEDICINE

## 2023-06-08 PROCEDURE — 4010F ACE/ARB THERAPY RXD/TAKEN: CPT | Mod: CPTII,S$GLB,, | Performed by: INTERNAL MEDICINE

## 2023-06-08 PROCEDURE — 1125F PR PAIN SEVERITY QUANTIFIED, PAIN PRESENT: ICD-10-PCS | Mod: CPTII,S$GLB,, | Performed by: INTERNAL MEDICINE

## 2023-06-08 PROCEDURE — 4010F PR ACE/ARB THEARPY RXD/TAKEN: ICD-10-PCS | Mod: CPTII,S$GLB,, | Performed by: INTERNAL MEDICINE

## 2023-06-08 PROCEDURE — 3052F HG A1C>EQUAL 8.0%<EQUAL 9.0%: CPT | Mod: CPTII,S$GLB,, | Performed by: INTERNAL MEDICINE

## 2023-06-08 PROCEDURE — 99214 PR OFFICE/OUTPT VISIT, EST, LEVL IV, 30-39 MIN: ICD-10-PCS | Mod: S$GLB,,, | Performed by: INTERNAL MEDICINE

## 2023-06-08 PROCEDURE — 1160F PR REVIEW ALL MEDS BY PRESCRIBER/CLIN PHARMACIST DOCUMENTED: ICD-10-PCS | Mod: CPTII,S$GLB,, | Performed by: INTERNAL MEDICINE

## 2023-06-08 PROCEDURE — 1159F MED LIST DOCD IN RCRD: CPT | Mod: CPTII,S$GLB,, | Performed by: INTERNAL MEDICINE

## 2023-06-08 PROCEDURE — 3075F SYST BP GE 130 - 139MM HG: CPT | Mod: CPTII,S$GLB,, | Performed by: INTERNAL MEDICINE

## 2023-06-08 PROCEDURE — 3075F PR MOST RECENT SYSTOLIC BLOOD PRESS GE 130-139MM HG: ICD-10-PCS | Mod: CPTII,S$GLB,, | Performed by: INTERNAL MEDICINE

## 2023-06-08 PROCEDURE — 1159F PR MEDICATION LIST DOCUMENTED IN MEDICAL RECORD: ICD-10-PCS | Mod: CPTII,S$GLB,, | Performed by: INTERNAL MEDICINE

## 2023-06-08 RX ORDER — METHYLPREDNISOLONE 4 MG/1
TABLET ORAL
Qty: 21 EACH | Refills: 0 | Status: SHIPPED | OUTPATIENT
Start: 2023-06-08 | End: 2023-06-29

## 2023-06-08 RX ORDER — INSULIN GLARGINE 100 [IU]/ML
INJECTION, SOLUTION SUBCUTANEOUS
Refills: 0
Start: 2023-06-08 | End: 2023-09-19 | Stop reason: SDUPTHER

## 2023-06-08 RX ORDER — FUROSEMIDE 20 MG/1
20 TABLET ORAL DAILY
Qty: 90 TABLET | Refills: 1 | Status: SHIPPED | OUTPATIENT
Start: 2023-06-08

## 2023-06-14 ENCOUNTER — TELEPHONE (OUTPATIENT)
Dept: ORTHOPEDICS | Facility: CLINIC | Age: 68
End: 2023-06-14
Payer: MEDICARE

## 2023-06-14 DIAGNOSIS — M51.36 DDD (DEGENERATIVE DISC DISEASE), LUMBAR: Primary | ICD-10-CM

## 2023-06-22 ENCOUNTER — HOSPITAL ENCOUNTER (OUTPATIENT)
Dept: RADIOLOGY | Facility: HOSPITAL | Age: 68
Discharge: HOME | End: 2023-06-22
Attending: ORTHOPAEDIC SURGERY
Payer: MEDICARE

## 2023-06-22 ENCOUNTER — OFFICE VISIT (OUTPATIENT)
Dept: ORTHOPEDICS | Facility: CLINIC | Age: 68
End: 2023-06-22
Payer: MEDICARE

## 2023-06-22 VITALS — WEIGHT: 187.38 LBS | HEIGHT: 67 IN | BODY MASS INDEX: 29.41 KG/M2

## 2023-06-22 DIAGNOSIS — M51.36 DDD (DEGENERATIVE DISC DISEASE), LUMBAR: ICD-10-CM

## 2023-06-22 DIAGNOSIS — G89.29 CHRONIC LOW BACK PAIN, UNSPECIFIED BACK PAIN LATERALITY, UNSPECIFIED WHETHER SCIATICA PRESENT: ICD-10-CM

## 2023-06-22 DIAGNOSIS — M54.50 CHRONIC LOW BACK PAIN, UNSPECIFIED BACK PAIN LATERALITY, UNSPECIFIED WHETHER SCIATICA PRESENT: ICD-10-CM

## 2023-06-22 DIAGNOSIS — M47.816 LUMBAR SPONDYLOSIS: Primary | ICD-10-CM

## 2023-06-22 DIAGNOSIS — M54.9 DORSALGIA, UNSPECIFIED: ICD-10-CM

## 2023-06-22 PROCEDURE — 99204 PR OFFICE/OUTPT VISIT, NEW, LEVL IV, 45-59 MIN: ICD-10-PCS | Mod: GC,S$GLB,, | Performed by: ORTHOPAEDIC SURGERY

## 2023-06-22 PROCEDURE — 3066F NEPHROPATHY DOC TX: CPT | Mod: CPTII,S$GLB,, | Performed by: ORTHOPAEDIC SURGERY

## 2023-06-22 PROCEDURE — 72110 X-RAY EXAM L-2 SPINE 4/>VWS: CPT | Mod: 26,,, | Performed by: RADIOLOGY

## 2023-06-22 PROCEDURE — 1101F PR PT FALLS ASSESS DOC 0-1 FALLS W/OUT INJ PAST YR: ICD-10-PCS | Mod: CPTII,S$GLB,, | Performed by: ORTHOPAEDIC SURGERY

## 2023-06-22 PROCEDURE — 3061F NEG MICROALBUMINURIA REV: CPT | Mod: CPTII,S$GLB,, | Performed by: ORTHOPAEDIC SURGERY

## 2023-06-22 PROCEDURE — 3288F FALL RISK ASSESSMENT DOCD: CPT | Mod: CPTII,S$GLB,, | Performed by: ORTHOPAEDIC SURGERY

## 2023-06-22 PROCEDURE — 99204 OFFICE O/P NEW MOD 45 MIN: CPT | Mod: GC,S$GLB,, | Performed by: ORTHOPAEDIC SURGERY

## 2023-06-22 PROCEDURE — 99999 PR PBB SHADOW E&M-EST. PATIENT-LVL V: CPT | Mod: PBBFAC,,, | Performed by: ORTHOPAEDIC SURGERY

## 2023-06-22 PROCEDURE — 1160F RVW MEDS BY RX/DR IN RCRD: CPT | Mod: CPTII,S$GLB,, | Performed by: ORTHOPAEDIC SURGERY

## 2023-06-22 PROCEDURE — 3008F BODY MASS INDEX DOCD: CPT | Mod: CPTII,S$GLB,, | Performed by: ORTHOPAEDIC SURGERY

## 2023-06-22 PROCEDURE — 1126F AMNT PAIN NOTED NONE PRSNT: CPT | Mod: CPTII,S$GLB,, | Performed by: ORTHOPAEDIC SURGERY

## 2023-06-22 PROCEDURE — 4010F ACE/ARB THERAPY RXD/TAKEN: CPT | Mod: CPTII,S$GLB,, | Performed by: ORTHOPAEDIC SURGERY

## 2023-06-22 PROCEDURE — 3066F PR DOCUMENTATION OF TREATMENT FOR NEPHROPATHY: ICD-10-PCS | Mod: CPTII,S$GLB,, | Performed by: ORTHOPAEDIC SURGERY

## 2023-06-22 PROCEDURE — 3052F HG A1C>EQUAL 8.0%<EQUAL 9.0%: CPT | Mod: CPTII,S$GLB,, | Performed by: ORTHOPAEDIC SURGERY

## 2023-06-22 PROCEDURE — 1160F PR REVIEW ALL MEDS BY PRESCRIBER/CLIN PHARMACIST DOCUMENTED: ICD-10-PCS | Mod: CPTII,S$GLB,, | Performed by: ORTHOPAEDIC SURGERY

## 2023-06-22 PROCEDURE — 72110 XR LUMBAR SPINE AP AND LAT WITH FLEX/EXT: ICD-10-PCS | Mod: 26,,, | Performed by: RADIOLOGY

## 2023-06-22 PROCEDURE — 99999 PR PBB SHADOW E&M-EST. PATIENT-LVL V: ICD-10-PCS | Mod: PBBFAC,,, | Performed by: ORTHOPAEDIC SURGERY

## 2023-06-22 PROCEDURE — 1159F MED LIST DOCD IN RCRD: CPT | Mod: CPTII,S$GLB,, | Performed by: ORTHOPAEDIC SURGERY

## 2023-06-22 PROCEDURE — 3288F PR FALLS RISK ASSESSMENT DOCUMENTED: ICD-10-PCS | Mod: CPTII,S$GLB,, | Performed by: ORTHOPAEDIC SURGERY

## 2023-06-22 PROCEDURE — 3052F PR MOST RECENT HEMOGLOBIN A1C LEVEL 8.0 - < 9.0%: ICD-10-PCS | Mod: CPTII,S$GLB,, | Performed by: ORTHOPAEDIC SURGERY

## 2023-06-22 PROCEDURE — 1126F PR PAIN SEVERITY QUANTIFIED, NO PAIN PRESENT: ICD-10-PCS | Mod: CPTII,S$GLB,, | Performed by: ORTHOPAEDIC SURGERY

## 2023-06-22 PROCEDURE — 4010F PR ACE/ARB THEARPY RXD/TAKEN: ICD-10-PCS | Mod: CPTII,S$GLB,, | Performed by: ORTHOPAEDIC SURGERY

## 2023-06-22 PROCEDURE — 3008F PR BODY MASS INDEX (BMI) DOCUMENTED: ICD-10-PCS | Mod: CPTII,S$GLB,, | Performed by: ORTHOPAEDIC SURGERY

## 2023-06-22 PROCEDURE — 1101F PT FALLS ASSESS-DOCD LE1/YR: CPT | Mod: CPTII,S$GLB,, | Performed by: ORTHOPAEDIC SURGERY

## 2023-06-22 PROCEDURE — 72110 X-RAY EXAM L-2 SPINE 4/>VWS: CPT | Mod: TC

## 2023-06-22 PROCEDURE — 3061F PR NEG MICROALBUMINURIA RESULT DOCUMENTED/REVIEW: ICD-10-PCS | Mod: CPTII,S$GLB,, | Performed by: ORTHOPAEDIC SURGERY

## 2023-06-22 PROCEDURE — 1159F PR MEDICATION LIST DOCUMENTED IN MEDICAL RECORD: ICD-10-PCS | Mod: CPTII,S$GLB,, | Performed by: ORTHOPAEDIC SURGERY

## 2023-06-22 RX ORDER — METHOCARBAMOL 500 MG/1
500 TABLET, FILM COATED ORAL 3 TIMES DAILY
Qty: 60 TABLET | Refills: 1 | Status: SHIPPED | OUTPATIENT
Start: 2023-06-22

## 2023-06-22 NOTE — PROGRESS NOTES
DATE: 6/22/2023  PATIENT: Dee Hamilton    Attending Physician: Yonny Rangel M.D.    CHIEF COMPLAINT: LBP with BLE pain    HISTORY:  Dee Hamilton is a 67 y.o. female presents for initial evaluation of low back and b/l leg pain (Back - 5, Leg - 4). The pain has been present for 2-3 years. The patient describes the pain as sharp and it radiates anterolaterally down BLE to the knees.  The pain is worse with standing up and walking and getting out of the car and improved by bending over. She cannot walk longer than a block due to pain; she gets relief by leaning forward on a shopping cart. There is associated numbness and tingling. There is subjective weakness. Prior treatments have included tylenol, advil, but no injections, PT, or surgery.    Denies perineal paresthesias, bowel/bladder dysfunction.    The patient does have DM (A1c 8.9); she does not smoke or endorse IVDU. The patient is not on any blood thinners and does not take chronic narcotics. She is retired; she used to work as a  at [employer].    PAST MEDICAL/SURGICAL HISTORY:  Past Medical History:   Diagnosis Date    Anxiety     Bilateral leg edema 6/8/2021    Depression     Diabetes mellitus, type 2     Diverticulitis     GERD (gastroesophageal reflux disease)     Glaucoma     Hyperlipidemia     Hypertension     Nicotine dependence in remission     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     Past Surgical History:   Procedure Laterality Date    BLADDER SUSPENSION  12/2011    CATARACT EXTRACTION, BILATERAL      CHOLECYSTECTOMY      COLD KNIFE CONIZATION OF CERVIX N/A 09/29/2021    Procedure: CONE BIOPSY, CERVIX, USING COLD KNIFE;  Surgeon: Sheeba Frank MD;  Location: [facility] OR;  Service: OB/GYN;  Laterality: N/A;    EYE SURGERY      Glaucoma Laser Sx ou      HYSTEROSCOPY WITH DILATION AND CURETTAGE OF UTERUS N/A 09/29/2021    Procedure: HYSTEROSCOPY, WITH DILATION AND CURETTAGE OF UTERUS;  Surgeon: Sheeba Frank MD;  Location:  "[facility] OR;  Service: OB/GYN;  Laterality: N/A;    OOPHORECTOMY      TONSILLECTOMY      TUBAL LIGATION      vaginal mesh         Current Medications:   Current Outpatient Medications:     blood sugar diagnostic Strp, To check BG 2 times daily, to use with insurance preferred meter, Disp: 200 strip, Rfl: 3    blood-glucose meter kit, To check BG 2 times daily, to use with insurance preferred meter, Disp: 1 each, Rfl: 0    CONTOUR TEST STRIPS Strp, USE TO TEST BLOOD SUGAR TWICE DAILY, Disp: 50 strip, Rfl: 3    dulaglutide (TRULICITY) 3 mg/0.5 mL pen injector, Inject 3 mg into the skin every 7 days., Disp: 12 pen, Rfl: 3    ergocalciferol (ERGOCALCIFEROL) 50,000 unit Cap, Take 1 capsule (50,000 Units total) by mouth every 30 days., Disp: 3 capsule, Rfl: 3    estradioL (ESTRACE) 0.01 % (0.1 mg/gram) vaginal cream, Use 1 gram of estrogen cream in vagina nightly for 2 weeks, then twice a week thereafter., Disp: 42.5 g, Rfl: 3    furosemide (LASIX) 20 MG tablet, Take 1 tablet (20 mg total) by mouth once daily., Disp: 90 tablet, Rfl: 1    gabapentin (NEURONTIN) 100 MG capsule, Take 1 capsule (100 mg total) by mouth every evening., Disp: 90 capsule, Rfl: 1    insulin (LANTUS SOLOSTAR U-100 INSULIN) glargine 100 units/mL SubQ pen, 39 SC u QHS, Disp: , Rfl: 0    insulin needles, disposable, (RELION PEN NEEDLES) 32 x 5/32 " Ndle, Use as directed, Disp: 50 each, Rfl: 6    lancets 33 gauge Misc, by Misc.(Non-Drug; Combo Route) route. True plus, Disp: , Rfl:     lancets Misc, To check BG 2 times daily, to use with insurance preferred meter, Disp: 200 each, Rfl: 11    LIDOCAINE 2 %, VALIUM 5 MG, BACLOFEN 4 % SUPPOSITORY, Place 1 suppository vaginally 2 (two) times daily as needed (pelvic pain)., Disp: 20 each, Rfl: 5    losartan (COZAAR) 100 MG tablet, Take 1 tablet (100 mg total) by mouth once daily., Disp: 90 tablet, Rfl: 1    methylPREDNISolone (MEDROL DOSEPACK) 4 mg tablet, use as directed, Disp: 21 each, Rfl: 0    nystatin " (MYCOSTATIN) powder, Apply topically 4 (four) times daily., Disp: 60 g, Rfl: 1    pantoprazole (PROTONIX) 40 MG tablet, Take 1 tablet (40 mg total) by mouth once daily., Disp: 30 tablet, Rfl: 2    paroxetine (PAXIL) 20 MG tablet, Take 1 & 1/2 tablets (30 mg total) by mouth once daily., Disp: 90 tablet, Rfl: 1    phenazopyridine (PYRIDIUM) 200 MG tablet, Take 1 tablet (200 mg total) by mouth 3 (three) times daily as needed for Pain., Disp: 10 tablet, Rfl: 0    potassium chloride SA (K-DUR,KLOR-CON) 20 MEQ tablet, Take 1 tablet (20 mEq total) by mouth once daily., Disp: 90 tablet, Rfl: 0    repaglinide (PRANDIN) 1 MG tablet, Take 1 tablet (1 mg total) by mouth 3 (three) times daily before meals., Disp: 270 tablet, Rfl: 0    rosuvastatin (CRESTOR) 40 MG Tab, Take 1 tablet (40 mg total) by mouth every evening., Disp: 90 tablet, Rfl: 0    travoprost (TRAVATAN Z) 0.004 % ophthalmic solution, Place 1 drop into both eyes every evening., Disp: 7.5 each, Rfl: 3    amitriptyline (ELAVIL) 10 MG tablet, Take 1 tablet (10 mg total) by mouth nightly. Can increase by 10 mg per week at nights as needed for max of 5 tabs (50 mg) per night., Disp: 45 tablet, Rfl: 11    methocarbamoL (ROBAXIN) 500 MG Tab, Take 1 tablet (500 mg total) by mouth 3 (three) times daily., Disp: 60 tablet, Rfl: 1  No current facility-administered medications for this visit.    Social History:   Social History     Socioeconomic History    Marital status:    Occupational History     Employer: [email or web address]   Tobacco Use    Smoking status: Former     Packs/day: 2.00     Years: 25.00     Pack years: 50.00     Types: Cigarettes     Quit date:      Years since quittin.4    Smokeless tobacco: Never   Substance and Sexual Activity    Alcohol use: Yes     Comment: social     Drug use: Never    Sexual activity: Yes     Partners: Male     Comment: works at [employer], 3 grown kids    Social History Narrative    ** Merged History Encounter **       "    Social Determinants of Health     Financial Resource Strain: Low Risk     Difficulty of Paying Living Expenses: Not very hard   Food Insecurity: No Food Insecurity    Worried About Running Out of Food in the Last Year: Never true    Ran Out of Food in the Last Year: Never true   Transportation Needs: No Transportation Needs    Lack of Transportation (Medical): No    Lack of Transportation (Non-Medical): No   Physical Activity: Inactive    Days of Exercise per Week: 0 days    Minutes of Exercise per Session: 0 min   Stress: Stress Concern Present    Feeling of Stress : Very much   Social Connections: Moderately Integrated    Frequency of Communication with Friends and Family: Three times a week    Frequency of Social Gatherings with Friends and Family: Never    Attends Judaism Services: More than 4 times per year    Active Member of Clubs or Organizations: No    Attends Club or Organization Meetings: Never    Marital Status:    Housing Stability: Unknown    Unable to Pay for Housing in the Last Year: No    Unstable Housing in the Last Year: No       REVIEW OF SYSTEMS:  Constitution: Negative. Negative for chills, fever and night sweats.   Cardiovascular: Negative for chest pain and syncope.   Respiratory: Negative for cough and shortness of breath.   Gastrointestinal: See HPI. Negative for nausea/vomiting. Negative for abdominal pain.  Genitourinary: See HPI. Negative for discoloration or dysuria.  Hematologic/Lymphatic: Negative for bleeding/clotting disorders.   Musculoskeletal: Negative for falls and muscle weakness.   Neurological: See HPI. No history of seizures. No history of cranial surgery or shunts.  Neurological: See HPI. No seizures.   Endocrine: Negative for polydipsia, polyphagia and polyuria.   Allergic/Immunologic: Negative for hives and persistent infections.     EXAM:  Ht 5' 7" (1.702 m)   Wt 85 kg (187 lb 6.4 oz)   BMI 29.35 kg/m²     PHYSICAL EXAMINATION:    General: The patient is a " 67 y.o. female in no apparent distress, the patient is orientatied to person, place and time.  Psych: Normal mood and affect  HEENT: Vision grossly intact, hearing intact to the spoken word.  Lungs: Respirations unlabored.  Gait: Normal station and gait, no difficulty with toe or heel walk.   Skin: Dorsal lumbar skin negative for rashes, lesions, hairy patches and surgical scars. There is lumbar tenderness to palpation.  Range of motion: Lumbar range of motion is acceptable.  Spinal Balance: Global saggital and coronal spinal balance acceptable, no significant for scoliosis and kyphosis.  Musculoskeletal: No pain with the range of motion of the bilateral hips. No trochanteric tenderness to palpation.  Vascular: Bilateral lower extremities warm and well perfused, Dorsalis pedis pulses 2+ bilaterally.  Neurological: Normal strength and tone in all major motor groups in the bilateral lower extremities. Normal sensation to light touch in the L2-S1 dermatomes bilaterally.  Deep tendon reflexes symmetric in the bilateral lower extremities.  Negative Babinski bilaterally. Straight leg raise negative bilaterally.    IMAGING:   Today I independently reviewed the following images and my interpretations are as follows:    AP, Lat and Flex/Ex  upright L-spine demonstrate spondylosis and DDD without listhesis.    DEXA in 2023 showed lumbar T-score of -2.0.    Body mass index is 29.35 kg/m².  Hemoglobin A1C   Date Value Ref Range Status   06/01/2023 8.9 (H) 4.0 - 5.6 % Final     Comment:     ADA Screening Guidelines:  5.7-6.4%  Consistent with prediabetes  >or=6.5%  Consistent with diabetes    High levels of fetal hemoglobin interfere with the HbA1C  assay. Heterozygous hemoglobin variants (HbS, HgC, etc)do  not significantly interfere with this assay.   However, presence of multiple variants may affect accuracy.     02/24/2023 11.9 (H) 4.0 - 5.6 % Final     Comment:     ADA Screening Guidelines:  5.7-6.4%  Consistent with  prediabetes  >or=6.5%  Consistent with diabetes    High levels of fetal hemoglobin interfere with the HbA1C  assay. Heterozygous hemoglobin variants (HbS, HgC, etc)do  not significantly interfere with this assay.   However, presence of multiple variants may affect accuracy.     09/14/2022 10.1 (H) 4.0 - 5.6 % Final     Comment:     ADA Screening Guidelines:  5.7-6.4%  Consistent with prediabetes  >or=6.5%  Consistent with diabetes    High levels of fetal hemoglobin interfere with the HbA1C  assay. Heterozygous hemoglobin variants (HbS, HgC, etc)do  not significantly interfere with this assay.   However, presence of multiple variants may affect accuracy.         ASSESSMENT/PLAN:    Dee was seen today for low-back pain.    Diagnoses and all orders for this visit:    Lumbar spondylosis  -     Ambulatory referral/consult to Physical/Occupational Therapy; Future  -     methocarbamoL (ROBAXIN) 500 MG Tab; Take 1 tablet (500 mg total) by mouth 3 (three) times daily.    Chronic low back pain, unspecified back pain laterality, unspecified whether sciatica present  -     Ambulatory referral[facility]consult to [facility]    DDD (degenerative disc disease), lumbar    Dorsalgia, unspecified  -     MRI Lumbar Spine Without Contrast; Future      Follow up in about 4 weeks (around 7/20/2023).    Patient has lumbar spondylosis and symptoms consistent with neurogenic claudication. I discussed the natural history of their diagnoses as well as surgical and nonsurgical treatment options. I educated the patient on the importance of core/back strengthening, correct posture, bending/lifting ergonomics, and low-impact aerobic exercises (walking, elliptical, and aquatherapy). I prescribed robaxin. Continue medications gabapentin and ibuprofen. I will refer the patient to PT for core/back strengthening. I ordered a lumbar MRI to evaluate stenosis. Patient will follow up in 4 weeks for MRI review.    I have personally examined the patient  and agree with the above plan.    Yonny Rangel MD  Orthopaedic Spine Surgeon  Department of Orthopaedic Surgery  [phone]

## 2023-06-27 ENCOUNTER — OFFICE VISIT (OUTPATIENT)
Dept: DERMATOLOGY | Facility: CLINIC | Age: 68
End: 2023-06-27
Payer: MEDICARE

## 2023-06-27 DIAGNOSIS — Z41.1 ENCOUNTER FOR COSMETIC PROCEDURE: ICD-10-CM

## 2023-06-27 DIAGNOSIS — L82.1 SEBORRHEIC KERATOSES: Primary | ICD-10-CM

## 2023-06-27 DIAGNOSIS — L72.0 EPIDERMAL INCLUSION CYST: ICD-10-CM

## 2023-06-27 DIAGNOSIS — L81.1 MELASMA: ICD-10-CM

## 2023-06-27 PROCEDURE — 1159F MED LIST DOCD IN RCRD: CPT | Mod: CPTII,S$GLB,, | Performed by: DERMATOLOGY

## 2023-06-27 PROCEDURE — 3066F NEPHROPATHY DOC TX: CPT | Mod: CPTII,S$GLB,, | Performed by: DERMATOLOGY

## 2023-06-27 PROCEDURE — 1101F PT FALLS ASSESS-DOCD LE1/YR: CPT | Mod: CPTII,S$GLB,, | Performed by: DERMATOLOGY

## 2023-06-27 PROCEDURE — 4010F PR ACE/ARB THEARPY RXD/TAKEN: ICD-10-PCS | Mod: CPTII,S$GLB,, | Performed by: DERMATOLOGY

## 2023-06-27 PROCEDURE — 4010F ACE/ARB THERAPY RXD/TAKEN: CPT | Mod: CPTII,S$GLB,, | Performed by: DERMATOLOGY

## 2023-06-27 PROCEDURE — 3052F HG A1C>EQUAL 8.0%<EQUAL 9.0%: CPT | Mod: CPTII,S$GLB,, | Performed by: DERMATOLOGY

## 2023-06-27 PROCEDURE — 3052F PR MOST RECENT HEMOGLOBIN A1C LEVEL 8.0 - < 9.0%: ICD-10-PCS | Mod: CPTII,S$GLB,, | Performed by: DERMATOLOGY

## 2023-06-27 PROCEDURE — 1160F PR REVIEW ALL MEDS BY PRESCRIBER/CLIN PHARMACIST DOCUMENTED: ICD-10-PCS | Mod: CPTII,S$GLB,, | Performed by: DERMATOLOGY

## 2023-06-27 PROCEDURE — 3288F FALL RISK ASSESSMENT DOCD: CPT | Mod: CPTII,S$GLB,, | Performed by: DERMATOLOGY

## 2023-06-27 PROCEDURE — 99999 PR PBB SHADOW E&M-EST. PATIENT-LVL V: CPT | Mod: PBBFAC,,, | Performed by: DERMATOLOGY

## 2023-06-27 PROCEDURE — 1160F RVW MEDS BY RX/DR IN RCRD: CPT | Mod: CPTII,S$GLB,, | Performed by: DERMATOLOGY

## 2023-06-27 PROCEDURE — 1126F PR PAIN SEVERITY QUANTIFIED, NO PAIN PRESENT: ICD-10-PCS | Mod: CPTII,S$GLB,, | Performed by: DERMATOLOGY

## 2023-06-27 PROCEDURE — 3288F PR FALLS RISK ASSESSMENT DOCUMENTED: ICD-10-PCS | Mod: CPTII,S$GLB,, | Performed by: DERMATOLOGY

## 2023-06-27 PROCEDURE — 99204 OFFICE O/P NEW MOD 45 MIN: CPT | Mod: S$GLB,,, | Performed by: DERMATOLOGY

## 2023-06-27 PROCEDURE — 99204 PR OFFICE/OUTPT VISIT, NEW, LEVL IV, 45-59 MIN: ICD-10-PCS | Mod: S$GLB,,, | Performed by: DERMATOLOGY

## 2023-06-27 PROCEDURE — 1126F AMNT PAIN NOTED NONE PRSNT: CPT | Mod: CPTII,S$GLB,, | Performed by: DERMATOLOGY

## 2023-06-27 PROCEDURE — 3066F PR DOCUMENTATION OF TREATMENT FOR NEPHROPATHY: ICD-10-PCS | Mod: CPTII,S$GLB,, | Performed by: DERMATOLOGY

## 2023-06-27 PROCEDURE — 1159F PR MEDICATION LIST DOCUMENTED IN MEDICAL RECORD: ICD-10-PCS | Mod: CPTII,S$GLB,, | Performed by: DERMATOLOGY

## 2023-06-27 PROCEDURE — 99999 PR PBB SHADOW E&M-EST. PATIENT-LVL V: ICD-10-PCS | Mod: PBBFAC,,, | Performed by: DERMATOLOGY

## 2023-06-27 PROCEDURE — 3061F PR NEG MICROALBUMINURIA RESULT DOCUMENTED/REVIEW: ICD-10-PCS | Mod: CPTII,S$GLB,, | Performed by: DERMATOLOGY

## 2023-06-27 PROCEDURE — 3061F NEG MICROALBUMINURIA REV: CPT | Mod: CPTII,S$GLB,, | Performed by: DERMATOLOGY

## 2023-06-27 PROCEDURE — 1101F PR PT FALLS ASSESS DOC 0-1 FALLS W/OUT INJ PAST YR: ICD-10-PCS | Mod: CPTII,S$GLB,, | Performed by: DERMATOLOGY

## 2023-06-27 NOTE — PROGRESS NOTES
Subjective:      Patient ID:  Dee Hamilton is a 67 y.o. female who presents for   Chief Complaint   Patient presents with    Cyst     Neck      Cyst - Initial  Affected locations: neck  Duration: 8 months  Signs / symptoms: tender  Severity: mild to moderate  Timing: constant  Aggravated by: friction and pressure  Improvement on treatment: no relief      Review of Systems   Constitutional: Negative.    HENT: Negative.     Respiratory: Negative.     Musculoskeletal: Negative.      Objective:   Physical Exam   Constitutional: She appears well-developed and well-nourished.   Neurological: She is alert and oriented to person, place, and time.   Psychiatric: She has a normal mood and affect.   Skin:                      Diagram Legend     Erythematous scaling macule/papule c/w actinic keratosis       Vascular papule c/w angioma      Pigmented verrucoid papule/plaque c/w seborrheic keratosis      Yellow umbilicated papule c/w sebaceous hyperplasia      Irregularly shaped tan macule c/w lentigo     1-2 mm smooth white papules consistent with Milia      Movable subcutaneous cyst with punctum c/w epidermal inclusion cyst      Subcutaneous movable cyst c/w pilar cyst      Firm pink to brown papule c/w dermatofibroma      Pedunculated fleshy papule(s) c/w skin tag(s)      Evenly pigmented macule c/w junctional nevus     Mildly variegated pigmented, slightly irregular-bordered macule c/w mildly atypical nevus      Flesh colored to evenly pigmented papule c/w intradermal nevus       Pink pearly papule/plaque c/w basal cell carcinoma      Erythematous hyperkeratotic cursted plaque c/w SCC      Surgical scar with no sign of skin cancer recurrence      Open and closed comedones      Inflammatory papules and pustules      Verrucoid papule consistent consistent with wart     Erythematous eczematous patches and plaques     Dystrophic onycholytic nail with subungual debris c/w onychomycosis     Umbilicated papule     Erythematous-base heme-crusted tan verrucoid plaque consistent with inflamed seborrheic keratosis     Erythematous Silvery Scaling Plaque c/w Psoriasis     See annotation  Cheeks with tan patches.    Assessment / Plan:        Seborrheic keratoses  Discussed with patient the benign nature of these lesions and that no treatment is indicated.  Chronic nature of this condition discussed with patient.  Brochure given for patient education.    Epidermal inclusion cyst  -     Ambulatory referral/consult to Dermatology  Previous [facility] labs and or records and notes reviewed and considered for their impact on our clinical decision making today.  Discussed with patient the likelihood that this lesion is an epidermal cyst caused by an involuted lining of skin with dead skin trapped inside.  Cysts do not have a malignant potential but can become infected and turn into abscesses with possible cellulitis.  Discussed the option of warm compresses if infected with oral antibiotics.  Discussed the option of surgical excision with scar, possible recurrence, hematoma, and infection.  Patient to consider these options.  Decision made for minor surgery today after discussing with the patient.  Patient agrees.  Discussed with patient the risks of procedure or surgery, including scar, ulceration, recurrence, skin discoloration, and infection.  Patient not to have alcohol, ibuprofen, nsaids day of and night before procedure.  No swimming pool or ocean water for one week after procedure.  No heavy exertion in general or physical activity that would stretch the surgical site for one week after the procedure.    Melasma  Patient instructed in importance in daily sun protection. Sun avoidance and topical protection discussed.     Patient encouraged to wear hat for all outdoor exposure.     Also discussed sun protective clothing.  Patient to start 4% HQ carefully every other day for a week or so before attempting daily.  Later can go to twice  daily, morning and night.  Watch out for irritation, which can cause more darkening, which is not what we want.  If dryness occurs, take a break and apply coconut oil.  If irritation occurs, take a break and apply OTC hydrocortisone.  Pt wants to wait on hq for now.    Encounter for cosmetic procedure  $350 for split earlobe repair.  Warned of smaller lobe and inability to wear big ear rings for one year.  Reviewed with patient different treatment options and associated risks.             Follow up if symptoms worsen or fail to improve, for Procedure.

## 2023-06-29 ENCOUNTER — TELEPHONE (OUTPATIENT)
Dept: DERMATOLOGY | Facility: CLINIC | Age: 68
End: 2023-06-29
Payer: MEDICARE

## 2023-06-29 NOTE — TELEPHONE ENCOUNTER
----- Message from Saima Morataya RN sent at 6/28/2023 11:45 AM CDT -----    ----- Message -----  From: Sarah Blackwell  Sent: 6/28/2023  11:33 AM CDT  To: [facility] Staff    Dee Hamilton calling regarding Patient Advice for a missed call from the clinic but no message was left, call back [phone]

## 2023-07-10 ENCOUNTER — HOSPITAL ENCOUNTER (OUTPATIENT)
Dept: RADIOLOGY | Facility: OTHER | Age: 68
Discharge: HOME | End: 2023-07-10
Attending: OBSTETRICS & GYNECOLOGY
Payer: MEDICARE

## 2023-07-10 DIAGNOSIS — Z12.39 ENCOUNTER FOR SCREENING FOR MALIGNANT NEOPLASM OF BREAST, UNSPECIFIED SCREENING MODALITY: ICD-10-CM

## 2023-07-10 PROCEDURE — 77063 MAMMO DIGITAL SCREENING BILAT WITH TOMO: ICD-10-PCS | Mod: 26,,, | Performed by: RADIOLOGY

## 2023-07-10 PROCEDURE — 77067 SCR MAMMO BI INCL CAD: CPT | Mod: TC

## 2023-07-10 PROCEDURE — 77063 BREAST TOMOSYNTHESIS BI: CPT | Mod: 26,,, | Performed by: RADIOLOGY

## 2023-07-10 PROCEDURE — 77067 MAMMO DIGITAL SCREENING BILAT WITH TOMO: ICD-10-PCS | Mod: 26,,, | Performed by: RADIOLOGY

## 2023-07-10 PROCEDURE — 77067 SCR MAMMO BI INCL CAD: CPT | Mod: 26,,, | Performed by: RADIOLOGY

## 2023-07-17 ENCOUNTER — PROCEDURE VISIT (OUTPATIENT)
Dept: DERMATOLOGY | Facility: CLINIC | Age: 68
End: 2023-07-17
Payer: MEDICARE

## 2023-07-17 DIAGNOSIS — L82.1 SK (SEBORRHEIC KERATOSIS): ICD-10-CM

## 2023-07-17 DIAGNOSIS — L81.9 HYPERPIGMENTATION: ICD-10-CM

## 2023-07-17 DIAGNOSIS — L81.4 LENTIGO: ICD-10-CM

## 2023-07-17 DIAGNOSIS — L90.5 SCAR: ICD-10-CM

## 2023-07-17 DIAGNOSIS — D22.9 BENIGN MOLE: ICD-10-CM

## 2023-07-17 DIAGNOSIS — L72.0 EPIDERMAL CYST: Primary | ICD-10-CM

## 2023-07-17 PROCEDURE — 88304 PR  SURG PATH,LEVEL III: ICD-10-PCS | Mod: 26,,, | Performed by: DERMATOLOGY

## 2023-07-17 PROCEDURE — 11422 PR EXC SKIN BENIG 1.1-2 CM REMAINDR BODY: ICD-10-PCS | Mod: S$GLB,,, | Performed by: DERMATOLOGY

## 2023-07-17 PROCEDURE — 99212 PR OFFICE/OUTPT VISIT, EST, LEVL II, 10-19 MIN: ICD-10-PCS | Mod: 25,S$GLB,, | Performed by: DERMATOLOGY

## 2023-07-17 PROCEDURE — 88304 TISSUE EXAM BY PATHOLOGIST: CPT | Mod: 26,,, | Performed by: DERMATOLOGY

## 2023-07-17 PROCEDURE — 99212 OFFICE O/P EST SF 10 MIN: CPT | Mod: 25,S$GLB,, | Performed by: DERMATOLOGY

## 2023-07-17 PROCEDURE — 88304 TISSUE EXAM BY PATHOLOGIST: CPT | Performed by: DERMATOLOGY

## 2023-07-17 PROCEDURE — 13131 PR RECMPL WND HEAD,FAC,HAND 1.1-2.5 CM: ICD-10-PCS | Mod: 51,S$GLB,, | Performed by: DERMATOLOGY

## 2023-07-17 PROCEDURE — 13131 CMPLX RPR F/C/C/M/N/AX/G/H/F: CPT | Mod: 51,S$GLB,, | Performed by: DERMATOLOGY

## 2023-07-17 PROCEDURE — 11422 EXC H-F-NK-SP B9+MARG 1.1-2: CPT | Mod: S$GLB,,, | Performed by: DERMATOLOGY

## 2023-07-17 NOTE — PROGRESS NOTES
Pt co dark spots of the feet for months.  Doesn't remember getting a rash or bites.  Subjective:      Patient ID:  Dee Hamilton is a 67 y.o. female who presents for No chief complaint on file.    HPI    Review of Systems   Constitutional:  Negative for fever.   HENT:  Negative for sore throat.    Respiratory:  Negative for cough.    Skin:  Negative for itching.     Objective:   Physical Exam   Constitutional: She appears well-developed and well-nourished.   Eyes: No conjunctival no injection.   Neurological: She is alert and oriented to person, place, and time.   Psychiatric: She has a normal mood and affect.   Skin:                Diagram Legend     Erythematous scaling macule/papule c/w actinic keratosis       Vascular papule c/w angioma      Pigmented verrucoid papule/plaque c/w seborrheic keratosis      Yellow umbilicated papule c/w sebaceous hyperplasia      Irregularly shaped tan macule c/w lentigo     1-2 mm smooth white papules consistent with Milia      Movable subcutaneous cyst with punctum c/w epidermal inclusion cyst      Subcutaneous movable cyst c/w pilar cyst      Firm pink to brown papule c/w dermatofibroma      Pedunculated fleshy papule(s) c/w skin tag(s)      Evenly pigmented macule c/w junctional nevus     Mildly variegated pigmented, slightly irregular-bordered macule c/w mildly atypical nevus      Flesh colored to evenly pigmented papule c/w intradermal nevus       Pink pearly papule/plaque c/w basal cell carcinoma      Erythematous hyperkeratotic cursted plaque c/w SCC      Surgical scar with no sign of skin cancer recurrence      Open and closed comedones      Inflammatory papules and pustules      Verrucoid papule consistent consistent with wart     Erythematous eczematous patches and plaques     Dystrophic onycholytic nail with subungual debris c/w onychomycosis     Umbilicated papule    Erythematous-base heme-crusted tan verrucoid plaque consistent with inflamed seborrheic  keratosis     Erythematous Silvery Scaling Plaque c/w Psoriasis     See annotation      Dark spots of the r dors foot.    Assessment / Plan:      Pathology Orders:       Normal Orders This Visit    Specimen to Pathology, Dermatology     Questions:    Procedure Type: Dermatology and skin neoplasms    Number of Specimens: 1    ------------------------: -------------------------    Spec 1 Procedure: Excision >2cm    Spec 1 Clinical Impression: cyst    Spec 1 Source: neck    Release to patient:           Epidermal cyst  -     Specimen to Pathology, Dermatology  PROCEDURE: Elliptical excision with complex layered repair in order to decrease dead space.    ANESTHETIC: 1 cc 1% Xylocaine with Epinephrine 1:100,000, buffered    SURGEON:  Osvaldo Kuhn M.D.    ASSISTANTS: Karla Leblanc LPN    PREOPERATIVE DIAGNOSIS:  cyst    POSTOPERATIVE DIAGNOSIS: Same as preoperative diagnosis    PATHOLOGIC DIAGNOSIS: Pending    LOCATION: l neck    INITIAL LESION SIZE: 1.1 cm    EXCISED DIAMETER: 1.1 cm    PREPARATION: The diagnosis, procedure, alternatives, benefits and risks, including but not limited to: infection, bleeding/bruising, drug reactions, pain, scar or cosmetic defect, local sensation disturbances, wound dehiscence (separation of wound edges after sutures removed) and/or recurrence of present condition were explained to the patient. The patient elected to proceed.  Patient's identity was verified and the site was verified.    PROCEDURE: The location noted above was prepped, draped, and anesthetized in the usual sterile fashion per Karla Leblanc LPN. Lesional tissue was carefully marked with at least 0 mm margins of clinically normal skin in all directions. A fusiform elliptical excision was done with #15 blade carried down completely through the dermis into the deep subcutaneous tissues to the level of the non-muscle fascia, and dissection was carried out in that plane. The wound was undermined to a distance at least the  maximum width of the defect as measured perpendicular to the closure line along at least one entire edge of the defect, in the case 0.5 cm. Electrocoagulation was used to obtain hemostasis. Blood loss was minimal. The wound was then approximated in a layered fashion with subcutaneous and intradermal sutures of 3.0 Monocryl, approximately 1 in number, and the wound was then superficially closed with simple interrupted sutures of 3.0 Prolene.  Absorbable sutures used for outer sutures.       The patient tolerated the procedure well.    The area was cleaned and dressed appropriately and the patient was given wound care instructions, as well as an appointment for follow-up evaluation.    LENGTH OF REPAIR: 1.3 cm    Scar  Discussed with patient the benign nature of these lesions and that no treatment is indicated.  Long term scar discussed.  Possibly from old bites.    Hyperpigmentation  Discussed with the patient the risk of color scars, erythema, or hyperpigmentation that could take months to resolve.    SK (seborrheic keratosis)  Discussed with patient the benign nature of these lesions and that no treatment is indicated.    Lentigo  Discussed with patient the benign nature of these lesions and that no treatment is indicated.    Benign mole  Discussed with patient the benign nature of these lesions and that no treatment is indicated.             Follow up if symptoms worsen or fail to improve.

## 2023-07-17 NOTE — PATIENT INSTRUCTIONS
Post- Operative Wound Care    Your doctor has performed local skin surgery today.  Vaseline ointment and a pressure bandage were placed after the surgery.  It is very important that you keep this bandage in place for 24 hours.  This will decrease the risk of post - operative infection and bleeding.  After 24 hours, you may remove the band aid and wash the area with warm soap and water and apply Vaseline ointment.  Many patients prefer to use Neosporin or Bacitracin ointment.  This is acceptable; however know that you can develop an allergy to this medication even if you have used it safely for years.  It is important to keep the area moist.  Letting it dry out and get air slows healing time, will worsen the scar, and make it more difficult to remove the stitches.  Band aid is optional after first 24 hours.    It is best NOT to use hydrogen peroxide or antibacterial soap to wash the operative site.       If you notice increasing redness, tenderness, pain, or yellow drainage at the biopsy or surgical site, please notify your doctor.  These are signs of an infection.    If your biopsy/surgical site is bleeding, apply firm pressure for 15 minutes straight.  Repeat for another 15 minutes, if it is still bleeding.   If the surgical site continues to bleed, then please contact your doctor.      For [facility] users:   You will receive your pathology results in [facility] as soon as they are available. Please be assured that your physician/provider will review your results and contact you should additional treatment be required. This is one more way [facility] is putting you first.       [facility] [place] [address]/ [phone] FAX/ [email or web address]

## 2023-07-24 LAB
FINAL PATHOLOGIC DIAGNOSIS: NORMAL
GROSS: NORMAL
Lab: NORMAL
MICROSCOPIC EXAM: NORMAL

## 2023-08-01 NOTE — PROGRESS NOTES
[facility] Note    Chief Complaint      Chief Complaint   Patient presents with    Medicare AWV         History of Present Illness      Dee Hamilton is a 68 y.o. female who presents today for   Chief Complaint   Patient presents with    Medicare AWV           Patient presents wellness visit by Medicare.  She reports anxiety and depression continue despite taking the medication Prozac.  She will be seeing her primary care provider at the end of this month for her routine medical exam for anxiety and depression.  I will also refer patient to Psychiatry for therapy.  Patient verbally authorizes understanding and is agreeable with this plan of care.  She has not been active for quite some time. I discussed walking for 30 minutes for 2 days a week for 2 weeks and increasing a 30 minute day every 2 weeks. Patient verbalizes understanding.          Review of Systems   Psychiatric/Behavioral:  Positive for depression. The patient is nervous/anxious.    All 12 systems otherwise negative.       Family History:  family history includes Cancer in her brother; Cancer (age of onset: 68) in her father; Cataracts in her father, mother, and sister; Dementia in her mother; Diabetes in her daughter, sister, and son; Hypertension in her mother; No Known Problems in her son; Pancreatic cancer in her brother; Ulcers in her brother.   Family history was reviewed with patient.     Medications:  Outpatient Encounter Medications as of 8/15/2023   Medication Sig Dispense Refill    ergocalciferol (ERGOCALCIFEROL) 50,000 unit Cap Take 1 capsule (50,000 Units total) by mouth every 30 days. 3 capsule 3    furosemide (LASIX) 20 MG tablet Take 1 tablet (20 mg total) by mouth once daily. 90 tablet 1    gabapentin (NEURONTIN) 100 MG capsule Take 1 capsule (100 mg total) by mouth every evening. 90 capsule 1    insulin (LANTUS SOLOSTAR U-100 INSULIN) glargine 100 units/mL SubQ pen 39 SC u QHS  0    LIDOCAINE 2 %, VALIUM 5 MG,  "BACLOFEN 4 % SUPPOSITORY Place 1 suppository vaginally 2 (two) times daily as needed (pelvic pain). 20 each 5    losartan (COZAAR) 100 MG tablet Take 1 tablet (100 mg total) by mouth once daily. 90 tablet 1    methocarbamoL (ROBAXIN) 500 MG Tab Take 1 tablet (500 mg total) by mouth 3 (three) times daily. 60 tablet 1    pantoprazole (PROTONIX) 40 MG tablet Take 1 tablet (40 mg total) by mouth once daily. 30 tablet 2    paroxetine (PAXIL) 20 MG tablet Take 1 & 1/2 tablets (30 mg total) by mouth once daily. 90 tablet 1    phenazopyridine (PYRIDIUM) 200 MG tablet Take 1 tablet (200 mg total) by mouth 3 (three) times daily as needed for Pain. 10 tablet 0    potassium chloride SA (K-DUR,KLOR-CON) 20 MEQ tablet Take 1 tablet (20 mEq total) by mouth once daily. 90 tablet 0    rosuvastatin (CRESTOR) 40 MG Tab Take 1 tablet (40 mg total) by mouth every evening. 90 tablet 0    travoprost (TRAVATAN Z) 0.004 % ophthalmic solution Place 1 drop into both eyes every evening. 7.5 each 3    blood sugar diagnostic Strp To check BG 2 times daily, to use with insurance preferred meter 200 strip 3    blood-glucose meter kit To check BG 2 times daily, to use with insurance preferred meter 1 each 0    CONTOUR TEST STRIPS Str USE TO TEST BLOOD SUGAR TWICE DAILY 50 strip 3    dulaglutide (TRULICITY) 3 mg/0.5 mL pen injector Inject 3 mg into the skin every 7 days. 12 pen 3    insulin needles, disposable, (RELION PEN NEEDLES) 32 x 5/32 " Ndle Use as directed 50 each 6    lancets 33 gauge Misc by Misc.(Non-Drug; Combo Route) route. True plus      lancets Misc To check BG 2 times daily, to use with insurance preferred meter 200 each 11    repaglinide (PRANDIN) 1 MG tablet Take 1 tablet (1 mg total) by mouth 3 (three) times daily before meals. 270 tablet 0    [DISCONTINUED] amitriptyline (ELAVIL) 10 MG tablet Take 1 tablet (10 mg total) by mouth nightly. Can increase by 10 mg per week at nights as needed for max of 5 tabs (50 mg) per night. " "(Patient not taking: Reported on 8/15/2023) 45 tablet 11    [DISCONTINUED] estradioL (ESTRACE) 0.01 % (0.1 mg/gram) vaginal cream Use 1 gram of estrogen cream in vagina nightly for 2 weeks, then twice a week thereafter. (Patient not taking: Reported on 8/15/2023) 42.5 g 3    [DISCONTINUED] nystatin (MYCOSTATIN) powder Apply topically 4 (four) times daily. (Patient not taking: Reported on 8/15/2023) 60 g 1     No facility-administered encounter medications on file as of 8/15/2023.     Review for Opioid Screening: Pt does not have Rx for Opioids    Review for Substance Use Disorders: Patient does not have a controlled substance abuse disorder.        Allergies:  Review of patient's allergies indicates:  No Known Allergies    Health Maintenance:  Health Maintenance   Topic Date Due    Colorectal Cancer Screening  07/25/2023    Hemoglobin A1c  09/01/2023    Foot Exam  10/04/2023    TETANUS VACCINE  08/15/2023 (Originally 8/8/1973)    Eye Exam  04/13/2024    Lipid Panel  06/01/2024    Mammogram  07/10/2024    DEXA Scan  03/08/2026    Hepatitis C Screening  Completed    Shingles Vaccine  Completed     Health Maintenance Topics with due status: Not Due       Topic Last Completion Date    Influenza Vaccine 09/14/2022    Diabetes Urine Screening 02/24/2023    DEXA Scan 03/08/2023    Eye Exam 04/13/2023    Lipid Panel 06/01/2023    Mammogram 07/10/2023       Physical Exam      Vital Signs  Pulse: 81  SpO2: 95 %  BP: 138/80  BP Location: Right arm  Patient Position: Sitting  Height and Weight  Height: 5' 7" (170.2 cm)  Weight: 85 kg (187 lb 6.3 oz)  BSA (Calculated - sq m): 2 sq meters  BMI (Calculated): 29.3  Weight in (lb) to have BMI = 25: 159.3]    Physical Exam  Constitutional:       Appearance: Normal appearance. She is normal weight.   HENT:      Head: Normocephalic and atraumatic.      Nose: Nose normal.      Mouth/Throat:      Mouth: Mucous membranes are moist.      Pharynx: Oropharynx is clear.   Eyes:      " Extraocular Movements: Extraocular movements intact.      Conjunctiva/sclera: Conjunctivae normal.      Pupils: Pupils are equal, round, and reactive to light.   Cardiovascular:      Rate and Rhythm: Normal rate and regular rhythm.      Pulses: Normal pulses.      Heart sounds: Normal heart sounds.   Pulmonary:      Effort: Pulmonary effort is normal.      Breath sounds: Normal breath sounds.   Musculoskeletal:         General: Normal range of motion.      Cervical back: Normal range of motion and neck supple.   Skin:     General: Skin is warm and dry.      Capillary Refill: Capillary refill takes less than 2 seconds.   Neurological:      General: No focal deficit present.      Mental Status: She is alert and oriented to person, place, and time. Mental status is at baseline.   Psychiatric:         Mood and Affect: Mood normal.         Behavior: Behavior normal.         Thought Content: Thought content normal.         Judgment: Judgment normal.            Assessment/Plan     Dee Hamilton is a 68 y.o.female with:    Depression, unspecified depression type  -     Ambulatory referral/consult to Primary Care Behavioral Health (Non-Opioids); Future; Expected date: 08/22/2023  -     Ambulatory referral/consult to Psychiatry; Future; Expected date: 08/22/2023    Positive depression screening  Comments:  I have reviewed the positive depression score which warrants active treatment with psychotherapy and/or medications.  Orders:  -     Ambulatory referral/consult to Primary Care Behavioral Health (Non-Opioids); Future; Expected date: 08/22/2023    Encounter for preventive health examination      As above, continue current medications and maintain follow up with specialists.  Return to clinic as needed.    Greater than 50% of visit was spent face to face with patient.  All questions were answered to patient's satisfaction.           Karen L Spencer, NP-C [facility]    I have used clinical judgement based on  duration and functional status to consider definite necessity for treatment. [place]

## 2023-08-14 ENCOUNTER — TELEPHONE (OUTPATIENT)
Dept: ADMINISTRATIVE | Facility: CLINIC | Age: 68
End: 2023-08-14
Payer: MEDICARE

## 2023-08-15 ENCOUNTER — PATIENT MESSAGE (OUTPATIENT)
Dept: BEHAVIORAL HEALTH | Facility: CLINIC | Age: 68
End: 2023-08-15
Payer: MEDICARE

## 2023-08-15 ENCOUNTER — OFFICE VISIT (OUTPATIENT)
Dept: PRIMARY CARE CLINIC | Facility: CLINIC | Age: 68
End: 2023-08-15
Payer: MEDICARE

## 2023-08-15 VITALS
WEIGHT: 187.38 LBS | OXYGEN SATURATION: 95 % | SYSTOLIC BLOOD PRESSURE: 138 MMHG | BODY MASS INDEX: 29.41 KG/M2 | HEIGHT: 67 IN | HEART RATE: 81 BPM | DIASTOLIC BLOOD PRESSURE: 80 MMHG

## 2023-08-15 DIAGNOSIS — Z13.31 POSITIVE DEPRESSION SCREENING: ICD-10-CM

## 2023-08-15 DIAGNOSIS — E11.65 TYPE 2 DIABETES MELLITUS WITH HYPERGLYCEMIA, WITH LONG-TERM CURRENT USE OF INSULIN: ICD-10-CM

## 2023-08-15 DIAGNOSIS — Z79.4 TYPE 2 DIABETES MELLITUS WITH HYPERGLYCEMIA, WITH LONG-TERM CURRENT USE OF INSULIN: ICD-10-CM

## 2023-08-15 DIAGNOSIS — Z00.00 ENCOUNTER FOR PREVENTIVE HEALTH EXAMINATION: ICD-10-CM

## 2023-08-15 DIAGNOSIS — F32.A DEPRESSION, UNSPECIFIED DEPRESSION TYPE: Primary | ICD-10-CM

## 2023-08-15 PROCEDURE — 99999 PR PBB SHADOW E&M-EST. PATIENT-LVL V: CPT | Mod: PBBFAC,,, | Performed by: NURSE PRACTITIONER

## 2023-08-15 PROCEDURE — 4010F PR ACE/ARB THEARPY RXD/TAKEN: ICD-10-PCS | Mod: CPTII,S$GLB,, | Performed by: NURSE PRACTITIONER

## 2023-08-15 PROCEDURE — 3052F HG A1C>EQUAL 8.0%<EQUAL 9.0%: CPT | Mod: CPTII,S$GLB,, | Performed by: NURSE PRACTITIONER

## 2023-08-15 PROCEDURE — 3288F FALL RISK ASSESSMENT DOCD: CPT | Mod: CPTII,S$GLB,, | Performed by: NURSE PRACTITIONER

## 2023-08-15 PROCEDURE — 1160F PR REVIEW ALL MEDS BY PRESCRIBER/CLIN PHARMACIST DOCUMENTED: ICD-10-PCS | Mod: CPTII,S$GLB,, | Performed by: NURSE PRACTITIONER

## 2023-08-15 PROCEDURE — 3008F PR BODY MASS INDEX (BMI) DOCUMENTED: ICD-10-PCS | Mod: CPTII,S$GLB,, | Performed by: NURSE PRACTITIONER

## 2023-08-15 PROCEDURE — 3052F PR MOST RECENT HEMOGLOBIN A1C LEVEL 8.0 - < 9.0%: ICD-10-PCS | Mod: CPTII,S$GLB,, | Performed by: NURSE PRACTITIONER

## 2023-08-15 PROCEDURE — 3075F PR MOST RECENT SYSTOLIC BLOOD PRESS GE 130-139MM HG: ICD-10-PCS | Mod: CPTII,S$GLB,, | Performed by: NURSE PRACTITIONER

## 2023-08-15 PROCEDURE — 1170F PR FUNCTIONAL STATUS ASSESSED: ICD-10-PCS | Mod: CPTII,S$GLB,, | Performed by: NURSE PRACTITIONER

## 2023-08-15 PROCEDURE — 1160F RVW MEDS BY RX/DR IN RCRD: CPT | Mod: CPTII,S$GLB,, | Performed by: NURSE PRACTITIONER

## 2023-08-15 PROCEDURE — 3288F PR FALLS RISK ASSESSMENT DOCUMENTED: ICD-10-PCS | Mod: CPTII,S$GLB,, | Performed by: NURSE PRACTITIONER

## 2023-08-15 PROCEDURE — 3008F BODY MASS INDEX DOCD: CPT | Mod: CPTII,S$GLB,, | Performed by: NURSE PRACTITIONER

## 2023-08-15 PROCEDURE — 1159F PR MEDICATION LIST DOCUMENTED IN MEDICAL RECORD: ICD-10-PCS | Mod: CPTII,S$GLB,, | Performed by: NURSE PRACTITIONER

## 2023-08-15 PROCEDURE — 1170F FXNL STATUS ASSESSED: CPT | Mod: CPTII,S$GLB,, | Performed by: NURSE PRACTITIONER

## 2023-08-15 PROCEDURE — 3066F NEPHROPATHY DOC TX: CPT | Mod: CPTII,S$GLB,, | Performed by: NURSE PRACTITIONER

## 2023-08-15 PROCEDURE — 3075F SYST BP GE 130 - 139MM HG: CPT | Mod: CPTII,S$GLB,, | Performed by: NURSE PRACTITIONER

## 2023-08-15 PROCEDURE — 99999 PR PBB SHADOW E&M-EST. PATIENT-LVL V: ICD-10-PCS | Mod: PBBFAC,,, | Performed by: NURSE PRACTITIONER

## 2023-08-15 PROCEDURE — 3066F PR DOCUMENTATION OF TREATMENT FOR NEPHROPATHY: ICD-10-PCS | Mod: CPTII,S$GLB,, | Performed by: NURSE PRACTITIONER

## 2023-08-15 PROCEDURE — G0439 PR MEDICARE ANNUAL WELLNESS SUBSEQUENT VISIT: ICD-10-PCS | Mod: S$GLB,,, | Performed by: NURSE PRACTITIONER

## 2023-08-15 PROCEDURE — 3079F DIAST BP 80-89 MM HG: CPT | Mod: CPTII,S$GLB,, | Performed by: NURSE PRACTITIONER

## 2023-08-15 PROCEDURE — G0439 PPPS, SUBSEQ VISIT: HCPCS | Mod: S$GLB,,, | Performed by: NURSE PRACTITIONER

## 2023-08-15 PROCEDURE — 3079F PR MOST RECENT DIASTOLIC BLOOD PRESSURE 80-89 MM HG: ICD-10-PCS | Mod: CPTII,S$GLB,, | Performed by: NURSE PRACTITIONER

## 2023-08-15 PROCEDURE — 3061F NEG MICROALBUMINURIA REV: CPT | Mod: CPTII,S$GLB,, | Performed by: NURSE PRACTITIONER

## 2023-08-15 PROCEDURE — 3061F PR NEG MICROALBUMINURIA RESULT DOCUMENTED/REVIEW: ICD-10-PCS | Mod: CPTII,S$GLB,, | Performed by: NURSE PRACTITIONER

## 2023-08-15 PROCEDURE — 1159F MED LIST DOCD IN RCRD: CPT | Mod: CPTII,S$GLB,, | Performed by: NURSE PRACTITIONER

## 2023-08-15 PROCEDURE — 1101F PT FALLS ASSESS-DOCD LE1/YR: CPT | Mod: CPTII,S$GLB,, | Performed by: NURSE PRACTITIONER

## 2023-08-15 PROCEDURE — 1101F PR PT FALLS ASSESS DOC 0-1 FALLS W/OUT INJ PAST YR: ICD-10-PCS | Mod: CPTII,S$GLB,, | Performed by: NURSE PRACTITIONER

## 2023-08-15 PROCEDURE — 4010F ACE/ARB THERAPY RXD/TAKEN: CPT | Mod: CPTII,S$GLB,, | Performed by: NURSE PRACTITIONER

## 2023-08-15 RX ORDER — DULAGLUTIDE 3 MG/.5ML
3 INJECTION, SOLUTION SUBCUTANEOUS
Qty: 12 PEN | Refills: 3 | Status: SHIPPED | OUTPATIENT
Start: 2023-08-15 | End: 2023-09-21

## 2023-08-15 NOTE — PATIENT INSTRUCTIONS
Counseling and Referral of Other Preventative  (Italic type indicates deductible and co-insurance are waived)    Patient Name: Dee Hamilton  Today's Date: 8/15/2023    Health Maintenance       Date Due Completion Date    Colorectal Cancer Screening 07/25/2023 7/25/2018    Override on 9/1/2008: Done (at [facility]  no poyps need repeat in 10 yrs )    Hemoglobin A1c 09/01/2023 6/1/2023    Foot Exam 10/04/2023 10/4/2022    TETANUS VACCINE 08/15/2023 (Originally 8/8/1973) ---    COVID-19 Vaccine (5 - Moderna series) 08/15/2023 (Originally 2/6/2023) 10/6/2022    Influenza Vaccine (1) 09/01/2023 9/14/2022    Diabetes Urine Screening 02/24/2024 2/24/2023    Eye Exam 04/13/2024 4/13/2023 (Done)    Override on 4/13/2023: Done (Dr. Pardo)    Override on 4/13/2021: Done (sometime march/april 2021)    Lipid Panel 06/01/2024 6/1/2023    Mammogram 07/10/2024 7/10/2023    DEXA Scan 03/08/2026 3/8/2023        Orders Placed This Encounter   Procedures    Ambulatory referral/consult to Primary Care Behavioral Health (Non-Opioids)    Ambulatory referral/consult to Psychiatry     The following information is provided to all patients.  This information is to help you find resources for any of the problems found today that may be affecting your health:                Living healthy guide: www.[facility].[place].gov      Understanding Diabetes: www.diabetes.org      Eating healthy: www.cdc.gov/healthyweight      CDC home safety checklist: www.cdc.gov/steadi/patient.html      Agency on Aging: www.goea.[place].gov      Alcoholics anonymous (AA): www.aa.org      Physical Activity: www.jacki.nih.gov/ml6qlvm      Tobacco use: www.quitwithusla.org     Counseling and Referral of Other Preventative  (Italic type indicates deductible and co-insurance are waived)    Patient Name: Dee Hamilton  Today's Date: 8/15/2023    Health Maintenance       Date Due Completion Date    Colorectal Cancer Screening 07/25/2023 7/25/2018    Override on 9/1/2008:  Done (at [facility]  no poyps need repeat in 10 yrs )    Hemoglobin A1c 09/01/2023 6/1/2023    Foot Exam 10/04/2023 10/4/2022    TETANUS VACCINE 08/15/2023 (Originally 8/8/1973) ---    COVID-19 Vaccine (5 - Moderna series) 08/15/2023 (Originally 2/6/2023) 10/6/2022    Influenza Vaccine (1) 09/01/2023 9/14/2022    Diabetes Urine Screening 02/24/2024 2/24/2023    Eye Exam 04/13/2024 4/13/2023 (Done)    Override on 4/13/2023: Done (Dr. Pardo)    Override on 4/13/2021: Done (sometime march/april 2021)    Lipid Panel 06/01/2024 6/1/2023    Mammogram 07/10/2024 7/10/2023    DEXA Scan 03/08/2026 3/8/2023        Orders Placed This Encounter   Procedures    Ambulatory referral/consult to Primary Care Behavioral Health (Non-Opioids)    Ambulatory referral/consult to Psychiatry     The following information is provided to all patients.  This information is to help you find resources for any of the problems found today that may be affecting your health:                Living healthy guide: www.[facility].[place].gov      Understanding Diabetes: www.diabetes.org      Eating healthy: www.cdc.gov/healthyweight      CDC home safety checklist: www.cdc.gov/steadi/patient.html      Agency on Aging: www.goea.[place].[facility]      Alcoholics anonymous (AA): www.aa.org      Physical Activity: www.jacki.nih.gov/xw4pdiz      Tobacco use: www.quitwithusla.org

## 2023-08-21 ENCOUNTER — TELEPHONE (OUTPATIENT)
Dept: BEHAVIORAL HEALTH | Facility: CLINIC | Age: 68
End: 2023-08-21
Payer: MEDICARE

## 2023-08-23 ENCOUNTER — OFFICE VISIT (OUTPATIENT)
Dept: OPTOMETRY | Facility: CLINIC | Age: 68
End: 2023-08-23
Payer: MEDICARE

## 2023-08-23 DIAGNOSIS — H40.022 OPEN ANGLE WITH BORDERLINE FINDINGS, HIGH RISK, LEFT: ICD-10-CM

## 2023-08-23 DIAGNOSIS — H40.1111 PRIMARY OPEN ANGLE GLAUCOMA (POAG) OF RIGHT EYE, MILD STAGE: Primary | ICD-10-CM

## 2023-08-23 PROCEDURE — 3052F PR MOST RECENT HEMOGLOBIN A1C LEVEL 8.0 - < 9.0%: ICD-10-PCS | Mod: CPTII,S$GLB,, | Performed by: OPTOMETRIST

## 2023-08-23 PROCEDURE — 1160F RVW MEDS BY RX/DR IN RCRD: CPT | Mod: CPTII,S$GLB,, | Performed by: OPTOMETRIST

## 2023-08-23 PROCEDURE — 99999 PR PBB SHADOW E&M-EST. PATIENT-LVL III: ICD-10-PCS | Mod: PBBFAC,,, | Performed by: OPTOMETRIST

## 2023-08-23 PROCEDURE — 1101F PR PT FALLS ASSESS DOC 0-1 FALLS W/OUT INJ PAST YR: ICD-10-PCS | Mod: CPTII,S$GLB,, | Performed by: OPTOMETRIST

## 2023-08-23 PROCEDURE — 1160F PR REVIEW ALL MEDS BY PRESCRIBER/CLIN PHARMACIST DOCUMENTED: ICD-10-PCS | Mod: CPTII,S$GLB,, | Performed by: OPTOMETRIST

## 2023-08-23 PROCEDURE — 1126F AMNT PAIN NOTED NONE PRSNT: CPT | Mod: CPTII,S$GLB,, | Performed by: OPTOMETRIST

## 2023-08-23 PROCEDURE — 1159F PR MEDICATION LIST DOCUMENTED IN MEDICAL RECORD: ICD-10-PCS | Mod: CPTII,S$GLB,, | Performed by: OPTOMETRIST

## 2023-08-23 PROCEDURE — 99214 PR OFFICE/OUTPT VISIT, EST, LEVL IV, 30-39 MIN: ICD-10-PCS | Mod: S$GLB,,, | Performed by: OPTOMETRIST

## 2023-08-23 PROCEDURE — 3288F FALL RISK ASSESSMENT DOCD: CPT | Mod: CPTII,S$GLB,, | Performed by: OPTOMETRIST

## 2023-08-23 PROCEDURE — 1126F PR PAIN SEVERITY QUANTIFIED, NO PAIN PRESENT: ICD-10-PCS | Mod: CPTII,S$GLB,, | Performed by: OPTOMETRIST

## 2023-08-23 PROCEDURE — 3052F HG A1C>EQUAL 8.0%<EQUAL 9.0%: CPT | Mod: CPTII,S$GLB,, | Performed by: OPTOMETRIST

## 2023-08-23 PROCEDURE — 3066F NEPHROPATHY DOC TX: CPT | Mod: CPTII,S$GLB,, | Performed by: OPTOMETRIST

## 2023-08-23 PROCEDURE — 4010F PR ACE/ARB THEARPY RXD/TAKEN: ICD-10-PCS | Mod: CPTII,S$GLB,, | Performed by: OPTOMETRIST

## 2023-08-23 PROCEDURE — 99214 OFFICE O/P EST MOD 30 MIN: CPT | Mod: S$GLB,,, | Performed by: OPTOMETRIST

## 2023-08-23 PROCEDURE — 3061F PR NEG MICROALBUMINURIA RESULT DOCUMENTED/REVIEW: ICD-10-PCS | Mod: CPTII,S$GLB,, | Performed by: OPTOMETRIST

## 2023-08-23 PROCEDURE — 1159F MED LIST DOCD IN RCRD: CPT | Mod: CPTII,S$GLB,, | Performed by: OPTOMETRIST

## 2023-08-23 PROCEDURE — 3066F PR DOCUMENTATION OF TREATMENT FOR NEPHROPATHY: ICD-10-PCS | Mod: CPTII,S$GLB,, | Performed by: OPTOMETRIST

## 2023-08-23 PROCEDURE — 1101F PT FALLS ASSESS-DOCD LE1/YR: CPT | Mod: CPTII,S$GLB,, | Performed by: OPTOMETRIST

## 2023-08-23 PROCEDURE — 3061F NEG MICROALBUMINURIA REV: CPT | Mod: CPTII,S$GLB,, | Performed by: OPTOMETRIST

## 2023-08-23 PROCEDURE — 4010F ACE/ARB THERAPY RXD/TAKEN: CPT | Mod: CPTII,S$GLB,, | Performed by: OPTOMETRIST

## 2023-08-23 PROCEDURE — 3288F PR FALLS RISK ASSESSMENT DOCUMENTED: ICD-10-PCS | Mod: CPTII,S$GLB,, | Performed by: OPTOMETRIST

## 2023-08-23 PROCEDURE — 99999 PR PBB SHADOW E&M-EST. PATIENT-LVL III: CPT | Mod: PBBFAC,,, | Performed by: OPTOMETRIST

## 2023-08-23 RX ORDER — TRAVOPROST OPHTHALMIC SOLUTION 0.04 MG/ML
1 SOLUTION OPHTHALMIC NIGHTLY
Qty: 7.5 EACH | Refills: 3 | Status: SHIPPED | OUTPATIENT
Start: 2023-08-23 | End: 2024-08-22

## 2023-08-23 NOTE — PROGRESS NOTES
HPI    NADIR: 01/23  Chief complaint (CC): Patient is here because she has had an increase in   floaters OU in the past month.  Patient also needs a referral to another   glaucoma specialist because she had trouble getting in to see Dr. Dawn.   Patient also needs refills on her Travatan.  Glasses? +2 yrs. old  Contacts? -  H/o eye surgery, injections or laser: PC IOL OU, punctal plugs  H/o eye injury: -  Known eye conditions? See above  Family h/o eye conditions? -  Eye gtts? Using Travatan OU Q HS, last used last night      (-) Flashes (+)  Floaters (-) Mucous   (-)  Tearing (-) Itching (-) Burning   (-) Headaches (-) Eye Pain/discomfort (-) Irritation   (-)  Redness (-) Double vision (-) Blurry vision    Diabetic? + BS about 160-170  A1c? Hemoglobin A1C       Date                     Value               Ref Range             Status                06/01/2023               8.9 (H)             4.0 - 5.6 %           Final                 02/24/2023               11.9 (H)            4.0 - 5.6 %           Final                09/14/2022               10.1 (H)            4.0 - 5.6 %           Final                Last edited by Emma Weaver on 8/23/2023  9:41 AM.            Assessment /Plan     For exam results, see Encounter Report.    Primary open angle glaucoma (POAG) of right eye, mild stage  -     travoprost (TRAVATAN Z) 0.004 % ophthalmic solution; Place 1 drop into both eyes every evening.  Dispense: 7.5 each; Refill: 3    Open angle with borderline findings, high risk, left  -     travoprost (TRAVATAN Z) 0.004 % ophthalmic solution; Place 1 drop into both eyes every evening.  Dispense: 7.5 each; Refill: 3      IOP 22 OD, OS. Pt states she didn't take her drops last night. Pt was supposed to see Dr Dawn but reports that she has had issues trying to get scheduled with her so she decided to return to me. Pt was last seen by Dr Dawn on 4/1/2022. Importance of drops usage and f/u discussed. Refills on Travatan  [facility] [place] [facility]. Referral to Dr Carver.

## 2023-08-24 ENCOUNTER — PATIENT MESSAGE (OUTPATIENT)
Dept: BEHAVIORAL HEALTH | Facility: CLINIC | Age: 68
End: 2023-08-24
Payer: MEDICARE

## 2023-08-24 ENCOUNTER — TELEPHONE (OUTPATIENT)
Dept: OPHTHALMOLOGY | Facility: CLINIC | Age: 68
End: 2023-08-24
Payer: MEDICARE

## 2023-08-24 ENCOUNTER — TELEPHONE (OUTPATIENT)
Dept: BEHAVIORAL HEALTH | Facility: CLINIC | Age: 68
End: 2023-08-24
Payer: MEDICARE

## 2023-08-24 NOTE — TELEPHONE ENCOUNTER
----- Message from Katie Aquino sent at 8/23/2023 11:08 AM CDT -----    ----- Message -----  From: Emma Weaver  Sent: 8/23/2023  10:16 AM CDT  To: Krystal Carver Staff    Will you please contact this patient to schedule a consult?

## 2023-08-24 NOTE — TELEPHONE ENCOUNTER
Clinic left VM for pt to schedule new patient glaucoma evaluation with Dr. Carver. Pt referred by Dr. Grimes and previously followed by Dr. Dawn. Left dept # for pt to call back to schedule appt.

## 2023-08-24 NOTE — PROGRESS NOTES
CHW reached out to pt, no answer, LVM,. Sent portal message to pls call or have PCP refer her again.

## 2023-08-30 DIAGNOSIS — R39.9 UTI SYMPTOMS: ICD-10-CM

## 2023-08-30 RX ORDER — NITROFURANTOIN 25; 75 MG/1; MG/1
100 CAPSULE ORAL 2 TIMES DAILY
Qty: 10 CAPSULE | Refills: 0 | OUTPATIENT
Start: 2023-08-30 | End: 2023-09-04

## 2023-09-15 DIAGNOSIS — E11.9 TYPE 2 DIABETES MELLITUS WITHOUT COMPLICATION: ICD-10-CM

## 2023-09-18 DIAGNOSIS — F33.9 EPISODE OF RECURRENT MAJOR DEPRESSIVE DISORDER, UNSPECIFIED DEPRESSION EPISODE SEVERITY: ICD-10-CM

## 2023-09-18 DIAGNOSIS — F41.9 ANXIETY: ICD-10-CM

## 2023-09-19 ENCOUNTER — LAB VISIT (OUTPATIENT)
Dept: LAB | Facility: HOSPITAL | Age: 68
End: 2023-09-19
Attending: INTERNAL MEDICINE
Payer: MEDICARE

## 2023-09-19 ENCOUNTER — OFFICE VISIT (OUTPATIENT)
Dept: PRIMARY CARE CLINIC | Facility: CLINIC | Age: 68
End: 2023-09-19
Payer: MEDICARE

## 2023-09-19 VITALS
TEMPERATURE: 98 F | OXYGEN SATURATION: 98 % | HEART RATE: 89 BPM | HEIGHT: 67 IN | BODY MASS INDEX: 28.07 KG/M2 | DIASTOLIC BLOOD PRESSURE: 72 MMHG | SYSTOLIC BLOOD PRESSURE: 128 MMHG | RESPIRATION RATE: 18 BRPM | WEIGHT: 178.81 LBS

## 2023-09-19 DIAGNOSIS — E83.52 HYPERCALCEMIA: ICD-10-CM

## 2023-09-19 DIAGNOSIS — K76.0 FATTY LIVER: ICD-10-CM

## 2023-09-19 DIAGNOSIS — R41.3 MEMORY IMPAIRMENT: ICD-10-CM

## 2023-09-19 DIAGNOSIS — E11.65 TYPE 2 DIABETES MELLITUS WITH HYPERGLYCEMIA, WITH LONG-TERM CURRENT USE OF INSULIN: ICD-10-CM

## 2023-09-19 DIAGNOSIS — I10 HYPERTENSION, UNSPECIFIED TYPE: ICD-10-CM

## 2023-09-19 DIAGNOSIS — E21.3 HYPERPARATHYROIDISM: ICD-10-CM

## 2023-09-19 DIAGNOSIS — E11.40 TYPE 2 DIABETES MELLITUS WITH DIABETIC NEUROPATHY, WITH LONG-TERM CURRENT USE OF INSULIN: Primary | ICD-10-CM

## 2023-09-19 DIAGNOSIS — E78.5 HYPERLIPIDEMIA ASSOCIATED WITH TYPE 2 DIABETES MELLITUS: ICD-10-CM

## 2023-09-19 DIAGNOSIS — Z79.899 ON POTASSIUM WASTING DIURETIC THERAPY: ICD-10-CM

## 2023-09-19 DIAGNOSIS — E87.6 HYPOKALEMIA: ICD-10-CM

## 2023-09-19 DIAGNOSIS — Z79.4 TYPE 2 DIABETES MELLITUS WITH HYPERGLYCEMIA, WITH LONG-TERM CURRENT USE OF INSULIN: ICD-10-CM

## 2023-09-19 DIAGNOSIS — F32.A DEPRESSION, UNSPECIFIED DEPRESSION TYPE: ICD-10-CM

## 2023-09-19 DIAGNOSIS — R32 URINARY INCONTINENCE, UNSPECIFIED TYPE: ICD-10-CM

## 2023-09-19 DIAGNOSIS — E11.69 HYPERLIPIDEMIA ASSOCIATED WITH TYPE 2 DIABETES MELLITUS: ICD-10-CM

## 2023-09-19 DIAGNOSIS — Z79.4 TYPE 2 DIABETES MELLITUS WITH DIABETIC NEUROPATHY, WITH LONG-TERM CURRENT USE OF INSULIN: Primary | ICD-10-CM

## 2023-09-19 DIAGNOSIS — M85.80 OSTEOPENIA, UNSPECIFIED LOCATION: ICD-10-CM

## 2023-09-19 DIAGNOSIS — E11.9 TYPE 2 DIABETES MELLITUS WITHOUT COMPLICATION: ICD-10-CM

## 2023-09-19 LAB
ANION GAP SERPL CALC-SCNC: 10 MMOL/L (ref 8–16)
BILIRUB SERPL-MCNC: NEGATIVE MG/DL
BLOOD URINE, POC: NEGATIVE
BUN SERPL-MCNC: 7 MG/DL (ref 8–23)
CALCIUM SERPL-MCNC: 10.6 MG/DL (ref 8.7–10.5)
CHLORIDE SERPL-SCNC: 101 MMOL/L (ref 95–110)
CLARITY, POC UA: CLEAR
CO2 SERPL-SCNC: 27 MMOL/L (ref 23–29)
COLOR, POC UA: YELLOW
CREAT SERPL-MCNC: 0.7 MG/DL (ref 0.5–1.4)
EST. GFR  (NO RACE VARIABLE): >60 ML/MIN/1.73 M^2
ESTIMATED AVG GLUCOSE: 326 MG/DL (ref 68–131)
GLUCOSE SERPL-MCNC: 284 MG/DL (ref 70–110)
GLUCOSE UR QL STRIP: NORMAL
HBA1C MFR BLD: 13 % (ref 4–5.6)
KETONES UR QL STRIP: NEGATIVE
LEUKOCYTE ESTERASE URINE, POC: NEGATIVE
NITRITE, POC UA: NEGATIVE
PH, POC UA: 5
POTASSIUM SERPL-SCNC: 3.6 MMOL/L (ref 3.5–5.1)
PROTEIN, POC: NEGATIVE
PTH-INTACT SERPL-MCNC: 126.5 PG/ML (ref 9–77)
SODIUM SERPL-SCNC: 138 MMOL/L (ref 136–145)
SPECIFIC GRAVITY, POC UA: 1
UROBILINOGEN, POC UA: NORMAL

## 2023-09-19 PROCEDURE — 3008F PR BODY MASS INDEX (BMI) DOCUMENTED: ICD-10-PCS | Mod: CPTII,S$GLB,, | Performed by: INTERNAL MEDICINE

## 2023-09-19 PROCEDURE — 3061F NEG MICROALBUMINURIA REV: CPT | Mod: CPTII,S$GLB,, | Performed by: INTERNAL MEDICINE

## 2023-09-19 PROCEDURE — 81002 URINALYSIS NONAUTO W/O SCOPE: CPT | Mod: S$GLB,,, | Performed by: INTERNAL MEDICINE

## 2023-09-19 PROCEDURE — 83036 HEMOGLOBIN GLYCOSYLATED A1C: CPT | Performed by: INTERNAL MEDICINE

## 2023-09-19 PROCEDURE — 81002 POCT URINE DIPSTICK WITHOUT MICROSCOPE: ICD-10-PCS | Mod: S$GLB,,, | Performed by: INTERNAL MEDICINE

## 2023-09-19 PROCEDURE — 3061F PR NEG MICROALBUMINURIA RESULT DOCUMENTED/REVIEW: ICD-10-PCS | Mod: CPTII,S$GLB,, | Performed by: INTERNAL MEDICINE

## 2023-09-19 PROCEDURE — 4010F PR ACE/ARB THEARPY RXD/TAKEN: ICD-10-PCS | Mod: CPTII,S$GLB,, | Performed by: INTERNAL MEDICINE

## 2023-09-19 PROCEDURE — 3052F HG A1C>EQUAL 8.0%<EQUAL 9.0%: CPT | Mod: CPTII,S$GLB,, | Performed by: INTERNAL MEDICINE

## 2023-09-19 PROCEDURE — 1101F PT FALLS ASSESS-DOCD LE1/YR: CPT | Mod: CPTII,S$GLB,, | Performed by: INTERNAL MEDICINE

## 2023-09-19 PROCEDURE — 99214 OFFICE O/P EST MOD 30 MIN: CPT | Mod: S$GLB,,, | Performed by: INTERNAL MEDICINE

## 2023-09-19 PROCEDURE — 1125F AMNT PAIN NOTED PAIN PRSNT: CPT | Mod: CPTII,S$GLB,, | Performed by: INTERNAL MEDICINE

## 2023-09-19 PROCEDURE — 4010F ACE/ARB THERAPY RXD/TAKEN: CPT | Mod: CPTII,S$GLB,, | Performed by: INTERNAL MEDICINE

## 2023-09-19 PROCEDURE — 3074F SYST BP LT 130 MM HG: CPT | Mod: CPTII,S$GLB,, | Performed by: INTERNAL MEDICINE

## 2023-09-19 PROCEDURE — 99214 PR OFFICE/OUTPT VISIT, EST, LEVL IV, 30-39 MIN: ICD-10-PCS | Mod: S$GLB,,, | Performed by: INTERNAL MEDICINE

## 2023-09-19 PROCEDURE — 1125F PR PAIN SEVERITY QUANTIFIED, PAIN PRESENT: ICD-10-PCS | Mod: CPTII,S$GLB,, | Performed by: INTERNAL MEDICINE

## 2023-09-19 PROCEDURE — 99999 PR PBB SHADOW E&M-EST. PATIENT-LVL V: ICD-10-PCS | Mod: PBBFAC,,, | Performed by: INTERNAL MEDICINE

## 2023-09-19 PROCEDURE — 3078F DIAST BP <80 MM HG: CPT | Mod: CPTII,S$GLB,, | Performed by: INTERNAL MEDICINE

## 2023-09-19 PROCEDURE — 36415 COLL VENOUS BLD VENIPUNCTURE: CPT | Mod: PN | Performed by: INTERNAL MEDICINE

## 2023-09-19 PROCEDURE — 1159F PR MEDICATION LIST DOCUMENTED IN MEDICAL RECORD: ICD-10-PCS | Mod: CPTII,S$GLB,, | Performed by: INTERNAL MEDICINE

## 2023-09-19 PROCEDURE — 82652 VIT D 1 25-DIHYDROXY: CPT | Performed by: INTERNAL MEDICINE

## 2023-09-19 PROCEDURE — 3078F PR MOST RECENT DIASTOLIC BLOOD PRESSURE < 80 MM HG: ICD-10-PCS | Mod: CPTII,S$GLB,, | Performed by: INTERNAL MEDICINE

## 2023-09-19 PROCEDURE — 3066F PR DOCUMENTATION OF TREATMENT FOR NEPHROPATHY: ICD-10-PCS | Mod: CPTII,S$GLB,, | Performed by: INTERNAL MEDICINE

## 2023-09-19 PROCEDURE — 3288F PR FALLS RISK ASSESSMENT DOCUMENTED: ICD-10-PCS | Mod: CPTII,S$GLB,, | Performed by: INTERNAL MEDICINE

## 2023-09-19 PROCEDURE — 1101F PR PT FALLS ASSESS DOC 0-1 FALLS W/OUT INJ PAST YR: ICD-10-PCS | Mod: CPTII,S$GLB,, | Performed by: INTERNAL MEDICINE

## 2023-09-19 PROCEDURE — 3074F PR MOST RECENT SYSTOLIC BLOOD PRESSURE < 130 MM HG: ICD-10-PCS | Mod: CPTII,S$GLB,, | Performed by: INTERNAL MEDICINE

## 2023-09-19 PROCEDURE — 3066F NEPHROPATHY DOC TX: CPT | Mod: CPTII,S$GLB,, | Performed by: INTERNAL MEDICINE

## 2023-09-19 PROCEDURE — 83970 ASSAY OF PARATHORMONE: CPT | Performed by: INTERNAL MEDICINE

## 2023-09-19 PROCEDURE — 80048 BASIC METABOLIC PNL TOTAL CA: CPT | Performed by: INTERNAL MEDICINE

## 2023-09-19 PROCEDURE — 3008F BODY MASS INDEX DOCD: CPT | Mod: CPTII,S$GLB,, | Performed by: INTERNAL MEDICINE

## 2023-09-19 PROCEDURE — 99999 PR PBB SHADOW E&M-EST. PATIENT-LVL V: CPT | Mod: PBBFAC,,, | Performed by: INTERNAL MEDICINE

## 2023-09-19 PROCEDURE — 3288F FALL RISK ASSESSMENT DOCD: CPT | Mod: CPTII,S$GLB,, | Performed by: INTERNAL MEDICINE

## 2023-09-19 PROCEDURE — 1159F MED LIST DOCD IN RCRD: CPT | Mod: CPTII,S$GLB,, | Performed by: INTERNAL MEDICINE

## 2023-09-19 PROCEDURE — 3052F PR MOST RECENT HEMOGLOBIN A1C LEVEL 8.0 - < 9.0%: ICD-10-PCS | Mod: CPTII,S$GLB,, | Performed by: INTERNAL MEDICINE

## 2023-09-19 RX ORDER — LOSARTAN POTASSIUM 100 MG/1
100 TABLET ORAL DAILY
Qty: 90 TABLET | Refills: 1 | Status: SHIPPED | OUTPATIENT
Start: 2023-09-19

## 2023-09-19 RX ORDER — ROSUVASTATIN CALCIUM 40 MG/1
40 TABLET, COATED ORAL NIGHTLY
Qty: 90 TABLET | Refills: 0 | Status: SHIPPED | OUTPATIENT
Start: 2023-09-19 | End: 2023-12-14 | Stop reason: SDUPTHER

## 2023-09-19 RX ORDER — REPAGLINIDE 1 MG/1
1 TABLET ORAL
Qty: 270 TABLET | Refills: 0 | Status: SHIPPED | OUTPATIENT
Start: 2023-09-19 | End: 2023-12-19

## 2023-09-19 RX ORDER — PAROXETINE HYDROCHLORIDE 20 MG/1
30 TABLET, FILM COATED ORAL DAILY
Qty: 135 TABLET | Refills: 2 | Status: SHIPPED | OUTPATIENT
Start: 2023-09-19

## 2023-09-19 RX ORDER — INSULIN GLARGINE 100 [IU]/ML
INJECTION, SOLUTION SUBCUTANEOUS
Qty: 15 ML | Refills: 0 | Status: SHIPPED | OUTPATIENT
Start: 2023-09-19 | End: 2023-09-21

## 2023-09-19 RX ORDER — POTASSIUM CHLORIDE 20 MEQ/1
20 TABLET, EXTENDED RELEASE ORAL DAILY
Qty: 90 TABLET | Refills: 0 | Status: SHIPPED | OUTPATIENT
Start: 2023-09-19 | End: 2023-11-21 | Stop reason: SDUPTHER

## 2023-09-19 RX ORDER — GABAPENTIN 100 MG/1
100 CAPSULE ORAL NIGHTLY
Qty: 90 CAPSULE | Refills: 1 | Status: SHIPPED | OUTPATIENT
Start: 2023-09-19 | End: 2024-09-18

## 2023-09-19 NOTE — PROGRESS NOTES
I sent pt a my chart message (Please also d/w pt as she has memory issues) -  I reviewed your urine analysis.  It did not look like you have a urinary tract infection but it does appear you have a lot of glucose in your urine I suspect due to uncontrolled diabetes and not being on your insulin.  I await the rest of your labs.   Dr. BRUSH

## 2023-09-19 NOTE — TELEPHONE ENCOUNTER
Care Due:                  Date            Visit Type   Department     Provider  --------------------------------------------------------------------------------                                EP -                              PRIMARY      [facility]  Last Visit: 06-      CARE ([facility])   CARE           Farzana Dorman                              EP -                              PRIMARY      [facility] PRIMARY  Next Visit: 09-      CARE ([facility])   CARE           Farzana  Girezarone                                                            Last  Test          Frequency    Reason                     Performed    Due Date  --------------------------------------------------------------------------------    HBA1C.......  6 months...  dulaglutide, insulin.....  06- 11-    [facility] Embedded Care Due Messages. Reference number: [id number].   9/18/2023 8:46:50 PM CDT

## 2023-09-19 NOTE — TELEPHONE ENCOUNTER
Provider Staff:  Action required for this patient     Please see care gap opportunities below in Care Due Message.    Thanks!  [facility]     Appointments      Date Provider   Last Visit   6/8/2023 Farzana Dorman MD   Next Visit   9/19/2023 Farzana Dorman MD     Refill Decision Note   Dee Hamilton  is requesting a refill authorization.  Brief Assessment and Rationale for Refill:  Approve     Medication Therapy Plan:         Comments:     Note composed:4:46 AM 09/19/2023             Appointments     Last Visit   6/8/2023 Farzana Dorman MD   Next Visit   9/19/2023 Farzana Dorman MD

## 2023-09-21 ENCOUNTER — OFFICE VISIT (OUTPATIENT)
Dept: ENDOCRINOLOGY | Facility: CLINIC | Age: 68
End: 2023-09-21
Payer: MEDICARE

## 2023-09-21 VITALS
WEIGHT: 179.13 LBS | OXYGEN SATURATION: 95 % | DIASTOLIC BLOOD PRESSURE: 74 MMHG | BODY MASS INDEX: 28.11 KG/M2 | HEIGHT: 67 IN | SYSTOLIC BLOOD PRESSURE: 130 MMHG | HEART RATE: 83 BPM

## 2023-09-21 DIAGNOSIS — Z79.4 TYPE 2 DIABETES MELLITUS WITH HYPERGLYCEMIA, WITH LONG-TERM CURRENT USE OF INSULIN: ICD-10-CM

## 2023-09-21 DIAGNOSIS — E11.65 TYPE 2 DIABETES MELLITUS WITH HYPERGLYCEMIA, WITH LONG-TERM CURRENT USE OF INSULIN: ICD-10-CM

## 2023-09-21 DIAGNOSIS — E11.42 DIABETIC PERIPHERAL NEUROPATHY ASSOCIATED WITH TYPE 2 DIABETES MELLITUS: Primary | ICD-10-CM

## 2023-09-21 PROCEDURE — 3078F DIAST BP <80 MM HG: CPT | Mod: CPTII,S$GLB,, | Performed by: INTERNAL MEDICINE

## 2023-09-21 PROCEDURE — 1101F PR PT FALLS ASSESS DOC 0-1 FALLS W/OUT INJ PAST YR: ICD-10-PCS | Mod: CPTII,S$GLB,, | Performed by: INTERNAL MEDICINE

## 2023-09-21 PROCEDURE — 3288F PR FALLS RISK ASSESSMENT DOCUMENTED: ICD-10-PCS | Mod: CPTII,S$GLB,, | Performed by: INTERNAL MEDICINE

## 2023-09-21 PROCEDURE — 99999 PR PBB SHADOW E&M-EST. PATIENT-LVL V: CPT | Mod: PBBFAC,,, | Performed by: INTERNAL MEDICINE

## 2023-09-21 PROCEDURE — 3066F NEPHROPATHY DOC TX: CPT | Mod: CPTII,S$GLB,, | Performed by: INTERNAL MEDICINE

## 2023-09-21 PROCEDURE — 1126F AMNT PAIN NOTED NONE PRSNT: CPT | Mod: CPTII,S$GLB,, | Performed by: INTERNAL MEDICINE

## 2023-09-21 PROCEDURE — 1159F MED LIST DOCD IN RCRD: CPT | Mod: CPTII,S$GLB,, | Performed by: INTERNAL MEDICINE

## 2023-09-21 PROCEDURE — 3061F NEG MICROALBUMINURIA REV: CPT | Mod: CPTII,S$GLB,, | Performed by: INTERNAL MEDICINE

## 2023-09-21 PROCEDURE — 3066F PR DOCUMENTATION OF TREATMENT FOR NEPHROPATHY: ICD-10-PCS | Mod: CPTII,S$GLB,, | Performed by: INTERNAL MEDICINE

## 2023-09-21 PROCEDURE — 3075F SYST BP GE 130 - 139MM HG: CPT | Mod: CPTII,S$GLB,, | Performed by: INTERNAL MEDICINE

## 2023-09-21 PROCEDURE — 3075F PR MOST RECENT SYSTOLIC BLOOD PRESS GE 130-139MM HG: ICD-10-PCS | Mod: CPTII,S$GLB,, | Performed by: INTERNAL MEDICINE

## 2023-09-21 PROCEDURE — 99999 PR PBB SHADOW E&M-EST. PATIENT-LVL V: ICD-10-PCS | Mod: PBBFAC,,, | Performed by: INTERNAL MEDICINE

## 2023-09-21 PROCEDURE — 99214 PR OFFICE/OUTPT VISIT, EST, LEVL IV, 30-39 MIN: ICD-10-PCS | Mod: S$GLB,,, | Performed by: INTERNAL MEDICINE

## 2023-09-21 PROCEDURE — 4010F PR ACE/ARB THEARPY RXD/TAKEN: ICD-10-PCS | Mod: CPTII,S$GLB,, | Performed by: INTERNAL MEDICINE

## 2023-09-21 PROCEDURE — 1101F PT FALLS ASSESS-DOCD LE1/YR: CPT | Mod: CPTII,S$GLB,, | Performed by: INTERNAL MEDICINE

## 2023-09-21 PROCEDURE — 4010F ACE/ARB THERAPY RXD/TAKEN: CPT | Mod: CPTII,S$GLB,, | Performed by: INTERNAL MEDICINE

## 2023-09-21 PROCEDURE — 3078F PR MOST RECENT DIASTOLIC BLOOD PRESSURE < 80 MM HG: ICD-10-PCS | Mod: CPTII,S$GLB,, | Performed by: INTERNAL MEDICINE

## 2023-09-21 PROCEDURE — 3046F HEMOGLOBIN A1C LEVEL >9.0%: CPT | Mod: CPTII,S$GLB,, | Performed by: INTERNAL MEDICINE

## 2023-09-21 PROCEDURE — 99214 OFFICE O/P EST MOD 30 MIN: CPT | Mod: S$GLB,,, | Performed by: INTERNAL MEDICINE

## 2023-09-21 PROCEDURE — 3061F PR NEG MICROALBUMINURIA RESULT DOCUMENTED/REVIEW: ICD-10-PCS | Mod: CPTII,S$GLB,, | Performed by: INTERNAL MEDICINE

## 2023-09-21 PROCEDURE — 3008F PR BODY MASS INDEX (BMI) DOCUMENTED: ICD-10-PCS | Mod: CPTII,S$GLB,, | Performed by: INTERNAL MEDICINE

## 2023-09-21 PROCEDURE — 1126F PR PAIN SEVERITY QUANTIFIED, NO PAIN PRESENT: ICD-10-PCS | Mod: CPTII,S$GLB,, | Performed by: INTERNAL MEDICINE

## 2023-09-21 PROCEDURE — 3008F BODY MASS INDEX DOCD: CPT | Mod: CPTII,S$GLB,, | Performed by: INTERNAL MEDICINE

## 2023-09-21 PROCEDURE — 3288F FALL RISK ASSESSMENT DOCD: CPT | Mod: CPTII,S$GLB,, | Performed by: INTERNAL MEDICINE

## 2023-09-21 PROCEDURE — 1159F PR MEDICATION LIST DOCUMENTED IN MEDICAL RECORD: ICD-10-PCS | Mod: CPTII,S$GLB,, | Performed by: INTERNAL MEDICINE

## 2023-09-21 PROCEDURE — 3046F PR MOST RECENT HEMOGLOBIN A1C LEVEL > 9.0%: ICD-10-PCS | Mod: CPTII,S$GLB,, | Performed by: INTERNAL MEDICINE

## 2023-09-21 RX ORDER — BLOOD-GLUCOSE SENSOR
1 EACH MISCELLANEOUS
Qty: 3 EACH | Refills: 11 | Status: SHIPPED | OUTPATIENT
Start: 2023-09-21 | End: 2024-09-20

## 2023-09-21 RX ORDER — INSULIN GLARGINE-YFGN 100 [IU]/ML
26 INJECTION, SOLUTION SUBCUTANEOUS NIGHTLY
Qty: 15 ML | Refills: 6 | Status: SHIPPED | OUTPATIENT
Start: 2023-09-21

## 2023-09-21 RX ORDER — LIRAGLUTIDE 6 MG/ML
1.2 INJECTION SUBCUTANEOUS DAILY
Qty: 3 ML | Refills: 2 | Status: SHIPPED | OUTPATIENT
Start: 2023-09-21 | End: 2023-11-21 | Stop reason: SDUPTHER

## 2023-09-21 NOTE — PROGRESS NOTES
Subjective:      Patient ID: Dee Hamilton is a 68 y.o. female.    Chief Complaint:  Diabetes and Hyperparathyroidism      History of Present Illness  Previous patient of Dr. Armstrong seen for T2DM and primary hyperparathyroidism.     No 24 hr urine collection completed   DXA: osteopenia with FRAX caculation below treatment threshholds  Total calcium: 10 - 10.6 mg/dl    Stopped HCTZ over one year ago  Supplements:  Calcium   Vitamin D    T2DM  Diagnosed in 15 years ago   Known complications:    Current Diabetes Regimen:  Stopped all of her medications   Lantus 10 units at bedtime (has not started)  Metformin 500 mg twice daily --> due to side effects GI symptoms   Trulicity 3 mg weekly   Prandin 1 mg tid     Diet/Exercise:   Likes to pick at foods     Recent Hgb A1C:  Lab Results   Component Value Date    HGBA1C 13.0 (H) 09/19/2023       Glucose Monitoring:  Not checking     Hypoglycemic Episodes: denies     Screening / DM Complications:    Nephropathy:  ACEi/ARB: Taking  Lab Results   Component Value Date    MICALBCREAT Unable to calculate 02/24/2023       Last Lipid Panel:  Statin: Taking  Lab Results   Component Value Date    LDLCALC 135.0 06/01/2023       Last foot exam : 10/04/2022  Last eye exam : 04/13/2023;  no laser surgery or DR    B12:  Lab Results   Component Value Date    [id number] 605 04/18/2023     Review of Systems   Constitutional:  Negative for fever.   HENT:  Negative for congestion.    Eyes:  Negative for visual disturbance.   Respiratory:  Negative for shortness of breath.    Cardiovascular:  Negative for chest pain.   Gastrointestinal:  Negative for abdominal pain.   Genitourinary:  Negative for dysuria.   Musculoskeletal:  Negative for arthralgias.   Skin:  Negative for rash.   Neurological:  Negative for weakness.   Hematological:  Does not bruise/bleed easily.   Psychiatric/Behavioral:  Negative for sleep disturbance.        Objective:   Physical Exam  Vitals:    09/21/23 1031   BP:  "130/74   BP Location: Right arm   Patient Position: Sitting   BP Method: Medium (Manual)   Pulse: 83   SpO2: 95%   Weight: 81.3 kg (179 lb 2 oz)   Height: 5' 7" (1.702 m)       BP Readings from Last 3 Encounters:   09/21/23 130/74   09/19/23 128/72   08/15/23 138/80     Wt Readings from Last 1 Encounters:   09/21/23 1031 81.3 kg (179 lb 2 oz)         Body mass index is 28.05 kg/m².    Lab Review:   Lab Results   Component Value Date    HGBA1C 13.0 (H) 09/19/2023     Lab Results   Component Value Date    CHOL 207 (H) 06/01/2023    HDL 40 06/01/2023    LDLCALC 135.0 06/01/2023    TRIG 160 (H) 06/01/2023    CHOLHDL 19.3 (L) 06/01/2023     Lab Results   Component Value Date     09/19/2023    K 3.6 09/19/2023     09/19/2023    CO2 27 09/19/2023     (H) 09/19/2023    BUN 7 (L) 09/19/2023    CREATININE 0.7 09/19/2023    CALCIUM 10.6 (H) 09/19/2023    PROT 7.6 06/01/2023    ALBUMIN 4.0 06/01/2023    BILITOT 0.6 06/01/2023    ALKPHOS 107 06/01/2023    AST 34 06/01/2023    ALT 27 06/01/2023    ANIONGAP 10 09/19/2023    ESTGFRAFRICA >60 06/22/2022    EGFRNONAA >60 06/22/2022    TSH 0.966 02/24/2023        Latest Reference Range & Units Most Recent   Thiamine 38 - 122 ug/L 48  6/8/21 09:53   Vit D, 25-Hydroxy 30 - 96 ng/mL 33  9/14/22 12:40   Vitamin B-12 210 - 950 pg/mL 605  4/18/23 10:23     Assessment and Plan     Type 2 diabetes mellitus with hyperglycemia, with long-term current use of insulin  T2DM for 15 years  Continues to take trulicity 3 mg weekly   A1C is 13%    PLAN:  Increase semglee (weight based calculation using 81 kg X 0.6 units/2 = 26 units)  trulicity and ozempic too expensive   Switch to victoza 1.2 mg daily once finishes trulicity. Plan to increase to 1.8 mg daily if tolerates.   Consider adding SLGT 2 inhibitor in the future   Stop prandin - not effective   Stopped metformin - does not want to restart  Dm education defers but to use G7 effectively I highly recommend          "

## 2023-09-21 NOTE — PATIENT INSTRUCTIONS
Goal weight loss 5% body weight ( 9 lbs)    Weigh yourself daily    Simple plan: avoid anything made with flour or sugar.     Increase vegetables, lean proteins and limit carbohydrates.     Choose high fiber carbohydrates, that is a carbohydrate that has more than 3 - 5 grams of fiber per serving. Examples of starchy foods that are good for you are beans, squash.      Drink plenty of water and avoid drinks with sugar such as regular sodas.     No snacking     Nutrition plan:  1) lower carbohydrate plans   2) intermittent fasting      New diabetes medications:  Semglee 24 units at bedtime   Start victoza 1.2 mg daily   Repeat A1C in six weeks     Please begin Alpha lipoic acid 600 mg once daily for painful diabetic neuropathy

## 2023-09-21 NOTE — ASSESSMENT & PLAN NOTE
T2DM for 15 years  Continues to take trulicity 3 mg weekly   A1C is 13%    PLAN:  Increase semglee (weight based calculation using 81 kg X 0.6 units/2 = 26 units)  trulicity and ozempic too expensive   Switch to victoza 1.2 mg daily once finishes trulicity. Plan to increase to 1.8 mg daily if tolerates.   Consider adding SLGT 2 inhibitor in the future   Stop prandin - not effective   Stopped metformin - does not want to restart  Dm education defers but to use G7 effectively I highly recommend

## 2023-09-22 LAB — 1,25(OH)2D3 SERPL-MCNC: 95 PG/ML (ref 20–79)

## 2023-09-25 NOTE — PROGRESS NOTES
I sent pt a my chart message -  I reviewed your labs. Your Vit D was elevated. It was not deficient.  Your parathyroid level remains high at 126. Your calcium was slightly elevated at 10.6.  I suspect primary hyperparathyroidism.  This will need to continue to be monitored. Continue to hydrate well and do not take any calcium supplements. Your Ha1c is uncontrolled at 13. I saw Dr. Kaba made some changes to your regimen to hopefully get this number to improve.    Dr. BRUSH

## 2023-09-28 ENCOUNTER — PATIENT OUTREACH (OUTPATIENT)
Dept: ADMINISTRATIVE | Facility: HOSPITAL | Age: 68
End: 2023-09-28
Payer: MEDICARE

## 2023-09-28 NOTE — PROGRESS NOTES
Patient due for the following    TETANUS VACCINE     COVID-19 Vaccine (5 - Moderna series)    Colorectal Cancer Screening     Foot Exam       [facility] records    Immunizations: reviewed and updated  Care Everywhere: triggered  Care Teams: up to date  Outreach: none needed

## 2023-09-28 NOTE — LETTER
AUTHORIZATION FOR RELEASE OF   CONFIDENTIAL INFORMATION    Dear Myrna GOLDSMIHT,    We are seeing Dee Hamilton, date of birth 1955, in the clinic at [facility]. Farzana Dorman MD is the patient's PCP. Dee Hamilton has an outstanding lab/procedure at the time we reviewed her chart. In order to help keep her health information updated, she has authorized us to request the following medical record(s):        (  )  MAMMOGRAM                                      ( X )  COLONOSCOPY      (  )  PAP SMEAR                                          (  )  OUTSIDE LAB RESULTS     (  )  DEXA SCAN                                          (  )  EYE EXAM            (  )  FOOT EXAM                                          (  )  ENTIRE RECORD     (  )  OUTSIDE IMMUNIZATIONS                 (  )  _______________         Please fax records to [facility], Giambrone, Nicole A., MD, [phone]     ATTN: Yulia          Patient Name: Dee Hamilton  : 1955  Patient Phone #: [phone]     
- - -

## 2023-09-29 ENCOUNTER — PATIENT OUTREACH (OUTPATIENT)
Dept: ADMINISTRATIVE | Facility: HOSPITAL | Age: 68
End: 2023-09-29
Payer: MEDICARE

## 2023-09-29 NOTE — PROGRESS NOTES
Patient due for the following    TETANUS VACCINE     COVID-19 Vaccine (5 - Moderna series)    Colorectal Cancer Screening     Foot Exam       Immunizations: reviewed and updated  Care Everywhere: triggered  Care Teams: up to date  Outreach: LEILA

## 2023-10-06 ENCOUNTER — PATIENT OUTREACH (OUTPATIENT)
Dept: ADMINISTRATIVE | Facility: HOSPITAL | Age: 68
End: 2023-10-06
Payer: MEDICARE

## 2023-10-06 NOTE — PROGRESS NOTES
Patient due for the following    TETANUS VACCINE     Colorectal Cancer Screening     COVID-19 Vaccine (5 - 2023-24 season)    Foot Exam       Immunizations: reviewed and updated  Care Everywhere: triggered  Care Teams: up to date  Outreach: none needed

## 2023-10-09 ENCOUNTER — TELEPHONE (OUTPATIENT)
Dept: PRIMARY CARE CLINIC | Facility: CLINIC | Age: 68
End: 2023-10-09
Payer: MEDICARE

## 2023-11-02 ENCOUNTER — LAB VISIT (OUTPATIENT)
Dept: LAB | Facility: HOSPITAL | Age: 68
End: 2023-11-02
Attending: INTERNAL MEDICINE
Payer: MEDICARE

## 2023-11-02 DIAGNOSIS — Z79.4 TYPE 2 DIABETES MELLITUS WITH HYPERGLYCEMIA, WITH LONG-TERM CURRENT USE OF INSULIN: ICD-10-CM

## 2023-11-02 DIAGNOSIS — E11.65 TYPE 2 DIABETES MELLITUS WITH HYPERGLYCEMIA, WITH LONG-TERM CURRENT USE OF INSULIN: ICD-10-CM

## 2023-11-02 LAB
ESTIMATED AVG GLUCOSE: 278 MG/DL (ref 68–131)
HBA1C MFR BLD: 11.3 % (ref 4–5.6)

## 2023-11-02 PROCEDURE — 83036 HEMOGLOBIN GLYCOSYLATED A1C: CPT | Performed by: INTERNAL MEDICINE

## 2023-11-02 PROCEDURE — 36415 COLL VENOUS BLD VENIPUNCTURE: CPT | Mod: PN | Performed by: INTERNAL MEDICINE

## 2023-11-07 ENCOUNTER — OFFICE VISIT (OUTPATIENT)
Dept: OBSTETRICS AND GYNECOLOGY | Facility: CLINIC | Age: 68
End: 2023-11-07
Payer: MEDICARE

## 2023-11-07 VITALS
WEIGHT: 182.31 LBS | BODY MASS INDEX: 28.61 KG/M2 | DIASTOLIC BLOOD PRESSURE: 70 MMHG | SYSTOLIC BLOOD PRESSURE: 128 MMHG | HEIGHT: 67 IN

## 2023-11-07 DIAGNOSIS — Z01.419 WELL WOMAN EXAM WITH ROUTINE GYNECOLOGICAL EXAM: Primary | ICD-10-CM

## 2023-11-07 PROCEDURE — 1101F PT FALLS ASSESS-DOCD LE1/YR: CPT | Mod: CPTII,S$GLB,, | Performed by: OBSTETRICS & GYNECOLOGY

## 2023-11-07 PROCEDURE — 88175 CYTOPATH C/V AUTO FLUID REDO: CPT | Performed by: OBSTETRICS & GYNECOLOGY

## 2023-11-07 PROCEDURE — 1159F MED LIST DOCD IN RCRD: CPT | Mod: CPTII,S$GLB,, | Performed by: OBSTETRICS & GYNECOLOGY

## 2023-11-07 PROCEDURE — 3074F SYST BP LT 130 MM HG: CPT | Mod: CPTII,S$GLB,, | Performed by: OBSTETRICS & GYNECOLOGY

## 2023-11-07 PROCEDURE — 1126F AMNT PAIN NOTED NONE PRSNT: CPT | Mod: CPTII,S$GLB,, | Performed by: OBSTETRICS & GYNECOLOGY

## 2023-11-07 PROCEDURE — 87624 HPV HI-RISK TYP POOLED RSLT: CPT | Performed by: OBSTETRICS & GYNECOLOGY

## 2023-11-07 PROCEDURE — 3066F PR DOCUMENTATION OF TREATMENT FOR NEPHROPATHY: ICD-10-PCS | Mod: CPTII,S$GLB,, | Performed by: OBSTETRICS & GYNECOLOGY

## 2023-11-07 PROCEDURE — 3061F NEG MICROALBUMINURIA REV: CPT | Mod: CPTII,S$GLB,, | Performed by: OBSTETRICS & GYNECOLOGY

## 2023-11-07 PROCEDURE — G0101 CA SCREEN;PELVIC/BREAST EXAM: HCPCS | Mod: S$GLB,,, | Performed by: OBSTETRICS & GYNECOLOGY

## 2023-11-07 PROCEDURE — 1126F PR PAIN SEVERITY QUANTIFIED, NO PAIN PRESENT: ICD-10-PCS | Mod: CPTII,S$GLB,, | Performed by: OBSTETRICS & GYNECOLOGY

## 2023-11-07 PROCEDURE — 1101F PR PT FALLS ASSESS DOC 0-1 FALLS W/OUT INJ PAST YR: ICD-10-PCS | Mod: CPTII,S$GLB,, | Performed by: OBSTETRICS & GYNECOLOGY

## 2023-11-07 PROCEDURE — 3078F DIAST BP <80 MM HG: CPT | Mod: CPTII,S$GLB,, | Performed by: OBSTETRICS & GYNECOLOGY

## 2023-11-07 PROCEDURE — 99999 PR PBB SHADOW E&M-EST. PATIENT-LVL IV: ICD-10-PCS | Mod: PBBFAC,,, | Performed by: OBSTETRICS & GYNECOLOGY

## 2023-11-07 PROCEDURE — 3046F PR MOST RECENT HEMOGLOBIN A1C LEVEL > 9.0%: ICD-10-PCS | Mod: CPTII,S$GLB,, | Performed by: OBSTETRICS & GYNECOLOGY

## 2023-11-07 PROCEDURE — 3078F PR MOST RECENT DIASTOLIC BLOOD PRESSURE < 80 MM HG: ICD-10-PCS | Mod: CPTII,S$GLB,, | Performed by: OBSTETRICS & GYNECOLOGY

## 2023-11-07 PROCEDURE — 3066F NEPHROPATHY DOC TX: CPT | Mod: CPTII,S$GLB,, | Performed by: OBSTETRICS & GYNECOLOGY

## 2023-11-07 PROCEDURE — G0101 PR CA SCREEN;PELVIC/BREAST EXAM: ICD-10-PCS | Mod: S$GLB,,, | Performed by: OBSTETRICS & GYNECOLOGY

## 2023-11-07 PROCEDURE — 4010F ACE/ARB THERAPY RXD/TAKEN: CPT | Mod: CPTII,S$GLB,, | Performed by: OBSTETRICS & GYNECOLOGY

## 2023-11-07 PROCEDURE — 3074F PR MOST RECENT SYSTOLIC BLOOD PRESSURE < 130 MM HG: ICD-10-PCS | Mod: CPTII,S$GLB,, | Performed by: OBSTETRICS & GYNECOLOGY

## 2023-11-07 PROCEDURE — 1159F PR MEDICATION LIST DOCUMENTED IN MEDICAL RECORD: ICD-10-PCS | Mod: CPTII,S$GLB,, | Performed by: OBSTETRICS & GYNECOLOGY

## 2023-11-07 PROCEDURE — 3046F HEMOGLOBIN A1C LEVEL >9.0%: CPT | Mod: CPTII,S$GLB,, | Performed by: OBSTETRICS & GYNECOLOGY

## 2023-11-07 PROCEDURE — 99999 PR PBB SHADOW E&M-EST. PATIENT-LVL IV: CPT | Mod: PBBFAC,,, | Performed by: OBSTETRICS & GYNECOLOGY

## 2023-11-07 PROCEDURE — 3288F PR FALLS RISK ASSESSMENT DOCUMENTED: ICD-10-PCS | Mod: CPTII,S$GLB,, | Performed by: OBSTETRICS & GYNECOLOGY

## 2023-11-07 PROCEDURE — 3288F FALL RISK ASSESSMENT DOCD: CPT | Mod: CPTII,S$GLB,, | Performed by: OBSTETRICS & GYNECOLOGY

## 2023-11-07 PROCEDURE — 3061F PR NEG MICROALBUMINURIA RESULT DOCUMENTED/REVIEW: ICD-10-PCS | Mod: CPTII,S$GLB,, | Performed by: OBSTETRICS & GYNECOLOGY

## 2023-11-07 PROCEDURE — 4010F PR ACE/ARB THEARPY RXD/TAKEN: ICD-10-PCS | Mod: CPTII,S$GLB,, | Performed by: OBSTETRICS & GYNECOLOGY

## 2023-11-07 NOTE — PROGRESS NOTES
Subjective:       Patient ID: Dee Hamilton is a 68 y.o. female.    Chief Complaint:  Well Woman      History of Present Illness  Gynecologic Exam  The patient's primary symptoms include pelvic pain and vaginal bleeding. The patient's pertinent negatives include no genital itching, genital lesions, genital odor, genital rash, missed menses or vaginal discharge. This is a recurrent problem. The problem occurs intermittently. The problem has been waxing and waning. The pain is moderate. The problem affects the right side. She is not pregnant. Associated symptoms include abdominal pain, back pain, constipation and flank pain. Pertinent negatives include no anorexia, chills, diarrhea, discolored urine, dysuria, fever, frequency, headaches, hematuria, nausea, painful intercourse, rash, urgency or vomiting. The vaginal discharge was normal. The vaginal bleeding is spotting. She has not been passing clots. She has not been passing tissue. The symptoms are aggravated by bowel movements. The treatment provided moderate relief. No, her partner does not have an STD. She uses nothing for contraception. Her past medical history is significant for an abdominal surgery, an ectopic pregnancy, a gynecological surgery, menorrhagia, miscarriage and ovarian cysts.     68 y.o. female  here for annual.     She is postmenopausal. Reports increased fatigue. Reports decreased libido and increased hot flushes/night sweats. She takes paxil 30 mg daily for depression.   denies postmenopausal bleeding.    denies vaginal itching or irritation.  denies vaginal discharge.    She is sexually active.  She uses no method for contraception.    History of abnormal pap: Yes:  2021 AGC pap/HPV non 16/18  2021 Colpo CHUN II ecto, ECC benign, EMBx insufficient  2021 CKC with CHUN II-III ectocervix with positive margins; post-CKC ECC benign; D&C benign  2022 NILM pap/HPV neg/Colpo CHUN I    Last Pap: as above  Last MM2023 - Birads  1  Last Colonoscopy: 2018 - benign, repeat 5 years  Last DXA: 3/2023 Osteopenia    Family History   Problem Relation Age of Onset    Cataracts Mother     Hypertension Mother     Dementia Mother     Cancer Father 68        Jaw Bone    Cataracts Father     Diabetes Sister     Cataracts Sister     Cancer Brother         pancreatic    Pancreatic cancer Brother     Ulcers Brother     Diabetes Daughter     Diabetes Son     No Known Problems Son     Amblyopia Neg Hx     Blindness Neg Hx     Glaucoma Neg Hx     Macular degeneration Neg Hx     Retinal detachment Neg Hx     Strabismus Neg Hx     Stroke Neg Hx     Thyroid disease Neg Hx     Breast cancer Neg Hx     Colon cancer Neg Hx     Ovarian cancer Neg Hx        GYN & OB History  No LMP recorded. Patient is postmenopausal.   Date of Last Pap: 2023    OB History    Para Term  AB Living   3 3 3     3   SAB IAB Ectopic Multiple Live Births                  # Outcome Date GA Lbr Tavo/2nd Weight Sex Delivery Anes PTL Lv   3 Term            2 Term            1 Term                Review of Systems  Review of Systems   Constitutional:  Negative for chills, diaphoresis, fatigue and fever.   Respiratory:  Negative for cough and shortness of breath.    Cardiovascular:  Negative for chest pain and palpitations.   Gastrointestinal:  Positive for abdominal pain and constipation. Negative for anorexia, diarrhea, nausea and vomiting.   Genitourinary:  Positive for decreased libido, flank pain, menorrhagia and pelvic pain. Negative for dysmenorrhea, dysuria, frequency, hematuria, menstrual problem, missed menses, urgency, vaginal bleeding, vaginal discharge and vaginal pain.   Musculoskeletal:  Positive for back pain. Negative for myalgias.   Integumentary:  Negative for rash, acne, breast mass, nipple discharge and breast skin changes.   Neurological:  Negative for headaches.   Psychiatric/Behavioral:  Negative for depression. The patient is not nervous/anxious.     Breast: Negative for mass, mastodynia, nipple discharge and skin changes          Objective:    Physical Exam:   Constitutional: She is oriented to person, place, and time. She appears well-developed and well-nourished. No distress.    HENT:   Head: Normocephalic and atraumatic.    Eyes: EOM are normal.    Neck: No thyromegaly present.     Pulmonary/Chest: Effort normal. She exhibits no mass and no tenderness. Right breast exhibits no inverted nipple, no mass, no nipple discharge, no skin change, no tenderness and no swelling. Left breast exhibits no inverted nipple, no mass, no nipple discharge, no skin change, no tenderness and no swelling. Breasts are symmetrical.        Abdominal: Soft. She exhibits no distension and no mass. There is no abdominal tenderness. There is no rebound and no guarding. No hernia.     Genitourinary:    Genitourinary Comments: Normal external female genitalia; vagina rugated, normal; cervix normal, no masses; uterus small mobile nontender; no adnexal masses palpated; rectal deferred               Musculoskeletal: Normal range of motion and moves all extremeties.       Neurological: She is alert and oriented to person, place, and time.    Skin: Skin is warm. No rash noted.    Psychiatric: She has a normal mood and affect. Her behavior is normal. Judgment and thought content normal.          Assessment:        1. Well woman exam with routine gynecological exam                Plan:      Dee was seen today for consult.    Diagnoses and all orders for this visit:    Well woman exam with routine gynecological exam  - Pap guidelines discussed. Pap/HPV collected.   - Breast cancer screening - up to date.  - Colon cancer screening - up to date.   - Bone density screening - up to date.   - Contraception - N/A.  - STD screening - declined.  - Menopause symptoms/decreased libido- discussed limitations for treatment in postmenopausal patients. Recommended Senior Moments manny.    Encounter for screening  for malignant neoplasm of breast, unspecified screening modality  -     Mammo Digital Screening Bilat w/ Dionisio; Future    CHUN III (cervical intraepithelial neoplasia grade III) with severe dysplasia        Orders Placed This Encounter   Procedures    HPV High Risk Genotypes, PCR       Follow up in about 1 year (around 11/7/2024) for annual.

## 2023-11-15 LAB
HPV HR 12 DNA SPEC QL NAA+PROBE: NEGATIVE
HPV16 AG SPEC QL: NEGATIVE
HPV18 DNA SPEC QL NAA+PROBE: NEGATIVE

## 2023-11-16 LAB
FINAL PATHOLOGIC DIAGNOSIS: NORMAL
Lab: NORMAL

## 2023-11-21 ENCOUNTER — PATIENT MESSAGE (OUTPATIENT)
Dept: ADMINISTRATIVE | Facility: HOSPITAL | Age: 68
End: 2023-11-21
Payer: MEDICARE

## 2023-11-21 DIAGNOSIS — Z79.4 TYPE 2 DIABETES MELLITUS WITH HYPERGLYCEMIA, WITH LONG-TERM CURRENT USE OF INSULIN: ICD-10-CM

## 2023-11-21 DIAGNOSIS — Z79.899 ON POTASSIUM WASTING DIURETIC THERAPY: ICD-10-CM

## 2023-11-21 DIAGNOSIS — E11.65 TYPE 2 DIABETES MELLITUS WITH HYPERGLYCEMIA, WITH LONG-TERM CURRENT USE OF INSULIN: ICD-10-CM

## 2023-11-21 RX ORDER — POTASSIUM CHLORIDE 20 MEQ/1
20 TABLET, EXTENDED RELEASE ORAL DAILY
Qty: 90 TABLET | Refills: 3 | Status: SHIPPED | OUTPATIENT
Start: 2023-11-21

## 2023-11-21 RX ORDER — LIRAGLUTIDE 6 MG/ML
1.2 INJECTION SUBCUTANEOUS DAILY
Qty: 6 ML | Refills: 6 | Status: SHIPPED | OUTPATIENT
Start: 2023-11-21 | End: 2024-02-02

## 2023-11-21 RX ORDER — INSULIN GLARGINE-YFGN 100 [IU]/ML
INJECTION, SOLUTION SUBCUTANEOUS
Qty: 15 ML | Refills: 0 | OUTPATIENT
Start: 2023-11-21

## 2023-11-21 NOTE — TELEPHONE ENCOUNTER
No care due was identified.  [facility] Embedded Care Due Messages. Reference number: [id number].   11/21/2023 12:01:28 PM CST

## 2023-11-21 NOTE — TELEPHONE ENCOUNTER
Refill Decision Note   Dee Hamilton  is requesting a refill authorization.  Brief Assessment and Rationale for Refill:  Approve     Medication Therapy Plan:         Comments:     Note composed:12:07 PM 11/21/2023

## 2023-12-10 NOTE — PROGRESS NOTES
"[facility] Note    Chief Complaint      Chief Complaint   Patient presents with    Follow-up       History of Present Illness      Dee Hamilton is a 68 y.o.    F with Anxiety, Depression, Glaucoma, HLD, Hypercalcemia. Last visit - 9/19/23     Hypercalcemia -   She was taking calcium but has since stopped this. Vit D was elevated. It was not deficient.  PTH level remains high at 126.  Calcium was slightly elevated at 10.6.  I suspect primary hyperparathyroidism.  This will need to continue to be monitored. Cont. to hydrate well and do not take any calcium supplements.   Fu by Endo.       Candida intertrigo- Rec drying well after bathing/showering.  Will Rx Nystatin powder and if no help will change to Clotrimazole cream. Ithelps.      Hypokalemia - Potassium is back to nl at 4.6.  Pt on Kcl 20 meQ daily.      Osteopenia - DEXA/Bone Density which showed osteopenia. I rec  Vit D3 2000 units/d OTC.  In addition, I rec weight-bearing  exercise at least 30 minutes 3 times/wk and ideally 5 times/wk.  No specific intensity.  Walking is fine. I just rec you pick a form of weight-bearing exercise you enjoy to facilitate long term compliance and benefit. Repeat DEXA in 2-3 yrs.     Noncompliance - Pt prev. stopped her Metformin due to Ha and abd pain.  "It just felt like every day I get up and I'm just there".  Sounds like she is depressed.  She is overdue for her Endo fu with Dr. Escoto. We discussed the imp. of compliance and calling with any concerns. She is not taking her Protonix G I just Rx for her. We resumed  Prandin, and cont.Trulicitiy.  Will resume Lantus. Will not resume Metformin at this time since it made her feel poorly.      Memory issues - I forget where I put things off and on. Will cont to jack.  Refer toNeuroPsych referral. She writes things in her note book. no evid of NPH on MRI. Pt reports imbalance, Incontinence, and memory issues. She has known Mixed urinary incontinence and " "had a prev bladder supsension. Carotid U/S - 6/1/21 - no evid of sig stenosis. Vitamin B1 level and it was normal.  Vitamin b12 was elevated. Her folic acid was normal.    has noticed she is more forgetful in doing chores around the house (like emptying the  or taking the clothes out of the dryer". Pt scored 27/30 on MMSE - 8/11/22. If someone has to ask her how she is she has to stop and think about how to respond so she has the words in her mind to answer correctly. "My tooth brush may be in the refrigerator.  Sometimes I have to remember how to drive so I stop driving". Note -she has multiple pill bottles for Atorvastatin/Repaglinide which are very full and it does not appear she has been taking these regularly. Folic acid and vitamin b12 are normal.  MRI Brain - has not yet had.  was in hospital. MRI Brain - 6/22/23 - mild generalized cerebral volume loss, without evidence of hydrocephalus. Mild patchy T2/FLAIR hyperintensity in the supratentorial white matter, nonspecific but most likely reflecting chronic small vessel ischemic changes. No evidence of recent or remote major vascular distribution infarct. These finding can be seen with aging and with high cholesterol and diabetes.  We will continue to work on keeping these things controlled.      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 ECC neg. EMBx insufficient. Pt is s/p hysteroscopy/D&C and CKC 9/29/21.  Has upcoming fu to discuss poss. Hys. + Spotting. CT abd/Pelvis - 3/25/22 -Punctate low-density foci in both kidneys too small to adequately characterize.   Colonic diverticulosis with no evidence for acute diverticulitis. No further recommendations Pelvic U/S - 5/31/22- wnl. Due for repeat PAP     Anxiety - She is back on her Paroxetine 30 mg/d.  "It takes the edge off".   D/w pt withdrawal effects with abrupt discontinuation. Referred to Psychiatry but she never got to go because her appt got " "cancelled twice at [facility].  "I go down a rabbit hole every now and then". It's depression more so than Anxiety because she doesn't go out much".  Pt talks to someone who is a friend of her daughter which has helped. She would like a support animal but her  does not like dogs. Playing with the neighbor's dog is helpful.  Prev fu by Jonna NICOLE.  Referred to Psychiatry. Her  is ill and she has a new grandson and she puts everyone before herself. Her  has Cancer and is doing well.      Depression - She is taking her Paroxetine 30 mg/d.   "It takes the edge off".  D/w pt withdrawal effects with abrupt discontinuation.  Referred to Psychiatry.  Pt talks to someone who is a friend of her daughter which has helped.   "It comes and goes". She has been meditating which helps. "My son is a trigger".         Glaucoma - Fu by Adilson. Fu by Dr. Nereyda De Anda.  Planning for fu with Dr. Carver.     HLD -  Cholesterol was high despite being on Atorvastatin 80 mg daily.  We changed this this to crestor 40 mg/d. If remains high can possibly add Zetia to her regimen     Hypercalcemia/Hyperparathyroidism -  Prev fu by Dr. Jarek Davison.  Vit D low normal - 24- low. ionized calcium - 1.19 - wnl and  iPTH - 126- 9/19/23. Vit D - 95- 9/19/23 up from 24.  Note - HCTZ prev stopped.      DM - II  - Dx '02 - Ha1c was 11.3 still uncontrolled. Had appt with Laney in July but it was cancelled.   Pt off trulicity  3 mg wkly, Prandin (not effective) and Metformin.  She is on Victoza 1.2 mg/d, Semglee 26 units QHS. Fu by Laney.  She c/o burning in her feet to her knees.  Pt on a trial of Gabapentin 100 mg QHs.  Will not resume Metformin if causing GI issues. no evidence of microalbuminuria.      Unexplained wt loss -Resolved - Up 15 lb since 8/2022.   She reports she has been eating less and is not as hungry. "I don't eat sweets anymore shelli I don't have a taste for it".  Wt down 19 lb since Aug. Planning for EGD/C-scope   "   Vit D def - 95 - 9/19/23. Pt not on suppl. She is fu by endo. She completed Ergocalciferol 50,000 units once weekly x 8 wks.  Pt forgets to take her meds.  Pt on once monthly Ergo.      Mixed urinary Incontinence - Cystocele - Fu by  UroGyn, Dr. Coleman. Brianna- couldn't afford. She prefers not to go back to Dr. Dinh.  She had  Prev bladder suspension in '11. Checked MRI Brain to r/o  NPH with abn gait, imbalance, memory issues, and Incontinence.  No evid of this on MRI. Pt doesn't take Elavil 10 mg QHS prn.  Referred to Pelvic Floor PTx.      Diaphragmatic hernia - No issues at present.  Will cont to monitor. She does have GERD. Rec smaller more freq meals.       Diverticulosis - No issues at present. Diarrhea resolved with changing to Metformin ER.       Imbalance -  I'm not wobbling as much any more and haven't fallen or had dizziness. My mood swings are gone and it's much better .  I suspect this has improved since resuming her SSRI. She describes it as bouncing when she walks but then describes walking into things and being off balance.       Fatty Liver - Noted on CT abd/pelvis -2/22/20.   Rec she  limit tylenol and alcohol.  Rec diet and exercise for wt loss   As discussed at visit there is a potential for cirrhosis.      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     Small fat containing umbilical hernia and Small fat containing left inguinal hernia - Noted on chart review. Not palpated on today's exam.      Atherosclerosis aorta - Seen on prev CT scans.  Cont Statin.      Rectocele - Fu by Uro/GYN. Planning for Pelvic Floor PTx.      HCM - Flu -9/19/23;  Tdap - ? At [facility];  PCV 13 - 10/24/19;  PVX 23 - 3/1/21;  Shingrix -#1 - 10/24/19 and #2 -12/30/19- she thinks she had shingles in Jan. 2022 to Left shoulder/flank x 2 wks;  COVID - 19 Vaccine (Moderna) #1 - 2/15/21; #2 - 3/15/21; # 3 - 10/27/21; #  4 10/16/22; # 5 10/24/23;  Hep C Screen -2/7/23 - neg. +h/o CHUN II; C-scope - 7/25/18 - hemorrhoids and polyps - repeat 5 yrs At [facility] - planning for repeat with ; PAP - 5/25/22 - neg.; MGM - 7/10/23 - repeat 1 yr;  DEXA -3/8/23 - repeat 2 yrs; Prev PCP - Dr Benito at [facility] [place]; Endo - Dr Gonzales; Ophtho - Dr. Dawn; Optometry - Dr. Young; GI - Dr. Jarvis; Uro/GYN - ; OB/GYN - Dr. Frank; Podiatry - Dr. Young; Derm - Dr. Kuhn    Patient Care Team:  Farzana Dorman MD as PCP - General (Internal Medicine)  Ivy Wharton MD (Family Medicine)  Crystal Almanza, QUIANA, CDE as Dietitian (Diabetes)  Heriberto Man MD as Consulting Physician (Gastroenterology)     Health Maintenance:  Immunization History   Administered Date(s) Administered    COVID-19, MRNA, LN-S, PF (MODERNA FULL 0.5 ML DOSE) 02/15/2021, 03/15/2021, 10/27/2021    COVID-19, mRNA, LNP-S, PF (Moderna 2023)Ages 12+ 10/24/2023    COVID-19, mRNA, LNP-S, bivalent booster, PF (Moderna Omicron)12 + YEARS 10/06/2022    Influenza (FLUAD) - Quadrivalent - Adjuvanted - PF *Preferred* (65+) 10/27/2021, 09/14/2022, 09/19/2023    Influenza - High Dose - PF (65 years and older) 10/24/2019    Influenza - Intradermal - Quadrivalent - PF 11/27/2013    Influenza - Quadrivalent - High Dose - PF (65 years and older) 09/08/2020    Influenza - Quadrivalent - PF *Preferred* (6 months and older) 10/17/2014, 10/25/2016, 10/23/2017, 10/15/2018    Influenza - Trivalent (ADULT) 10/24/2011, 10/17/2014, 10/23/2017    Influenza - Trivalent - PF (ADULT) 10/25/2016    Pneumococcal Conjugate - 13 Valent 10/24/2019    Pneumococcal Polysaccharide - 23 Valent 11/16/2015, 03/01/2021    RSVpreF (Arexvy) 12/14/2023    Zoster Recombinant 10/24/2019, 12/30/2019      Health Maintenance   Topic Date Due    TETANUS VACCINE  Never done    LDCT Lung Screen  06/18/2015    Colorectal Cancer Screening  07/25/2023    Foot Exam  10/04/2023    Hemoglobin A1c  02/02/2024    Eye Exam   2024    Lipid Panel  2024    Mammogram  07/10/2024    DEXA Scan  2026    Hepatitis C Screening  Completed    Shingles Vaccine  Completed        Past Medical History:  Past Medical History:   Diagnosis Date    Anxiety     Bilateral leg edema 2021    Depression     Diabetes mellitus, type 2     Diverticulitis     GERD (gastroesophageal reflux disease)     Glaucoma     Hyperlipidemia     Hypertension     Nicotine dependence in remission     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       Past Surgical History:   has a past surgical history that includes vaginal mesh; Bladder suspension (2011); Glaucoma Laser Sx ou; Tonsillectomy; Cholecystectomy; Cataract extraction, bilateral; Oophorectomy; Eye surgery; Hysteroscopy with dilation and curettage of uterus (N/A, 2021); Cold knife conization of cervix (N/A, 2021); and Tubal ligation.    Family History:  family history includes Cancer in her brother; Cancer (age of onset: 68) in her father; Cataracts in her father, mother, and sister; Dementia in her mother; Diabetes in her daughter, sister, and son; Hypertension in her mother; No Known Problems in her son; Pancreatic cancer in her brother; Ulcers in her brother.     Social History:  Social History     Tobacco Use    Smoking status: Former     Current packs/day: 0.00     Average packs/day: 1.4 packs/day for 35.4 years (50.0 ttl pk-yrs)     Types: Cigarettes     Start date:      Quit date: 2015     Years since quittin.5    Smokeless tobacco: Never   Substance Use Topics    Alcohol use: Yes    Drug use: No       Review of Systems   Constitutional:  Negative for chills, diaphoresis and fever.   HENT:  Negative for nasal congestion and sore throat.    Respiratory:  Negative for cough and shortness of breath.    Cardiovascular:  Negative for chest pain and palpitations.   Gastrointestinal:  Positive for diarrhea. Negative for abdominal pain, constipation, nausea and vomiting.         "Diarrhea 5 days ago - resolved 2 days ago. Soft and brown    Genitourinary:  Positive for dysuria, flank pain and frequency. Negative for hematuria.        Left flank pain x 6 days   Musculoskeletal:  Negative for arthralgias and myalgias.   Neurological:  Positive for dizziness. Negative for headaches.        Imbalance   Psychiatric/Behavioral:  Positive for dysphoric mood. The patient is nervous/anxious.          has Ca.  Daughter is in [place] and went to [employer] and has upcoming trial. She is upset today.  She scratched her rearview mirror on her mailbox this AM.         Medications:    Current Outpatient Medications:     ergocalciferol (ERGOCALCIFEROL) 50,000 unit Cap, Take 1 capsule (50,000 Units total) by mouth every 30 days., Disp: 3 capsule, Rfl: 3    furosemide (LASIX) 20 MG tablet, Take 1 tablet (20 mg total) by mouth once daily., Disp: 90 tablet, Rfl: 1    gabapentin (NEURONTIN) 100 MG capsule, Take 1 capsule (100 mg total) by mouth every evening., Disp: 90 capsule, Rfl: 1    insulin glargine-yfgn (SEMGLEE,INSULIN GLARG-YFGN,PEN) 100 unit/mL (3 mL) InPn, Inject 26 Units into the skin every evening., Disp: 15 mL, Rfl: 6    insulin needles, disposable, (RELION PEN NEEDLES) 32 x 5/32 " Ndle, Use as directed, Disp: 50 each, Rfl: 6    lancets 33 gauge Misc, by Misc.(Non-Drug; Combo Route) route. True plus, Disp: , Rfl:     LIDOCAINE 2 %, VALIUM 5 MG, BACLOFEN 4 % SUPPOSITORY, Place 1 suppository vaginally 2 (two) times daily as needed (pelvic pain)., Disp: 20 each, Rfl: 5    liraglutide 0.6 mg/0.1 mL, 18 mg/3 mL, subq PNIJ (VICTOZA 2-ALDO) 0.6 mg/0.1 mL (18 mg/3 mL) PnIj pen, Inject 1.2 mg into the skin once daily., Disp: 6 mL, Rfl: 6    losartan (COZAAR) 100 MG tablet, Take 1 tablet (100 mg total) by mouth once daily., Disp: 90 tablet, Rfl: 1    methocarbamoL (ROBAXIN) 500 MG Tab, Take 1 tablet (500 mg total) by mouth 3 (three) times daily., Disp: 60 tablet, Rfl: 1    pantoprazole (PROTONIX) 40 MG tablet, Take " "1 tablet (40 mg total) by mouth once daily., Disp: 30 tablet, Rfl: 2    paroxetine (PAXIL) 20 MG tablet, Take 1 & 1/2 tablets (30 mg total) by mouth once daily., Disp: 135 tablet, Rfl: 2    potassium chloride SA (K-DUR,KLOR-CON) 20 MEQ tablet, Take 1 tablet (20 mEq total) by mouth once daily., Disp: 90 tablet, Rfl: 3    repaglinide (PRANDIN) 1 MG tablet, Take 1 tablet (1 mg total) by mouth 3 (three) times daily before meals., Disp: 270 tablet, Rfl: 0    travoprost (TRAVATAN Z) 0.004 % ophthalmic solution, Place 1 drop into both eyes every evening., Disp: 7.5 each, Rfl: 3    blood sugar diagnostic Strp, To check BG 2 times daily, to use with insurance preferred meter, Disp: 200 strip, Rfl: 3    blood-glucose meter kit, To check BG 2 times daily, to use with insurance preferred meter, Disp: 1 each, Rfl: 0    blood-glucose sensor (DEXCOM G7 SENSOR) Louise, 1 each by Misc.(Non-Drug; Combo Route) route every 10 days., Disp: 3 each, Rfl: 11    CONTOUR TEST STRIPS Strp, USE TO TEST BLOOD SUGAR TWICE DAILY, Disp: 50 strip, Rfl: 3    lancets Mis, To check BG 2 times daily, to use with insurance preferred meter, Disp: 200 each, Rfl: 11    phenazopyridine (PYRIDIUM) 200 MG tablet, Take 1 tablet (200 mg total) by mouth 3 (three) times daily as needed for Pain. (Patient not taking: Reported on 12/14/2023), Disp: 10 tablet, Rfl: 0    rosuvastatin (CRESTOR) 40 MG Tab, Take 1 tablet (40 mg total) by mouth every evening., Disp: 90 tablet, Rfl: 1    RSVPreF3 antigen-AS01E, PF, (AREXVY) 120 mcg/0.5 mL SusR vaccine, Inject 0.5ML into the muscle., Disp: 0.5 mL, Rfl: 0     Allergies:  Review of patient's allergies indicates:  No Known Allergies    Physical Exam      Vital Signs  Temp: 98 °F (36.7 °C)  Temp Source: Oral  Pulse: 100  SpO2: 96 %  BP: (P) 134/88  BP Location: (P) Right arm  Patient Position: (P) Sitting  Pain Score:   7  Pain Loc:  (left flank pain)  Height and Weight  Height: 5' 7" (170.2 cm)  Weight: 82.8 kg (182 lb 8.7 " oz)  BSA (Calculated - sq m): 1.98 sq meters  BMI (Calculated): 28.6  Weight in (lb) to have BMI = 25: 159.3      Patient Position: (P) Sitting      Physical Exam  Vitals reviewed.   Constitutional:       General: She is not in acute distress.     Appearance: Normal appearance. She is obese. She is not ill-appearing, toxic-appearing or diaphoretic.   HENT:      Head: Normocephalic and atraumatic.      Right Ear: Tympanic membrane normal.      Left Ear: Tympanic membrane normal.   Eyes:      Extraocular Movements: Extraocular movements intact.      Conjunctiva/sclera: Conjunctivae normal.   Neck:      Vascular: No carotid bruit.   Cardiovascular:      Rate and Rhythm: Normal rate and regular rhythm.      Pulses: Normal pulses.      Heart sounds: Normal heart sounds. No murmur heard.  Pulmonary:      Effort: No respiratory distress.      Breath sounds: Normal breath sounds. No wheezing.   Abdominal:      General: There is no distension.      Palpations: Abdomen is soft.      Tenderness: There is abdominal tenderness. There is no right CVA tenderness or left CVA tenderness.      Comments: Tenderness to Left flank. No erythema, no overlying rash   Musculoskeletal:         General: Normal range of motion.   Skin:     General: Skin is dry.   Neurological:      General: No focal deficit present.      Mental Status: She is alert and oriented to person, place, and time.   Psychiatric:         Mood and Affect: Mood normal.         Behavior: Behavior normal.          Laboratory:  CBC:  Recent Labs   Lab 04/06/21  0940 02/24/23  1207   WBC 8.19 6.76   RBC 4.66 5.08   Hemoglobin 12.6 14.0   Hematocrit 39.5 44.7   Platelets 357 346   MCV 85 88   MCH 27.0 27.6   MCHC 31.9 L 31.3 L       CMP:  Recent Labs   Lab 06/22/22  1110 02/24/23  1207 04/18/23  1023 06/01/23  1006 09/19/23  1245   Glucose 241 H 304 H  --  221 H 284 H   Calcium 10.3 10.4  --  10.6 H 10.6 H   Albumin 4.1 4.0  --  4.0  --    Total Protein 7.7 7.9  --  7.6  --     Sodium 139 138  --  140 138   Potassium 4.3 3.3 L   < > 4.6 3.6   CO2 27 26  --  24 27   Chloride 103 98  --  105 101   BUN 8 6 L  --  13 7 L   Creatinine 0.8 0.9  --  0.7 0.7   Alkaline Phosphatase 118 132  --  107  --    ALT 23 17  --  27  --    AST 26 22  --  34  --    Total Bilirubin 0.4 0.4  --  0.6  --     < > = values in this interval not displayed.           URINALYSIS:  Recent Labs   Lab 10/11/22  1532 09/19/23  1240   Color, UA Yellow Yellow   Clarity, UA  --  Clear   Spec Grav UA  --  1.005   Specific [place], UA 1.025  --    pH, UA 6.0 5   Protein, UA Negative  --    Bacteria Many A  --    Nitrite, UA Negative negative   Leukocytes, UA 2+ A  --    Urobilinogen, UA  --  normal        LIPIDS:  Recent Labs   Lab 04/06/21  0940 07/19/21  0832 06/22/22  1110 09/14/22  1240 02/24/23  1207 06/01/23  1006   TSH 2.100  --  0.871  --  0.966  --    HDL 37 L 32 L  --  32 L  --  40   Cholesterol 253 H 155  --  194  --  207 H   Triglycerides 397 H 151 H  --  272 H  --  160 H   LDL Cholesterol 136.6 92.8  --  107.6  --  135.0   HDL/Cholesterol Ratio 14.6 L 20.6  --  16.5 L  --  19.3 L   Non-HDL Cholesterol 216 123  --  162  --  167   Total Cholesterol/HDL Ratio 6.8 H 4.8  --  6.1 H  --  5.2 H       TSH:  Recent Labs   Lab 04/06/21  0940 06/22/22  1110 02/24/23  1207   TSH 2.100 0.871 0.966       A1C:  Recent Labs   Lab 04/06/21  0940 07/09/21  0839 10/08/21  0932 01/14/22  0929 06/22/22  1110 09/14/22  1240 02/24/23  1207 06/01/23  1006 09/19/23  1245 11/02/23  1015   Hemoglobin A1C 8.2 H 9.5 H 10.6 H 8.6 H 10.9 H 10.1 H 11.9 H 8.9 H 13.0 H 11.3 H       Urine Microalbumin/Cr:  Recent Labs   Lab 04/06/21  0913 06/22/22  1228 02/24/23  1159   Microalb/Creat Ratio 8.8 56.5 H Unable to calculate         Other:   Recent Labs   Lab 06/28/21  1140 01/14/22  0929 09/14/22  1240 04/18/23  1023   Estradiol 13  --   --   --    Vit D, 25-Hydroxy  --  24 L 33  --    Vitamin B-12  --   --   --  605   Ferritin  --  34  --   --       Recent Labs   Lab 02/07/23  1119   Hepatitis C Ab Non-reactive       Assessment/Plan     Dee Hamilton is a 68 y.o.female with:    Type 2 diabetes mellitus with diabetic neuropathy, with long-term current use of insulin  -     Comprehensive Metabolic Panel; Future; Expected date: 12/14/2023  - Improving but not yet controlled.  Fu by Endo.     Hyperlipidemia associated with type 2 diabetes mellitus  -     rosuvastatin (CRESTOR) 40 MG Tab; Take 1 tablet (40 mg total) by mouth every evening.  Dispense: 90 tablet; Refill: 1  -     Comprehensive Metabolic Panel; Future; Expected date: 12/14/2023  - Uncontrolled.  Recently changed to crestor. If remains high can consider adding Zetia.     Urinary symptom or sign  -     POCT urine dipstick without microscope  -     CBC Auto Differential; Future; Expected date: 12/14/2023  -     Urinalysis, Reflex to Urine Culture Urine, Clean Catch; Future; Expected date: 12/14/2023  - Check Ua and treat if positive.     Flank pain  -     CBC Auto Differential; Future; Expected date: 12/14/2023  -     Comprehensive Metabolic Panel; Future; Expected date: 12/14/2023  -     Urinalysis, Reflex to Urine Culture Urine, Clean Catch; Future; Expected date: 12/14/2023  - Check Ua and treat if positive.     Hyperparathyroidism  - Cont to monitor. Fu by endo.    Osteopenia, unspecified location  - Stable.  Cont current regimen.  Avoid calcium suppl.     Screening for colorectal cancer  -     Ambulatory referral/consult to Endo Procedure ; Future; Expected date: 12/15/2023      Chronic conditions status updated as per HPI.  Other than changes above, cont current medications and maintain follow up with specialists.    No follow-ups on file.      Farzana Dorman MD  [facility]

## 2023-12-14 ENCOUNTER — OFFICE VISIT (OUTPATIENT)
Dept: PRIMARY CARE CLINIC | Facility: CLINIC | Age: 68
End: 2023-12-14
Payer: MEDICARE

## 2023-12-14 ENCOUNTER — LAB VISIT (OUTPATIENT)
Dept: LAB | Facility: HOSPITAL | Age: 68
End: 2023-12-14
Attending: INTERNAL MEDICINE
Payer: MEDICARE

## 2023-12-14 VITALS
BODY MASS INDEX: 28.65 KG/M2 | DIASTOLIC BLOOD PRESSURE: 90 MMHG | HEART RATE: 100 BPM | WEIGHT: 182.56 LBS | TEMPERATURE: 98 F | HEIGHT: 67 IN | SYSTOLIC BLOOD PRESSURE: 144 MMHG | OXYGEN SATURATION: 96 %

## 2023-12-14 DIAGNOSIS — R10.9 FLANK PAIN: ICD-10-CM

## 2023-12-14 DIAGNOSIS — E11.40 TYPE 2 DIABETES MELLITUS WITH DIABETIC NEUROPATHY, WITH LONG-TERM CURRENT USE OF INSULIN: ICD-10-CM

## 2023-12-14 DIAGNOSIS — E11.40 TYPE 2 DIABETES MELLITUS WITH DIABETIC NEUROPATHY, WITH LONG-TERM CURRENT USE OF INSULIN: Primary | ICD-10-CM

## 2023-12-14 DIAGNOSIS — E11.69 HYPERLIPIDEMIA ASSOCIATED WITH TYPE 2 DIABETES MELLITUS: ICD-10-CM

## 2023-12-14 DIAGNOSIS — R39.9 URINARY SYMPTOM OR SIGN: ICD-10-CM

## 2023-12-14 DIAGNOSIS — Z12.11 SCREENING FOR COLORECTAL CANCER: ICD-10-CM

## 2023-12-14 DIAGNOSIS — Z79.4 TYPE 2 DIABETES MELLITUS WITH DIABETIC NEUROPATHY, WITH LONG-TERM CURRENT USE OF INSULIN: Primary | ICD-10-CM

## 2023-12-14 DIAGNOSIS — E21.3 HYPERPARATHYROIDISM: ICD-10-CM

## 2023-12-14 DIAGNOSIS — Z12.12 SCREENING FOR COLORECTAL CANCER: ICD-10-CM

## 2023-12-14 DIAGNOSIS — E78.5 HYPERLIPIDEMIA ASSOCIATED WITH TYPE 2 DIABETES MELLITUS: ICD-10-CM

## 2023-12-14 DIAGNOSIS — M85.80 OSTEOPENIA, UNSPECIFIED LOCATION: ICD-10-CM

## 2023-12-14 DIAGNOSIS — Z79.4 TYPE 2 DIABETES MELLITUS WITH DIABETIC NEUROPATHY, WITH LONG-TERM CURRENT USE OF INSULIN: ICD-10-CM

## 2023-12-14 LAB
ALBUMIN SERPL BCP-MCNC: 3.9 G/DL (ref 3.5–5.2)
ALP SERPL-CCNC: 101 U/L (ref 55–135)
ALT SERPL W/O P-5'-P-CCNC: 18 U/L (ref 10–44)
ANION GAP SERPL CALC-SCNC: 12 MMOL/L (ref 8–16)
AST SERPL-CCNC: 23 U/L (ref 10–40)
BASOPHILS # BLD AUTO: 0.04 K/UL (ref 0–0.2)
BASOPHILS NFR BLD: 0.6 % (ref 0–1.9)
BILIRUB SERPL-MCNC: 0.5 MG/DL (ref 0.1–1)
BUN SERPL-MCNC: 14 MG/DL (ref 8–23)
CALCIUM SERPL-MCNC: 10.3 MG/DL (ref 8.7–10.5)
CHLORIDE SERPL-SCNC: 101 MMOL/L (ref 95–110)
CO2 SERPL-SCNC: 26 MMOL/L (ref 23–29)
CREAT SERPL-MCNC: 0.9 MG/DL (ref 0.5–1.4)
DIFFERENTIAL METHOD: ABNORMAL
EOSINOPHIL # BLD AUTO: 0.1 K/UL (ref 0–0.5)
EOSINOPHIL NFR BLD: 0.9 % (ref 0–8)
ERYTHROCYTE [DISTWIDTH] IN BLOOD BY AUTOMATED COUNT: 13.5 % (ref 11.5–14.5)
EST. GFR  (NO RACE VARIABLE): >60 ML/MIN/1.73 M^2
GLUCOSE SERPL-MCNC: 368 MG/DL (ref 70–110)
HCT VFR BLD AUTO: 42.1 % (ref 37–48.5)
HGB BLD-MCNC: 13.4 G/DL (ref 12–16)
IMM GRANULOCYTES # BLD AUTO: 0.03 K/UL (ref 0–0.04)
IMM GRANULOCYTES NFR BLD AUTO: 0.4 % (ref 0–0.5)
LYMPHOCYTES # BLD AUTO: 2.9 K/UL (ref 1–4.8)
LYMPHOCYTES NFR BLD: 41.8 % (ref 18–48)
MCH RBC QN AUTO: 27.1 PG (ref 27–31)
MCHC RBC AUTO-ENTMCNC: 31.8 G/DL (ref 32–36)
MCV RBC AUTO: 85 FL (ref 82–98)
MONOCYTES # BLD AUTO: 0.3 K/UL (ref 0.3–1)
MONOCYTES NFR BLD: 4.8 % (ref 4–15)
NEUTROPHILS # BLD AUTO: 3.6 K/UL (ref 1.8–7.7)
NEUTROPHILS NFR BLD: 51.5 % (ref 38–73)
NRBC BLD-RTO: 0 /100 WBC
PLATELET # BLD AUTO: 338 K/UL (ref 150–450)
PMV BLD AUTO: 10.8 FL (ref 9.2–12.9)
POTASSIUM SERPL-SCNC: 3.9 MMOL/L (ref 3.5–5.1)
PROT SERPL-MCNC: 7.6 G/DL (ref 6–8.4)
RBC # BLD AUTO: 4.94 M/UL (ref 4–5.4)
SODIUM SERPL-SCNC: 139 MMOL/L (ref 136–145)
WBC # BLD AUTO: 7.04 K/UL (ref 3.9–12.7)

## 2023-12-14 PROCEDURE — 3046F HEMOGLOBIN A1C LEVEL >9.0%: CPT | Mod: CPTII,S$GLB,, | Performed by: INTERNAL MEDICINE

## 2023-12-14 PROCEDURE — 3080F DIAST BP >= 90 MM HG: CPT | Mod: CPTII,S$GLB,, | Performed by: INTERNAL MEDICINE

## 2023-12-14 PROCEDURE — 1160F RVW MEDS BY RX/DR IN RCRD: CPT | Mod: CPTII,S$GLB,, | Performed by: INTERNAL MEDICINE

## 2023-12-14 PROCEDURE — 36415 COLL VENOUS BLD VENIPUNCTURE: CPT | Mod: PN | Performed by: INTERNAL MEDICINE

## 2023-12-14 PROCEDURE — 99214 OFFICE O/P EST MOD 30 MIN: CPT | Mod: S$GLB,,, | Performed by: INTERNAL MEDICINE

## 2023-12-14 PROCEDURE — 1125F AMNT PAIN NOTED PAIN PRSNT: CPT | Mod: CPTII,S$GLB,, | Performed by: INTERNAL MEDICINE

## 2023-12-14 PROCEDURE — 4010F PR ACE/ARB THEARPY RXD/TAKEN: ICD-10-PCS | Mod: CPTII,S$GLB,, | Performed by: INTERNAL MEDICINE

## 2023-12-14 PROCEDURE — 3080F PR MOST RECENT DIASTOLIC BLOOD PRESSURE >= 90 MM HG: ICD-10-PCS | Mod: CPTII,S$GLB,, | Performed by: INTERNAL MEDICINE

## 2023-12-14 PROCEDURE — 99214 PR OFFICE/OUTPT VISIT, EST, LEVL IV, 30-39 MIN: ICD-10-PCS | Mod: S$GLB,,, | Performed by: INTERNAL MEDICINE

## 2023-12-14 PROCEDURE — 3061F PR NEG MICROALBUMINURIA RESULT DOCUMENTED/REVIEW: ICD-10-PCS | Mod: CPTII,S$GLB,, | Performed by: INTERNAL MEDICINE

## 2023-12-14 PROCEDURE — 1159F PR MEDICATION LIST DOCUMENTED IN MEDICAL RECORD: ICD-10-PCS | Mod: CPTII,S$GLB,, | Performed by: INTERNAL MEDICINE

## 2023-12-14 PROCEDURE — 1159F MED LIST DOCD IN RCRD: CPT | Mod: CPTII,S$GLB,, | Performed by: INTERNAL MEDICINE

## 2023-12-14 PROCEDURE — 3288F FALL RISK ASSESSMENT DOCD: CPT | Mod: CPTII,S$GLB,, | Performed by: INTERNAL MEDICINE

## 2023-12-14 PROCEDURE — 99999 PR PBB SHADOW E&M-EST. PATIENT-LVL V: CPT | Mod: PBBFAC,,, | Performed by: INTERNAL MEDICINE

## 2023-12-14 PROCEDURE — 3066F NEPHROPATHY DOC TX: CPT | Mod: CPTII,S$GLB,, | Performed by: INTERNAL MEDICINE

## 2023-12-14 PROCEDURE — 4010F ACE/ARB THERAPY RXD/TAKEN: CPT | Mod: CPTII,S$GLB,, | Performed by: INTERNAL MEDICINE

## 2023-12-14 PROCEDURE — 1101F PT FALLS ASSESS-DOCD LE1/YR: CPT | Mod: CPTII,S$GLB,, | Performed by: INTERNAL MEDICINE

## 2023-12-14 PROCEDURE — 3077F SYST BP >= 140 MM HG: CPT | Mod: CPTII,S$GLB,, | Performed by: INTERNAL MEDICINE

## 2023-12-14 PROCEDURE — 3008F PR BODY MASS INDEX (BMI) DOCUMENTED: ICD-10-PCS | Mod: CPTII,S$GLB,, | Performed by: INTERNAL MEDICINE

## 2023-12-14 PROCEDURE — 81001 URINALYSIS AUTO W/SCOPE: CPT | Performed by: INTERNAL MEDICINE

## 2023-12-14 PROCEDURE — 3077F PR MOST RECENT SYSTOLIC BLOOD PRESSURE >= 140 MM HG: ICD-10-PCS | Mod: CPTII,S$GLB,, | Performed by: INTERNAL MEDICINE

## 2023-12-14 PROCEDURE — 85025 COMPLETE CBC W/AUTO DIFF WBC: CPT | Performed by: INTERNAL MEDICINE

## 2023-12-14 PROCEDURE — 99999 PR PBB SHADOW E&M-EST. PATIENT-LVL V: ICD-10-PCS | Mod: PBBFAC,,, | Performed by: INTERNAL MEDICINE

## 2023-12-14 PROCEDURE — 1125F PR PAIN SEVERITY QUANTIFIED, PAIN PRESENT: ICD-10-PCS | Mod: CPTII,S$GLB,, | Performed by: INTERNAL MEDICINE

## 2023-12-14 PROCEDURE — 3046F PR MOST RECENT HEMOGLOBIN A1C LEVEL > 9.0%: ICD-10-PCS | Mod: CPTII,S$GLB,, | Performed by: INTERNAL MEDICINE

## 2023-12-14 PROCEDURE — 3288F PR FALLS RISK ASSESSMENT DOCUMENTED: ICD-10-PCS | Mod: CPTII,S$GLB,, | Performed by: INTERNAL MEDICINE

## 2023-12-14 PROCEDURE — 1160F PR REVIEW ALL MEDS BY PRESCRIBER/CLIN PHARMACIST DOCUMENTED: ICD-10-PCS | Mod: CPTII,S$GLB,, | Performed by: INTERNAL MEDICINE

## 2023-12-14 PROCEDURE — 3008F BODY MASS INDEX DOCD: CPT | Mod: CPTII,S$GLB,, | Performed by: INTERNAL MEDICINE

## 2023-12-14 PROCEDURE — 1101F PR PT FALLS ASSESS DOC 0-1 FALLS W/OUT INJ PAST YR: ICD-10-PCS | Mod: CPTII,S$GLB,, | Performed by: INTERNAL MEDICINE

## 2023-12-14 PROCEDURE — 80053 COMPREHEN METABOLIC PANEL: CPT | Performed by: INTERNAL MEDICINE

## 2023-12-14 PROCEDURE — 3066F PR DOCUMENTATION OF TREATMENT FOR NEPHROPATHY: ICD-10-PCS | Mod: CPTII,S$GLB,, | Performed by: INTERNAL MEDICINE

## 2023-12-14 PROCEDURE — 3061F NEG MICROALBUMINURIA REV: CPT | Mod: CPTII,S$GLB,, | Performed by: INTERNAL MEDICINE

## 2023-12-14 RX ORDER — ROSUVASTATIN CALCIUM 40 MG/1
40 TABLET, COATED ORAL NIGHTLY
Qty: 90 TABLET | Refills: 1 | Status: SHIPPED | OUTPATIENT
Start: 2023-12-14

## 2023-12-15 LAB
BACTERIA #/AREA URNS AUTO: NORMAL /HPF
BILIRUB UR QL STRIP: NEGATIVE
CLARITY UR REFRACT.AUTO: CLEAR
COLOR UR AUTO: YELLOW
GLUCOSE UR QL STRIP: ABNORMAL
HGB UR QL STRIP: NEGATIVE
KETONES UR QL STRIP: NEGATIVE
LEUKOCYTE ESTERASE UR QL STRIP: NEGATIVE
MICROSCOPIC COMMENT: NORMAL
NITRITE UR QL STRIP: NEGATIVE
PH UR STRIP: 6 [PH] (ref 5–8)
PROT UR QL STRIP: NEGATIVE
RBC #/AREA URNS AUTO: 0 /HPF (ref 0–4)
SP GR UR STRIP: >1.03 (ref 1–1.03)
SQUAMOUS #/AREA URNS AUTO: 1 /HPF
URN SPEC COLLECT METH UR: ABNORMAL
WBC #/AREA URNS AUTO: 2 /HPF (ref 0–5)
YEAST UR QL AUTO: NORMAL

## 2023-12-19 ENCOUNTER — TELEPHONE (OUTPATIENT)
Dept: ENDOCRINOLOGY | Facility: CLINIC | Age: 68
End: 2023-12-19
Payer: MEDICARE

## 2023-12-19 NOTE — PROGRESS NOTES
I sent pt a my chart message -  I reviewed your labs.Your glucose was elevated at 368 an dyou are spilling glucose into your urine due to elevated blood glucose levels.  Your kidney and liver functions looked good. You are not anemic. How much of the victoza are you taking? When is your fu with Dr. Kaba. I am going to cc her on your results so she is aware. She sent you a message regarding your Victoza but I did not see that you had replied to her. I suspect she will want to adjust this and possibly increase to 1.8 mg daily.  Dr. BRUSH

## 2023-12-19 NOTE — TELEPHONE ENCOUNTER
Discussed blood sugars and medications   Denies missing doses    PLAN:  Increase semglee to 32 units at bedtime  Continue victoza 1.8 mg daily (increased two weeks ago)  Discussed effect of high carb/starch diets on BG  Needs f/u in 4- 6 weeks.

## 2023-12-19 NOTE — PROGRESS NOTES
I sent pt a my chart message -  I reviewed your Urine analysis which did not show evidence of a urinary tract infection. It did show 4+ glucose in your urine likely due to your uncontrolled Diabetes.  Continue to work on a low carb diet and getting your glucose down with your medications. How is your side pain?   Dr. BRUSH

## 2023-12-28 ENCOUNTER — CLINICAL SUPPORT (OUTPATIENT)
Dept: ENDOSCOPY | Facility: HOSPITAL | Age: 68
End: 2023-12-28
Attending: INTERNAL MEDICINE
Payer: MEDICARE

## 2023-12-28 ENCOUNTER — TELEPHONE (OUTPATIENT)
Dept: ENDOSCOPY | Facility: HOSPITAL | Age: 68
End: 2023-12-28

## 2023-12-28 DIAGNOSIS — Z12.12 SCREENING FOR COLORECTAL CANCER: ICD-10-CM

## 2023-12-28 DIAGNOSIS — Z12.11 SCREENING FOR COLORECTAL CANCER: ICD-10-CM

## 2023-12-28 RX ORDER — SODIUM, POTASSIUM,MAG SULFATES 17.5-3.13G
1 SOLUTION, RECONSTITUTED, ORAL ORAL DAILY
Qty: 1 KIT | Refills: 0 | Status: SHIPPED | OUTPATIENT
Start: 2023-12-28 | End: 2024-01-07

## 2023-12-28 NOTE — TELEPHONE ENCOUNTER
Spoke to patient to schedule procedure(s) Colonoscopy       Physician to perform procedure(s) Dr. EDINSON Carrera  Date of Procedure (s) 4/30/24  Arrival Time 10:20 AM  Time of Procedure(s) 11:20 AM   Location of Procedure(s) [place] [address] Floor  Type of Rx Prep sent to patient: Suprep  Instructions provided to patient via [facility]    Patient was informed on the following information and verbalized understanding. Screening questionnaire reviewed with patient and complete. If procedure requires anesthesia, a responsible adult needs to be present to accompany the patient home, patient cannot drive after receiving anesthesia. Appointment details are tentative, especially check-in time. Patient will receive a prep-op call 7 days prior to confirm check-in time for procedure. If applicable the patient should contact their pharmacy to verify Rx for procedure prep is ready for pick-up. Patient was advised to call the scheduling department at [phone] if pharmacy states no Rx is available. Patient was advised to call the endoscopy scheduling department if any questions or concerns arise.      [facility] Scheduling Department

## 2024-02-02 DIAGNOSIS — E11.65 TYPE 2 DIABETES MELLITUS WITH HYPERGLYCEMIA, WITH LONG-TERM CURRENT USE OF INSULIN: Primary | ICD-10-CM

## 2024-02-02 DIAGNOSIS — Z79.4 TYPE 2 DIABETES MELLITUS WITH HYPERGLYCEMIA, WITH LONG-TERM CURRENT USE OF INSULIN: Primary | ICD-10-CM

## 2024-02-27 ENCOUNTER — TELEPHONE (OUTPATIENT)
Dept: PRIMARY CARE CLINIC | Facility: CLINIC | Age: 69
End: 2024-02-27
Payer: MEDICARE

## 2024-02-27 NOTE — TELEPHONE ENCOUNTER
----- Message from Renee Carlton sent at 2/27/2024  1:24 PM CST -----  Contact: Mobile  [phone]  Patient said that she have a possible UTI for about a week now, patient would like to speak with you about the following symptoms..    Frequent urination, pain while urinating.     Please send a script to patients pharmacy for her UTI..       [facility] [address] [facility] [address]  Phone: [phone] Fax: [phone]

## 2024-02-27 NOTE — TELEPHONE ENCOUNTER
Lov 12/14/23  Frequent urination, pain while urinating for about 1 week. Do not see that pt has . She last seen Uro/Gyn in 2022. Do you want to place a u/a and have pt do a virtual if she is able to figure out how to do so? If she is not able to do a virtual I will have to schedule her with an available provider.

## 2024-02-28 ENCOUNTER — OFFICE VISIT (OUTPATIENT)
Dept: PRIMARY CARE CLINIC | Facility: CLINIC | Age: 69
End: 2024-02-28
Payer: MEDICARE

## 2024-02-28 VITALS
HEIGHT: 67 IN | BODY MASS INDEX: 28.75 KG/M2 | DIASTOLIC BLOOD PRESSURE: 67 MMHG | TEMPERATURE: 98 F | OXYGEN SATURATION: 97 % | HEART RATE: 93 BPM | WEIGHT: 183.19 LBS | SYSTOLIC BLOOD PRESSURE: 132 MMHG

## 2024-02-28 DIAGNOSIS — R30.0 DYSURIA: Primary | ICD-10-CM

## 2024-02-28 DIAGNOSIS — E21.3 HYPERPARATHYROIDISM: ICD-10-CM

## 2024-02-28 DIAGNOSIS — Z79.4 CONTROLLED TYPE 2 DIABETES MELLITUS WITHOUT COMPLICATION, WITH LONG-TERM CURRENT USE OF INSULIN: ICD-10-CM

## 2024-02-28 DIAGNOSIS — I70.0 ATHEROSCLEROSIS OF AORTA: ICD-10-CM

## 2024-02-28 DIAGNOSIS — E78.2 MIXED HYPERLIPIDEMIA: ICD-10-CM

## 2024-02-28 DIAGNOSIS — E11.9 CONTROLLED TYPE 2 DIABETES MELLITUS WITHOUT COMPLICATION, WITH LONG-TERM CURRENT USE OF INSULIN: ICD-10-CM

## 2024-02-28 PROBLEM — E11.65 TYPE 2 DIABETES MELLITUS WITH HYPERGLYCEMIA, WITH LONG-TERM CURRENT USE OF INSULIN: Status: RESOLVED | Noted: 2021-03-01 | Resolved: 2024-02-28

## 2024-02-28 PROBLEM — F33.9 EPISODE OF RECURRENT MAJOR DEPRESSIVE DISORDER, UNSPECIFIED DEPRESSION EPISODE SEVERITY: Status: ACTIVE | Noted: 2024-02-28

## 2024-02-28 PROBLEM — F33.9 EPISODE OF RECURRENT MAJOR DEPRESSIVE DISORDER, UNSPECIFIED DEPRESSION EPISODE SEVERITY: Status: RESOLVED | Noted: 2024-02-28 | Resolved: 2024-02-28

## 2024-02-28 LAB
BILIRUB SERPL-MCNC: NORMAL MG/DL
BLOOD URINE, POC: NORMAL
CLARITY, POC UA: NORMAL
COLOR, POC UA: NORMAL
GLUCOSE UR QL STRIP: NORMAL
KETONES UR QL STRIP: NORMAL
LEUKOCYTE ESTERASE URINE, POC: NORMAL
NITRITE, POC UA: NORMAL
PH, POC UA: 6.5
PROTEIN, POC: NORMAL
SPECIFIC GRAVITY, POC UA: 1.01
UROBILINOGEN, POC UA: NORMAL

## 2024-02-28 PROCEDURE — 1159F MED LIST DOCD IN RCRD: CPT | Mod: CPTII,S$GLB,, | Performed by: FAMILY MEDICINE

## 2024-02-28 PROCEDURE — 1101F PT FALLS ASSESS-DOCD LE1/YR: CPT | Mod: CPTII,S$GLB,, | Performed by: FAMILY MEDICINE

## 2024-02-28 PROCEDURE — 3288F FALL RISK ASSESSMENT DOCD: CPT | Mod: CPTII,S$GLB,, | Performed by: FAMILY MEDICINE

## 2024-02-28 PROCEDURE — 1125F AMNT PAIN NOTED PAIN PRSNT: CPT | Mod: CPTII,S$GLB,, | Performed by: FAMILY MEDICINE

## 2024-02-28 PROCEDURE — 3008F BODY MASS INDEX DOCD: CPT | Mod: CPTII,S$GLB,, | Performed by: FAMILY MEDICINE

## 2024-02-28 PROCEDURE — 1160F RVW MEDS BY RX/DR IN RCRD: CPT | Mod: CPTII,S$GLB,, | Performed by: FAMILY MEDICINE

## 2024-02-28 PROCEDURE — 81002 URINALYSIS NONAUTO W/O SCOPE: CPT | Mod: S$GLB,,, | Performed by: FAMILY MEDICINE

## 2024-02-28 PROCEDURE — 3078F DIAST BP <80 MM HG: CPT | Mod: CPTII,S$GLB,, | Performed by: FAMILY MEDICINE

## 2024-02-28 PROCEDURE — 87088 URINE BACTERIA CULTURE: CPT | Performed by: FAMILY MEDICINE

## 2024-02-28 PROCEDURE — 99214 OFFICE O/P EST MOD 30 MIN: CPT | Mod: S$GLB,,, | Performed by: FAMILY MEDICINE

## 2024-02-28 PROCEDURE — 4010F ACE/ARB THERAPY RXD/TAKEN: CPT | Mod: CPTII,S$GLB,, | Performed by: FAMILY MEDICINE

## 2024-02-28 PROCEDURE — 87086 URINE CULTURE/COLONY COUNT: CPT | Performed by: FAMILY MEDICINE

## 2024-02-28 PROCEDURE — 99999 PR PBB SHADOW E&M-EST. PATIENT-LVL IV: CPT | Mod: PBBFAC,,, | Performed by: FAMILY MEDICINE

## 2024-02-28 PROCEDURE — 87147 CULTURE TYPE IMMUNOLOGIC: CPT | Performed by: FAMILY MEDICINE

## 2024-02-28 PROCEDURE — 3077F SYST BP >= 140 MM HG: CPT | Mod: CPTII,S$GLB,, | Performed by: FAMILY MEDICINE

## 2024-02-28 RX ORDER — NITROFURANTOIN MACROCRYSTALS 50 MG/1
100 CAPSULE ORAL EVERY 12 HOURS
Qty: 28 CAPSULE | Refills: 0 | Status: SHIPPED | OUTPATIENT
Start: 2024-02-28 | End: 2024-03-06

## 2024-02-28 NOTE — ASSESSMENT & PLAN NOTE
High 200s, continue meds  STOP DIET PEPSI she drinks TWO 12 oz curvy bottles a day  STOP JUICE  Just water  STOP chips, candy , cookies  STOP 2 pieces of bread w ham, switch to lettuce wrap   STOP Fried fish  Bring in home sugars  Has appt in April w PCP Dr BRUSH

## 2024-02-28 NOTE — ASSESSMENT & PLAN NOTE
Urinalysis, we discussed that  glucose 2000+ breeds infection  See dietary recommendations w DM  Nitrofurantoin sent  Urine culture

## 2024-02-28 NOTE — PROGRESS NOTES
"      Assessment:     1. Dysuria    2. Controlled type 2 diabetes mellitus without complication, with long-term current use of insulin    3. Mixed hyperlipidemia    4. Hyperparathyroidism    5. Atherosclerosis of aorta      Plan:     Dysuria  Urinalysis, we discussed that  glucose 2000+ breeds infection  See dietary recommendations w DM  Nitrofurantoin sent  Urine culture    Controlled type 2 diabetes mellitus without complication, with long-term current use of insulin  High 200s, continue meds  STOP DIET PEPSI she drinks TWO 12 oz curvy bottles a day  STOP JUICE  Just water  STOP chips, candy , cookies  STOP 2 pieces of bread w ham, switch to lettuce wrap   STOP Fried fish  Bring in home sugars  Has appt in April w PCP Dr BRUSH     Atherosclerosis of aorta  Stable, continue ROsuvastatin 40 daily,  has appt in April w PCP Dr BRUSH     Mixed hyperlipidemia  Stable, continue Rosuvastatin 40, has appt in April w PCP Dr BRUSH     Hyperparathyroidism  Stable, continue Vit D supplement,  follow w Dr Escoto        CHIEF COMPLAINT: UTI    HPI: Dee Hamilton is a 68 y.o. female with is here today for UTI    Burnming w urinating x 10 d, less pain now. No fever, some back pain.     Home sugars 180, hi 200    Current Outpatient Medications   Medication Instructions    blood sugar diagnostic Strp To check BG 2 times daily, to use with insurance preferred meter    blood-glucose meter kit To check BG 2 times daily, to use with insurance preferred meter    blood-glucose sensor (DEXCOM G7 SENSOR) Louise 1 each, Misc.(Non-Drug; Combo Route), Every 10 days    CONTOUR TEST STRIPS Strp USE TO TEST BLOOD SUGAR TWICE DAILY    furosemide (LASIX) 20 mg, Oral, Daily    gabapentin (NEURONTIN) 100 mg, Oral, Nightly    insulin needles, disposable, (RELION PEN NEEDLES) 32 x 5/32 " Ndle Use as directed    lancets 33 gauge Misc Misc.(Non-Drug; Combo Route), True plus     LIDOCAINE 2 %, VALIUM 5 MG, BACLOFEN 4 % SUPPOSITORY 1 suppository, Vaginal, 2 times " daily PRN    losartan (COZAAR) 100 mg, Oral, Daily    methocarbamoL (ROBAXIN) 500 mg, Oral, 3 times daily    nitrofurantoin (MACRODANTIN) 100 mg, Oral, Every 12 hours    pantoprazole (PROTONIX) 40 mg, Oral, Daily    paroxetine (PAXIL) 20 MG tablet Take 1 & 1/2 tablets (30 mg total) by mouth once daily.    phenazopyridine (PYRIDIUM) 200 mg, Oral, 3 times daily PRN    potassium chloride SA (K-DUR,KLOR-CON) 20 MEQ tablet 20 mEq, Oral, Daily    repaglinide (PRANDIN) 1 mg, Oral, 3 times daily before meals    rosuvastatin (CRESTOR) 40 mg, Oral, Nightly    RSVPreF3 antigen-AS01E, PF, (AREXVY) 120 mcg/0.5 mL SusR vaccine Inject 0.5ML into the muscle.    SEMGLEE(INSULIN GLARG-YFGN)PEN 26 Units, Subcutaneous, Nightly    travoprost (TRAVATAN Z) 0.004 % ophthalmic solution 1 drop, Both Eyes, Nightly    TRULICITY 1.5 mg, Subcutaneous, Weekly    VITAMIN D2 50,000 Units, Oral, Every 30 days       Lab Results   Component Value Date    HGBA1C 11.3 (H) 11/02/2023    HGBA1C 13.0 (H) 09/19/2023    HGBA1C 8.9 (H) 06/01/2023     Lab Results   Component Value Date    MICALBCREAT Unable to calculate 02/24/2023     Lab Results   Component Value Date    LDLCALC 135.0 06/01/2023    LDLCALC 107.6 09/14/2022    CHOL 207 (H) 06/01/2023    HDL 40 06/01/2023    TRIG 160 (H) 06/01/2023       Lab Results   Component Value Date     12/14/2023    K 3.9 12/14/2023     12/14/2023    CO2 26 12/14/2023     (H) 12/14/2023    BUN 14 12/14/2023    CREATININE 0.9 12/14/2023    CALCIUM 10.3 12/14/2023    PROT 7.6 12/14/2023    ALBUMIN 3.9 12/14/2023    BILITOT 0.5 12/14/2023    ALKPHOS 101 12/14/2023    AST 23 12/14/2023    ALT 18 12/14/2023    ANIONGAP 12 12/14/2023    ESTGFRAFRICA >60 06/22/2022    EGFRNONAA >60 06/22/2022    WBC 7.04 12/14/2023    HGB 13.4 12/14/2023    HGB 14.0 02/24/2023    HCT 42.1 12/14/2023    MCV 85 12/14/2023     12/14/2023    TSH 0.966 02/24/2023    HEPCAB Non-reactive 02/07/2023       Lab Results  "  Component Value Date    ESTRADIOL 13 06/28/2021    YJOTVXXA60AX 33 09/14/2022    IQUIBLOP67IV 24 (L) 01/14/2022    GXOCTDVS26 605 04/18/2023    FERRITIN 34 01/14/2022         Past Medical History:   Diagnosis Date    Anxiety     Bilateral leg edema 6/8/2021    Depression     Diabetes mellitus, type 2     Diverticulitis     GERD (gastroesophageal reflux disease)     Glaucoma     Hyperlipidemia     Hypertension     Nicotine dependence in remission     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     Past Surgical History:   Procedure Laterality Date    BLADDER SUSPENSION  12/2011    CATARACT EXTRACTION, BILATERAL      CHOLECYSTECTOMY      COLD KNIFE CONIZATION OF CERVIX N/A 09/29/2021    Procedure: CONE BIOPSY, CERVIX, USING COLD KNIFE;  Surgeon: Sheeba Frank MD;  Location: [facility] OR;  Service: OB/GYN;  Laterality: N/A;    EYE SURGERY      Glaucoma Laser Sx ou      HYSTEROSCOPY WITH DILATION AND CURETTAGE OF UTERUS N/A 09/29/2021    Procedure: HYSTEROSCOPY, WITH DILATION AND CURETTAGE OF UTERUS;  Surgeon: Sheeba Frank MD;  Location: [facility] OR;  Service: OB/GYN;  Laterality: N/A;    OOPHORECTOMY      TONSILLECTOMY      TUBAL LIGATION      vaginal mesh       Vitals:    02/28/24 1147 02/28/24 1214   BP: (!) 146/70 132/67   Pulse: 93    Temp: 97.8 °F (36.6 °C)    TempSrc: Oral    SpO2: 97%    Weight: 83.1 kg (183 lb 3.2 oz)    Height: 5' 7" (1.702 m)    PainSc:   4    PainLoc: Vagina      Wt Readings from Last 5 Encounters:   02/28/24 83.1 kg (183 lb 3.2 oz)   12/14/23 82.8 kg (182 lb 8.7 oz)   11/07/23 82.7 kg (182 lb 5.1 oz)   09/21/23 81.3 kg (179 lb 2 oz)   09/19/23 81.1 kg (178 lb 12.7 oz)     Objective:   Physical Exam  Abdominal:      General: Bowel sounds are normal.      Palpations: Abdomen is soft. There is no mass.      Tenderness: There is no abdominal tenderness. There is no guarding or rebound.                                     "

## 2024-02-28 NOTE — TELEPHONE ENCOUNTER
Pt states she does not believe she can figure out how to do a virtual so I scheduled her a same day appt with an available provider for eval

## 2024-02-29 RX ORDER — PANTOPRAZOLE SODIUM 40 MG/1
40 TABLET, DELAYED RELEASE ORAL DAILY
Qty: 30 TABLET | Refills: 2 | Status: SHIPPED | OUTPATIENT
Start: 2024-02-29 | End: 2024-06-06 | Stop reason: SDUPTHER

## 2024-03-01 LAB — BACTERIA UR CULT: ABNORMAL

## 2024-03-12 ENCOUNTER — TELEPHONE (OUTPATIENT)
Dept: GASTROENTEROLOGY | Facility: CLINIC | Age: 69
End: 2024-03-12
Payer: MEDICARE

## 2024-03-14 ENCOUNTER — OFFICE VISIT (OUTPATIENT)
Dept: GASTROENTEROLOGY | Facility: CLINIC | Age: 69
End: 2024-03-14
Payer: MEDICARE

## 2024-03-14 VITALS
HEIGHT: 67 IN | BODY MASS INDEX: 28.93 KG/M2 | WEIGHT: 184.31 LBS | DIASTOLIC BLOOD PRESSURE: 76 MMHG | HEART RATE: 89 BPM | SYSTOLIC BLOOD PRESSURE: 135 MMHG

## 2024-03-14 DIAGNOSIS — Z86.010 HISTORY OF COLON POLYPS: ICD-10-CM

## 2024-03-14 DIAGNOSIS — K21.9 GASTROESOPHAGEAL REFLUX DISEASE, UNSPECIFIED WHETHER ESOPHAGITIS PRESENT: Primary | ICD-10-CM

## 2024-03-14 PROCEDURE — 99214 OFFICE O/P EST MOD 30 MIN: CPT | Mod: S$GLB,,, | Performed by: INTERNAL MEDICINE

## 2024-03-14 PROCEDURE — 99999 PR PBB SHADOW E&M-EST. PATIENT-LVL V: CPT | Mod: PBBFAC,,, | Performed by: INTERNAL MEDICINE

## 2024-03-14 PROCEDURE — 99215 OFFICE O/P EST HI 40 MIN: CPT | Mod: PBBFAC | Performed by: INTERNAL MEDICINE

## 2024-03-14 NOTE — PROGRESS NOTES
Reason for visit: GERD     HPI:  is a 68 year old lady last seen a year ago in 2023. She had presented with changes in bowels and bloating. Bowels swing between constipation (pellet like stools and straining) and diarrhea (loose stools). No blood in stool. Drinks sufficient amounts of water but diet is very inconsistent with limited fiber. Takes Metamucil most days of the week. Has been having more heartburn and acid regurgitation. Takes TUMS which gives some relief. Has hiccups as well. No dysphagia or odynophagia. She has Obesity, Diverticulosis (h/o diverticulitis twice), GERD, Atypical cervical glandular cells on pap smear, DM2, Anxiety, Depression, Glaucoma, Cystocele with mixed urinary incontinence post surgery with improvement, Fatty liver, Atherosclerosis, HLD and Hypercalcemia.      Colonoscopy in 2018 revealed a few small polyps. Rare alcohol. Never had pancreatitis. No GI malignancy in the family. Her alternating constipation and diarrhea was suspected to be due to inconsistent diet and uncontrolled DM2. She was to continue Metamucil daily. Given her chronic GERD and history of colon polyps EGD/Colonoscopy was recommended however has not been scheduled yet. She was also asked to continue Pantoprazole 40 mg daily.      just got diagnosed with Hepatitis C. Her Hep C antibody level was negative.        Past medical, surgical, social and family history reviewed in epic     Medication allergies reviewed in epic     Review of systems:     Constitutional:  No fever, no chills, has gained weight since the last visit, appetite is diminished  Eyes:  No visual changes or red eyes  ENT:  No odynophagia or hoarseness of voice  Cardiovascular:  No angina or palpitation  Respiratory:  No shortness breath or wheezing  Genitourinary:  No dysuria or frequency  Musculoskeletal:  No myalgias or arthralgias; has burning in her feet  Skin:  No pruritus or eczema  Neurologic:  No headache or  seizures  Psychiatric:  No anxiety depression  Gastrointestinal:  See HPI     Physical exam:  Vitals see epic, awake, alert, oriented x3 in no acute distress     No physical exam done today     Recent labs (Sept 2021 HbA1c 10.1), imaging and endoscopy reports reviewed.        Impression: GERD, History of colon polyps, Alternating constipation and diarrhea (most likely secondary to uncontrolled DM2),  recently diagnosed with Hepatitis C     Recommendations:      Schedule EGD and Colonoscopy  Citrucel 2 caplets with lunch.  Continue Pantoprazole 40 mg daily

## 2024-03-19 ENCOUNTER — TELEPHONE (OUTPATIENT)
Dept: ENDOSCOPY | Facility: HOSPITAL | Age: 69
End: 2024-03-19
Payer: MEDICARE

## 2024-03-19 NOTE — TELEPHONE ENCOUNTER
"    RE: Endo Case Request  Received: Today  Jacky Jarvis MD Piglia, Ashley, RN  Yes, add EGD and do the double here.          Previous Messages       ----- Message -----  From: Agustina Kan, RN  Sent: 3/19/2024   7:46 AM CDT  To: Jacky Jarvis MD  Subject: RE: Endo Case Request                            Dr. Jarvis - this pt is scheduled for colon with Dr. Carrera in April. Did you want me to add the EGD and move her to [facility] for both procedures? Please advise.  Thank you  ----- Message -----  From: Jacky Jarvis MD  Sent: 3/14/2024   2:12 PM CDT  To: Agustina Kan RN  Subject: Endo Case Request                                Procedure: EGD/Colonoscopy    Diagnosis: Surveillance colonoscopy - Hx of colon polyps and GERD    Procedure Timin-4 weeks    *If within 4 weeks selected, please bear as high priority*    *If greater than 12 weeks, please select "4-12 weeks" and delay sending until 2 months prior to requested date*    Provider: Myself    Location: [facility]    Additional Scheduling Information: No scheduling concerns    Prep Specifications:Standard prep    Have you attached a patient to this message: Yes         "

## 2024-03-19 NOTE — TELEPHONE ENCOUNTER
"    Endo Case Request  Received: 5 days ago  Jacky Jarvis MD Piglia, Ashley, RN  Procedure: EGD/Colonoscopy    Diagnosis: Surveillance colonoscopy - Hx of colon polyps and GERD    Procedure Timin-4 weeks    *If within 4 weeks selected, please bear as high priority*    *If greater than 12 weeks, please select "4-12 weeks" and delay sending until 2 months prior to requested date*    Provider: Myself    Location: [facility]    Additional Scheduling Information: No scheduling concerns    Prep Specifications:Standard prep    Have you attached a patient to this message: Yes  "

## 2024-03-20 ENCOUNTER — TELEPHONE (OUTPATIENT)
Dept: ENDOSCOPY | Facility: HOSPITAL | Age: 69
End: 2024-03-20
Payer: MEDICARE

## 2024-03-20 ENCOUNTER — PATIENT MESSAGE (OUTPATIENT)
Dept: ENDOSCOPY | Facility: HOSPITAL | Age: 69
End: 2024-03-20
Payer: MEDICARE

## 2024-03-20 NOTE — TELEPHONE ENCOUNTER
Spoke to patient to schedule procedure(s) Colonoscopy/EGD       Physician to perform procedure(s) Dr. J LUIS Horn  Date of Procedure (s) 4/30/2024  Arrival Time 7:00 AM  Time of Procedure(s) 8:00 AM   Location of Procedure(s) [facility]  Type of Rx Prep sent to patient: Suprep  Instructions provided to patient via [facility]    Patient was informed on the following information and verbalized understanding. Screening questionnaire reviewed with patient and complete. If procedure requires anesthesia, a responsible adult needs to be present to accompany the patient home, patient cannot drive after receiving anesthesia. Appointment details are tentative, especially check-in time. Patient will receive a prep-op call 7 days prior to confirm check-in time for procedure. If applicable the patient should contact their pharmacy to verify Rx for procedure prep is ready for pick-up. Patient was advised to call the scheduling department at [phone] if pharmacy states no Rx is available. Patient was advised to call the endoscopy scheduling department if any questions or concerns arise.      [facility] Scheduling Department

## 2024-03-26 RX ORDER — ERGOCALCIFEROL 1.25 MG/1
50000 CAPSULE ORAL
Qty: 3 CAPSULE | Refills: 0 | Status: SHIPPED | OUTPATIENT
Start: 2024-03-26 | End: 2024-04-15 | Stop reason: SDUPTHER

## 2024-03-26 NOTE — TELEPHONE ENCOUNTER
No care due was identified.  [facility] Embedded Care Due Messages. Reference number: [id number].   3/26/2024 10:11:07 AM CDT

## 2024-04-01 ENCOUNTER — PATIENT OUTREACH (OUTPATIENT)
Dept: ADMINISTRATIVE | Facility: HOSPITAL | Age: 69
End: 2024-04-01
Payer: MEDICARE

## 2024-04-01 NOTE — PROGRESS NOTES
Health Maintenance Due   Topic Date Due    TETANUS VACCINE  Never done    LDCT Lung Screen  06/18/2015    Foot Exam  10/04/2023    Hemoglobin A1c  02/02/2024    Diabetes Urine Screening  02/24/2024        Chart review done.   HM updated.   Immunizations reviewed & updated.   Care Everywhere updated.

## 2024-04-06 ENCOUNTER — PATIENT MESSAGE (OUTPATIENT)
Dept: ENDOSCOPY | Facility: HOSPITAL | Age: 69
End: 2024-04-06
Payer: MEDICARE

## 2024-04-06 ENCOUNTER — TELEPHONE (OUTPATIENT)
Dept: ENDOSCOPY | Facility: HOSPITAL | Age: 69
End: 2024-04-06
Payer: MEDICARE

## 2024-04-14 NOTE — PROGRESS NOTES
"[facility] Note    Chief Complaint      Chief Complaint   Patient presents with    Follow-up       History of Present Illness      Dee Hamilton is a 68 y.o.  F with Anxiety, Depression, Glaucoma, HLD, Hypercalcemia. Last visit - 9/19/23    Hypercalcemia -   She was taking calcium but has since stopped this. Vit D was elevated. It was not deficient.  PTH level remains high at 126.  Calcium 10.3 down from 10.6.  I suspect primary hyperparathyroidism.  This will need to continue to be monitored. Cont. to hydrate well and do not take any calcium supplements.   Fu by Endo.  Pt last seen by Endo in sept.  Will order labs.  Rec she make appt with Endo.      Candida intertrigo- Rec drying well after bathing/showering.  Rx Nystatin powder and if no help will change to Clotrimazole cream. It helps.      Hypokalemia - Potassium is back to nl at 3.9.  Pt on Kcl 20 meQ daily.      Osteopenia - DEXA/Bone Density which showed osteopenia. I rec  Vit D3 2000 units/d OTC.  In addition, I rec weight-bearing  exercise at least 30 minutes 3 times/wk and ideally 5 times/wk.  No specific intensity.  Walking is fine. I just rec  a form of weight-bearing exercise pt enjoys to facilitate long term compliance and benefit. Repeat DEXA in 2  yrs.     Noncompliance - Pt prev. stopped her Metformin due to Ha and abd pain.  "It just felt like every day I get up and I'm just there".  Sounds like she is depressed.  She is overdue for her Endo fu with Dr. Escoto. We discussed the imp. of compliance and calling with any concerns. She is not taking her Protonix G I just Rx for her. Cont Trulicitiy.   Will not resume Metformin at this time since it made her feel poorly.      Memory issues - I forget where I put things off and on. Will cont to ajck.  Refer toNeuroPsych referral. She writes things in her note book. no evid of NPH on MRI. Pt reports imbalance, Incontinence, and memory issues. She has known Mixed urinary " "incontinence and had a prev bladder supsension. Carotid U/S - 6/1/21 - no evid of sig stenosis. Vitamin B1 level and it was normal.  Vitamin b12 was elevated. Her folic acid was normal.    has noticed she is more forgetful in doing chores around the house (like emptying the  or taking the clothes out of the dryer". Pt scored 27/30 on MMSE - 8/11/22. If someone has to ask her how she is she has to stop and think about how to respond so she has the words in her mind to answer correctly. "My tooth brush may be in the refrigerator.  Sometimes I have to remember how to drive so I stop driving". Note -she has multiple pill bottles for Atorvastatin/Repaglinide which are very full and it does not appear she has been taking these regularly. Folic acid and vitamin b12 are normal.  MRI Brain - has not yet had.  was in hospital. MRI Brain - 6/22/23 - mild generalized cerebral volume loss, without evidence of hydrocephalus. Mild patchy T2/FLAIR hyperintensity in the supratentorial white matter, nonspecific but most likely reflecting chronic small vessel ischemic changes. No evidence of recent or remote major vascular distribution infarct. These finding can be seen with aging and with high cholesterol and diabetes.  We will continue to work on keeping these things controlled.      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 ECC neg. EMBx insufficient. Pt is s/p hysteroscopy/D&C and CKC 9/29/21.  Has upcoming fu to discuss poss. Hys. + Spotting. CT abd/Pelvis - 3/25/22 -Punctate low-density foci in both kidneys too small to adequately characterize.   Colonic diverticulosis with no evidence for acute diverticulitis. No further recommendations Pelvic U/S - 5/31/22- wnl.   PAP -11/7/23 - neg.      Anxiety - She is back on her Paroxetine 30 mg/d.  "It takes the edge off".   D/w pt withdrawal effects with abrupt discontinuation. Referred to Psychiatry but she never got to go " "because her appt got cancelled twice at [facility].  "I go down a rabbit hole every now and then". It's depression more so than Anxiety because she doesn't go out much".  Pt talks to someone who is a friend of her daughter which has helped. She would like a support animal but her  does not like dogs. Playing with the neighbor's dog is helpful.  Prev fu by Jonna NICOLE.  Referred to Psychiatry. Her  is ill and she has a new grandson and she puts everyone before herself. Her  has Cancer and is doing well.      Depression - She is taking her Paroxetine 30 mg/d.   "It takes the edge off".  D/w pt withdrawal effects with abrupt discontinuation.  Referred to Psychiatry.  Pt talks to someone who is a friend of her daughter which has helped.   "It comes and goes". She has been meditating which helps. "My son is a trigger". She is caring for her  with Cancer.  "It's hard". Her 2 kids live in [facility]. She has a son on the [employer] but they don't speak often.      Glaucoma - Fu by Adilson. Fu by Dr. Nereyda De Anda.  Planning for fu with Dr. Carver.      HLD -  Cholesterol was high despite being on Atorvastatin 80 mg daily.  We changed this this to crestor 40 mg/d. If remains high can possibly add Zetia to her regimen     Hypercalcemia/Hyperparathyroidism -  Prev fu by Dr. Jarek Davison.  Vit D low normal - 24- low. ionized calcium - 1.19 - wnl and  iPTH - 126- 9/19/23. Vit D - 95- 9/19/23 up from 24.  Note - HCTZ prev stopped. Will repeat labs.      DM - II  - Dx '02 - Ha1c was 11.3 still uncontrolled. Had appt with Laney in July but it was cancelled.   Pt off trulicity  3 mg wkly, Prandin (not effective) and Metformin.  She is on Trulicity 1.5mg/d, Semglee 36 units QHS.  She c/o burning in her feet to her knees.  Pt on Gabapentin 100 mg QHs.  Glc . Will not resume Metformin if causing GI issues. Repeat labs.  Glucose was elevated at 368 and she was spilling glucose into her urine due to " "elevated blood glucose levels. Due for fu with Dr. Kaba.  Dr Kaba Increase semglee to 32 units at bedtime and pt is actually now taking 36 units/d.       Unexplained wt loss -Resolved - Up 3 lb since last visit.   She reports she has been eating less and is not as hungry. "I don't eat sweets anymore shelli I don't have a taste for it".   Planning for EGD/C-scope     Vit D def - 95 - 9/19/23. Pt not on suppl. She is fu by alison. She completed Ergocalciferol 50,000 units once weekly x 8 wks.  Pt forgets to take her meds.  Pt on once monthly Ergo.      Mixed urinary Incontinence - Cystocele - Fu by  UroGyn, Dr. Coleman. Brianna- couldn't afford. She prefers not to go back to Dr. Dinh.  She had  Prev bladder suspension in '11. Checked MRI Brain to r/o  NPH with abn gait, imbalance, memory issues, and Incontinence.  No evid of this on MRI. Pt doesn't take Elavil 10 mg QHS prn.  Referred to Pelvic Floor PTx.      Diaphragmatic hernia - No issues at present.  Will cont to monitor. She does have GERD. Rec smaller more freq meals.  Planning for Egd    Colon polyps  - Planning for  C-scope.      Diverticulosis - No issues at present. Diarrhea resolved with changing to Metformin ER.       Imbalance -  I'm not wobbling as much any more and haven't fallen or had dizziness. My mood swings are gone and it's much better .  I suspect this has improved since resuming her SSRI. She describes it as bouncing when she walks but then describes walking into things and being off balance.       Fatty Liver - Noted on CT abd/pelvis -2/22/20.   Rec she  limit tylenol and alcohol.  Rec diet and exercise for wt loss   As discussed at visit there is a potential for cirrhosis.   FIB-4 Calculation: 1.11 at 4/15/2024  1:43 PM   FIB-4 below 1.30 is considered as low-risk for advanced fibrosis  FIB-4 over 2.67 is considered as high-risk for advanced fibrosis  FIB-4 values between 1.30 and 2.67 are considered as intermediate-risk of advanced " fibrosis for ages 36-64.   For ages > 64 the cut-off for low-risk goes to < 2.    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     Small fat containing umbilical hernia and Small fat containing left inguinal hernia - Noted on chart review.       Atherosclerosis aorta - Seen on prev CT scans.  Cont Statin.      Rectocele - Fu by Uro/GYN. Planning for Pelvic Floor PTx.      HCM - Flu -9/19/23;  Tdap - ? At [facility];  PCV 13 - 10/24/19;  PVX 23 - 3/1/21;  Shingrix -#1 - 10/24/19 and #2 -12/30/19- she thinks she had shingles in Jan. 2022 to Left shoulder/flank x 2 wks;  COVID - 19 Vaccine (Moderna) #1 - 2/15/21; #2 - 3/15/21; # 3 - 10/27/21; # 4 10/16/22; # 5 10/24/23;  Hep C Screen -2/7/23 - neg. +h/o CHUN II; C-scope - 7/25/18 - hemorrhoids and polyps - repeat 5 yrs At [facility] - planning for repeat with ; PAP - 11/7/23 - neg.; MGM - 7/10/23 - repeat 1 yr;  DEXA -3/8/23 - repeat 2 yrs; Prev PCP - Dr Benito at [facility] [place]; Endo - Dr Gonzales; Ophtho - Dr. Dawn; Optometry - Dr. Young; GI - Dr. Jarvis; Uro/GYN - ; OB/GYN - Dr. Frank; Podiatry - Dr. Young; Derm - Dr. Kuhn     Patient Care Team:  Farzana Dorman MD as PCP - General (Internal Medicine)  Ivy Wharton MD (Family Medicine)  Crystal Almanza, RD, CDE as Dietitian (Diabetes)  Heriberto Man MD as Consulting Physician (Gastroenterology)     Health Maintenance:  Immunization History   Administered Date(s) Administered    COVID-19 MRNA, LN-S PF (MODERNA HALF 0.25 ML DOSE) 10/27/2021    COVID-19, MRNA, LN-S, PF (MODERNA FULL 0.5 ML DOSE) 02/15/2021, 03/15/2021    COVID-19, mRNA, LNP-S, PF (Moderna 2023)Ages 12+ 10/24/2023    COVID-19, mRNA, LNP-S, bivalent booster, PF (Moderna Omicron)12 + YEARS 10/06/2022    Influenza (FLUAD) - Quadrivalent - Adjuvanted - PF *Preferred* (65+) 10/27/2021, 09/14/2022, 09/19/2023    Influenza - High Dose - PF (65  years and older) 10/24/2019    Influenza - Intradermal - Quadrivalent - PF 11/27/2013    Influenza - Quadrivalent - High Dose - PF (65 years and older) 09/08/2020    Influenza - Quadrivalent - PF *Preferred* (6 months and older) 10/17/2014, 10/25/2016, 10/23/2017, 10/15/2018    Influenza - Trivalent (ADULT) 10/24/2011, 10/17/2014, 10/23/2017    Influenza - Trivalent - PF (ADULT) 10/25/2016    Pneumococcal Conjugate - 13 Valent 10/24/2019    Pneumococcal Polysaccharide - 23 Valent 11/16/2015, 03/01/2021    RSVpreF (Arexvy) 12/14/2023    Zoster Recombinant 10/24/2019, 12/30/2019      Health Maintenance   Topic Date Due    TETANUS VACCINE  Never done    LDCT Lung Screen  06/18/2015    Foot Exam  10/04/2023    Hemoglobin A1c  02/02/2024    Mammogram  07/10/2024    Eye Exam  05/31/2024 (Originally 4/13/2024)    Colorectal Cancer Screening  06/03/2024 (Originally 1955)    Lipid Panel  06/01/2024    DEXA Scan  03/08/2026    Hepatitis C Screening  Completed    Shingles Vaccine  Completed        Past Medical History:  Past Medical History:   Diagnosis Date    Anxiety     Bilateral leg edema 6/8/2021    Depression     Diabetes mellitus, type 2     Diverticulitis     GERD (gastroesophageal reflux disease)     Glaucoma     Hyperlipidemia     Hypertension     Nicotine dependence in remission     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       Past Surgical History:   has a past surgical history that includes vaginal mesh; Bladder suspension (12/2011); Glaucoma Laser Sx ou; Tonsillectomy; Cholecystectomy; Cataract extraction, bilateral; Oophorectomy; Eye surgery; Hysteroscopy with dilation and curettage of uterus (N/A, 09/29/2021); Cold knife conization of cervix (N/A, 09/29/2021); and Tubal ligation.    Family History:  family history includes Cancer in her brother; Cancer (age of onset: 68) in her father; Cataracts in her father, mother, and sister; Dementia in her mother; Diabetes in her daughter, sister, and son; Hypertension  in her mother; No Known Problems in her son; Pancreatic cancer in her brother; Ulcers in her brother.     Social History:  Social History     Tobacco Use    Smoking status: Former     Current packs/day: 0.00     Average packs/day: 2.0 packs/day for 25.0 years (50.0 ttl pk-yrs)     Types: Cigarettes     Start date:      Quit date: 2015     Years since quittin.8    Smokeless tobacco: Never   Substance Use Topics    Alcohol use: Yes    Drug use: No       Review of Systems   Constitutional:  Positive for diaphoresis. Negative for chills and fever.        +Ns   HENT:  Negative for hearing loss.    Eyes:  Negative for visual disturbance.   Respiratory:  Negative for chest tightness and shortness of breath.    Cardiovascular:  Positive for palpitations. Negative for chest pain and leg swelling.        Only with anxiety   Gastrointestinal:  Positive for constipation. Negative for abdominal pain, diarrhea, nausea and vomiting.        Stool softeners help   Endocrine: Positive for polydipsia. Negative for cold intolerance and heat intolerance.   Genitourinary:  Negative for dysuria and frequency.   Musculoskeletal:  Positive for back pain and leg pain. Negative for arthralgias and myalgias.        Low back radiates down her rt leg.    Neurological:  Positive for headaches. Negative for dizziness.        She takes advil prn 1 tab every other day   Psychiatric/Behavioral:  Positive for dysphoric mood. Negative for suicidal ideas. The patient is nervous/anxious.         Medications:    Current Outpatient Medications:     blood sugar diagnostic Strp, To check BG 2 times daily, to use with insurance preferred meter, Disp: 200 strip, Rfl: 3    blood-glucose meter kit, To check BG 2 times daily, to use with insurance preferred meter, Disp: 1 each, Rfl: 0    blood-glucose sensor (DEXCOM G7 SENSOR) Louise, 1 each by Misc.(Non-Drug; Combo Route) route every 10 days., Disp: 3 each, Rfl: 11    CONTOUR TEST STRIPS Strp, USE TO  "TEST BLOOD SUGAR TWICE DAILY, Disp: 50 strip, Rfl: 3    dulaglutide (TRULICITY) 1.5 mg/0.5 mL pen injector, Inject 1.5 mg into the skin once a week., Disp: 4 Pen, Rfl: 11    furosemide (LASIX) 20 MG tablet, Take 1 tablet (20 mg total) by mouth once daily., Disp: 90 tablet, Rfl: 1    gabapentin (NEURONTIN) 100 MG capsule, Take 1 capsule (100 mg total) by mouth every evening., Disp: 90 capsule, Rfl: 1    insulin glargine-yfgn (SEMGLEE,INSULIN GLARG-YFGN,PEN) 100 unit/mL (3 mL) InPn, Inject 26 Units into the skin every evening. (Patient taking differently: Inject 36 Units into the skin every evening.), Disp: 15 mL, Rfl: 6    insulin needles, disposable, (RELION PEN NEEDLES) 32 x 5/32 " Ndle, Use as directed, Disp: 50 each, Rfl: 6    lancets 33 gauge Misc, by Misc.(Non-Drug; Combo Route) route. True plus, Disp: , Rfl:     LIDOCAINE 2 %, VALIUM 5 MG, BACLOFEN 4 % SUPPOSITORY, Place 1 suppository vaginally 2 (two) times daily as needed (pelvic pain)., Disp: 20 each, Rfl: 5    methocarbamoL (ROBAXIN) 500 MG Tab, Take 1 tablet (500 mg total) by mouth 3 (three) times daily., Disp: 60 tablet, Rfl: 1    pantoprazole (PROTONIX) 40 MG tablet, Take 1 tablet (40 mg total) by mouth once daily., Disp: 30 tablet, Rfl: 2    paroxetine (PAXIL) 20 MG tablet, Take 1 & 1/2 tablets (30 mg total) by mouth once daily., Disp: 135 tablet, Rfl: 2    phenazopyridine (PYRIDIUM) 200 MG tablet, Take 1 tablet (200 mg total) by mouth 3 (three) times daily as needed for Pain., Disp: 10 tablet, Rfl: 0    potassium chloride SA (K-DUR,KLOR-CON) 20 MEQ tablet, Take 1 tablet (20 mEq total) by mouth once daily., Disp: 90 tablet, Rfl: 3    rosuvastatin (CRESTOR) 40 MG Tab, Take 1 tablet (40 mg total) by mouth every evening., Disp: 90 tablet, Rfl: 1    travoprost (TRAVATAN Z) 0.004 % ophthalmic solution, Place 1 drop into both eyes every evening., Disp: 7.5 each, Rfl: 3    ergocalciferol (VITAMIN D2) 50,000 unit Cap, Take 1 capsule (50,000 Units total) by " "mouth every 30 days., Disp: 3 capsule, Rfl: 1    losartan (COZAAR) 100 MG tablet, Take 1 tablet (100 mg total) by mouth once daily., Disp: 90 tablet, Rfl: 1    RSVPreF3 antigen-AS01E, PF, (AREXVY) 120 mcg/0.5 mL SusR vaccine, Inject 0.5ML into the muscle. (Patient not taking: Reported on 4/15/2024), Disp: 0.5 mL, Rfl: 0     Allergies:  Review of patient's allergies indicates:  No Known Allergies    Physical Exam      Vital Signs  Temp: 99.3 °F (37.4 °C)  Temp Source: Oral  Pulse: 97  SpO2: 96 %  BP: 134/86  BP Location: Left arm  Patient Position: Sitting  Pain Score: 0-No pain  Height and Weight  Height: 5' 7" (170.2 cm)  Weight: 84.2 kg (185 lb 10 oz)  BSA (Calculated - sq m): 1.99 sq meters  BMI (Calculated): 29.1  Weight in (lb) to have BMI = 25: 159.3      Patient Position: Sitting      Physical Exam  Vitals reviewed.   Constitutional:       General: She is not in acute distress.     Appearance: Normal appearance. She is not ill-appearing, toxic-appearing or diaphoretic.   HENT:      Head: Normocephalic and atraumatic.      Right Ear: Tympanic membrane normal.      Left Ear: Tympanic membrane normal.      Ears:      Comments: Rt cerumen impaction removed manually - osei TM- wnl     Mouth/Throat:      Mouth: Mucous membranes are moist.      Pharynx: No posterior oropharyngeal erythema.   Eyes:      Extraocular Movements: Extraocular movements intact.      Conjunctiva/sclera: Conjunctivae normal.      Pupils: Pupils are equal, round, and reactive to light.      Comments: Osei arcus senilis   Neck:      Vascular: No carotid bruit.   Cardiovascular:      Pulses: Normal pulses.      Heart sounds: Normal heart sounds.   Pulmonary:      Effort: Pulmonary effort is normal. No respiratory distress.      Breath sounds: Normal breath sounds.   Abdominal:      General: Bowel sounds are normal. There is no distension.      Palpations: Abdomen is soft.      Tenderness: There is no abdominal tenderness. There is no guarding or " rebound.   Musculoskeletal:      Comments: +Rt straight leg raise;  Patellar DTR's 2+ rick;  5/5 strength BLE   Skin:     General: Skin is warm.   Neurological:      General: No focal deficit present.      Mental Status: She is alert and oriented to person, place, and time.   Psychiatric:         Mood and Affect: Mood normal.         Behavior: Behavior normal.          Laboratory:  CBC:  Recent Labs   Lab 02/24/23  1207 12/14/23  1200   WBC 6.76 7.04   RBC 5.08 4.94   Hemoglobin 14.0 13.4   Hematocrit 44.7 42.1   Platelets 346 338   MCV 88 85   MCH 27.6 27.1   MCHC 31.3 L 31.8 L       CMP:  Recent Labs   Lab 02/24/23  1207 04/18/23  1023 06/01/23  1006 09/19/23  1245 12/14/23  1200   Glucose 304 H  --  221 H   < > 368 H   Calcium 10.4  --  10.6 H   < > 10.3   Albumin 4.0  --  4.0  --  3.9   Total Protein 7.9  --  7.6  --  7.6   Sodium 138  --  140   < > 139   Potassium 3.3 L   < > 4.6   < > 3.9   CO2 26  --  24   < > 26   Chloride 98  --  105   < > 101   BUN 6 L  --  13   < > 14   Creatinine 0.9  --  0.7   < > 0.9   Alkaline Phosphatase 132  --  107  --  101   ALT 17  --  27  --  18   AST 22  --  34  --  23   Total Bilirubin 0.4  --  0.6  --  0.5    < > = values in this interval not displayed.           URINALYSIS:  Recent Labs   Lab 12/14/23  1221 02/28/24  1218   Color, UA Yellow Straw   Clarity, UA  --  Slightly Cloudy   Spec Grav UA  --  1.010   Specific Gravity, UA >1.030 A  --    pH, UA 6.0 6.5   Protein, UA Negative  --    Bacteria Rare  --    Nitrite, UA Negative neg   Leukocytes, UA Negative  --    Urobilinogen, UA  --  normal        LIPIDS:  Recent Labs   Lab 07/19/21  0832 06/22/22  1110 09/14/22  1240 02/24/23  1207 06/01/23  1006   TSH  --  0.871  --  0.966  --    HDL 32 L  --  32 L  --  40   Cholesterol 155  --  194  --  207 H   Triglycerides 151 H  --  272 H  --  160 H   LDL Cholesterol 92.8  --  107.6  --  135.0   HDL/Cholesterol Ratio 20.6  --  16.5 L  --  19.3 L   Non-HDL Cholesterol 123  --  162   --  167   Total Cholesterol/HDL Ratio 4.8  --  6.1 H  --  5.2 H       TSH:  Recent Labs   Lab 06/22/22  1110 02/24/23  1207   TSH 0.871 0.966       A1C:  Recent Labs   Lab 07/09/21  0839 10/08/21  0932 01/14/22  0929 06/22/22  1110 09/14/22  1240 02/24/23  1207 06/01/23  1006 09/19/23  1245 11/02/23  1015   Hemoglobin A1C 9.5 H 10.6 H 8.6 H 10.9 H 10.1 H 11.9 H 8.9 H 13.0 H 11.3 H       Urine Microalbumin/Cr:  Recent Labs   Lab 06/22/22  1228 02/24/23  1159   Microalb/Creat Ratio 56.5 H Unable to calculate         Other:   Recent Labs   Lab 06/28/21  1140 01/14/22  0929 09/14/22  1240 04/18/23  1023   Estradiol 13  --   --   --    Vit D, 25-Hydroxy  --  24 L 33  --    Vitamin B-12  --   --   --  605   Ferritin  --  34  --   --      Recent Labs   Lab 02/07/23  1119   Hepatitis C Ab Non-reactive       Assessment/Plan     Dee Hamilton is a 68 y.o.female with:    Type 2 diabetes mellitus with hyperglycemia, with long-term current use of insulin  -     Comprehensive Metabolic Panel; Future; Expected date: 04/15/2024  -     Hemoglobin A1C; Future; Expected date: 04/15/2024  -     Microalbumin/Creatinine Ratio, Urine  - Improved per pt.  Cont current regimen. Repeat labs.     Hypertension, unspecified type  -     losartan (COZAAR) 100 MG tablet; Take 1 tablet (100 mg total) by mouth once daily.  Dispense: 90 tablet; Refill: 1  -     Comprehensive Metabolic Panel; Future; Expected date: 04/15/2024  - Controlled.  Cont current.     Mixed hyperlipidemia  - Stable.  Cont current regimen.     Atherosclerosis of aorta  - Stable.  Cont current regimen.    Fatty liver  -     Comprehensive Metabolic Panel; Future; Expected date: 04/15/2024  - rec  limit tylenol and alcohol.  Rec diet and exercise for wt loss.       On potassium wasting diuretic therapy  -     Comprehensive Metabolic Panel; Future; Expected date: 04/15/2024  - Stable.  Cont current regimen. Repeat CMP.     Hyperparathyroidism  -     Calcium, Ionized; Future;  Expected date: 04/15/2024  -     PTH, Intact; Future; Expected date: 04/15/2024  -     Misc Sendout Test, Blood vitamin d; Future; Expected date: 04/15/2024  - Avoid Calcium suppl. Repeat labs.     Screening mammogram, encounter for  -     Mammo Digital Screening Bilat w/ Dionisio; Future; Expected date: 07/11/2024    Other orders  -     ergocalciferol (VITAMIN D2) 50,000 unit Cap; Take 1 capsule (50,000 Units total) by mouth every 30 days.  Dispense: 3 capsule; Refill: 1         Chronic conditions status updated as per HPI.  Other than changes above, cont current medications and maintain follow up with specialists.   Follow up in about 3 months (around 7/15/2024).      Farzana Dorman MD  [facility]

## 2024-04-15 ENCOUNTER — OFFICE VISIT (OUTPATIENT)
Dept: PRIMARY CARE CLINIC | Facility: CLINIC | Age: 69
End: 2024-04-15
Payer: MEDICARE

## 2024-04-15 ENCOUNTER — LAB VISIT (OUTPATIENT)
Dept: LAB | Facility: HOSPITAL | Age: 69
End: 2024-04-15
Attending: INTERNAL MEDICINE
Payer: MEDICARE

## 2024-04-15 VITALS
BODY MASS INDEX: 29.13 KG/M2 | HEART RATE: 97 BPM | OXYGEN SATURATION: 96 % | SYSTOLIC BLOOD PRESSURE: 134 MMHG | HEIGHT: 67 IN | DIASTOLIC BLOOD PRESSURE: 86 MMHG | WEIGHT: 185.63 LBS | TEMPERATURE: 99 F

## 2024-04-15 DIAGNOSIS — E11.65 TYPE 2 DIABETES MELLITUS WITH HYPERGLYCEMIA, WITH LONG-TERM CURRENT USE OF INSULIN: Primary | ICD-10-CM

## 2024-04-15 DIAGNOSIS — Z79.4 TYPE 2 DIABETES MELLITUS WITH HYPERGLYCEMIA, WITH LONG-TERM CURRENT USE OF INSULIN: Primary | ICD-10-CM

## 2024-04-15 DIAGNOSIS — K76.0 FATTY LIVER: ICD-10-CM

## 2024-04-15 DIAGNOSIS — Z12.31 SCREENING MAMMOGRAM, ENCOUNTER FOR: ICD-10-CM

## 2024-04-15 DIAGNOSIS — I70.0 ATHEROSCLEROSIS OF AORTA: ICD-10-CM

## 2024-04-15 DIAGNOSIS — Z79.4 TYPE 2 DIABETES MELLITUS WITH HYPERGLYCEMIA, WITH LONG-TERM CURRENT USE OF INSULIN: ICD-10-CM

## 2024-04-15 DIAGNOSIS — I10 HYPERTENSION, UNSPECIFIED TYPE: ICD-10-CM

## 2024-04-15 DIAGNOSIS — E21.3 HYPERPARATHYROIDISM: ICD-10-CM

## 2024-04-15 DIAGNOSIS — Z79.899 ON POTASSIUM WASTING DIURETIC THERAPY: ICD-10-CM

## 2024-04-15 DIAGNOSIS — E11.65 TYPE 2 DIABETES MELLITUS WITH HYPERGLYCEMIA, WITH LONG-TERM CURRENT USE OF INSULIN: ICD-10-CM

## 2024-04-15 DIAGNOSIS — E78.2 MIXED HYPERLIPIDEMIA: ICD-10-CM

## 2024-04-15 LAB
ALBUMIN SERPL BCP-MCNC: 4 G/DL (ref 3.5–5.2)
ALBUMIN/CREAT UR: 33.2 UG/MG (ref 0–30)
ALP SERPL-CCNC: 105 U/L (ref 55–135)
ALT SERPL W/O P-5'-P-CCNC: 25 U/L (ref 10–44)
ANION GAP SERPL CALC-SCNC: 10 MMOL/L (ref 8–16)
AST SERPL-CCNC: 38 U/L (ref 10–40)
BILIRUB SERPL-MCNC: 0.4 MG/DL (ref 0.1–1)
BUN SERPL-MCNC: 9 MG/DL (ref 8–23)
CA-I BLDV-SCNC: 1.27 MMOL/L (ref 1.06–1.42)
CALCIUM SERPL-MCNC: 10.5 MG/DL (ref 8.7–10.5)
CHLORIDE SERPL-SCNC: 105 MMOL/L (ref 95–110)
CO2 SERPL-SCNC: 27 MMOL/L (ref 23–29)
CREAT SERPL-MCNC: 0.7 MG/DL (ref 0.5–1.4)
CREAT UR-MCNC: 193 MG/DL (ref 15–325)
EST. GFR  (NO RACE VARIABLE): >60 ML/MIN/1.73 M^2
GLUCOSE SERPL-MCNC: 111 MG/DL (ref 70–110)
MICROALBUMIN UR DL<=1MG/L-MCNC: 64 UG/ML
POTASSIUM SERPL-SCNC: 3.8 MMOL/L (ref 3.5–5.1)
PROT SERPL-MCNC: 7.7 G/DL (ref 6–8.4)
SODIUM SERPL-SCNC: 142 MMOL/L (ref 136–145)

## 2024-04-15 PROCEDURE — 36415 COLL VENOUS BLD VENIPUNCTURE: CPT | Mod: PN | Performed by: INTERNAL MEDICINE

## 2024-04-15 PROCEDURE — 99215 OFFICE O/P EST HI 40 MIN: CPT | Mod: PBBFAC,PN | Performed by: INTERNAL MEDICINE

## 2024-04-15 PROCEDURE — 80053 COMPREHEN METABOLIC PANEL: CPT | Performed by: INTERNAL MEDICINE

## 2024-04-15 PROCEDURE — 99999 PR PBB SHADOW E&M-EST. PATIENT-LVL V: CPT | Mod: PBBFAC,,, | Performed by: INTERNAL MEDICINE

## 2024-04-15 PROCEDURE — 99214 OFFICE O/P EST MOD 30 MIN: CPT | Mod: S$GLB,,, | Performed by: INTERNAL MEDICINE

## 2024-04-15 PROCEDURE — 82330 ASSAY OF CALCIUM: CPT | Performed by: INTERNAL MEDICINE

## 2024-04-15 PROCEDURE — 82043 UR ALBUMIN QUANTITATIVE: CPT | Performed by: INTERNAL MEDICINE

## 2024-04-15 PROCEDURE — 83970 ASSAY OF PARATHORMONE: CPT | Performed by: INTERNAL MEDICINE

## 2024-04-15 PROCEDURE — 83036 HEMOGLOBIN GLYCOSYLATED A1C: CPT | Performed by: INTERNAL MEDICINE

## 2024-04-15 PROCEDURE — 82306 VITAMIN D 25 HYDROXY: CPT | Performed by: INTERNAL MEDICINE

## 2024-04-15 RX ORDER — LOSARTAN POTASSIUM 100 MG/1
100 TABLET ORAL DAILY
Qty: 90 TABLET | Refills: 1 | Status: SHIPPED | OUTPATIENT
Start: 2024-04-15

## 2024-04-15 RX ORDER — ERGOCALCIFEROL 1.25 MG/1
50000 CAPSULE ORAL
Qty: 3 CAPSULE | Refills: 1 | Status: SHIPPED | OUTPATIENT
Start: 2024-04-15

## 2024-04-16 LAB
25(OH)D3+25(OH)D2 SERPL-MCNC: 22 NG/ML (ref 30–96)
ESTIMATED AVG GLUCOSE: 252 MG/DL (ref 68–131)
HBA1C MFR BLD: 10.4 % (ref 4–5.6)
PTH-INTACT SERPL-MCNC: 109.6 PG/ML (ref 9–77)

## 2024-04-17 DIAGNOSIS — R39.9 UTI SYMPTOMS: ICD-10-CM

## 2024-04-17 NOTE — PROGRESS NOTES
I sent pt a my chart message -  I reviewed your labs and you had  evidence of slight microalbuminuria (small protein in your urine from your Diabetes).   Continue your current dose of Losartan.  Dr. BRUSH

## 2024-04-19 RX ORDER — PHENAZOPYRIDINE HYDROCHLORIDE 200 MG/1
200 TABLET, FILM COATED ORAL 3 TIMES DAILY PRN
Qty: 10 TABLET | Refills: 0 | OUTPATIENT
Start: 2024-04-19

## 2024-04-22 ENCOUNTER — TELEPHONE (OUTPATIENT)
Dept: ENDOSCOPY | Facility: HOSPITAL | Age: 69
End: 2024-04-22
Payer: MEDICARE

## 2024-04-22 ENCOUNTER — ANESTHESIA EVENT (OUTPATIENT)
Dept: ENDOSCOPY | Facility: HOSPITAL | Age: 69
End: 2024-04-22
Payer: MEDICARE

## 2024-04-22 NOTE — ANESTHESIA PREPROCEDURE EVALUATION
04/22/2024  Dee Hamilton is a 68 y.o., female.  [facility]  Anesthesia Pre-Operative Evaluation       Patient Name: Dee Hamilton  YOB: 1955  MRN: [id number]  [facility]: [id number]      Code Status: Prior   Date of Procedure: 4/30/2024  Anesthesia: Choice Procedure: Procedure(s) (LRB):  COLONOSCOPY (N/A)  EGD (ESOPHAGOGASTRODUODENOSCOPY) (N/A)  Pre-Operative Diagnosis: Screening for colorectal cancer [Z12.11, Z12.12]  Proceduralist: Surgeons and Role:     * Elisa Horn MD - Primary Nurse: (Unknown)      SUBJECTIVE:   Dee Hamilton is a 68 y.o. female who  has a past medical history of Anxiety, Bilateral leg edema (6/8/2021), Depression, Diabetes mellitus, type 2, Diverticulitis, GERD (gastroesophageal reflux disease), Glaucoma, Hyperlipidemia, Hypertension, Nicotine dependence in remission, and Senile nuclear sclerosis (10/19/2012)..     she has a current medication list which includes the following long-term medication(s): blood-glucose meter, dexcom g7 sensor, furosemide, gabapentin, pen needle, diabetic, lancets, losartan, pantoprazole, paroxetine, rosuvastatin, travoprost, and [DISCONTINUED] insulin.     ALLERGIES:   Review of patient's allergies indicates:  No Known Allergies  LDA:          Lines/Drains/Airways       None                  Anesthesia Evaluation      Airway   Dental      Pulmonary    Cardiovascular   (+) hypertension  Valvular problems/murmurs: Atherosclerosis of the aorta.    Neuro/Psych      GI/Hepatic/Renal    (+) GERDLiver disease: Fatty liver.    Endo/Other    (+) diabetes mellitus  Abdominal                   MEDICATIONS:     Antibiotics (From admission, onward)      None          VTE Risk Mitigation (From admission, onward)      None              No current facility-administered medications for this encounter.     Current  "Outpatient Medications   Medication Sig Dispense Refill    blood sugar diagnostic Strp To check BG 2 times daily, to use with insurance preferred meter 200 strip 3    blood-glucose meter kit To check BG 2 times daily, to use with insurance preferred meter 1 each 0    blood-glucose sensor (DEXCOM G7 SENSOR) Louise 1 each by Misc.(Non-Drug; Combo Route) route every 10 days. 3 each 11    CONTOUR TEST STRIPS Strp USE TO TEST BLOOD SUGAR TWICE DAILY 50 strip 3    dulaglutide (TRULICITY) 1.5 mg/0.5 mL pen injector Inject 1.5 mg into the skin once a week. 4 Pen 11    ergocalciferol (VITAMIN D2) 50,000 unit Cap Take 1 capsule (50,000 Units total) by mouth every 30 days. 3 capsule 1    furosemide (LASIX) 20 MG tablet Take 1 tablet (20 mg total) by mouth once daily. 90 tablet 1    gabapentin (NEURONTIN) 100 MG capsule Take 1 capsule (100 mg total) by mouth every evening. 90 capsule 1    insulin glargine-yfgn (SEMGLEE,INSULIN GLARG-YFGN,PEN) 100 unit/mL (3 mL) InPn Inject 26 Units into the skin every evening. (Patient taking differently: Inject 36 Units into the skin every evening.) 15 mL 6    insulin needles, disposable, (RELION PEN NEEDLES) 32 x 5/32 " Ndle Use as directed 50 each 6    lancets 33 gauge Misc by Misc.(Non-Drug; Combo Route) route. True plus      LIDOCAINE 2 %, VALIUM 5 MG, BACLOFEN 4 % SUPPOSITORY Place 1 suppository vaginally 2 (two) times daily as needed (pelvic pain). 20 each 5    losartan (COZAAR) 100 MG tablet Take 1 tablet (100 mg total) by mouth once daily. 90 tablet 1    methocarbamoL (ROBAXIN) 500 MG Tab Take 1 tablet (500 mg total) by mouth 3 (three) times daily. 60 tablet 1    pantoprazole (PROTONIX) 40 MG tablet Take 1 tablet (40 mg total) by mouth once daily. 30 tablet 2    paroxetine (PAXIL) 20 MG tablet Take 1 & 1/2 tablets (30 mg total) by mouth once daily. 135 tablet 2    phenazopyridine (PYRIDIUM) 200 MG tablet Take 1 tablet (200 mg total) by mouth 3 (three) times daily as " needed for Pain. 10 tablet 0    potassium chloride SA (K-DUR,KLOR-CON) 20 MEQ tablet Take 1 tablet (20 mEq total) by mouth once daily. 90 tablet 3    rosuvastatin (CRESTOR) 40 MG Tab Take 1 tablet (40 mg total) by mouth every evening. 90 tablet 1    RSVPreF3 antigen-AS01E, PF, (AREXVY) 120 mcg/0.5 mL SusR vaccine Inject 0.5ML into the muscle. (Patient not taking: Reported on 4/15/2024) 0.5 mL 0    travoprost (TRAVATAN Z) 0.004 % ophthalmic solution Place 1 drop into both eyes every evening. 7.5 each 3          History:   There are no hospital problems to display for this patient.    Surgical History:    has a past surgical history that includes vaginal mesh; Bladder suspension (12/2011); Glaucoma Laser Sx ou; Tonsillectomy; Cholecystectomy; Cataract extraction, bilateral; Oophorectomy; Eye surgery; Hysteroscopy with dilation and curettage of uterus (N/A, 09/29/2021); Cold knife conization of cervix (N/A, 09/29/2021); and Tubal ligation.   Social History:    reports being sexually active and has had partner(s) who are male. She reports using the following method of birth control/protection: None.  reports that she quit smoking about 8 years ago. Her smoking use included cigarettes. She started smoking about 44 years ago. She has a 50 pack-year smoking history. She has never used smokeless tobacco. She reports current alcohol use. She reports that she does not use drugs.     OBJECTIVE:     Vital Signs (Most Recent):    Vital Signs Range (Last 24H):          There is no height or weight on file to calculate BMI.   Wt Readings from Last 4 Encounters:   04/15/24 84.2 kg (185 lb 10 oz)   03/14/24 83.6 kg (184 lb 4.9 oz)   02/28/24 83.1 kg (183 lb 3.2 oz)   12/14/23 82.8 kg (182 lb 8.7 oz)       Significant Labs:  Lab Results   Component Value Date    WBC 7.04 12/14/2023    HGB 13.4 12/14/2023    HCT 42.1 12/14/2023     12/14/2023     04/15/2024    K 3.8 04/15/2024     04/15/2024    CREATININE 0.7  "04/15/2024    BUN 9 04/15/2024    CO2 27 04/15/2024     (H) 04/15/2024    CALCIUM 10.5 04/15/2024    MG 1.9 06/22/2022    PHOS 3.5 08/14/2019    ALKPHOS 105 04/15/2024    ALT 25 04/15/2024    AST 38 04/15/2024    ALBUMIN 4.0 04/15/2024    INR 1.0 05/02/2014    APTT 29.0 05/02/2014    HGBA1C 10.4 (H) 04/15/2024     (H) 09/19/2013    CPKMB 2.3 09/19/2013    TROPONINI <0.006 05/02/2014    MB 1.1 09/19/2013     No LMP recorded. Patient is postmenopausal.  No results found for this or any previous visit (from the past 72 hour(s)).    EKG:       TTE:  No results found for this or any previous visit.  No results found for: "EF"   No results found for this or any previous visit.  SERGEI:  No results found for this or any previous visit.  Stress Test:  No results found for this or any previous visit.     LHC:  No results found for this or any previous visit.     PFT:  No results found for: "FEV1", "FVC", "VCV3BCM", "TLC", "DLCO"     ASSESSMENT/PLAN:        Pre-op Assessment    I have reviewed the Patient Summary Reports.     I have reviewed the Nursing Notes. I have reviewed the NPO Status.   I have reviewed the Medications.     Review of Systems  Anesthesia Hx:  No problems with previous Anesthesia             Denies Family Hx of Anesthesia complications.    Denies Personal Hx of Anesthesia complications.                    Hematology/Oncology:  Hematology Normal   Oncology Normal                                   EENT/Dental:  EENT/Dental Normal           Cardiovascular:     Hypertension Valvular problems/murmurs: Atherosclerosis of the aorta.          hyperlipidemia                             Pulmonary:  Pulmonary Normal                       Renal/:  Renal/ Normal    Dysuria  cystitis             Hepatic/GI:     GERD Liver disease: Fatty liver.            Musculoskeletal:  Musculoskeletal Normal    Imbalance            OB/GYN/PEDS:  Cystocele           Neurological:  Neurology Normal          Memory " impairement                            Endocrine:  Diabetes   Hyperparathyroidism  Vitamin D deficiency      Obesity / BMI > 30  Dermatological:  Skin Normal    Psych:   anxiety depression                 Anesthesia Plan  Type of Anesthesia, risks & benefits discussed:    Anesthesia Type: Gen Natural Airway  Intra-op Monitoring Plan: Standard ASA Monitors  Post Op Pain Control Plan: multimodal analgesia  Induction:  IV  Informed Consent: Informed consent signed with the Patient and all parties understand the risks and agree with anesthesia plan.  All questions answered. Patient consented to blood products? No  ASA Score: 3  Day of Surgery Review of History & Physical: H&P Update referred to the surgeon/provider.I have interviewed and examined the patient. I have reviewed the patient's H&P dated: There are no significant changes.     Ready For Surgery From Anesthesia Perspective.     .

## 2024-04-25 ENCOUNTER — TELEPHONE (OUTPATIENT)
Dept: ENDOSCOPY | Facility: HOSPITAL | Age: 69
End: 2024-04-25
Payer: MEDICARE

## 2024-04-25 DIAGNOSIS — Z12.11 SPECIAL SCREENING FOR MALIGNANT NEOPLASMS, COLON: Primary | ICD-10-CM

## 2024-04-25 RX ORDER — SODIUM, POTASSIUM,MAG SULFATES 17.5-3.13G
1 SOLUTION, RECONSTITUTED, ORAL ORAL DAILY
Qty: 1 KIT | Refills: 0 | Status: SHIPPED | OUTPATIENT
Start: 2024-04-25 | End: 2024-04-28

## 2024-04-25 NOTE — TELEPHONE ENCOUNTER
----- Message from Jasmin Dias RN sent at 4/25/2024 10:51 AM CDT -----  Regarding: FW: Procedure Prep  Contact: [phone]    ----- Message -----  From: Janice Oakley  Sent: 4/25/2024  10:34 AM CDT  To: [facility] Endo Schedulers  Subject: Procedure Prep                                   Dee Alfonso calling regarding Patient Advice (message) for # pt is calling she needs another prep kit for her upcoming procedure      [facility] [address] [place] [address]  Phone: [phone] Fax: [phone]

## 2024-04-29 NOTE — H&P
Short Stay Endoscopy History and Physical    PCP - Farzana Dorman MD  Referring Physician - PRE-ADMIT NURSE 3, ENDO -[facility]  No address on file    Procedure - EGD and Colonoscopy  ASA - per anesthesia  Mallampati - per anesthesia  History of Anesthesia problems - no  Family history Anesthesia problems -  no   Plan of anesthesia - General    HPI  68 y.o. female    Reason for procedure:   Screening for colorectal cancer [Z12.11, Z12.12]      ROS:  Constitutional: No fevers, chills, No weight loss  CV: No chest pain  Pulm: No cough, No shortness of breath  GI: see HPI    Medical History:  has a past medical history of Anxiety, Bilateral leg edema (6/8/2021), Depression, Diabetes mellitus, type 2, Diverticulitis, GERD (gastroesophageal reflux disease), Glaucoma, Hyperlipidemia, Hypertension, Nicotine dependence in remission, and Senile nuclear sclerosis (10/19/2012).    Surgical History:  has a past surgical history that includes vaginal mesh; Bladder suspension (12/2011); Glaucoma Laser Sx ou; Tonsillectomy; Cholecystectomy; Cataract extraction, bilateral; Oophorectomy; Eye surgery; Hysteroscopy with dilation and curettage of uterus (N/A, 09/29/2021); Cold knife conization of cervix (N/A, 09/29/2021); and Tubal ligation.    Family History: family history includes Cancer in her brother; Cancer (age of onset: 68) in her father; Cataracts in her father, mother, and sister; Dementia in her mother; Diabetes in her daughter, sister, and son; Hypertension in her mother; No Known Problems in her son; Pancreatic cancer in her brother; Ulcers in her brother.    Social History:  reports that she quit smoking about 8 years ago. Her smoking use included cigarettes. She started smoking about 44 years ago. She has a 50 pack-year smoking history. She has never used smokeless tobacco. She reports current alcohol use. She reports that she does not use drugs.    Review of patient's allergies indicates:  No Known  "Allergies    Medications:   Medications Prior to Admission   Medication Sig Dispense Refill Last Dose    dulaglutide (TRULICITY) 1.5 mg/0.5 mL pen injector Inject 1.5 mg into the skin once a week. 4 Pen 11 4/17/2024    ergocalciferol (VITAMIN D2) 50,000 unit Cap Take 1 capsule (50,000 Units total) by mouth every 30 days. 3 capsule 1 Past Week    furosemide (LASIX) 20 MG tablet Take 1 tablet (20 mg total) by mouth once daily. 90 tablet 1 Past Week    gabapentin (NEURONTIN) 100 MG capsule Take 1 capsule (100 mg total) by mouth every evening. 90 capsule 1 Past Week    insulin glargine-yfgn (SEMGLEE,INSULIN GLARG-YFGN,PEN) 100 unit/mL (3 mL) InPn Inject 26 Units into the skin every evening. 15 mL 6 4/24/2024    losartan (COZAAR) 100 MG tablet Take 1 tablet (100 mg total) by mouth once daily. 90 tablet 1 4/24/2024    blood sugar diagnostic Strp To check BG 2 times daily, to use with insurance preferred meter 200 strip 3     blood-glucose meter kit To check BG 2 times daily, to use with insurance preferred meter 1 each 0     blood-glucose sensor (DEXCOM G7 SENSOR) Louise 1 each by Misc.(Non-Drug; Combo Route) route every 10 days. 3 each 11     CONTOUR TEST STRIPS Strp USE TO TEST BLOOD SUGAR TWICE DAILY 50 strip 3     insulin needles, disposable, (RELION PEN NEEDLES) 32 x 5/32 " Ndle Use as directed 50 each 6     lancets 33 gauge Misc by Misc.(Non-Drug; Combo Route) route. True plus       LIDOCAINE 2 %, VALIUM 5 MG, BACLOFEN 4 % SUPPOSITORY Place 1 suppository vaginally 2 (two) times daily as needed (pelvic pain). 20 each 5     methocarbamoL (ROBAXIN) 500 MG Tab Take 1 tablet (500 mg total) by mouth 3 (three) times daily. 60 tablet 1 4/24/2024    pantoprazole (PROTONIX) 40 MG tablet Take 1 tablet (40 mg total) by mouth once daily. 30 tablet 2 4/24/2024    paroxetine (PAXIL) 20 MG tablet Take 1 & 1/2 tablets (30 mg total) by mouth once daily. 135 tablet 2 4/24/2024    phenazopyridine (PYRIDIUM) 200 MG tablet Take 1 tablet " (200 mg total) by mouth 3 (three) times daily as needed for Pain. 10 tablet 0 4/24/2024    potassium chloride SA (K-DUR,KLOR-CON) 20 MEQ tablet Take 1 tablet (20 mEq total) by mouth once daily. 90 tablet 3 4/24/2024    rosuvastatin (CRESTOR) 40 MG Tab Take 1 tablet (40 mg total) by mouth every evening. 90 tablet 1 4/24/2024    RSVPreF3 antigen-AS01E, PF, (AREXVY) 120 mcg/0.5 mL SusR vaccine Inject 0.5ML into the muscle. (Patient not taking: Reported on 4/15/2024) 0.5 mL 0     travoprost (TRAVATAN Z) 0.004 % ophthalmic solution Place 1 drop into both eyes every evening. 7.5 each 3 4/24/2024       Physical Exam:    Vital Signs:   Vitals:    04/30/24 0744   BP: (!) 153/73   Pulse: 91   Resp: 16   Temp: 97.5 °F (36.4 °C)       General Appearance: Well appearing in no acute distress  Abdomen: Soft, non tender, non distended with normal bowel sounds, no masses    Labs:  Lab Results   Component Value Date    WBC 7.04 12/14/2023    HGB 13.4 12/14/2023    HCT 42.1 12/14/2023     12/14/2023    CHOL 207 (H) 06/01/2023    TRIG 160 (H) 06/01/2023    HDL 40 06/01/2023    ALT 25 04/15/2024    AST 38 04/15/2024     04/15/2024    K 3.8 04/15/2024     04/15/2024    CREATININE 0.7 04/15/2024    BUN 9 04/15/2024    CO2 27 04/15/2024    TSH 0.966 02/24/2023    INR 1.0 05/02/2014    HGBA1C 10.4 (H) 04/15/2024       I have explained the risks and benefits of this endoscopic procedure to the patient including but not limited to bleeding, inflammation, infection, perforation, and death.    Assessment/Plan:     GERD   CRC Screening     - Proceed with EGD and colonoscopy     Elisa Horn MD  Gastroenterology   [facility]

## 2024-04-30 ENCOUNTER — HOSPITAL ENCOUNTER (OUTPATIENT)
Facility: HOSPITAL | Age: 69
Discharge: HOME | End: 2024-04-30
Attending: STUDENT IN AN ORGANIZED HEALTH CARE EDUCATION/TRAINING PROGRAM | Admitting: STUDENT IN AN ORGANIZED HEALTH CARE EDUCATION/TRAINING PROGRAM
Payer: MEDICARE

## 2024-04-30 ENCOUNTER — PATIENT OUTREACH (OUTPATIENT)
Dept: ADMINISTRATIVE | Facility: HOSPITAL | Age: 69
End: 2024-04-30
Payer: MEDICARE

## 2024-04-30 ENCOUNTER — ANESTHESIA (OUTPATIENT)
Dept: ENDOSCOPY | Facility: HOSPITAL | Age: 69
End: 2024-04-30
Payer: MEDICARE

## 2024-04-30 VITALS
HEART RATE: 92 BPM | HEIGHT: 67 IN | DIASTOLIC BLOOD PRESSURE: 71 MMHG | OXYGEN SATURATION: 98 % | WEIGHT: 180 LBS | SYSTOLIC BLOOD PRESSURE: 152 MMHG | RESPIRATION RATE: 18 BRPM | BODY MASS INDEX: 28.25 KG/M2 | TEMPERATURE: 98 F

## 2024-04-30 DIAGNOSIS — Z12.11 COLON CANCER SCREENING: Primary | ICD-10-CM

## 2024-04-30 LAB — POCT GLUCOSE: 287 MG/DL (ref 70–110)

## 2024-04-30 PROCEDURE — 45380 COLONOSCOPY AND BIOPSY: CPT | Mod: 59,,, | Performed by: STUDENT IN AN ORGANIZED HEALTH CARE EDUCATION/TRAINING PROGRAM

## 2024-04-30 PROCEDURE — 88305 TISSUE EXAM BY PATHOLOGIST: CPT | Mod: 26,,, | Performed by: PATHOLOGY

## 2024-04-30 PROCEDURE — 63600175 PHARM REV CODE 636 W HCPCS: Performed by: STUDENT IN AN ORGANIZED HEALTH CARE EDUCATION/TRAINING PROGRAM

## 2024-04-30 PROCEDURE — 27201012 HC FORCEPS, HOT/COLD, DISP: Performed by: STUDENT IN AN ORGANIZED HEALTH CARE EDUCATION/TRAINING PROGRAM

## 2024-04-30 PROCEDURE — 99900035 HC TECH TIME PER 15 MIN (STAT)

## 2024-04-30 PROCEDURE — 88342 IMHCHEM/IMCYTCHM 1ST ANTB: CPT | Mod: 26,,, | Performed by: PATHOLOGY

## 2024-04-30 PROCEDURE — 27201089 HC SNARE, DISP (ANY): Performed by: STUDENT IN AN ORGANIZED HEALTH CARE EDUCATION/TRAINING PROGRAM

## 2024-04-30 PROCEDURE — 37000009 HC ANESTHESIA EA ADD 15 MINS: Performed by: STUDENT IN AN ORGANIZED HEALTH CARE EDUCATION/TRAINING PROGRAM

## 2024-04-30 PROCEDURE — 45385 COLONOSCOPY W/LESION REMOVAL: CPT | Mod: ,,, | Performed by: STUDENT IN AN ORGANIZED HEALTH CARE EDUCATION/TRAINING PROGRAM

## 2024-04-30 PROCEDURE — 88342 IMHCHEM/IMCYTCHM 1ST ANTB: CPT | Performed by: PATHOLOGY

## 2024-04-30 PROCEDURE — 25000003 PHARM REV CODE 250: Performed by: STUDENT IN AN ORGANIZED HEALTH CARE EDUCATION/TRAINING PROGRAM

## 2024-04-30 PROCEDURE — 88305 TISSUE EXAM BY PATHOLOGIST: CPT | Performed by: PATHOLOGY

## 2024-04-30 PROCEDURE — 94761 N-INVAS EAR/PLS OXIMETRY MLT: CPT

## 2024-04-30 PROCEDURE — 37000008 HC ANESTHESIA 1ST 15 MINUTES: Performed by: STUDENT IN AN ORGANIZED HEALTH CARE EDUCATION/TRAINING PROGRAM

## 2024-04-30 PROCEDURE — 25000003 PHARM REV CODE 250: Performed by: NURSE ANESTHETIST, CERTIFIED REGISTERED

## 2024-04-30 PROCEDURE — 45385 COLONOSCOPY W/LESION REMOVAL: CPT | Performed by: STUDENT IN AN ORGANIZED HEALTH CARE EDUCATION/TRAINING PROGRAM

## 2024-04-30 PROCEDURE — D9220A PRA ANESTHESIA: Mod: ,,, | Performed by: NURSE ANESTHETIST, CERTIFIED REGISTERED

## 2024-04-30 PROCEDURE — 43239 EGD BIOPSY SINGLE/MULTIPLE: CPT | Mod: 51,,, | Performed by: STUDENT IN AN ORGANIZED HEALTH CARE EDUCATION/TRAINING PROGRAM

## 2024-04-30 PROCEDURE — 45380 COLONOSCOPY AND BIOPSY: CPT | Mod: 59 | Performed by: STUDENT IN AN ORGANIZED HEALTH CARE EDUCATION/TRAINING PROGRAM

## 2024-04-30 PROCEDURE — 82962 GLUCOSE BLOOD TEST: CPT | Performed by: STUDENT IN AN ORGANIZED HEALTH CARE EDUCATION/TRAINING PROGRAM

## 2024-04-30 PROCEDURE — 43239 EGD BIOPSY SINGLE/MULTIPLE: CPT | Performed by: STUDENT IN AN ORGANIZED HEALTH CARE EDUCATION/TRAINING PROGRAM

## 2024-04-30 RX ORDER — PROPOFOL 10 MG/ML
VIAL (ML) INTRAVENOUS CONTINUOUS PRN
Status: DISCONTINUED | OUTPATIENT
Start: 2024-04-30 | End: 2024-04-30

## 2024-04-30 RX ORDER — LIDOCAINE HYDROCHLORIDE 20 MG/ML
INJECTION INTRAVENOUS
Status: DISCONTINUED | OUTPATIENT
Start: 2024-04-30 | End: 2024-04-30

## 2024-04-30 RX ORDER — SODIUM CHLORIDE 9 MG/ML
INJECTION, SOLUTION INTRAVENOUS CONTINUOUS
Status: DISCONTINUED | OUTPATIENT
Start: 2024-04-30 | End: 2024-04-30 | Stop reason: HOSPADM

## 2024-04-30 RX ORDER — PROPOFOL 10 MG/ML
VIAL (ML) INTRAVENOUS
Status: DISCONTINUED | OUTPATIENT
Start: 2024-04-30 | End: 2024-04-30

## 2024-04-30 RX ADMIN — PROPOFOL 50 MG: 10 INJECTION, EMULSION INTRAVENOUS at 08:04

## 2024-04-30 RX ADMIN — PROPOFOL 150 MCG/KG/MIN: 10 INJECTION, EMULSION INTRAVENOUS at 08:04

## 2024-04-30 RX ADMIN — LIDOCAINE HYDROCHLORIDE 100 MG: 20 INJECTION INTRAVENOUS at 08:04

## 2024-04-30 RX ADMIN — SODIUM CHLORIDE: 0.9 INJECTION, SOLUTION INTRAVENOUS at 08:04

## 2024-04-30 RX ADMIN — PROPOFOL 30 MG: 10 INJECTION, EMULSION INTRAVENOUS at 08:04

## 2024-04-30 NOTE — ANESTHESIA POSTPROCEDURE EVALUATION
Anesthesia Post Evaluation    Patient: Dee Hamilton    Procedure(s) Performed: Procedure(s) (LRB):  COLONOSCOPY (N/A)  EGD (ESOPHAGOGASTRODUODENOSCOPY) (N/A)    Final Anesthesia Type: general      Patient location during evaluation: PACU  Patient participation: Yes- Able to Participate  Level of consciousness: awake and alert  Post-procedure vital signs: reviewed and stable  Pain management: adequate  Airway patency: patent  EL mitigation strategies: Multimodal analgesia  PONV status at discharge: No PONV  Anesthetic complications: no      Cardiovascular status: hemodynamically stable  Respiratory status: spontaneous ventilation and room air  Hydration status: euvolemic  Follow-up not needed.              Vitals Value Taken Time   /71 04/30/24 0924   Temp 36.6 °C (97.9 °F) 04/30/24 0906   Pulse 85 04/30/24 0925   Resp 21 04/30/24 0925   SpO2 94 % 04/30/24 0925   Vitals shown include unfiled device data.      Event Time   Out of Recovery 09:22:00         Pain/Maureen Score: Maureen Score: 10 (4/30/2024  9:22 AM)

## 2024-04-30 NOTE — PROVATION PATIENT INSTRUCTIONS
Discharge Summary/Instructions after an Endoscopic Procedure  Patient Name: Dee Hamilton  Patient MRN: [id number]  Patient YOB: 1955 Tuesday, April 30, 2024  Elisa Horn MD  Dear patient,  As a result of recent federal legislation (The Federal Cures Act), you may   receive lab or pathology results from your procedure in your [facility]   account before your physician is able to contact you. Your physician or   their representative will relay the results to you with their   recommendations at their soonest availability.  Thank you,  RESTRICTIONS:  During your procedure today, you received medications for sedation.  These   medications may affect your judgment, balance and coordination.  Therefore,   for 24 hours, you have the following restrictions:   - DO NOT drive a car, operate machinery, make legal/financial decisions,   sign important papers or drink alcohol.    ACTIVITY:  Today: no heavy lifting, straining or running due to procedural   sedation/anesthesia.  The following day: return to full activity including work.  DIET:  Eat and drink normally unless instructed otherwise.     TREATMENT FOR COMMON SIDE EFFECTS:  - Mild abdominal pain, nausea, belching, bloating or excessive gas:  rest,   eat lightly and use a heating pad.  - Sore Throat: treat with throat lozenges and/or gargle with warm salt   water.  - Because air was used during the procedure, expelling large amounts of air   from your rectum or belching is normal.  - If a bowel prep was taken, you may not have a bowel movement for 1-3 days.    This is normal.  SYMPTOMS TO WATCH FOR AND REPORT TO YOUR PHYSICIAN:  1. Abdominal pain or bloating, other than gas cramps.  2. Chest pain.  3. Back pain.  4. Signs of infection such as: chills or fever occurring within 24 hours   after the procedure.  5. Rectal bleeding, which would show as bright red, maroon, or black stools.   (A tablespoon of blood from the rectum is not serious, especially  if   hemorrhoids are present.)  6. Vomiting.  7. Weakness or dizziness.  GO DIRECTLY TO THE NEAREST EMERGENCY ROOM IF YOU HAVE ANY OF THE FOLLOWING:      Difficulty breathing              Chills and/or fever over 101 F   Persistent vomiting and/or vomiting blood   Severe abdominal pain   Severe chest pain   Black, tarry stools   Bleeding- more than one tablespoon   Any other symptom or condition that you feel may need urgent attention  Your doctor recommends these additional instructions:  If any biopsies were taken, your doctors clinic will contact you in 1 to 2   weeks with any results.  - Discharge patient to home (ambulatory).   - Resume regular diet.   - Continue present medications.   - Await pathology results.  For questions, problems or results please call your physician - Elisa Horn MD at Work:  [phone].  [facility] [place], EMERGENCY ROOM PHONE NUMBER: [phone]  IF A COMPLICATION OR EMERGENCY SITUATION ARISES AND YOU ARE UNABLE TO REACH   YOUR PHYSICIAN - GO DIRECTLY TO THE EMERGENCY ROOM.  Elisa Horn MD  4/30/2024 9:01:56 AM  This report has been verified and signed electronically.  Dear patient,  As a result of recent federal legislation (The Federal Cures Act), you may   receive lab or pathology results from your procedure in your [facility]   account before your physician is able to contact you. Your physician or   their representative will relay the results to you with their   recommendations at their soonest availability.  Thank you,  PROVATION

## 2024-04-30 NOTE — PROGRESS NOTES
Patient due for the following    TETANUS VACCINE     LDCT Lung Screen     Foot Exam     Mammogram       Mammogram scheduled  E-faxed Dr. Pardo for eye exam records    Immunizations: reviewed and updated  Care Everywhere: triggered  Care Teams: up to date  Outreach: completed

## 2024-04-30 NOTE — PLAN OF CARE
Discharge instructions given to patient and they verbalized understanding of all.  Written report of procedure provided by MD and given to patient. MD came to bedside and gave report of findings. VSS, denies n/v and tolerating PO, rates pain level tolerable. IV removed, and family notified for patient discharge home.

## 2024-04-30 NOTE — OR NURSING
JACKI Rodriguez CRNA, aware of glucose result of 287 this morning. States stopped Trulicity this past Wednesday, 4/24. Vita Lemus RN, also made aware.

## 2024-04-30 NOTE — PLAN OF CARE
Pt arrived to recovery dosc via stretcher per ENDO team. Bedside report received. Pt attached to bedside monitor. VSS. Pt _awake__ post procedure. Pt on room air; oxygen sats __95__%. Pt IV access CDI and infusing. Warm blanket applied.Will continue to monitor.

## 2024-04-30 NOTE — PROVATION PATIENT INSTRUCTIONS
Called mom and notified her that referral was placed.    Discharge Summary/Instructions after an Endoscopic Procedure  Patient Name: Dee Hamitlon  Patient MRN: [id number]  Patient YOB: 1955 Tuesday, April 30, 2024  Elisa Horn MD  Dear patient,  As a result of recent federal legislation (The Federal Cures Act), you may   receive lab or pathology results from your procedure in your [facility]   account before your physician is able to contact you. Your physician or   their representative will relay the results to you with their   recommendations at their soonest availability.  Thank you,  RESTRICTIONS:  During your procedure today, you received medications for sedation.  These   medications may affect your judgment, balance and coordination.  Therefore,   for 24 hours, you have the following restrictions:   - DO NOT drive a car, operate machinery, make legal/financial decisions,   sign important papers or drink alcohol.    ACTIVITY:  Today: no heavy lifting, straining or running due to procedural   sedation/anesthesia.  The following day: return to full activity including work.  DIET:  Eat and drink normally unless instructed otherwise.     TREATMENT FOR COMMON SIDE EFFECTS:  - Mild abdominal pain, nausea, belching, bloating or excessive gas:  rest,   eat lightly and use a heating pad.  - Sore Throat: treat with throat lozenges and/or gargle with warm salt   water.  - Because air was used during the procedure, expelling large amounts of air   from your rectum or belching is normal.  - If a bowel prep was taken, you may not have a bowel movement for 1-3 days.    This is normal.  SYMPTOMS TO WATCH FOR AND REPORT TO YOUR PHYSICIAN:  1. Abdominal pain or bloating, other than gas cramps.  2. Chest pain.  3. Back pain.  4. Signs of infection such as: chills or fever occurring within 24 hours   after the procedure.  5. Rectal bleeding, which would show as bright red, maroon, or black stools.   (A tablespoon of blood from the rectum is not serious, especially  if   hemorrhoids are present.)  6. Vomiting.  7. Weakness or dizziness.  GO DIRECTLY TO THE NEAREST EMERGENCY ROOM IF YOU HAVE ANY OF THE FOLLOWING:      Difficulty breathing              Chills and/or fever over 101 F   Persistent vomiting and/or vomiting blood   Severe abdominal pain   Severe chest pain   Black, tarry stools   Bleeding- more than one tablespoon   Any other symptom or condition that you feel may need urgent attention  Your doctor recommends these additional instructions:  If any biopsies were taken, your doctors clinic will contact you in 1 to 2   weeks with any results.  - Discharge patient to home (ambulatory).   - Resume regular diet.   - Continue present medications.   - Await pathology results.   - Repeat colonoscopy in 7 years for surveillance.  For questions, problems or results please call your physician - Elisa Horn MD at Work:  [phone].  [facility] [place], EMERGENCY ROOM PHONE NUMBER: [phone]  IF A COMPLICATION OR EMERGENCY SITUATION ARISES AND YOU ARE UNABLE TO REACH   YOUR PHYSICIAN - GO DIRECTLY TO THE EMERGENCY ROOM.  Elisa Horn MD  4/30/2024 9:04:03 AM  This report has been verified and signed electronically.  Dear patient,  As a result of recent federal legislation (The Federal Cures Act), you may   receive lab or pathology results from your procedure in your [facility]   account before your physician is able to contact you. Your physician or   their representative will relay the results to you with their   recommendations at their soonest availability.  Thank you,  PROVATION

## 2024-04-30 NOTE — LETTER
AUTHORIZATION FOR RELEASE OF   CONFIDENTIAL INFORMATION    Dear Med Records,    We are seeing Dee Hamilton, date of birth 1955, in the clinic at [facility]. Farzana Dorman MD is the patient's PCP. Dee Hamilton has an outstanding lab/procedure at the time we reviewed her chart. In order to help keep her health information updated, she has authorized us to request the following medical record(s):        (  )  MAMMOGRAM                                      (  )  COLONOSCOPY      (  )  PAP SMEAR                                          (  )  OUTSIDE LAB RESULTS     (  )  DEXA SCAN                                          ( X )  EYE EXAM            (  )  FOOT EXAM                                          (  )  ENTIRE RECORD     (  )  OUTSIDE IMMUNIZATIONS                 (  )  _______________         Please fax records to [facility], Giambrone, Nicole A., MD, [phone]     If you have any questions, please contact Yulia Ybarra at [phone].           Patient Name: Dee Hamilton  : 1955  Patient Phone #: [phone]

## 2024-05-03 LAB
FINAL PATHOLOGIC DIAGNOSIS: NORMAL
GROSS: NORMAL
Lab: NORMAL

## 2024-06-06 DIAGNOSIS — E11.69 HYPERLIPIDEMIA ASSOCIATED WITH TYPE 2 DIABETES MELLITUS: ICD-10-CM

## 2024-06-06 DIAGNOSIS — E78.5 HYPERLIPIDEMIA ASSOCIATED WITH TYPE 2 DIABETES MELLITUS: ICD-10-CM

## 2024-06-07 RX ORDER — ROSUVASTATIN CALCIUM 40 MG/1
40 TABLET, COATED ORAL NIGHTLY
Qty: 90 TABLET | Refills: 0 | Status: SHIPPED | OUTPATIENT
Start: 2024-06-07

## 2024-06-07 RX ORDER — PANTOPRAZOLE SODIUM 40 MG/1
40 TABLET, DELAYED RELEASE ORAL DAILY
Qty: 30 TABLET | Refills: 2 | Status: SHIPPED | OUTPATIENT
Start: 2024-06-07

## 2024-06-07 NOTE — TELEPHONE ENCOUNTER
Care Due:                  Date            Visit Type   Department     Provider  --------------------------------------------------------------------------------                                EP -                              PRIMARY      [facility]  Last Visit: 04-      CARE ([facility])   CARE           Farzana  Gibandare                              EP -                              PRIMARY      [facility] PRIMARY  Next Visit: 07-      CARE ([facility])   CARE           Farzana  Giambrone                                                            Last  Test          Frequency    Reason                     Performed    Due Date  --------------------------------------------------------------------------------    Lipid Panel.  12 months..  rosuvastatin.............  06- 05-    Health [facility] Embedded Care Due Messages. Reference number: [id number].   6/06/2024 9:44:26 PM CDT

## 2024-06-07 NOTE — TELEPHONE ENCOUNTER
Refill Routing Note   Medication(s) are not appropriate for processing by [facility] for the following reason(s):        Required labs outdated    [facility] action(s):  Defer     Requires labs : Yes             Appointments  past 12m or future 3m with PCP    Date Provider   Last Visit   4/15/2024 Farzana Dorman MD   Next Visit   7/15/2024 Farzana Dorman MD   ED visits in past 90 days: 0        Note composed:9:49 PM 06/06/2024

## 2024-07-31 ENCOUNTER — HOSPITAL ENCOUNTER (OUTPATIENT)
Dept: RADIOLOGY | Facility: OTHER | Age: 69
Discharge: HOME | End: 2024-07-31
Attending: INTERNAL MEDICINE
Payer: MEDICARE

## 2024-07-31 DIAGNOSIS — Z12.31 SCREENING MAMMOGRAM, ENCOUNTER FOR: ICD-10-CM

## 2024-07-31 PROCEDURE — 77063 BREAST TOMOSYNTHESIS BI: CPT | Mod: TC

## 2024-07-31 PROCEDURE — 77067 SCR MAMMO BI INCL CAD: CPT | Mod: TC

## 2024-09-16 NOTE — PROGRESS NOTES
"[facility] Note    Chief Complaint      Chief Complaint   Patient presents with    Annual Exam       History of Present Illness      Dee Hamilton is a 69 y.o.   F with Anxiety, Depression, Glaucoma, HLD, Hypercalcemia. She reports she ran out of her meds a few wks ago. "I've been in a funk". She reoprts issues affording her meds.   Last visit - 4/15/24    Hypercalcemia - She was taking calcium but has since stopped this. Vit D was elevated. It was not deficient.  PTH level remains high at 126.  Calcium 10.5 down from 10.6.  I suspect primary hyperparathyroidism.  This will need to continue to be monitored. Cont. to hydrate well and do not take any calcium supplements.   Fu by Endo.  Pt last seen by Endo in Sept.  Will order labs.  Rec she make appt with Endo.      Candida intertrigo- Rec drying well after bathing/showering.  Rx Nystatin powder and if no help will change to Clotrimazole cream. It helps.      Hypokalemia - Potassium is back to nl at 3.8.  Pt off all meds incl Kcl 20 meQ daily.      Osteopenia - DEXA/Bone Density which showed osteopenia. I rec  Vit D3 2000 units/d OTC.  In addition, I rec weight-bearing  exercise at least 30 minutes 3 times/wk and ideally 5 times/wk.  No specific intensity.  Walking is fine. I just rec  a form of weight-bearing exercise pt enjoys to facilitate long term compliance and benefit. Repeat DEXA in 2  yrs.     Noncompliance - Pt prev. stopped her Metformin due to Ha and abd pain.  "It just felt like every day I get up and I'm just there".  Sounds like she is depressed.  She is overdue for her Endo fu. We discussed the imp. of compliance and calling with any concerns. She is not taking her Protonix G I just Rx for her. Cont Trulicitiy.   Will not resume Metformin at this time since it made her feel poorly.      Memory issues - I forget where I put things off and on. Will cont to isaiahior.  Refer toNeuroPsych referral. She writes things in her note book. " "no evid of NPH on MRI. Pt reports imbalance, Incontinence, and memory issues. She has known Mixed urinary incontinence and had a prev bladder supsension. Carotid U/S - 6/1/21 - no evid of sig stenosis. Vitamin B1 level and it was normal.  Vitamin b12 was elevated. Her folic acid was normal.    has noticed she is more forgetful in doing chores around the house (like emptying the  or taking the clothes out of the dryer". Pt scored 27/30 on MMSE - 8/11/22. If someone has to ask her how she is she has to stop and think about how to respond so she has the words in her mind to answer correctly. "My tooth brush may be in the refrigerator.  Sometimes I have to remember how to drive so I stop driving". Note -she has multiple pill bottles for Atorvastatin/Repaglinide which are very full and it does not appear she has been taking these regularly. Folic acid and vitamin b12 are normal.  MRI Brain - has not yet had.  was in hospital. MRI Brain - 6/22/23 - mild generalized cerebral volume loss, without evidence of hydrocephalus. Mild patchy T2/FLAIR hyperintensity in the supratentorial white matter, nonspecific but most likely reflecting chronic small vessel ischemic changes. No evidence of recent or remote major vascular distribution infarct. These finding can be seen with aging and with high cholesterol and diabetes.  We will continue to work on keeping these things controlled.      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 ECC neg. EMBx insufficient. Pt is s/p hysteroscopy/D&C and CKC 9/29/21.  Has upcoming fu to discuss poss. Hys. + Spotting. CT abd/Pelvis - 3/25/22 -Punctate low-density foci in both kidneys too small to adequately characterize.   Colonic diverticulosis with no evidence for acute diverticulitis. No further recommendations Pelvic U/S - 5/31/22- wnl.   PAP -11/7/23 - neg.      Anxiety - She ran out of her her Paroxetine 30 mg/d.  "It takes the edge " "off".   D/w pt withdrawal effects with abrupt discontinuation.  "I go down a rabbit hole every now and then". It's depression more so than Anxiety because she doesn't go out much".  Pt talks to someone who is a friend of her daughter which has helped. She would like a support animal but her  does not like dogs. Playing with the neighbor's dog is helpful.  Prev fu by Jonna NICOLE.  Referred to Psychiatry. Her  has cancer and is doing better  and she has a new grandson and she puts everyone before herself. Her  has Cancer and is doing well. She declines counseling.      Depression - She ran out of  her Paroxetine 30 mg/d.   "It takes the edge off".  D/w pt withdrawal effects with abrupt discontinuation.  Referred to Psychiatry.  Pt talks to someone who is a friend of her daughter which has helped.  "It comes and goes". She has been meditating which helps. "My son is a trigger".  has Ca and is doing better.  Her 2 kids live in [place]. She has a son on the [employer] but they don't speak often. She declines counseling.      Glaucoma - Fu by Adilson. Fu by Dr. Nereyda De Anda.  Planning for fu with Dr. Carver.      HLD -  Cholesterol was high despite being on Atorvastatin 80 mg daily.  We changed this this to crestor 40 mg/d. If remains high can possibly add Zetia to her regimen     Hypercalcemia/Hyperparathyroidism -  Prev fu by Dr. Jarek Davison.  Vit D low normal - 22- low  4/15/24. ionized calcium - 1.27 - wnl and  iPTH - 109- 4/15/24 (Range - - ). Vit D - 95- 9/19/23 up from 24.  Note - HCTZ prev stopped. Will repeat labs.      DM - II  - Dx '02 - Ha1c was 10.4. still uncontrolled. Had appt with Laney in July but it was cancelled.   Pt off trulicity  3 mg wkly, Prandin (not effective) and Metformin.  She is on Trulicity 1.5mg/d, Semglee 36 units QHS.  She c/o burning in her feet to her knees.  Pt on Gabapentin 100 mg QHs.  Glc . Will not resume Metformin if causing GI issues. " "Repeat labs.  Glucose was elevated at 368 and she was spilling glucose into her urine due to elevated blood glucose levels. Due for fu with Dr. Kaba.  Dr Kaba Increase semglee to 32 units at bedtime and pt is actually now taking 36 units/d.  4/15/24+MAB - 33. Continue your current dose of Losartan.      Unexplained wt loss - Down 8 lb since last visit.   She reports she has been eating less and is not as hungry. "I don't eat sweets anymore shelli I don't have a taste for it".   Planning for EGD/C-scope     Vit D def - 95 - 9/19/23. Pt not on suppl. She is fu by endo. She completed Ergocalciferol 50,000 units once weekly x 8 wks.  Pt forgets to take her meds.  Pt on once monthly Ergo.      Mixed urinary Incontinence - Cystocele - Fu by  UroGyn, Dr. Coleman. Myrbetriq- couldn't afford. She prefers not to go back to Dr. Dinh.  She had  Prev bladder suspension in '11. Checked MRI Brain to r/o  NPH with abn gait, imbalance, memory issues, and Incontinence.  No evid of this on MRI. Pt doesn't take Elavil 10 mg QHS prn.  Referred to Pelvic Floor PTx.      Diaphragmatic hernia - No issues at present.  Will cont to monitor. She does have GERD. Rec smaller more freq meals.  Planning for Egd     Colon polyps  - Planning for  C-scope.      Diverticulosis - No issues at present. Diarrhea resolved with changing to Metformin ER.       Imbalance -  I'm not wobbling as much any more and haven't fallen or had dizziness. My mood swings are gone and it's much better .  I suspect this has improved since resuming her SSRI. She describes it as bouncing when she walks but then describes walking into things and being off balance.       Fatty Liver - Noted on CT abd/pelvis -2/22/20.   Rec she  limit tylenol and alcohol.  Rec diet and exercise for wt loss   As discussed at visit there is a potential for cirrhosis.   FIB-4 Calculation: 1.57 at 9/19/2024 11:46 AM   FIB-4 below 1.30 is considered as low-risk for advanced fibrosis  FIB-4 over " 2.67 is considered as high-risk for advanced fibrosis  FIB-4 values between 1.30 and 2.67 are considered as intermediate-risk of advanced fibrosis for ages 36-64.   For ages > 64 the cut-off for low-risk goes to < 2.     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     Small fat containing umbilical hernia and Small fat containing left inguinal hernia - Noted on chart review.       Atherosclerosis aorta - Seen on prev CT scans.  Cont Statin.      Rectocele - Fu by Uro/GYN. Planning for Pelvic Floor PTx.      HCM - Flu - admin 9/19/24; RSV - 12/14/23;  Tdap - ? At [facility];  PCV 13 - 10/24/19;  PVX 23 - 3/1/21;  Shingrix -#1 - 10/24/19 and #2 -12/30/19- she thinks she had shingles in Jan. 2022 to Left shoulder/flank x 2 wks;  COVID - 19 Vaccine (Moderna) #1 - 2/15/21; #2 - 3/15/21; # 3 - 10/27/21; # 4 10/16/22; # 5 10/24/23;  # 6 4; Hep C Screen -2/7/23 - neg. +h/o CHUN II; C-scope -  4/30/24 polyps, hemorrhoids,  and diverticulosis - repeat 7 yrs At [facility]; PAP - 11/7/23 - neg.; MGM - 7/31/24 - repeat 1 yr;  DEXA -3/8/23 - repeat 2 yrs; Prev PCP - Dr Beinto at [facility] [place]; Endo - Dr Gonzales; Ophtho - Dr. Dawn; Optometry - Dr. Young; GI - Dr. Jarvis; Uro/GYN - ; OB/GYN - Dr. Frank; Podiatry - Dr. Young; Derm - Dr. Kuhn    Patient Care Team:  Farzana Dorman MD as PCP - General (Internal Medicine)  Ivy Wharton MD (Family Medicine)  Crystal Almanza, RD, CDE as Dietitian (Diabetes)  Heriberto Man MD as Consulting Physician (Gastroenterology)     Health Maintenance:  Immunization History   Administered Date(s) Administered    COVID-19 MRNA, LN-S PF (MODERNA HALF 0.25 ML DOSE) 10/27/2021    COVID-19, MRNA, LN-S, PF (MODERNA FULL 0.5 ML DOSE) 02/15/2021, 03/15/2021    COVID-19, mRNA, LNP-S, PF (Moderna 2023)Ages 12+ 10/24/2023    COVID-19, mRNA, LNP-S, bivalent booster, PF (Moderna Omicron)12 + YEARS  10/06/2022    Influenza (FLUAD) - Quadrivalent - Adjuvanted - PF *Preferred* (65+) 10/27/2021, 09/14/2022, 09/19/2023    Influenza - Intradermal - Quadrivalent - PF 11/27/2013    Influenza - Quadrivalent - High Dose - PF (65 years and older) 09/08/2020    Influenza - Quadrivalent - PF *Preferred* (6 months and older) 10/17/2014, 10/25/2016, 10/23/2017, 10/15/2018    Influenza - Trivalent - Afluria, Fluzone MDV 10/24/2011, 10/17/2014, 10/23/2017    Influenza - Trivalent - Fluarix, Flulaval, Fluzone, Afluria - PF 10/25/2016    Influenza - Trivalent - Fluzone High Dose - PF (65 years and older) 10/24/2019    Pneumococcal Conjugate - 13 Valent 10/24/2019    Pneumococcal Polysaccharide - 23 Valent 11/16/2015, 03/01/2021    RSVpreF (Arexvy) 12/14/2023    Zoster Recombinant 10/24/2019, 12/30/2019      Health Maintenance   Topic Date Due    TETANUS VACCINE  Never done    LDCT Lung Screen  06/18/2015    Foot Exam  10/04/2023    Eye Exam  04/13/2024    Lipid Panel  06/01/2024    Hemoglobin A1c  07/15/2024    Mammogram  07/31/2025    DEXA Scan  03/08/2026    Colorectal Cancer Screening  04/30/2031    Hepatitis C Screening  Completed    Shingles Vaccine  Completed        Past Medical History:  Past Medical History:   Diagnosis Date    Anxiety     Bilateral leg edema 6/8/2021    Depression     Diabetes mellitus, type 2     Diverticulitis     GERD (gastroesophageal reflux disease)     Glaucoma     Hyperlipidemia     Hypertension     Nicotine dependence in remission     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       Past Surgical History:   has a past surgical history that includes vaginal mesh; Bladder suspension (12/2011); Glaucoma Laser Sx ou; Tonsillectomy; Cholecystectomy; Cataract extraction, bilateral; Oophorectomy; Eye surgery; Hysteroscopy with dilation and curettage of uterus (N/A, 09/29/2021); Cold knife conization of cervix (N/A, 09/29/2021); Tubal ligation; Colonoscopy (N/A, 4/30/2024); and Esophagogastroduodenoscopy (N/A,  "2024).    Family History:  family history includes Cancer in her brother; Cancer (age of onset: 68) in her father; Cataracts in her father, mother, and sister; Dementia in her mother; Diabetes in her daughter, sister, and son; Hypertension in her mother; No Known Problems in her son; Pancreatic cancer in her brother; Ulcers in her brother.     Social History:  Social History     Tobacco Use    Smoking status: Former     Current packs/day: 0.00     Average packs/day: 2.0 packs/day for 25.0 years (50.0 ttl pk-yrs)     Types: Cigarettes     Start date:      Quit date: 2015     Years since quittin.3    Smokeless tobacco: Never   Substance Use Topics    Alcohol use: Yes    Drug use: No       Review of Systems   Constitutional:  Positive for fatigue. Negative for chills, diaphoresis and fever.   HENT:  Positive for postnasal drip. Negative for hearing loss.         "Better"   Eyes:  Negative for visual disturbance.        Wears glasses   Respiratory:  Positive for cough. Negative for chest tightness and shortness of breath.         Dry    Cardiovascular:  Negative for chest pain, palpitations and leg swelling.   Gastrointestinal:  Positive for constipation and diarrhea. Negative for abdominal pain, blood in stool, nausea and vomiting.   Endocrine: Positive for cold intolerance. Negative for heat intolerance and polydipsia.   Genitourinary:  Positive for bladder incontinence, dysuria and frequency. Negative for hematuria.   Musculoskeletal:  Positive for leg pain and myalgias. Negative for arthralgias.   Neurological:  Positive for dizziness and light-headedness. Negative for headaches.        Reports episode of vertigo -  1-2 mos ago - lasted 10 minutes.    Psychiatric/Behavioral:  Positive for depressed mood. The patient is nervous/anxious.         Medications:    Current Outpatient Medications:     blood sugar diagnostic Strp, To check BG 2 times daily, to use with insurance preferred meter, Disp: 200 " "strip, Rfl: 3    blood-glucose meter kit, To check BG 2 times daily, to use with insurance preferred meter, Disp: 1 each, Rfl: 0    blood-glucose sensor (DEXCOM G7 SENSOR) Louise, 1 each by Misc.(Non-Drug; Combo Route) route every 10 days., Disp: 3 each, Rfl: 11    CONTOUR TEST STRIPS Strp, USE TO TEST BLOOD SUGAR TWICE DAILY, Disp: 50 strip, Rfl: 3    dulaglutide (TRULICITY) 1.5 mg/0.5 mL pen injector, Inject 1.5 mg into the skin once a week., Disp: 4 Pen, Rfl: 11    insulin glargine-yfgn (SEMGLEE,INSULIN GLARG-YFGN,PEN) 100 unit/mL (3 mL) InPn, Inject 26 Units into the skin every evening., Disp: 15 mL, Rfl: 6    insulin needles, disposable, (RELION PEN NEEDLES) 32 x 5/32 " Ndle, Use as directed, Disp: 50 each, Rfl: 6    lancets 33 gauge Misc, by Misc.(Non-Drug; Combo Route) route. True plus, Disp: , Rfl:     LIDOCAINE 2 %, VALIUM 5 MG, BACLOFEN 4 % SUPPOSITORY, Place 1 suppository vaginally 2 (two) times daily as needed (pelvic pain)., Disp: 20 each, Rfl: 5    methocarbamoL (ROBAXIN) 500 MG Tab, Take 1 tablet (500 mg total) by mouth 3 (three) times daily., Disp: 60 tablet, Rfl: 1    phenazopyridine (PYRIDIUM) 200 MG tablet, Take 1 tablet (200 mg total) by mouth 3 (three) times daily as needed for Pain., Disp: 10 tablet, Rfl: 0    clotrimazole (LOTRIMIN) 1 % cream, Apply topically 2 (two) times daily., Disp: 60 g, Rfl: 1    ergocalciferol (VITAMIN D2) 50,000 unit Cap, Take 1 capsule (50,000 Units total) by mouth every 7 days., Disp: 12 capsule, Rfl: 1    furosemide (LASIX) 20 MG tablet, Take 1 tablet (20 mg total) by mouth once daily., Disp: 90 tablet, Rfl: 3    gabapentin (NEURONTIN) 100 MG capsule, Take 1 capsule (100 mg total) by mouth every evening., Disp: 90 capsule, Rfl: 3    losartan (COZAAR) 100 MG tablet, Take 1 tablet (100 mg total) by mouth once daily., Disp: 90 tablet, Rfl: 1    pantoprazole (PROTONIX) 40 MG tablet, Take 1 tablet (40 mg total) by mouth once daily., Disp: 30 tablet, Rfl: 2    paroxetine " "(PAXIL) 20 MG tablet, Take 1 & 1/2 tablets (30 mg total) by mouth once daily., Disp: 135 tablet, Rfl: 2    potassium chloride SA (K-DUR,KLOR-CON) 20 MEQ tablet, Take 1 tablet (20 mEq total) by mouth once daily., Disp: 90 tablet, Rfl: 3    rosuvastatin (CRESTOR) 40 MG Tab, Take 1 tablet (40 mg total) by mouth every evening., Disp: 90 tablet, Rfl: 3    travoprost (TRAVATAN Z) 0.004 % ophthalmic solution, Place 1 drop into both eyes every evening., Disp: 7.5 each, Rfl: 3     Allergies:  Review of patient's allergies indicates:  No Known Allergies    Physical Exam      Vital Signs  Temp: 96.8 °F (36 °C)  Pulse: 85  Resp: 17  SpO2: 97 %  BP: 130/72  BP Location: Left arm  Patient Position: Sitting  Pain Score: 0-No pain  Height and Weight  Height: 5' 7" (170.2 cm)  Weight: 80.6 kg (177 lb 11.1 oz)  BSA (Calculated - sq m): 1.95 sq meters  BMI (Calculated): 27.8  Weight in (lb) to have BMI = 25: 159.3      Patient Position: Sitting      Physical Exam  Vitals reviewed.   Constitutional:       Appearance: Normal appearance.   HENT:      Head: Normocephalic and atraumatic.      Right Ear: Tympanic membrane normal.      Left Ear: Tympanic membrane normal.      Mouth/Throat:      Mouth: Mucous membranes are dry.      Comments: dentures  Eyes:      Extraocular Movements: Extraocular movements intact.      Conjunctiva/sclera: Conjunctivae normal.      Pupils: Pupils are equal, round, and reactive to light.   Neck:      Vascular: No carotid bruit.   Cardiovascular:      Rate and Rhythm: Normal rate and regular rhythm.      Pulses: Normal pulses.      Heart sounds: Normal heart sounds. No murmur heard.  Pulmonary:      Effort: No respiratory distress.      Breath sounds: Normal breath sounds. No wheezing.   Abdominal:      Palpations: Abdomen is soft.   Musculoskeletal:      Comments: Slow slightly wide based gait   Skin:     General: Skin is warm.   Neurological:      General: No focal deficit present.      Mental Status: She is " alert and oriented to person, place, and time.      Comments: A&O x 4   Psychiatric:         Mood and Affect: Mood normal.          Laboratory:  CBC:  Recent Labs   Lab 02/24/23  1207 12/14/23  1200   WBC 6.76 7.04   RBC 5.08 4.94   Hemoglobin 14.0 13.4   Hematocrit 44.7 42.1   Platelets 346 338   MCV 88 85   MCH 27.6 27.1   MCHC 31.3 L 31.8 L       CMP:  Recent Labs   Lab 06/01/23  1006 09/19/23  1245 12/14/23  1200 04/15/24  1411   Glucose 221 H   < > 368 H 111 H   Calcium 10.6 H   < > 10.3 10.5   Albumin 4.0  --  3.9 4.0   Total Protein 7.6  --  7.6 7.7   Sodium 140   < > 139 142   Potassium 4.6   < > 3.9 3.8   CO2 24   < > 26 27   Chloride 105   < > 101 105   BUN 13   < > 14 9   Creatinine 0.7   < > 0.9 0.7   Alkaline Phosphatase 107  --  101 105   ALT 27  --  18 25   AST 34  --  23 38   Total Bilirubin 0.6  --  0.5 0.4    < > = values in this interval not displayed.           URINALYSIS:  Recent Labs   Lab 12/14/23  1221 02/28/24  1218   Color, UA Yellow Straw   Clarity, UA  --  Slightly Cloudy   Spec Grav UA  --  1.010   Specific Gravity, UA >1.030 A  --    pH, UA 6.0 6.5   Protein, UA Negative  --    Bacteria Rare  --    Nitrite, UA Negative neg   Leukocytes, UA Negative  --    Urobilinogen, UA  --  normal        LIPIDS:  Recent Labs   Lab 06/22/22  1110 09/14/22  1240 02/24/23  1207 06/01/23  1006   TSH 0.871  --  0.966  --    HDL  --  32 L  --  40   Cholesterol  --  194  --  207 H   Triglycerides  --  272 H  --  160 H   LDL Cholesterol  --  107.6  --  135.0   HDL/Cholesterol Ratio  --  16.5 L  --  19.3 L   Non-HDL Cholesterol  --  162  --  167   Total Cholesterol/HDL Ratio  --  6.1 H  --  5.2 H       TSH:  Recent Labs   Lab 06/22/22  1110 02/24/23  1207   TSH 0.871 0.966       A1C:  Recent Labs   Lab 10/08/21  0932 01/14/22  0929 06/22/22  1110 09/14/22  1240 02/24/23  1207 06/01/23  1006 09/19/23  1245 11/02/23  1015 04/15/24  1411   Hemoglobin A1C 10.6 H 8.6 H 10.9 H 10.1 H 11.9 H 8.9 H 13.0 H 11.3 H 10.4  H       Urine Microalbumin/Cr:  Recent Labs   Lab 06/22/22  1228 02/24/23  1159 04/15/24  1422   Microalb/Creat Ratio 56.5 H Unable to calculate 33.2 H         Other:   Recent Labs   Lab 01/14/22  0929 09/14/22  1240 04/18/23  1023 04/15/24  1411   Vit D, 25-Hydroxy 24 L 33  --  22 L   Vitamin B-12  --   --  605  --    Ferritin 34  --   --   --      Recent Labs   Lab 02/07/23  1119   Hepatitis C Ab Non-reactive       Assessment/Plan     Dee Hamilton is a 69 y.o.female with:    Hyperlipidemia associated with type 2 diabetes mellitus  -     rosuvastatin (CRESTOR) 40 MG Tab; Take 1 tablet (40 mg total) by mouth every evening.  Dispense: 90 tablet; Refill: 3  -     Lipid Panel; Future; Expected date: 10/19/2024  -     CK; Future; Expected date: 10/19/2024  - Pt has been off meds.  Resume meds. Recheck labs in 1 month.     Hypertension, unspecified type  -     losartan (COZAAR) 100 MG tablet; Take 1 tablet (100 mg total) by mouth once daily.  Dispense: 90 tablet; Refill: 1  -     furosemide (LASIX) 20 MG tablet; Take 1 tablet (20 mg total) by mouth once daily.  Dispense: 90 tablet; Refill: 3  - BP is ok today.  Pt reports she has been off meds.  Resume meds. Recheck labs in 1 month.     Anxiety  -     paroxetine (PAXIL) 20 MG tablet; Take 1 & 1/2 tablets (30 mg total) by mouth once daily.  Dispense: 135 tablet; Refill: 2  - Pt has been off meds.  Resume meds. D/w pt dangers of just running out or stopping this medication.   Depression and anxiety not controlled but has not been on her meds.     Episode of recurrent major depressive disorder, unspecified depression episode severity  -     paroxetine (PAXIL) 20 MG tablet; Take 1 & 1/2 tablets (30 mg total) by mouth once daily.  Dispense: 135 tablet; Refill: 2  -     Ambulatory referral/consult to Outpatient Case Management  - Pt has been off meds.  Resume meds. D/w pt dangers of just running out or stopping this medication.   Depression and anxiety not controlled  but has not been on her meds.     Type 2 diabetes mellitus with diabetic neuropathy, with long-term current use of insulin  -     gabapentin (NEURONTIN) 100 MG capsule; Take 1 capsule (100 mg total) by mouth every evening.  Dispense: 90 capsule; Refill: 3  -     Ambulatory referral/consult to Outpatient Case Management  -     Comprehensive Metabolic Panel; Future; Expected date: 10/19/2024  -     Hemoglobin A1C; Future; Expected date: 10/19/2024  -Uncontrolled. Had improved slightly but pt still not taking meds appropriately. Having issues with affordability. Will try to get Rx assistance and Case Mgmnt.     Bilateral leg edema  -     furosemide (LASIX) 20 MG tablet; Take 1 tablet (20 mg total) by mouth once daily.  Dispense: 90 tablet; Refill: 3  - Stable.  Resume meds.    Hyperparathyroidism  -     Calcium, Ionized; Future; Expected date: 09/19/2024  -     Misc Sendout Test, Blood vitamin D; Future; Expected date: 10/19/2024  -     PTH, Intact; Future; Expected date: 10/19/2024  - Due for Endo fu.  Will check labs in 1 wk.     Fatigue, unspecified type  -     CBC Auto Differential; Future; Expected date: 10/19/2024  -     TSH; Future; Expected date: 10/19/2024  - Stable.  Cont current regimen.    Urinary symptom or sign  -     POCT urine dipstick without microscope    Pain in both lower extremities  -     CK; Future; Expected date: 10/19/2024    Imbalance  -     Cancel: Folate; Future; Expected date: 09/19/2024  -     Cancel: Vitamin B12; Future; Expected date: 09/19/2024  -     Vitamin B12; Future; Expected date: 10/19/2024  -     Folate; Future; Expected date: 10/19/2024    Fatty liver  - Rec limit tylenol and alcohol.  Rec diet and exercise for wt loss.  As discussed at visit there is a potential for cirrhosis.     Noncompliance  -     Ambulatory referral/consult to Outpatient Case Management  -     Ambulatory referral/consult to Medication Assistance Program (MAP); Future; Expected date: 09/26/2024    Patient  cannot afford medications  -     Ambulatory referral/consult to Medication Assistance Program (MAP); Future; Expected date: 09/26/2024    On potassium wasting diuretic therapy  -     potassium chloride SA (K-DUR,KLOR-CON) 20 MEQ tablet; Take 1 tablet (20 mEq total) by mouth once daily.  Dispense: 90 tablet; Refill: 3    Postmenopausal  -     DXA Bone Density Axial Skeleton 1 or more sites; Future; Expected date: 08/01/2025    Screening mammogram, encounter for  -     Mammo Digital Screening Bilat w/ Dionisio; Future; Expected date: 08/01/2025    Other orders  -     pantoprazole (PROTONIX) 40 MG tablet; Take 1 tablet (40 mg total) by mouth once daily.  Dispense: 30 tablet; Refill: 2  -     ergocalciferol (VITAMIN D2) 50,000 unit Cap; Take 1 capsule (50,000 Units total) by mouth every 7 days.  Dispense: 12 capsule; Refill: 1  -     clotrimazole (LOTRIMIN) 1 % cream; Apply topically 2 (two) times daily.  Dispense: 60 g; Refill: 1    Chronic conditions status updated as per HPI.  Other than changes above, cont current medications and maintain follow up with specialists.  Follow up in about 6 weeks (around 10/31/2024).      Farzana Dorman MD  [facility]

## 2024-09-19 ENCOUNTER — OFFICE VISIT (OUTPATIENT)
Dept: PRIMARY CARE CLINIC | Facility: CLINIC | Age: 69
End: 2024-09-19
Payer: MEDICARE

## 2024-09-19 ENCOUNTER — TELEPHONE (OUTPATIENT)
Dept: ENDOCRINOLOGY | Facility: CLINIC | Age: 69
End: 2024-09-19
Payer: MEDICARE

## 2024-09-19 VITALS
OXYGEN SATURATION: 97 % | WEIGHT: 177.69 LBS | BODY MASS INDEX: 27.89 KG/M2 | HEIGHT: 67 IN | RESPIRATION RATE: 17 BRPM | HEART RATE: 85 BPM | DIASTOLIC BLOOD PRESSURE: 72 MMHG | TEMPERATURE: 97 F | SYSTOLIC BLOOD PRESSURE: 130 MMHG

## 2024-09-19 DIAGNOSIS — R60.0 BILATERAL LEG EDEMA: ICD-10-CM

## 2024-09-19 DIAGNOSIS — Z79.899 ON POTASSIUM WASTING DIURETIC THERAPY: ICD-10-CM

## 2024-09-19 DIAGNOSIS — F33.9 EPISODE OF RECURRENT MAJOR DEPRESSIVE DISORDER, UNSPECIFIED DEPRESSION EPISODE SEVERITY: ICD-10-CM

## 2024-09-19 DIAGNOSIS — R53.83 FATIGUE, UNSPECIFIED TYPE: ICD-10-CM

## 2024-09-19 DIAGNOSIS — R39.9 URINARY SYMPTOM OR SIGN: ICD-10-CM

## 2024-09-19 DIAGNOSIS — R26.89 IMBALANCE: ICD-10-CM

## 2024-09-19 DIAGNOSIS — Z79.4 TYPE 2 DIABETES MELLITUS WITH DIABETIC NEUROPATHY, WITH LONG-TERM CURRENT USE OF INSULIN: ICD-10-CM

## 2024-09-19 DIAGNOSIS — M79.604 PAIN IN BOTH LOWER EXTREMITIES: ICD-10-CM

## 2024-09-19 DIAGNOSIS — E78.5 HYPERLIPIDEMIA ASSOCIATED WITH TYPE 2 DIABETES MELLITUS: Primary | ICD-10-CM

## 2024-09-19 DIAGNOSIS — Z91.199 NONCOMPLIANCE: ICD-10-CM

## 2024-09-19 DIAGNOSIS — E11.69 HYPERLIPIDEMIA ASSOCIATED WITH TYPE 2 DIABETES MELLITUS: Primary | ICD-10-CM

## 2024-09-19 DIAGNOSIS — Z12.31 SCREENING MAMMOGRAM, ENCOUNTER FOR: ICD-10-CM

## 2024-09-19 DIAGNOSIS — F41.9 ANXIETY: ICD-10-CM

## 2024-09-19 DIAGNOSIS — I10 HYPERTENSION, UNSPECIFIED TYPE: ICD-10-CM

## 2024-09-19 DIAGNOSIS — K76.0 FATTY LIVER: ICD-10-CM

## 2024-09-19 DIAGNOSIS — E11.40 TYPE 2 DIABETES MELLITUS WITH DIABETIC NEUROPATHY, WITH LONG-TERM CURRENT USE OF INSULIN: ICD-10-CM

## 2024-09-19 DIAGNOSIS — E21.3 HYPERPARATHYROIDISM: ICD-10-CM

## 2024-09-19 DIAGNOSIS — Z59.86 PATIENT CANNOT AFFORD MEDICATIONS: ICD-10-CM

## 2024-09-19 DIAGNOSIS — Z78.0 POSTMENOPAUSAL: ICD-10-CM

## 2024-09-19 DIAGNOSIS — M79.605 PAIN IN BOTH LOWER EXTREMITIES: ICD-10-CM

## 2024-09-19 PROCEDURE — 99999 PR PBB SHADOW E&M-EST. PATIENT-LVL V: CPT | Mod: PBBFAC,,, | Performed by: INTERNAL MEDICINE

## 2024-09-19 RX ORDER — PAROXETINE HYDROCHLORIDE 20 MG/1
30 TABLET, FILM COATED ORAL DAILY
Qty: 135 TABLET | Refills: 2 | Status: SHIPPED | OUTPATIENT
Start: 2024-09-19

## 2024-09-19 RX ORDER — CLOTRIMAZOLE 1 %
CREAM (GRAM) TOPICAL 2 TIMES DAILY
Qty: 60 G | Refills: 1 | Status: SHIPPED | OUTPATIENT
Start: 2024-09-19

## 2024-09-19 RX ORDER — LOSARTAN POTASSIUM 100 MG/1
100 TABLET ORAL DAILY
Qty: 90 TABLET | Refills: 1 | Status: SHIPPED | OUTPATIENT
Start: 2024-09-19

## 2024-09-19 RX ORDER — PANTOPRAZOLE SODIUM 40 MG/1
40 TABLET, DELAYED RELEASE ORAL DAILY
Qty: 30 TABLET | Refills: 2 | Status: SHIPPED | OUTPATIENT
Start: 2024-09-19

## 2024-09-19 RX ORDER — ERGOCALCIFEROL 1.25 MG/1
50000 CAPSULE ORAL
Qty: 12 CAPSULE | Refills: 1 | Status: SHIPPED | OUTPATIENT
Start: 2024-09-19

## 2024-09-19 RX ORDER — ROSUVASTATIN CALCIUM 40 MG/1
40 TABLET, COATED ORAL NIGHTLY
Qty: 90 TABLET | Refills: 3 | Status: SHIPPED | OUTPATIENT
Start: 2024-09-19

## 2024-09-19 RX ORDER — GABAPENTIN 100 MG/1
100 CAPSULE ORAL NIGHTLY
Qty: 90 CAPSULE | Refills: 3 | Status: SHIPPED | OUTPATIENT
Start: 2024-09-19 | End: 2025-09-19

## 2024-09-19 RX ORDER — FUROSEMIDE 20 MG/1
20 TABLET ORAL DAILY
Qty: 90 TABLET | Refills: 3 | Status: SHIPPED | OUTPATIENT
Start: 2024-09-19

## 2024-09-19 RX ORDER — POTASSIUM CHLORIDE 20 MEQ/1
20 TABLET, EXTENDED RELEASE ORAL DAILY
Qty: 90 TABLET | Refills: 3 | Status: SHIPPED | OUTPATIENT
Start: 2024-09-19

## 2024-09-19 SDOH — SOCIAL DETERMINANTS OF HEALTH (SDOH): FINANCIAL INSECURITY: Z59.86

## 2024-09-19 NOTE — TELEPHONE ENCOUNTER
Called pt twice, no answer. Pt is scheduled a follow up visit to see  which is a fellow who works with  on Tuesdays.

## 2024-09-19 NOTE — TELEPHONE ENCOUNTER
----- Message from Farzana Dorman MD sent at 9/19/2024 11:40 AM CDT -----  Pt got lost to fu.  She has been off meds due to depression and affordability. Can you please assist with a fu?  Dr. BRUSH

## 2024-09-20 ENCOUNTER — PATIENT OUTREACH (OUTPATIENT)
Dept: ADMINISTRATIVE | Facility: OTHER | Age: 69
End: 2024-09-20
Payer: MEDICARE

## 2024-09-20 NOTE — PROGRESS NOTES
CHW - Initial Contact    This Community Health Worker verified only the Social Determinant of Health questionnaire with patient via telephone today.      Pt identified barriers of most importance are: financial strain: pt stated some of her prescriptions are costly and it's very hard to get the prescriptions because she really can't afford it     Support and Services: CHW will wait until next week to do a PAP referral because per chart review PCP completed a referral for MAP on 9/19/24    CHW will wait to submit a referral to avoid duplicates referral     Follow up required: yes    Follow-up Outreach - Due: 9/24/2024

## 2024-09-23 NOTE — PROGRESS NOTES
Subjective:      Patient ID: Dee Hamilton is a 69 y.o. female.    Chief Complaint:  Diabetes      History of Present Illness  Follow up for T2DM and primary hyperparathyroidism, last seen by Dr. Kaba on 9/2023.    T2DM with insulin use  Diagnosed in 15 years ago   Known complications: None    States she has run out of her trulicity and is running out of her Semglee, unsure of how long it has been.  Patient also states that she is not using Dexcom at this time due to running out.  Denies regular exercise, poor diet.  Denies any lows, endorses only highs for blood glucose, checks her glucose but does not record it in a journal or access to data due to running out of the Dexcom.  Patient requesting refills today.    Primary hyperparathyroid:  Lab Results   Component Value Date    .6 (H) 04/15/2024    .5 (H) 09/19/2023    CALCIUM 10.5 04/15/2024    CALCIUM 10.3 12/14/2023    CAION 1.27 04/15/2024    CAION 1.19 06/22/2022    [id number] 22 (L) 04/15/2024    [id number] 33 09/14/2022     Managed currently with Vitamin D, does not meet criteria for surgical intervention  Orders for recheck placed by primary care  Currently taking prescription Vitamin D  Denies kidney stone, increased urinary frequency, endorses good fluid intake  No 24 hr urine collection completed in the past    Osteopenia:  DXA 3/2023: osteopenia with FRAX caculation below treatment threshholds  Denies falls/fractures  Supplements:  Calcium: None  Vitamin D: Prescription Vit. D 40280 IU    Current Diabetes Regimen:  Semglee 26 units at bedtime    Trulicity 1.5 mg weekly     Diet/Exercise:   Room for improvement    Recent Hgb A1C:  Lab Results   Component Value Date    HGBA1C 10.4 (H) 04/15/2024       Glucose Monitoring:  Checking, not recording, difficulty in recalling blood glucose levels    Hypoglycemic Episodes: denies     Screening / DM Complications:    Nephropathy:  ACEi/ARB: Taking  Lab Results   Component Value Date     "MICALBCREAT 33.2 (H) 04/15/2024       Last Lipid Panel:  Statin: Taking  Lab Results   Component Value Date    LDLCALC 135.0 06/01/2023       Last eye exam : 04/13/2023;  Scheduled for this month 9/2024  Last foot exam 09/2024  Protective Sensation (w/ 10 gram monofilament):  Right: Intact  Left: Intact    Visual Inspection:  Normal -  Bilateral    Pedal Pulses:   Right: Present  Left: Present    Posterior Tibialis Pulses:   Right:Present  Left: Present       B12:  Lab Results   Component Value Date    [id number] 605 04/18/2023         Objective:   Physical Exam  Vitals:    09/24/24 0909   BP: 132/84   BP Location: Right arm   Patient Position: Sitting   BP Method: Medium (Manual)   Pulse: 80   SpO2: 95%   Weight: 78.8 kg (173 lb 9.8 oz)   Height: 5' 7" (1.702 m)         BP Readings from Last 3 Encounters:   09/24/24 132/84   09/19/24 130/72   04/30/24 (!) 152/71     Wt Readings from Last 1 Encounters:   09/24/24 0909 78.8 kg (173 lb 9.8 oz)     Body mass index is 27.19 kg/m².    Lab Review:   Lab Results   Component Value Date    HGBA1C 10.4 (H) 04/15/2024     Lab Results   Component Value Date    CHOL 207 (H) 06/01/2023    HDL 40 06/01/2023    LDLCALC 135.0 06/01/2023    TRIG 160 (H) 06/01/2023    CHOLHDL 19.3 (L) 06/01/2023     Lab Results   Component Value Date     04/15/2024    K 3.8 04/15/2024     04/15/2024    CO2 27 04/15/2024     (H) 04/15/2024    BUN 9 04/15/2024    CREATININE 0.7 04/15/2024    CALCIUM 10.5 04/15/2024    PROT 7.7 04/15/2024    ALBUMIN 4.0 04/15/2024    BILITOT 0.4 04/15/2024    ALKPHOS 105 04/15/2024    AST 38 04/15/2024    ALT 25 04/15/2024    ANIONGAP 10 04/15/2024    ESTGFRAFRICA >60 06/22/2022    EGFRNONAA >60 06/22/2022    TSH 0.966 02/24/2023        Latest Reference Range & Units Most Recent   Thiamine 38 - 122 ug/L 48  6/8/21 09:53   Vit D, 25-Hydroxy 30 - 96 ng/mL 33  9/14/22 12:40   Vitamin B-12 210 - 950 pg/mL 605  4/18/23 10:23     ROS: As above in " HPI  Assessment and Plan     Uncontrolled diabetes mellitus with hyperglycemia, with long-term current use of insulin  Patient states she has run out of her trulicity, patient unsure of time without it  Current regimen is Semglee 26u HS  Endorses uncontrolled hyperglycemia at home, no data provided by patient  A1C 10.4%  Poor diet and exercise, however patient motivated on working on this    -Refill Semglee, remain on 26u HS  -Switch Trulicity with Ozempic, start with 0.25mg for 1 month and 0.5mg thereafter, patient to notify us about any issues or requests to titrate up  -Refill Dexcom prescription  -Diabetes education placed for the patient  -Losartan for microalbuminuria      Hyperparathyroidism  Elevated PTH on previous lab results  Vitamin D deficiency when last checked, currently being managed with prescription Vitamin D by PCP, agree with this  Denies symptoms  No indication for surgery at this time    -Continue to replete vitamin D, recheck lab ordered by PCP  -Encourage patient to increase fluid intake to prevent nephrolithiasis  -Encouraged patient to start multivitamins with Vitamin D    Osteopenia after menopause  DXA done on 3/2023:   FINDINGS:  The L1 to L4 vertebral bone mineral density is equal to 0.831 g/cm squared with a T score of -2.0.   The left femoral neck bone mineral density is equal to 0.677 g/cm squared with a T score of -1.5.   The total hip bone mineral density is equal to 0.769 g/cm squared with a T score of -1.4.   There is a 9.7% risk of a major osteoporotic fracture and a 1.3% risk of hip fracture in the next 10 years (FRAX).    -Encouraged vitamin D and calcium intake  -Repeat DXA scan in 2 year (3/2025)      Visit today included increased complexity associated with the care of uncontrolled hyperglycemia with insulin use, Osteopenia, and hyperparathyroid treated with medical management addressed and managing the longitudinal care of the patient due to the serious and/or complex  managed problem(s).     Tremaine Chong MD  [facility]

## 2024-09-24 ENCOUNTER — OFFICE VISIT (OUTPATIENT)
Dept: ENDOCRINOLOGY | Facility: CLINIC | Age: 69
End: 2024-09-24
Payer: MEDICARE

## 2024-09-24 ENCOUNTER — TELEPHONE (OUTPATIENT)
Dept: PRIMARY CARE CLINIC | Facility: CLINIC | Age: 69
End: 2024-09-24
Payer: MEDICARE

## 2024-09-24 VITALS
BODY MASS INDEX: 27.25 KG/M2 | HEIGHT: 67 IN | OXYGEN SATURATION: 95 % | WEIGHT: 173.63 LBS | DIASTOLIC BLOOD PRESSURE: 84 MMHG | HEART RATE: 80 BPM | SYSTOLIC BLOOD PRESSURE: 132 MMHG

## 2024-09-24 DIAGNOSIS — E11.65 UNCONTROLLED DIABETES MELLITUS WITH HYPERGLYCEMIA, WITH LONG-TERM CURRENT USE OF INSULIN: Primary | Chronic | ICD-10-CM

## 2024-09-24 DIAGNOSIS — Z79.4 TYPE 2 DIABETES MELLITUS WITH HYPERGLYCEMIA, WITH LONG-TERM CURRENT USE OF INSULIN: ICD-10-CM

## 2024-09-24 DIAGNOSIS — E11.65 TYPE 2 DIABETES MELLITUS WITH HYPERGLYCEMIA, WITH LONG-TERM CURRENT USE OF INSULIN: ICD-10-CM

## 2024-09-24 DIAGNOSIS — M85.80 OSTEOPENIA AFTER MENOPAUSE: ICD-10-CM

## 2024-09-24 DIAGNOSIS — Z78.0 OSTEOPENIA AFTER MENOPAUSE: ICD-10-CM

## 2024-09-24 DIAGNOSIS — E21.3 HYPERPARATHYROIDISM: ICD-10-CM

## 2024-09-24 DIAGNOSIS — Z79.4 UNCONTROLLED DIABETES MELLITUS WITH HYPERGLYCEMIA, WITH LONG-TERM CURRENT USE OF INSULIN: Primary | Chronic | ICD-10-CM

## 2024-09-24 PROCEDURE — 99999 PR PBB SHADOW E&M-EST. PATIENT-LVL IV: CPT | Mod: PBBFAC,GC,, | Performed by: STUDENT IN AN ORGANIZED HEALTH CARE EDUCATION/TRAINING PROGRAM

## 2024-09-24 RX ORDER — BLOOD-GLUCOSE SENSOR
1 EACH MISCELLANEOUS
Qty: 3 EACH | Refills: 11 | Status: SHIPPED | OUTPATIENT
Start: 2024-09-24 | End: 2025-09-24

## 2024-09-24 RX ORDER — INSULIN GLARGINE-YFGN 100 [IU]/ML
26 INJECTION, SOLUTION SUBCUTANEOUS NIGHTLY
Qty: 15 ML | Refills: 6 | Status: SHIPPED | OUTPATIENT
Start: 2024-09-24

## 2024-09-24 RX ORDER — INSULIN GLARGINE-YFGN 100 [IU]/ML
26 INJECTION, SOLUTION SUBCUTANEOUS NIGHTLY
Qty: 15 ML | Refills: 6 | Status: SHIPPED | OUTPATIENT
Start: 2024-09-24 | End: 2024-09-24

## 2024-09-24 RX ORDER — SEMAGLUTIDE 0.68 MG/ML
INJECTION, SOLUTION SUBCUTANEOUS
Qty: 39.75 ML | Refills: 0 | Status: SHIPPED | OUTPATIENT
Start: 2024-09-24 | End: 2025-10-22

## 2024-09-24 NOTE — ASSESSMENT & PLAN NOTE
DXA done on 3/2023:   FINDINGS:  The L1 to L4 vertebral bone mineral density is equal to 0.831 g/cm squared with a T score of -2.0.   The left femoral neck bone mineral density is equal to 0.677 g/cm squared with a T score of -1.5.   The total hip bone mineral density is equal to 0.769 g/cm squared with a T score of -1.4.   There is a 9.7% risk of a major osteoporotic fracture and a 1.3% risk of hip fracture in the next 10 years (FRAX).    -Encouraged vitamin D and calcium intake  -Repeat DXA scan in 2 year (3/2025)

## 2024-09-24 NOTE — ASSESSMENT & PLAN NOTE
Patient states she has run out of her trulicity, patient unsure of time without it  Current regimen is Semglee 26u HS  Endorses uncontrolled hyperglycemia at home, no data provided by patient  A1C 10.4%  Poor diet and exercise, however patient motivated on working on this    -Refill Semglee, remain on 26u HS  -Switch Trulicity with Ozempic, start with 0.25mg for 1 month and 0.5mg thereafter, patient to notify us about any issues or requests to titrate up  -Refill Dexcom prescription  -Diabetes education placed for the patient  -Losartan for microalbuminuria

## 2024-09-24 NOTE — TELEPHONE ENCOUNTER
"Spoke with patient, informed her, these symptoms are normal and she will need to go to the ER. Patient stated "maybe later". She is too weak to go right now. She will go later today or tomorrow.----- Message from Chely Cantu sent at 9/24/2024 12:46 PM CDT -----  Contact: Patient @ [phone]  [email or web address]     Patient is calling for Medical Advice regarding:  Symptoms: Abdominal Pain - Female - Not Pregnant, Stools - Unusual Color  Outcome: Talk to a nurse or provider within 15 minutes.  Reason: Color is red or black    The caller accepted this outcome.    How long has patient had these symptoms:This AM    Pharmacy name and phone#:  [facility] [place] [address] [place] [address]  Phone: [phone] Fax: [phone]            Patient wants a call back or thru [facility]:CALL     Comments:  "

## 2024-09-24 NOTE — ASSESSMENT & PLAN NOTE
Elevated PTH on previous lab results  Vitamin D deficiency when last checked, currently being managed with prescription Vitamin D by PCP, agree with this  Denies symptoms  No indication for surgery at this time    -Continue to replete vitamin D, recheck lab ordered by PCP  -Encourage patient to increase fluid intake to prevent nephrolithiasis  -Encouraged patient to start multivitamins with Vitamin D

## 2024-09-25 ENCOUNTER — PATIENT OUTREACH (OUTPATIENT)
Dept: ADMINISTRATIVE | Facility: OTHER | Age: 69
End: 2024-09-25
Payer: MEDICARE

## 2024-09-25 NOTE — PROGRESS NOTES
CHW - Outreach Attempt    Community Health Worker left a voicemail message for 1st attempt to contact patient regarding: rx assistance    Community Health Worker to attempt to contact patient on: 9/25/24

## 2024-09-26 ENCOUNTER — TELEPHONE (OUTPATIENT)
Dept: PHARMACY | Facility: CLINIC | Age: 69
End: 2024-09-26
Payer: MEDICARE

## 2024-09-26 ENCOUNTER — HOSPITAL ENCOUNTER (EMERGENCY)
Facility: HOSPITAL | Age: 69
Discharge: HOME | End: 2024-09-26
Attending: STUDENT IN AN ORGANIZED HEALTH CARE EDUCATION/TRAINING PROGRAM
Payer: MEDICARE

## 2024-09-26 VITALS
OXYGEN SATURATION: 98 % | BODY MASS INDEX: 27.15 KG/M2 | RESPIRATION RATE: 20 BRPM | DIASTOLIC BLOOD PRESSURE: 84 MMHG | SYSTOLIC BLOOD PRESSURE: 161 MMHG | TEMPERATURE: 98 F | HEART RATE: 75 BPM | HEIGHT: 67 IN | WEIGHT: 173 LBS

## 2024-09-26 DIAGNOSIS — K92.1 MELENA: ICD-10-CM

## 2024-09-26 DIAGNOSIS — Z00.00 ENCOUNTER FOR MEDICARE ANNUAL WELLNESS EXAM: ICD-10-CM

## 2024-09-26 LAB
ABO + RH BLD: NORMAL
ALBUMIN SERPL BCP-MCNC: 4 G/DL (ref 3.5–5.2)
ALP SERPL-CCNC: 105 U/L (ref 55–135)
ALT SERPL W/O P-5'-P-CCNC: 18 U/L (ref 10–44)
ANION GAP SERPL CALC-SCNC: 12 MMOL/L (ref 8–16)
AST SERPL-CCNC: 22 U/L (ref 10–40)
BASOPHILS # BLD AUTO: 0.03 K/UL (ref 0–0.2)
BASOPHILS NFR BLD: 0.5 % (ref 0–1.9)
BILIRUB SERPL-MCNC: 0.5 MG/DL (ref 0.1–1)
BLD GP AB SCN CELLS X3 SERPL QL: NORMAL
BUN SERPL-MCNC: 8 MG/DL (ref 8–23)
CALCIUM SERPL-MCNC: 10.5 MG/DL (ref 8.7–10.5)
CHLORIDE SERPL-SCNC: 102 MMOL/L (ref 95–110)
CO2 SERPL-SCNC: 25 MMOL/L (ref 23–29)
CREAT SERPL-MCNC: 1 MG/DL (ref 0.5–1.4)
DIFFERENTIAL METHOD BLD: ABNORMAL
EOSINOPHIL # BLD AUTO: 0.1 K/UL (ref 0–0.5)
EOSINOPHIL NFR BLD: 1.5 % (ref 0–8)
ERYTHROCYTE [DISTWIDTH] IN BLOOD BY AUTOMATED COUNT: 14.4 % (ref 11.5–14.5)
EST. GFR  (NO RACE VARIABLE): >60 ML/MIN/1.73 M^2
GLUCOSE SERPL-MCNC: 352 MG/DL (ref 70–110)
HCT VFR BLD AUTO: 41.6 % (ref 37–48.5)
HCV AB SERPL QL IA: NORMAL
HGB BLD-MCNC: 13.2 G/DL (ref 12–16)
HIV 1+2 AB+HIV1 P24 AG SERPL QL IA: NORMAL
IMM GRANULOCYTES # BLD AUTO: 0.01 K/UL (ref 0–0.04)
IMM GRANULOCYTES NFR BLD AUTO: 0.2 % (ref 0–0.5)
LYMPHOCYTES # BLD AUTO: 2.8 K/UL (ref 1–4.8)
LYMPHOCYTES NFR BLD: 46.3 % (ref 18–48)
MCH RBC QN AUTO: 26.7 PG (ref 27–31)
MCHC RBC AUTO-ENTMCNC: 31.7 G/DL (ref 32–36)
MCV RBC AUTO: 84 FL (ref 82–98)
MONOCYTES # BLD AUTO: 0.3 K/UL (ref 0.3–1)
MONOCYTES NFR BLD: 4.5 % (ref 4–15)
NEUTROPHILS # BLD AUTO: 2.8 K/UL (ref 1.8–7.7)
NEUTROPHILS NFR BLD: 47 % (ref 38–73)
NRBC BLD-RTO: 0 /100 WBC
OHS QRS DURATION: 76 MS
OHS QTC CALCULATION: 457 MS
PLATELET # BLD AUTO: 294 K/UL (ref 150–450)
PMV BLD AUTO: 10.3 FL (ref 9.2–12.9)
POTASSIUM SERPL-SCNC: 3.6 MMOL/L (ref 3.5–5.1)
PROT SERPL-MCNC: 7.9 G/DL (ref 6–8.4)
RBC # BLD AUTO: 4.95 M/UL (ref 4–5.4)
SODIUM SERPL-SCNC: 139 MMOL/L (ref 136–145)
SPECIMEN OUTDATE: NORMAL
WBC # BLD AUTO: 5.98 K/UL (ref 3.9–12.7)

## 2024-09-26 PROCEDURE — 87389 HIV-1 AG W/HIV-1&-2 AB AG IA: CPT | Performed by: PHYSICIAN ASSISTANT

## 2024-09-26 PROCEDURE — 86901 BLOOD TYPING SEROLOGIC RH(D): CPT | Performed by: EMERGENCY MEDICINE

## 2024-09-26 PROCEDURE — 85025 COMPLETE CBC W/AUTO DIFF WBC: CPT | Performed by: EMERGENCY MEDICINE

## 2024-09-26 PROCEDURE — 93010 ELECTROCARDIOGRAM REPORT: CPT | Mod: ,,, | Performed by: INTERNAL MEDICINE

## 2024-09-26 PROCEDURE — 86803 HEPATITIS C AB TEST: CPT | Performed by: PHYSICIAN ASSISTANT

## 2024-09-26 PROCEDURE — 93005 ELECTROCARDIOGRAM TRACING: CPT

## 2024-09-26 PROCEDURE — 99284 EMERGENCY DEPT VISIT MOD MDM: CPT | Mod: 25

## 2024-09-26 PROCEDURE — 86850 RBC ANTIBODY SCREEN: CPT | Performed by: EMERGENCY MEDICINE

## 2024-09-26 PROCEDURE — 86900 BLOOD TYPING SEROLOGIC ABO: CPT | Performed by: EMERGENCY MEDICINE

## 2024-09-26 PROCEDURE — 80053 COMPREHEN METABOLIC PANEL: CPT | Performed by: EMERGENCY MEDICINE

## 2024-09-26 NOTE — LETTER
September 26, 2024    Dee Hamilton  [address] [address]             [facility] - Pharmacy Assistance  [facility] [facility] [address]  Fax: [phone]   Dear Ms. Hamilton,      My name is Sharda Jarvis  I am reaching out on behalf of [facility]s Pharmacy Patient Assistance Team after receiving a referral from your Provider inquiring about assistance with your medication. I tried to contact you on 9/26/2024 . Sorry we missed you. Our goal is to assist qualified patients with financial assistance for their medications to better help enrollment for their medications to better help you achieve your health goals.      Please note that enrollment into available support will require the following documents:      Proof of household Income (such as social security statement, 1099 form, pension statement or 3 consecutive pay stubs)  Copy of all insurance cards (front and back)  Print out from your insurance or pharmacy showing how much you have spent on prescriptions this year  Completed Medication Access Center Authorization Forms       Please reach out to my phone number below if you are still in need of assistance with your medications. Follow-up attempt to reach you through MyChart or letter will be made in 5 business days. We look forward to hearing from you soon!    Thank you for choosing [facility] for your healthcare needs      Sincerely  Sharda MANUEL @[phone]  Pharmacy Patient Assistance Team  [address] [facility]  [address]  [place] [address]  Fax: [phone]  Email: [email or web address]

## 2024-09-26 NOTE — TELEPHONE ENCOUNTER
We have reviewed Ms. Hamilton current medication list and/or insurance status. Unfortunately, The Pharmacy Patient Assistance Team is unable to assist at this time due to the following reasons      There are no Manufacture or Co-pay Savings Programs available for requested medication.  and The [facility] Patient Assistance Program has temporarily stopped accepting new applications for Ulices Jarvis  Pharmacy Patient Assistance Team

## 2024-09-26 NOTE — TELEPHONE ENCOUNTER
We have reached out to MsEvert Alfonso to inform her of the [employer] application process for Ozempic 0.25 mg and Ozempic 0.5mg and whats required to apply.  She did not answer.         I was unable to leave a voicemail  I mailed a letter      Follow-up will be made in 5 business days.    Sharda Jarvis  Pharmacy Patient Assistance Team

## 2024-09-26 NOTE — DISCHARGE INSTRUCTIONS
If symptoms return, or if you experience chest pain, shortness of breath, loss of consciousness, vision changes, new onset weakness, or any other concerns please return to the emergency department.

## 2024-09-26 NOTE — ED TRIAGE NOTES
Dee Hamilton, a 69 y.o. female presents to the ED w/ complaint of melena x 1 day, denies blood thinners, abdominal pain, n/v/d, chest pain, SOB. AAOx4. Ambulatory.     Triage note:  Chief Complaint   Patient presents with    Melena     Melena since yesterday, - blood thinners. 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

## 2024-09-26 NOTE — ED PROVIDER NOTES
Encounter Date: 9/26/2024       History     Chief Complaint   Patient presents with    Melena     Melena since yesterday, - blood thinners. Denies N/V, abd pain     Dee Hamilton is a 69 y.o. female with PMH significant for DMII, HTN, HLD, and Primary HyperPTH presenting with melena. Pt denies abdominal pain, fever, chills, chest pain, SOB, confusion, LOC, or numbness and tingling. She does endorse some lightheadedness but no other sxs. Pt denies having any pain with recent BMs. She describes she came in after noticing an isolated, well formed dark colored stool earlier today. Pt reports having a routine EGD/Colonscopy back in April and that the report back was unremarkable. Patient denies changes to diet, medications, or activities.             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     Past Surgical History:   Procedure Laterality Date    BLADDER SUSPENSION  12/2011    CATARACT EXTRACTION, BILATERAL      CHOLECYSTECTOMY      COLD KNIFE CONIZATION OF CERVIX N/A 09/29/2021    Procedure: CONE BIOPSY, CERVIX, USING COLD KNIFE;  Surgeon: Sheeba Frank MD;  Location: [facility] OR;  Service: OB/GYN;  Laterality: N/A;    COLONOSCOPY N/A 4/30/2024    Procedure: COLONOSCOPY;  Surgeon: Elisa Horn MD;  Location: [facility] ENDOSCOPY;  Service: Endoscopy;  Laterality: N/A;  Ref by Dr LENA Dorman, Suprep, portal -   3-20 moved to [facility]; t[facility] pt; suprep, portal [facility]  4/22/24- pt ran out of [facility] and knows not to take prior to procedure if she is able to get it before then. pc complete. DBM    ESOPHAGOGASTRODUODENOSCOPY N/A 4/30/2024    Procedure: EGD  (ESOPHAGOGASTRODUODENOSCOPY);  Surgeon: Elisa Horn MD;  Location: [facility] ENDOSCOPY;  Service: Endoscopy;  Laterality: N/A;    EYE SURGERY      Glaucoma Laser Sx ou      HYSTEROSCOPY WITH DILATION AND CURETTAGE OF UTERUS N/A 2021    Procedure: HYSTEROSCOPY, WITH DILATION AND CURETTAGE OF UTERUS;  Surgeon: Sheeba Frank MD;  Location: [facility] OR;  Service: OB/GYN;  Laterality: N/A;    OOPHORECTOMY      TONSILLECTOMY      TUBAL LIGATION      vaginal mesh       Family History   Problem Relation Name Age of Onset    Cataracts Mother Rafia Hubbard     Hypertension Mother Rafia Hubbard     Dementia Mother Rafia Hubbard     Cancer Father 68 jaw ca 68        Jaw Bone    Cataracts Father 68 jaw ca     Diabetes Sister Josseline Hayden     Cataracts Sister Brianna     Cancer Brother          pancreatic    Pancreatic cancer Brother      Ulcers Brother      Diabetes Daughter Beth     Diabetes Son Lucia Loomis.     No Known Problems Son Sergo     Amblyopia Neg Hx      Blindness Neg Hx      Glaucoma Neg Hx      Macular degeneration Neg Hx      Retinal detachment Neg Hx      Strabismus Neg Hx      Stroke Neg Hx      Thyroid disease Neg Hx      Breast cancer Neg Hx      Colon cancer Neg Hx      Ovarian cancer Neg Hx       Social History     Tobacco Use    Smoking status: Former     Current packs/day: 0.00     Average packs/day: 2.0 packs/day for 25.0 years (50.0 ttl pk-yrs)     Types: Cigarettes     Start date:      Quit date: 2015     Years since quittin.3    Smokeless tobacco: Never   Substance Use Topics    Alcohol use: Yes    Drug use: No     Review of Systems   Constitutional:  Negative for activity change, appetite change, chills, diaphoresis, fatigue and fever.   HENT:  Negative for hearing loss, sinus pressure, sore throat and trouble swallowing.    Respiratory:  Negative for cough, chest tightness and shortness of breath.    Cardiovascular:  Negative for chest pain and palpitations.   Gastrointestinal:   Positive for blood in stool. Negative for abdominal distention, abdominal pain, constipation, diarrhea, nausea, rectal pain and vomiting.   Genitourinary:  Negative for decreased urine volume, difficulty urinating and hematuria.   Musculoskeletal:  Negative for arthralgias, neck pain and neck stiffness.   Neurological:  Positive for light-headedness. Negative for syncope, speech difficulty and weakness.   Psychiatric/Behavioral:  Negative for confusion and decreased concentration.        Physical Exam     Initial Vitals [09/26/24 1146]   BP Pulse Resp Temp SpO2   (!) 163/72 83 18 98.2 °F (36.8 °C) 100 %      MAP       --         Physical Exam    Constitutional: She appears well-developed and well-nourished. No distress.   HENT:   Head: Normocephalic and atraumatic.   Eyes: EOM are normal.   Neck: No tracheal deviation present. No JVD present.   Normal range of motion.  Cardiovascular:  Normal rate and regular rhythm.           Pulmonary/Chest: Breath sounds normal. No stridor.   Abdominal: Bowel sounds are normal. She exhibits no distension. There is no abdominal tenderness. There is no rebound and no guarding.   Musculoskeletal:         General: Normal range of motion.      Cervical back: Normal range of motion.     Neurological: She is alert and oriented to person, place, and time. She has normal strength.   Skin: Skin is warm and dry.         ED Course   Procedures  Labs Reviewed   CBC W/ AUTO DIFFERENTIAL - Abnormal       Result Value    WBC 5.98      RBC 4.95      Hemoglobin 13.2      Hematocrit 41.6      MCV 84      MCH 26.7 (*)     MCHC 31.7 (*)     RDW 14.4      Platelets 294      MPV 10.3      Immature Granulocytes 0.2      Gran # (ANC) 2.8      Immature Grans (Abs) 0.01      Lymph # 2.8      Mono # 0.3      Eos # 0.1      Baso # 0.03      nRBC 0      Gran % 47.0      Lymph % 46.3      Mono % 4.5      Eosinophil % 1.5      Basophil % 0.5      Differential Method Automated      Narrative:     Release to  patient->Immediate   COMPREHENSIVE METABOLIC PANEL - Abnormal    Sodium 139      Potassium 3.6      Chloride 102      CO2 25      Glucose 352 (*)     BUN 8      Creatinine 1.0      Calcium 10.5      Total Protein 7.9      Albumin 4.0      Total Bilirubin 0.5      Alkaline Phosphatase 105      AST 22      ALT 18      eGFR >60.0      Anion Gap 12      Narrative:     Release to patient->Immediate   HIV 1 / 2 ANTIBODY    HIV 1/2 Ag/Ab Non-reactive      Narrative:     Release to patient->Immediate   HEPATITIS C ANTIBODY    Hepatitis C Ab Non-reactive      Narrative:     Release to patient->Immediate   TYPE & SCREEN        ECG Results              EKG 12-lead (Final result)        Collection Time Result Time QRS Duration OHS QTC Calculation    09/26/24 12:10:06 09/26/24 12:23:01 76 457                     Final result by Interface, Lab In [facility] (09/26/24 12:23:10)                   Narrative:    Test Reason : K92.1,    Vent. Rate : 072 BPM     Atrial Rate : 072 BPM     P-R Int : 130 ms          QRS Dur : 076 ms      QT Int : 418 ms       P-R-T Axes : 014 071 081 degrees     QTc Int : 457 ms    Normal sinus rhythm  Nonspecific ST abnormality  Abnormal ECG  When compared with ECG of 02-MAY-2014 02:12,  Nonspecific T wave abnormality now evident in Lateral leads  Confirmed by IRAM DAWSON MD ([id number]) on 9/26/2024 12:22:53 PM    Referred By: [email or web address]   SELF           Confirmed By:IRAM DAWSON MD                                  Imaging Results    None          Medications - No data to display  Medical Decision Making  Dee Hamilton is a 69 y.o. female with PMH significant for DMII, HTN, HLD, and Primary HyperPTH presenting with melena. Pt denies abdominal pain, fever, chills, chest pain, SOB, confusion, LOC, or numbness and tingling. She does endorse some lightheadedness but no other sxs. Pt denies having any pain with recent BMs. Pt reports taking bismuth over the last few days as well. She describes she came in  after noticing an isolated, well formed dark colored stool earlier today. Pt reports having a routine EGD/Colonscopy back in April and that the report back was unremarkable. Patient denies changes to diet, medications, or activities.     Fecal occult test negative   CBC - H/H = 13.2/41.6  CMP   Suspect the changes in stool color are secondary to recent bismuth intake. H/H stable with no other concerning lab findings.     Dispo:   Patient is stable and ready for discharge.     Amount and/or Complexity of Data Reviewed  Labs:  Decision-making details documented in ED Course.  ECG/medicine tests:  Decision-making details documented in ED Course.               ED Course as of 09/26/24 1330   Thu Sep 26, 2024   1221 EKG 12-lead  Normal sinus rhythm, intervals within normal limits.  No acute ischemia. [AC]   1241 Hemoglobin: 13.2  Stable [AC]      ED Course User Index  [AC] Alverto Lee DO                           Clinical Impression:  Final diagnoses:  [K92.1] Melena          ED Disposition Condition    Discharge Stable          ED Prescriptions    None       Follow-up Information       Follow up With Specialties Details Why Contact Info    Farzana Dorman MD Internal Medicine Schedule an appointment as soon as possible for a visit   [address] [place] [address]  [phone]               Chucho Rabago MD  Resident  09/26/24 1330

## 2024-09-26 NOTE — TELEPHONE ENCOUNTER
----- Message from Ann Ash sent at 9/26/2024  2:40 PM CDT -----  Regarding: referral: trulicity and Semglee  Good Afternoon,    Patient has expressed financial difficulty for two prescriptions Trulicity and Semglee are there any savings programs/  coupons?      Best Regards,    Ann

## 2024-09-27 ENCOUNTER — PATIENT OUTREACH (OUTPATIENT)
Dept: EMERGENCY MEDICINE | Facility: HOSPITAL | Age: 69
End: 2024-09-27
Payer: MEDICARE

## 2024-09-27 NOTE — PROGRESS NOTES
I have reviewed and concur with Dr. Reid 's history, physical, assessment, and plan.  I have personally interviewed and examined the patient.  See below addendum for my evaluation and additional findings.    Agree with switch to ozempic  Continue basal insulin   Goal fasting  - 150    Visit today included increased complexity in with the care of the problems addressed and managing the longitudinal care of the patient due to the serious and/or complex managed problems     Sol Kaba MD

## 2024-09-30 ENCOUNTER — PATIENT OUTREACH (OUTPATIENT)
Dept: ADMINISTRATIVE | Facility: OTHER | Age: 69
End: 2024-09-30
Payer: MEDICARE

## 2024-09-30 NOTE — PROGRESS NOTES
CHW - Follow Up    This Community Health Worker completed a follow up visit with patient via telephone today.    Pt/Caregiver reported: pt was in the hospital on 9/26/24 and most likely missed the call from PAP team because of that    Pt stated she picked up the Ozempic for this month    But it was expensive    Community Health Worker provided: CHW read chart note from PAP team about Ozempic assistance     Pt verbalized understanding and stated she will check her mailbox for letter from PAP team      Follow up required: yes    Follow-up Outreach - Due: 10/4/2024

## 2024-10-02 ENCOUNTER — TELEPHONE (OUTPATIENT)
Dept: OPTOMETRY | Facility: CLINIC | Age: 69
End: 2024-10-02
Payer: MEDICARE

## 2024-10-03 ENCOUNTER — TELEPHONE (OUTPATIENT)
Dept: OPTOMETRY | Facility: CLINIC | Age: 69
End: 2024-10-03
Payer: MEDICARE

## 2024-10-03 NOTE — TELEPHONE ENCOUNTER
----- Message from Shayy sent at 10/3/2024  8:37 AM CDT -----  Contact: [phone] or [phone]  Patient is calling to reschedule appointment. Please call to assist. Unable to RS VF patient ill

## 2024-10-04 ENCOUNTER — PATIENT OUTREACH (OUTPATIENT)
Dept: ADMINISTRATIVE | Facility: OTHER | Age: 69
End: 2024-10-04
Payer: MEDICARE

## 2024-10-04 NOTE — PROGRESS NOTES
CHW - Outreach Attempt    Community Health Worker left a voicemail message for 1st attempt to contact patient regarding: letter mailed from PAP team    Community Health Worker to attempt to contact patient on: 10/4/24

## 2024-10-07 ENCOUNTER — PATIENT OUTREACH (OUTPATIENT)
Dept: ADMINISTRATIVE | Facility: OTHER | Age: 69
End: 2024-10-07
Payer: MEDICARE

## 2024-10-07 NOTE — PROGRESS NOTES
CHW - Outreach Attempt    Community Health Worker left a voicemail message for 2nd attempt to contact patient regarding: mailed letter from PAP team    Community Health Worker to attempt to contact patient on: 10/7/24

## 2024-10-09 ENCOUNTER — PATIENT OUTREACH (OUTPATIENT)
Dept: ADMINISTRATIVE | Facility: OTHER | Age: 69
End: 2024-10-09
Payer: MEDICARE

## 2024-11-16 ENCOUNTER — PATIENT MESSAGE (OUTPATIENT)
Dept: ADMINISTRATIVE | Facility: HOSPITAL | Age: 69
End: 2024-11-16
Payer: MEDICARE

## 2025-01-05 ENCOUNTER — HOSPITAL ENCOUNTER (EMERGENCY)
Facility: HOSPITAL | Age: 70
Discharge: HOME | End: 2025-01-05
Attending: EMERGENCY MEDICINE
Payer: MEDICARE

## 2025-01-05 VITALS
RESPIRATION RATE: 18 BRPM | TEMPERATURE: 98 F | OXYGEN SATURATION: 96 % | SYSTOLIC BLOOD PRESSURE: 136 MMHG | DIASTOLIC BLOOD PRESSURE: 88 MMHG | WEIGHT: 170 LBS | BODY MASS INDEX: 26.68 KG/M2 | HEIGHT: 67 IN | HEART RATE: 74 BPM

## 2025-01-05 DIAGNOSIS — R10.32 LLQ ABDOMINAL PAIN: Primary | ICD-10-CM

## 2025-01-05 DIAGNOSIS — E11.65 UNCONTROLLED DIABETES MELLITUS WITH HYPERGLYCEMIA, WITH LONG-TERM CURRENT USE OF INSULIN: Chronic | ICD-10-CM

## 2025-01-05 DIAGNOSIS — Z79.4 TYPE 2 DIABETES MELLITUS WITH HYPERGLYCEMIA, WITH LONG-TERM CURRENT USE OF INSULIN: ICD-10-CM

## 2025-01-05 DIAGNOSIS — R73.9 HYPERGLYCEMIA: ICD-10-CM

## 2025-01-05 DIAGNOSIS — E11.65 TYPE 2 DIABETES MELLITUS WITH HYPERGLYCEMIA, WITH LONG-TERM CURRENT USE OF INSULIN: ICD-10-CM

## 2025-01-05 DIAGNOSIS — Z79.4 UNCONTROLLED DIABETES MELLITUS WITH HYPERGLYCEMIA, WITH LONG-TERM CURRENT USE OF INSULIN: Chronic | ICD-10-CM

## 2025-01-05 DIAGNOSIS — I10 HYPERTENSION, UNSPECIFIED TYPE: ICD-10-CM

## 2025-01-05 DIAGNOSIS — E87.6 HYPOKALEMIA: ICD-10-CM

## 2025-01-05 LAB
ALBUMIN SERPL BCP-MCNC: 3.9 G/DL (ref 3.5–5.2)
ALP SERPL-CCNC: 110 U/L (ref 40–150)
ALT SERPL W/O P-5'-P-CCNC: 10 U/L (ref 10–44)
ANION GAP SERPL CALC-SCNC: 15 MMOL/L (ref 8–16)
AST SERPL-CCNC: 17 U/L (ref 10–40)
BACTERIA #/AREA URNS AUTO: ABNORMAL /HPF
BASOPHILS # BLD AUTO: 0.03 K/UL (ref 0–0.2)
BASOPHILS NFR BLD: 0.4 % (ref 0–1.9)
BILIRUB SERPL-MCNC: 0.6 MG/DL (ref 0.1–1)
BILIRUB UR QL STRIP: NEGATIVE
BUN SERPL-MCNC: 6 MG/DL (ref 6–30)
BUN SERPL-MCNC: 7 MG/DL (ref 8–23)
CALCIUM SERPL-MCNC: 10.3 MG/DL (ref 8.7–10.5)
CHLORIDE SERPL-SCNC: 103 MMOL/L (ref 95–110)
CHLORIDE SERPL-SCNC: 104 MMOL/L (ref 95–110)
CLARITY UR REFRACT.AUTO: CLEAR
CO2 SERPL-SCNC: 21 MMOL/L (ref 23–29)
COLOR UR AUTO: YELLOW
CREAT SERPL-MCNC: 0.5 MG/DL (ref 0.5–1.4)
CREAT SERPL-MCNC: 0.8 MG/DL (ref 0.5–1.4)
DIFFERENTIAL METHOD BLD: NORMAL
EOSINOPHIL # BLD AUTO: 0 K/UL (ref 0–0.5)
EOSINOPHIL NFR BLD: 0.5 % (ref 0–8)
ERYTHROCYTE [DISTWIDTH] IN BLOOD BY AUTOMATED COUNT: 12.7 % (ref 11.5–14.5)
EST. GFR  (NO RACE VARIABLE): >60 ML/MIN/1.73 M^2
GLUCOSE SERPL-MCNC: 329 MG/DL (ref 70–110)
GLUCOSE SERPL-MCNC: 331 MG/DL (ref 70–110)
GLUCOSE UR QL STRIP: ABNORMAL
HCT VFR BLD AUTO: 43.5 % (ref 37–48.5)
HCT VFR BLD CALC: 43 %PCV (ref 36–54)
HGB BLD-MCNC: 14.1 G/DL (ref 12–16)
HGB UR QL STRIP: NEGATIVE
IMM GRANULOCYTES # BLD AUTO: 0.02 K/UL (ref 0–0.04)
IMM GRANULOCYTES NFR BLD AUTO: 0.3 % (ref 0–0.5)
KETONES UR QL STRIP: ABNORMAL
LEUKOCYTE ESTERASE UR QL STRIP: NEGATIVE
LIPASE SERPL-CCNC: 8 U/L (ref 4–60)
LYMPHOCYTES # BLD AUTO: 3 K/UL (ref 1–4.8)
LYMPHOCYTES NFR BLD: 40.2 % (ref 18–48)
MCH RBC QN AUTO: 27.8 PG (ref 27–31)
MCHC RBC AUTO-ENTMCNC: 32.4 G/DL (ref 32–36)
MCV RBC AUTO: 86 FL (ref 82–98)
MICROSCOPIC COMMENT: ABNORMAL
MONOCYTES # BLD AUTO: 0.4 K/UL (ref 0.3–1)
MONOCYTES NFR BLD: 4.8 % (ref 4–15)
NEUTROPHILS # BLD AUTO: 4 K/UL (ref 1.8–7.7)
NEUTROPHILS NFR BLD: 53.8 % (ref 38–73)
NITRITE UR QL STRIP: POSITIVE
NRBC BLD-RTO: 0 /100 WBC
PH UR STRIP: 6 [PH] (ref 5–8)
PLATELET # BLD AUTO: 316 K/UL (ref 150–450)
PMV BLD AUTO: 10.1 FL (ref 9.2–12.9)
POC IONIZED CALCIUM: 1.22 MMOL/L (ref 1.06–1.42)
POC TCO2 (MEASURED): 23 MMOL/L (ref 23–29)
POCT GLUCOSE: 202 MG/DL (ref 70–110)
POTASSIUM BLD-SCNC: 3.3 MMOL/L (ref 3.5–5.1)
POTASSIUM SERPL-SCNC: 3.3 MMOL/L (ref 3.5–5.1)
PROT SERPL-MCNC: 8.1 G/DL (ref 6–8.4)
PROT UR QL STRIP: NEGATIVE
RBC # BLD AUTO: 5.08 M/UL (ref 4–5.4)
SAMPLE: ABNORMAL
SODIUM BLD-SCNC: 140 MMOL/L (ref 136–145)
SODIUM SERPL-SCNC: 140 MMOL/L (ref 136–145)
SP GR UR STRIP: 1.03 (ref 1–1.03)
SQUAMOUS #/AREA URNS AUTO: 3 /HPF
URN SPEC COLLECT METH UR: ABNORMAL
WBC # BLD AUTO: 7.51 K/UL (ref 3.9–12.7)
WBC #/AREA URNS AUTO: 2 /HPF (ref 0–5)
YEAST UR QL AUTO: ABNORMAL

## 2025-01-05 PROCEDURE — 25000003 PHARM REV CODE 250: Performed by: PHYSICIAN ASSISTANT

## 2025-01-05 PROCEDURE — 83690 ASSAY OF LIPASE: CPT | Performed by: PHYSICIAN ASSISTANT

## 2025-01-05 PROCEDURE — 25500020 PHARM REV CODE 255: Performed by: EMERGENCY MEDICINE

## 2025-01-05 PROCEDURE — 82330 ASSAY OF CALCIUM: CPT

## 2025-01-05 PROCEDURE — 80053 COMPREHEN METABOLIC PANEL: CPT | Performed by: PHYSICIAN ASSISTANT

## 2025-01-05 PROCEDURE — 81001 URINALYSIS AUTO W/SCOPE: CPT | Performed by: PHYSICIAN ASSISTANT

## 2025-01-05 PROCEDURE — 96361 HYDRATE IV INFUSION ADD-ON: CPT

## 2025-01-05 PROCEDURE — 63600175 PHARM REV CODE 636 W HCPCS: Performed by: PHYSICIAN ASSISTANT

## 2025-01-05 PROCEDURE — 99285 EMERGENCY DEPT VISIT HI MDM: CPT | Mod: 25

## 2025-01-05 PROCEDURE — 96375 TX/PRO/DX INJ NEW DRUG ADDON: CPT

## 2025-01-05 PROCEDURE — 85025 COMPLETE CBC W/AUTO DIFF WBC: CPT | Performed by: PHYSICIAN ASSISTANT

## 2025-01-05 PROCEDURE — 80047 BASIC METABLC PNL IONIZED CA: CPT | Mod: 91

## 2025-01-05 PROCEDURE — 82962 GLUCOSE BLOOD TEST: CPT

## 2025-01-05 PROCEDURE — 96374 THER/PROPH/DIAG INJ IV PUSH: CPT

## 2025-01-05 RX ORDER — CEPHALEXIN 500 MG/1
500 CAPSULE ORAL EVERY 12 HOURS
Qty: 14 CAPSULE | Refills: 0 | Status: SHIPPED | OUTPATIENT
Start: 2025-01-05 | End: 2025-01-12

## 2025-01-05 RX ORDER — PEN NEEDLE, DIABETIC 30 GX3/16"
NEEDLE, DISPOSABLE MISCELLANEOUS DAILY
Qty: 100 EACH | Refills: 6 | Status: SHIPPED | OUTPATIENT
Start: 2025-01-05

## 2025-01-05 RX ORDER — CEFTRIAXONE 1 G/1
1 INJECTION, POWDER, FOR SOLUTION INTRAMUSCULAR; INTRAVENOUS
Status: COMPLETED | OUTPATIENT
Start: 2025-01-05 | End: 2025-01-05

## 2025-01-05 RX ORDER — SODIUM CHLORIDE 9 MG/ML
1000 INJECTION, SOLUTION INTRAVENOUS
Status: COMPLETED | OUTPATIENT
Start: 2025-01-05 | End: 2025-01-05

## 2025-01-05 RX ORDER — LANCETS 33 GAUGE
1 EACH MISCELLANEOUS
Qty: 100 EACH | Refills: 3 | Status: SHIPPED | OUTPATIENT
Start: 2025-01-05

## 2025-01-05 RX ORDER — MORPHINE SULFATE 4 MG/ML
4 INJECTION, SOLUTION INTRAMUSCULAR; INTRAVENOUS
Status: COMPLETED | OUTPATIENT
Start: 2025-01-05 | End: 2025-01-05

## 2025-01-05 RX ORDER — INSULIN GLARGINE-YFGN 100 [IU]/ML
26 INJECTION, SOLUTION SUBCUTANEOUS NIGHTLY
Qty: 15 ML | Refills: 6 | Status: SHIPPED | OUTPATIENT
Start: 2025-01-05

## 2025-01-05 RX ORDER — POTASSIUM CHLORIDE 20 MEQ/1
20 TABLET, EXTENDED RELEASE ORAL ONCE
Status: COMPLETED | OUTPATIENT
Start: 2025-01-05 | End: 2025-01-05

## 2025-01-05 RX ADMIN — IOHEXOL 100 ML: 350 INJECTION, SOLUTION INTRAVENOUS at 02:01

## 2025-01-05 RX ADMIN — MORPHINE SULFATE 4 MG: 4 INJECTION INTRAVENOUS at 11:01

## 2025-01-05 RX ADMIN — SODIUM CHLORIDE 1000 ML: 9 INJECTION, SOLUTION INTRAVENOUS at 01:01

## 2025-01-05 RX ADMIN — CEFTRIAXONE 1 G: 1 INJECTION, POWDER, FOR SOLUTION INTRAMUSCULAR; INTRAVENOUS at 03:01

## 2025-01-05 RX ADMIN — POTASSIUM CHLORIDE 20 MEQ: 1500 TABLET, EXTENDED RELEASE ORAL at 03:01

## 2025-01-05 NOTE — ED PROVIDER NOTES
Encounter Date: 1/5/2025       History     Chief Complaint   Patient presents with    Abdominal Pain     L side of abd pain started wed,, hx divertic     69 y.o. Female with a PMHx of T2DM, HTN, HLD, diverticulitis presents to the ED with LLQ abdominal pain x1 week. She had non-diarrhea initially though has not had any in a few days. Last BM was 3-4 days ago. She is also having dysuria and lower back pain as well. She denies fever, chills, flank pain, bloody stool, N/V, hematuria.     The history is provided by the patient.     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     Past Surgical History:   Procedure Laterality Date    BLADDER SUSPENSION  12/2011    CATARACT EXTRACTION, BILATERAL      CHOLECYSTECTOMY      COLD KNIFE CONIZATION OF CERVIX N/A 09/29/2021    Procedure: CONE BIOPSY, CERVIX, USING COLD KNIFE;  Surgeon: Sheeba Frank MD;  Location: [facility] OR;  Service: OB/GYN;  Laterality: N/A;    COLONOSCOPY N/A 4/30/2024    Procedure: COLONOSCOPY;  Surgeon: Elisa Horn MD;  Location: [facility] ENDOSCOPY;  Service: Endoscopy;  Laterality: N/A;  Ref by Dr LENA Dorman, [facility] -   3[facility]20 [facility]  4/22/24- pt ran out of Trulicity and knows not to take prior to procedure if she is able to get it before then. pc complete. DBM    ESOPHAGOGASTRODUODENOSCOPY N/A 4/30/2024    Procedure: EGD (ESOPHAGOGASTRODUODENOSCOPY);  Surgeon: Elisa Horn MD;  Location: [facility] ENDOSCOPY;  Service: Endoscopy;  Laterality: N/A;    EYE SURGERY      Glaucoma Laser Sx ou      HYSTEROSCOPY WITH DILATION AND CURETTAGE OF UTERUS N/A 09/29/2021    Procedure:  HYSTEROSCOPY, WITH DILATION AND CURETTAGE OF UTERUS;  Surgeon: Sheeba Frank MD;  Location: [facility];  Service: OB/GYN;  Laterality: N/A;    OOPHORECTOMY      TONSILLECTOMY      TUBAL LIGATION      vaginal mesh       Family History   Problem Relation Name Age of Onset    Cataracts Mother Rafia Hubbard     Hypertension Mother Rafia Hubbard     Dementia Mother Rafia Hubbard     Cancer Father 68 jaw ca 68        Jaw Bone    Cataracts Father 68 jaw ca     Diabetes Sister Josseline Hayden     Cataracts Sister Brianna     Cancer Brother          pancreatic    Pancreatic cancer Brother      Ulcers Brother      Diabetes Daughter Beth     Diabetes Son Lucia Jr.     No Known Problems Son Sergo     Amblyopia Neg Hx      Blindness Neg Hx      Glaucoma Neg Hx      Macular degeneration Neg Hx      Retinal detachment Neg Hx      Strabismus Neg Hx      Stroke Neg Hx      Thyroid disease Neg Hx      Breast cancer Neg Hx      Colon cancer Neg Hx      Ovarian cancer Neg Hx       Social History     Tobacco Use    Smoking status: Former     Current packs/day: 0.00     Average packs/day: 2.0 packs/day for 25.0 years (50.0 ttl pk-yrs)     Types: Cigarettes     Start date:      Quit date: 2015     Years since quittin.6    Smokeless tobacco: Never   Substance Use Topics    Alcohol use: Yes    Drug use: No     Review of Systems   Constitutional:  Negative for fever.   Gastrointestinal:  Positive for abdominal pain and diarrhea. Negative for blood in stool, nausea and vomiting.   Genitourinary:  Positive for dysuria. Negative for flank pain and hematuria.       Physical Exam     Initial Vitals [25 1035]   BP Pulse Resp Temp SpO2   (!) 149/85 98 16 98.3 °F (36.8 °C) 98 %      MAP       --         Physical Exam    Nursing note and vitals reviewed.  Constitutional: She appears well-developed and well-nourished. She is not diaphoretic. No distress.   HENT:   Head: Normocephalic and atraumatic.   Nose: Nose normal.   Eyes:  Conjunctivae and EOM are normal.   Neck: Neck supple.   Cardiovascular:  Normal rate.           Pulmonary/Chest: No respiratory distress.   Abdominal: She exhibits no distension. There is abdominal tenderness in the left lower quadrant.   No right CVA tenderness.  No left CVA tenderness. There is no guarding.   Musculoskeletal:      Cervical back: Neck supple.     Neurological: She is alert and oriented to person, place, and time. Gait normal.   Skin: No rash noted.   Psychiatric: She has a normal mood and affect. Thought content normal.         ED Course   Procedures  Labs Reviewed   COMPREHENSIVE METABOLIC PANEL - Abnormal       Result Value    Sodium 140      Potassium 3.3 (*)     Chloride 104      CO2 21 (*)     Glucose 329 (*)     BUN 7 (*)     Creatinine 0.8      Calcium 10.3      Total Protein 8.1      Albumin 3.9      Total Bilirubin 0.6      Alkaline Phosphatase 110      AST 17      ALT 10      eGFR >60.0      Anion Gap 15     URINALYSIS, REFLEX TO URINE CULTURE - Abnormal    Specimen UA Urine, Clean Catch      Color, UA Yellow      Appearance, UA Clear      pH, UA 6.0      Specific Gravity, UA 1.030      Protein, UA Negative      Glucose, UA 4+ (*)     Ketones, UA Trace (*)     Bilirubin (UA) Negative      Occult Blood UA Negative      Nitrite, UA Positive (*)     Leukocytes, UA Negative      Narrative:     Specimen Source->Urine   URINALYSIS MICROSCOPIC - Abnormal    WBC, UA 2      Bacteria Many (*)     Yeast, UA None      Squam Epithel, UA 3      Microscopic Comment SEE COMMENT      Narrative:     Specimen Source->Urine   ISTAT PROCEDURE - Abnormal    POC Glucose 331 (*)     POC BUN 6      POC Creatinine 0.5      POC Sodium 140      POC Potassium 3.3 (*)     POC Chloride 103      POC TCO2 (MEASURED) 23      POC Ionized Calcium 1.22      POC Hematocrit 43      Sample YANELIS     POCT GLUCOSE - Abnormal    POCT Glucose 202 (*)    CBC W/ AUTO DIFFERENTIAL    WBC 7.51      RBC 5.08      Hemoglobin 14.1       Hematocrit 43.5      MCV 86      MCH 27.8      MCHC 32.4      RDW 12.7      Platelets 316      MPV 10.1      Immature Granulocytes 0.3      Gran # (ANC) 4.0      Immature Grans (Abs) 0.02      Lymph # 3.0      Mono # 0.4      Eos # 0.0      Baso # 0.03      nRBC 0      Gran % 53.8      Lymph % 40.2      Mono % 4.8      Eosinophil % 0.5      Basophil % 0.4      Differential Method Automated     LIPASE    Lipase 8     ISTAT CHEM8   POCT GLUCOSE MONITORING CONTINUOUS          Imaging Results              CT Abdomen Pelvis With IV Contrast NO Oral Contrast (Final result)  Result time 01/05/25 14:41:46      Final result by Yao Ramirez MD (01/05/25 14:41:46)                   Impression:      1. There is urinary bladder wall thickening, correlation with urinalysis recommended to exclude changes of cystitis.  2. Colonic diverticulosis, no convincing inflammation to suggest diverticulitis.  3. Findings suggest hepatic steatosis, correlation with LFTs recommended.  4. Please see above for several additional findings.      Electronically signed by: Yao Ramirez MD  Date:    01/05/2025  Time:    14:41               Narrative:    EXAMINATION:  CT ABDOMEN PELVIS WITH IV CONTRAST    CLINICAL HISTORY:  LLQ abdominal pain;    TECHNIQUE:  Low dose axial images, sagittal and coronal reformations were obtained from the lung bases to the pubic symphysis following the IV administration of 100 mL of Omnipaque 350 .  Oral contrast was not given.    COMPARISON:  CT 03/25/2022    FINDINGS:  Images of the lower thorax are unremarkable.    The liver is hypoattenuating suggesting steatosis, correlation with LFTs recommended.  The spleen, and adrenal glands are grossly unremarkable.  There is atrophic change of the pancreas without pancreatic ductal dilation.  The gallbladder is surgically absent noting stable prominence of the common duct.  Stomach is decompressed without wall thickening.  There is a small hiatal hernia.  There may  be sequela of gastroesophageal reflux.  The portal vein, splenic vein, SMV, celiac axis and SMA all are patent.  No significant abdominal lymphadenopathy.    The kidneys enhance symmetrically without hydronephrosis or nephrolithiasis.  Subcentimeter low attenuating lesions arise from the kidneys, too small for characterization.  The bilateral ureters are unremarkable without calculi seen.  The urinary bladder is nondistended noting wall thickening.  The uterus and adnexa are unremarkable.  No significant free fluid in the pelvis.    There are a few scattered colonic diverticula without surrounding inflammation to suggest diverticulitis.  The terminal ileum is unremarkable.  The appendix is unremarkable.  The small bowel is grossly unremarkable.  There are a few scattered shotty periaortic, pericaval, and mesenteric lymph nodes.  There is atherosclerotic calcification of the aorta and its branches.    There is osteopenia.  There are degenerative changes of the spine.  No significant inguinal lymphadenopathy.                                       Medications   morphine injection 4 mg (4 mg Intravenous Given 1/5/25 1132)   0.9% NaCl infusion (0 mLs Intravenous Stopped 1/5/25 1528)   iohexoL (OMNIPAQUE 350) injection 100 mL (100 mLs Intravenous Given 1/5/25 1432)   cefTRIAXone injection 1 g (1 g Intravenous Given 1/5/25 1517)   potassium chloride SA CR tablet 20 mEq (20 mEq Oral Given 1/5/25 1542)     Medical Decision Making  69 y.o. Female with a PMHx of T2DM, HTN, HLD, diverticulitis presents to the ED with LLQ abdominal pain x1 week. Nontoxic appearing. Vitals with HTN. Afebrile. Exam as above. I will initiate workup and reassess.    Ddx:  Diverticulitis, bowel perforation, intra-abdominal abscess, UTI, pyelonephritis    Urinalysis concerning for UTI. Nitrite positive.  Negative for blood or leukocytes.  Many bacteria on microscopic.  No signs of SIRS or sepsis such as fever, leukocytosis, tachycardia, hypotension.   CT AP with bladder wall thickening supportive diagnosis of UTI.  No kidney abnormalities noted on CT concerning for pyelonephritis. Patient does not have CVA tenderness on exam. I Do not suspect pyelonephritis at this time.  Patient was treated with Rocephin in the ED.  Keflex sent to pharmacy of choice for UTI.    No CT findings in the left lower quadrant that may explain patient's pain.  Patient notes improvement of her pain on reassessment    Chemistry with hyperglycemia.  Bicarb is mildly low though she has a normal anion gap.  Patient states that she has been out of her insulin for the past few months.  Fluids given with downtrending glucose.  Her insulin was refilled today.  I advised that patient follow up with her PCP or endocrinologist for further management of diabetes    Potassium mildly low today.  Replacement given    At this time patient is stable for discharge. Strict ED precautions given to return immediately for new, worsening, or concerning symptoms    Amount and/or Complexity of Data Reviewed  Labs: ordered. Decision-making details documented in ED Course.  Radiology: ordered.    Risk  OTC drugs.  Prescription drug management.               ED Course as of 01/05/25 1558   Sun Jan 05, 2025   1140 WBC: 7.51 [HM]   1438 Blood, UA: Negative [HM]   1438 NITRITE UA(!): Positive [HM]   1438 Leukocyte Esterase, UA: Negative [HM]   1438 Bacteria, UA(!): Many [HM]   1438 Potassium(!): 3.3 [HM]   1438 CO2(!): 21 [HM]   1438 Glucose(!): 329 [HM]   1438 Creatinine: 0.8 [HM]   1438 WBC: 7.51 [HM]   1454 POCT Glucose(!): 202  downtrending [HM]      ED Course User Index  [HM] Luna Forte PA-C                           Clinical Impression:  Final diagnoses:  [R10.32] LLQ abdominal pain (Primary)  [R73.9] Hyperglycemia  [E11.65, Z79.4] Uncontrolled diabetes mellitus with hyperglycemia, with long-term current use of insulin (Chronic)  [E87.6] Hypokalemia  [I10] Hypertension, unspecified type          ED  "Disposition Condition    Discharge Stable          ED Prescriptions       Medication Sig Dispense Start Date End Date Auth. Provider    insulin glargine-yfgn (SEMGLEE,INSULIN GLARG-YFGN,PEN) 100 unit/mL (3 mL) InPn Inject 26 Units into the skin every evening. 15 mL 1/5/2025 -- Luna Forte PA-C    pen needle, diabetic (RELION PEN NEEDLES) 32 gauge x 5/32" Ndle Use as directed 50 each 1/5/2025 -- Luna Forte PA-C    lancets 33 gauge Misc 1 lancet  by Misc.(Non-Drug; Combo Route) route as needed. True plus 100 each 1/5/2025 -- Luna Forte PA-C    cephALEXin (KEFLEX) 500 MG capsule Take 1 capsule (500 mg total) by mouth every 12 (twelve) hours. for 7 days 14 capsule 1/5/2025 1/12/2025 Luna Forte PA-C          Follow-up Information       Follow up With Specialties Details Why Contact Info    Tremaine Chong MD Endocrinology Schedule an appointment as soon as possible for a visit   [address] [facility] [address]  [phone]      Farzana Dorman MD Internal Medicine Schedule an appointment as soon as possible for a visit   [address] [facility] [address]  [phone]      [facility] - Emergency Dept Emergency Medicine  If symptoms worsen [address] [id number] [phone]             Luna Forte PA-C  01/05/25 1558    "

## 2025-01-05 NOTE — ED NOTES
I-STAT Chem-8+ Results:   Value Reference Range   Sodium 140 136-145 mmol/L   Potassium  3.3 3.5-5.1 mmol/L   Chloride 103  mmol/L   Ionized Calcium 1.22 1.06-1.42 mmol/L   CO2 (measured) 23 23-29 mmol/L   Glucose 331  mg/dL   BUN 6 6-30 mg/dL   Creatinine 0.5 0.5-1.4 mg/dL   Hematocrit 43 36-54%

## 2025-01-05 NOTE — DISCHARGE INSTRUCTIONS
You have a urinary tract infection.  I will treat this with an antibiotic known as Keflex.  Take the entire course as directed.    Your blood glucose is elevated today.  I refilled your insulin.  Please use as directed.  Please monitor your blood glucose at home.  Please follow up with your Endocrinologist and primary care provider for management of diabetes    Your blood pressure is elevated today.  Please take your blood pressure medication as directed    Strict ED precautions given to return immediately for new, worsening, or concerning symptoms

## 2025-01-29 DIAGNOSIS — H40.1111 PRIMARY OPEN ANGLE GLAUCOMA (POAG) OF RIGHT EYE, MILD STAGE: ICD-10-CM

## 2025-01-29 DIAGNOSIS — H40.022 OPEN ANGLE WITH BORDERLINE FINDINGS, HIGH RISK, LEFT: ICD-10-CM

## 2025-01-29 NOTE — TELEPHONE ENCOUNTER
----- Message from Giana sent at 1/29/2025  1:25 PM CST -----  Pt stated need a telephone call in regards to her diabetes medicine and her eye drops.    Thanks

## 2025-01-29 NOTE — TELEPHONE ENCOUNTER
----- Message from Zac sent at 1/29/2025  1:38 PM CST -----  Regarding: Callback Request - Medications  Contact: Pt [phone]  .1MEDICALADVICE     Patient is calling for Medical Advice regarding: Patient needs a callback in regard to diabetic needles. She said they were changed (she believes) and she wants to make sure with office. She also had questions regarding medications she takes. Please call patient to discuss.    How long has patient had these symptoms:    Pharmacy name and phone#:    Patient wants a call back or thru [facility]: Call    Comments:    Please advise patient replies from provider may take up to 48 hours.

## 2025-01-30 RX ORDER — TRAVOPROST OPHTHALMIC SOLUTION 0.04 MG/ML
1 SOLUTION OPHTHALMIC NIGHTLY
Qty: 7.5 EACH | Refills: 0 | Status: SHIPPED | OUTPATIENT
Start: 2025-01-30 | End: 2026-01-30

## 2025-01-31 ENCOUNTER — PATIENT MESSAGE (OUTPATIENT)
Dept: OPTOMETRY | Facility: CLINIC | Age: 70
End: 2025-01-31
Payer: MEDICARE

## 2025-02-17 DIAGNOSIS — H40.022 OPEN ANGLE WITH BORDERLINE FINDINGS, HIGH RISK, LEFT: ICD-10-CM

## 2025-02-17 DIAGNOSIS — H40.1111 PRIMARY OPEN ANGLE GLAUCOMA (POAG) OF RIGHT EYE, MILD STAGE: ICD-10-CM

## 2025-02-17 RX ORDER — TRAVOPROST OPHTHALMIC SOLUTION 0.04 MG/ML
1 SOLUTION OPHTHALMIC NIGHTLY
Qty: 7.5 EACH | Refills: 0 | OUTPATIENT
Start: 2025-02-17 | End: 2026-02-17

## 2025-02-24 DIAGNOSIS — Z00.00 ENCOUNTER FOR MEDICARE ANNUAL WELLNESS EXAM: ICD-10-CM

## 2025-03-05 DIAGNOSIS — F33.9 EPISODE OF RECURRENT MAJOR DEPRESSIVE DISORDER, UNSPECIFIED DEPRESSION EPISODE SEVERITY: ICD-10-CM

## 2025-03-05 DIAGNOSIS — I10 HYPERTENSION, UNSPECIFIED TYPE: ICD-10-CM

## 2025-03-05 DIAGNOSIS — F41.9 ANXIETY: ICD-10-CM

## 2025-03-05 DIAGNOSIS — R60.0 BILATERAL LEG EDEMA: ICD-10-CM

## 2025-03-05 RX ORDER — SEMAGLUTIDE 0.68 MG/ML
INJECTION, SOLUTION SUBCUTANEOUS
Qty: 39.75 ML | Refills: 0 | OUTPATIENT
Start: 2025-03-05 | End: 2026-04-02

## 2025-03-05 RX ORDER — FUROSEMIDE 20 MG/1
20 TABLET ORAL DAILY
Qty: 90 TABLET | Refills: 3 | OUTPATIENT
Start: 2025-03-05

## 2025-03-05 RX ORDER — PAROXETINE HYDROCHLORIDE 20 MG/1
30 TABLET, FILM COATED ORAL DAILY
Qty: 135 TABLET | Refills: 2 | OUTPATIENT
Start: 2025-03-05

## 2025-03-05 NOTE — TELEPHONE ENCOUNTER
I declined her refill requests as they all have refills at the pharm.  She gets Semaglutide from Endo and should be sched this month for fu but I don't see where she has an appt.  Can someone help her make one?  Dr. GONSALO Isbell

## 2025-03-05 NOTE — TELEPHONE ENCOUNTER
----- Message from Chiquita sent at 3/5/2025  7:55 AM CST -----  Contact: Self/ [phone]  Requesting an RX refill or new RX.Is this a refill or new RX: NewRX name and strength:  paroxetine (PAXIL) 20 MG tabletIs this a 30 day or 90 day RX: 30 Pharmacy name and phone # :  [facility] [place] [address]Qekua: [phone] Fax: [phone] doctors have asked that we provide their patients with the following 2 reminders -- prescription refills can take up to 72 hours, and a friendly reminder that in the future you can use your [facility] account to request refillsRequesting an RX refill or new RX.Is this a refill or new RX: NewRX name and strength :  furosemide (LASIX) 20 MG tabletIs this a 30 day or 90 day RX: 30Pharmacy name and phone # :  [facility] [place] [address]Qpxud: [phone] Fax: [phone] doctors have asked that we provide their patients with the following 2 reminders -- prescription refills can take up to 72 hours, and a friendly reminder that in the future you can use your [facility] account to request refills: Requesting an RX refill or new RX.Is this a refill or new RX: NewRX name and strength :  semaglutide (OZEMPIC) 0.25 mg or 0.5 mg (2 mg/3 mL) pen injectorIs this a 30 day or 90 day RX: 30Pharmacy name and phone # :  [facility] [address]Wdtow: [phone] Fax: [phone] doctors have asked that we provide their patients with the following 2 reminders -- prescription refills can take up to 72 hours, and a friendly reminder that in the future you can use your [facility] account to request refills:

## 2025-03-05 NOTE — TELEPHONE ENCOUNTER
Care Due:                  Date            Visit Type   Department     Provider  --------------------------------------------------------------------------------                                EP -                              PRIMARY      [facility] PRIMARY  Last Visit: 09-      CARE ([facility])   CARE           Farzanajet Ralphe                              EP -                              PRIMARY      [facility] PRIMARY  Next Visit: 03-      CARE ([facility])   CARE           Farzana  Giambrone                                                            Last  Test          Frequency    Reason                     Performed    Due Date  --------------------------------------------------------------------------------    Lipid Panel.  12 months..  rosuvastatin.............  06- 05-    Vitamin D...  12 months..  ergocalciferol...........  04- 04-    [employer] Embedded Care Due Messages. Reference number: [id number].   3/05/2025 8:21:12 AM CST

## 2025-03-06 ENCOUNTER — TELEPHONE (OUTPATIENT)
Dept: OPTOMETRY | Facility: CLINIC | Age: 70
End: 2025-03-06
Payer: MEDICARE

## 2025-03-07 ENCOUNTER — TELEPHONE (OUTPATIENT)
Dept: PRIMARY CARE CLINIC | Facility: CLINIC | Age: 70
End: 2025-03-07
Payer: MEDICARE

## 2025-03-07 DIAGNOSIS — F41.9 ANXIETY: ICD-10-CM

## 2025-03-07 DIAGNOSIS — F33.9 EPISODE OF RECURRENT MAJOR DEPRESSIVE DISORDER, UNSPECIFIED DEPRESSION EPISODE SEVERITY: ICD-10-CM

## 2025-03-07 RX ORDER — PAROXETINE HYDROCHLORIDE 20 MG/1
30 TABLET, FILM COATED ORAL DAILY
Qty: 135 TABLET | Refills: 1 | Status: SHIPPED | OUTPATIENT
Start: 2025-03-07

## 2025-03-07 NOTE — TELEPHONE ENCOUNTER
No care due was identified.  [facility] Embedded Care Due Messages. Reference number: [id number].   3/07/2025 10:03:09 AM CST

## 2025-03-07 NOTE — TELEPHONE ENCOUNTER
----- Message from Zac sent at 3/7/2025  9:46 AM CST -----  Regarding: Refill Request  Contact: Pt [phone]  Requesting an RX refill or new RX.Is this a refill or new RX: RefillRX name and strength (copy/paste from chart):  paroxetine (PAXIL) 20 MG tabletIs this a 30 day or 90 day RX: Pharmacy name and phone # (copy/paste from chart):  [facility] [facility] [address]Bbipv: [phone] Fax: [phone]: Patient says she is thinking she has dementia and she needs this RX.

## 2025-03-07 NOTE — TELEPHONE ENCOUNTER
----- Message from Scarlett sent at 3/7/2025  8:53 AM CST -----  Contact: Patient [phone]  Patient would like to get medical advice.Symptoms (please be specific):   Pt states she was in [place] visiting her sister. Pt states she was seen at the  in [place] and advised she has the early onset of dementia. Pt also states her Rx for paroxetine (PAXIL) 20 MG tablet was denied by Dr Dorman. Pt is asking to have something called in til her 03/13/2025 apptHow long have you had these symptoms: N/AWould you like a call back, or a response through your [facility] portal?:   call back Pharm[facility] name and phone # (copy from chart[facility]   [facility] [address]Oypne: [phone] Fax: [phone] Comments:  ----- Message -----  From: Scarlett Vinson  Sent: 3/7/2025   8:58 AM CST  To: Lakia Yanes Staff    Patient would like to get medical advice.Symptoms (please be specific):   Pt states she was in [place] visiting her sister. Pt states she was seen at the  in [place] and advised she has the early onset of dementia. Pt also states her Rx for paroxetine (PAXIL) 20 MG tablet was denied by Dr Dorman. How long have you had these symptoms: N/AWould you like a call back, or a response through your [facility] portal?:   call back Pharmacy name and phone # (copy from chart):   [facility] [address]Bwfvn: [phone] Fax: [phone] Comments:

## 2025-03-07 NOTE — TELEPHONE ENCOUNTER
Spoke with patient, informed her refills can be obtain via her pharmacy. Also she can discuss with provider memory issues. She states she will have her   meds.

## 2025-03-10 NOTE — PROGRESS NOTES
"[facility] Note    Chief Complaint      Chief Complaint   Patient presents with    Annual Exam       History of Present Illness      Dee Hamilton is a 69 y.o. .   F with Anxiety, Depression, Glaucoma, HLD, Hypercalcemia. She reports she ran out of her meds a few wks ago. "I've been in a funk". She reoprts issues affording her meds.   Last visit - 9/19/24    Interim:   Hosp -1/5/25  - LLQ pain x 1 wk. CT - A/P - 1/5/25. urinary bladder wall thickening, correlation with urinalysis recommended to exclude changes of cystitis. Colonic diverticulosis, no convincing inflammation to suggest diverticulitis.Findings suggest hepatic steatosis, correlation with LFTs recommended.Was tx with Rocpehin and keflex for cystitis   ED - 9/26/24 - Melena - H/H - stable     Hypercalcemia - She was taking calcium but has since stopped this. Vit D was elevated. It was not deficient.  PTH level remains high at 126.  Calcium 10.5 down from 10.6.  I suspect primary hyperparathyroidism.  This will need to continue to be monitored. Cont. to hydrate well and do not take any calcium supplements.   Fu by Endo.  Pt last seen by Endo in Sept.  Will order labs.  Rec she make appt with Endo.      Candida intertrigo- Rec drying well after bathing/showering.  Rx Nystatin powder and if no help will change to Clotrimazole cream. It helps.      Hypokalemia - Potassium is back to nl at 3.8.  Pt off all meds incl Kcl 20 meQ daily.      Osteopenia - DEXA/Bone Density which showed osteopenia. I rec  Vit D3 2000 units/d OTC.  In addition, I rec weight-bearing  exercise at least 30 minutes 3 times/wk and ideally 5 times/wk.  No specific intensity.  Walking is fine. I just rec  a form of weight-bearing exercise pt enjoys to facilitate long term compliance and benefit. Repeat DEXA in 2  yrs.     Noncompliance - Pt prev. stopped her Metformin due to Ha and abd pain.  "It just felt like every day I get up and I'm just there".  Sounds like " "she is depressed.  She is overdue for her Endo fu. We discussed the imp. of compliance and calling with any concerns. She is not taking her Protonix G I just Rx for her. Cont Trulicitiy.   Will not resume Metformin at this time since it made her feel poorly.      Memory issues - I forget where I put things off and on. [facility]  Refer toNeuroPsych referral again as she never went. She writes things in her note book. no evid of NPH on MRI. Pt reports imbalance, Incontinence, and memory issues. She has known Mixed urinary incontinence and had a prev bladder supsension. Carotid U/S - 6/1/21 - no evid of sig stenosis. Vitamin B1 level and it was normal.  Vitamin b12 was elevated. Her folic acid was normal.    has noticed she is more forgetful in doing chores around the house (like emptying the  or taking the clothes out of the dryer". Pt scored 27/30 on MMSE - 8/11/22. If someone has to ask her how she is she has to stop and think about how to respond so she has the words in her mind to answer correctly. "My tooth brush may be in the refrigerator.  Sometimes I have to remember how to drive so I stop driving". Note -she has multiple pill bottles for Atorvastatin/Repaglinide which are very full and it does not appear she has been taking these regularly. Folic acid and vitamin b12 are normal.  MRI Brain - has not yet had.  was in hospital. MRI Brain - 6/22/23 - mild generalized cerebral volume loss, without evidence of hydrocephalus. Mild patchy T2/FLAIR hyperintensity in the supratentorial white matter, nonspecific but most likely reflecting chronic small vessel ischemic changes. No evidence of recent or remote major vascular distribution infarct. These finding can be seen with aging and with high cholesterol and diabetes.  We will continue to work on keeping these things controlled.      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 ECC neg. EMBx " "insufficient. Pt is s/p hysteroscopy/D&C and CKC 9/29/21.  Has upcoming fu to discuss poss. Hys. + Spotting. CT abd/Pelvis - 3/25/22 -Punctate low-density foci in both kidneys too small to adequately characterize.   Colonic diverticulosis with no evidence for acute diverticulitis. No further recommendations Pelvic U/S - 5/31/22- wnl.   PAP -11/7/23 - neg.      Anxiety - She ran out of her her Paroxetine 30 mg/d.  "It takes the edge off".   D/w pt withdrawal effects with abrupt discontinuation.  "I go down a rabbit hole every now and then". It's depression more so than Anxiety because she doesn't go out much".  Pt talks to someone who is a friend of her daughter which has helped. She would like a support animal but her  does not like dogs. Playing with the neighbor's dog is helpful.  Prev fu by Jonna NICOLE.  Referred to Psychiatry. Her  has cancer and is doing better  and she has a new grandson and she puts everyone before herself. Her  has Cancer and is doing well. She declines counseling.      Depression - She ran out of  her Paroxetine 30 mg/d.   "It takes the edge off".  D/w pt withdrawal effects with abrupt discontinuation.  Referred to Psychiatry.  Pt talks to someone who is a friend of her daughter which has helped.  "It comes and goes". She has been meditating which helps. "My son is a trigger".  has Ca and is doing better.  Her 2 kids live in [facility]. She has a son on the [place] but they don't speak often. She declines counseling.      Glaucoma - Fu by Adilson. Fu by Dr. Nereyda De Anda.  Planning for fu with Dr. Carver.      HLD -  Cholesterol was high despite being on Atorvastatin 80 mg daily.  We changed this this to crestor 40 mg/d. If remains high can possibly add Zetia to her regimen     Hypercalcemia/Hyperparathyroidism -  Prev fu by Dr. Jarek Davison.  Vit D low normal - 22- low  4/15/24. ionized calcium - 1.27 - wnl and  iPTH - 109- 4/15/24 (Range - ). Vit D - " "95- 9/19/23 up from 24.  Note - HCTZ prev stopped.  repeat labs as prev ordered.      DM - II  - Dx '02 - Ha1c was 10.4. still uncontrolled.   Pt off trulicity  3 mg wkly, Prandin (not effective) and Metformin.  She is on Ozempic 0.5mg/d, Semglee 36 units QHS.  She c/o burning in her feet to her knees.  Pt on Gabapentin 100 mg QHs.  Glc . Will not resume Metformin if causing GI issues. Repeat labs.  Glucose was elevated at 368 and she was spilling glucose into her urine due to elevated blood glucose levels. Dr Kaba saw pt in sept - switched her to ozempic.  Endo note from Sept rec 26 u Semglee.  Unclear what quinones pt is actually giving herself.  Her  acc her today and plans to assist her with med admin.  4/15/24+MAB - 33. Continue your current dose of Losartan.      Unexplained wt loss - Down 12 lb since last visit.   She reports she has been eating less and is not as hungry. "I don't eat sweets anymore shelli I don't have a taste for it".   Planning for EGD/C-scope     Vit D def - 95 - 9/19/23. Pt not on suppl. She is fu by endo. She completed Ergocalciferol 50,000 units once weekly x 8 wks.  Pt forgets to take her meds.  Pt on once monthly Ergo.      Mixed urinary Incontinence - Cystocele - Fu by  UroGyn, Dr. Coleman. Myrbetriq- couldn't afford. She prefers not to go back to Dr. Dinh.  She had  Prev bladder suspension in '11. Checked MRI Brain to r/o  NPH with abn gait, imbalance, memory issues, and Incontinence.  No evid of this on MRI. Pt doesn't take Elavil 10 mg QHS prn.  Referred to Pelvic Floor PTx.      Diaphragmatic hernia - No issues at present.  Will cont to monitor. She does have GERD. Rec smaller more freq meals.  EGD - 4/30/24 - 3 cm hiatal hernia, gastritis.      Colon polyps  - C-scope - 4/30/24 - polyps and hemorrhoids - repeat 7 yrs     Diverticulosis - No issues at present. Diarrhea resolved with changing to Metformin ER.       Imbalance -  I'm not wobbling as much any more and " haven't fallen or had dizziness. My mood swings are gone and it's much better .  I suspect this has improved since resuming her SSRI. She describes it as bouncing when she walks but then describes walking into things and being off balance.       Fatty Liver - Noted on CT abd/pelvis -2/22/20.   Rec she  limit tylenol and alcohol.  Rec diet and exercise for wt loss   As discussed at visit there is a potential for cirrhosis.   FIB-4 Calculation: 1.57 at 9/19/2024 11:46 AM   FIB-4 below 1.30 is considered as low-risk for advanced fibrosis  FIB-4 over 2.67 is considered as high-risk for advanced fibrosis  FIB-4 values between 1.30 and 2.67 are considered as intermediate-risk of advanced fibrosis for ages 36-64.   For ages > 64 the cut-off for low-risk goes to < 2.     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     Small fat containing umbilical hernia and Small fat containing left inguinal hernia - Noted on chart review.       Atherosclerosis aorta - Seen on prev CT scans.  Cont Statin.      Rectocele - Fu by Uro/GYN. Planning for Pelvic Floor PTx.     Acc by      HCM - Flu -  9/19/24; RSV - 12/14/23;  Tdap - ? At [facility];  PCV 13 - 10/24/19;  PVX 23 - 3/1/21;  Shingrix -#1 - 10/24/19 and #2 -12/30/19- she thinks she had shingles in Jan. 2022 to Left shoulder/flank x 2 wks;  COVID - 19 Vaccine (Moderna) #1 - 2/15/21; #2 - 3/15/21; # 3 - 10/27/21; # 4 10/16/22; # 5 10/24/23;  # 6 4; Hep C Screen -2/7/23 - neg. +h/o CHUN II; C-scope -  4/30/24 polyps, hemorrhoids,  and diverticulosis - repeat 7 yrs At [facility]; PAP - 11/7/23 - neg.; MGM - 7/31/24 - repeat 1 yr;  DEXA -3/8/23 - repeat 2 yrs; Prev PCP - Dr Benito at [facility] [place]; Endo - Dr Gonzales; Ophtho - Dr. Dawn; Optometry - Dr. Young; GI - Dr. Jarvis; Uro/GYN - ; OB/GYN - Dr. Frank; Podiatry - Dr. Young; Derm - Dr. Kuhn     Patient Care Team:  Farzana Dorman  MD MERYL as PCP - General (Internal Medicine)  Ivy Wharton MD (Family Medicine)  Crystal Almanza RD, CDE as Dietitian (Diabetes)  Heriberto Man MD as Consulting Physician (Gastroenterology)     Health Maintenance:  Immunization History   Administered Date(s) Administered    COVID-19 MRNA, LN-S PF (MODERNA HALF 0.25 ML DOSE) 10/27/2021    COVID-19, MRNA, LN-S, PF (MODERNA FULL 0.5 ML DOSE) 02/15/2021, 03/15/2021    COVID-19, mRNA, LNP-S, PF (Moderna) Ages 12+ 10/24/2023    COVID-19, mRNA, LNP-S, bivalent booster, PF (Moderna Omicron)12 + YEARS 10/06/2022    Influenza (FLUAD) - Quadrivalent - Adjuvanted - PF *Preferred* (65+) 10/27/2021, 09/14/2022, 09/19/2023    Influenza - Intradermal - Quadrivalent - PF 11/27/2013    Influenza - Quadrivalent - High Dose - PF (65 years and older) 09/08/2020    Influenza - Quadrivalent - PF *Preferred* (6 months and older) 10/17/2014, 10/25/2016, 10/23/2017, 10/15/2018    Influenza - Trivalent - Afluria, Fluzone MDV 10/24/2011, 10/17/2014, 10/23/2017    Influenza - Trivalent - Fluarix, Flulaval, Fluzone, Afluria - PF 10/25/2016    Influenza - Trivalent - Fluzone High Dose - PF (65 years and older) 10/24/2019    Pneumococcal Conjugate - 13 Valent 10/24/2019    Pneumococcal Polysaccharide - 23 Valent 11/16/2015, 03/01/2021    RSVpreF (Arexvy) 12/14/2023    Zoster Recombinant 10/24/2019, 12/30/2019      Health Maintenance   Topic Date Due    TETANUS VACCINE  Never done    LDCT Lung Screen  06/18/2015    Diabetic Eye Exam  04/13/2024    Lipid Panel  06/01/2024    Hemoglobin A1c  07/15/2024    Influenza Vaccine (1) 09/01/2024    COVID-19 Vaccine (6 - 2024-25 season) 09/01/2024    Diabetes Urine Screening  04/15/2025    Mammogram  07/31/2025    Foot Exam  09/24/2025    DEXA Scan  03/08/2026    Colorectal Cancer Screening  04/30/2031    Hepatitis C Screening  Completed    Shingles Vaccine  Completed    RSV Vaccine (Age 60+ and Pregnant patients)  Completed    Pneumococcal Vaccines  (Age 50+)  Completed        Past Medical History:  Past Medical History:   Diagnosis Date    Anxiety     Bilateral leg edema 6/8/2021    Depression     Diabetes mellitus, type 2     Diverticulitis     GERD (gastroesophageal reflux disease)     Glaucoma     Hyperlipidemia     Hypertension     Nicotine dependence in remission     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       Past Surgical History:   has a past surgical history that includes vaginal mesh; Bladder suspension (12/2011); Glaucoma Laser Sx ou; Tonsillectomy; Cholecystectomy; Cataract extraction, bilateral; Oophorectomy; Eye surgery; Hysteroscopy with dilation and curettage of uterus (N/A, 09/29/2021); Cold knife conization of cervix (N/A, 09/29/2021); Tubal ligation; Colonoscopy (N/A, 4/30/2024); and Esophagogastroduodenoscopy (N/A, 4/30/2024).    Family History:  family history includes Cancer in her brother; Cancer (age of onset: 68) in her father; Cataracts in her father, mother, and sister; Dementia in her mother; Diabetes in her daughter, sister, and son; Hypertension in her mother; No Known Problems in her son; Pancreatic cancer in her brother; Ulcers in her brother.     Social History:  Social History[1]    Review of Systems   Constitutional:  Negative for chills, fever and night sweats.   HENT:  Positive for hearing loss and postnasal drip. Negative for nasal congestion and sore throat.          reports hearing loss. Off and on - rec resume nasal spray   Respiratory:  Positive for cough. Negative for chest tightness and shortness of breath.         Dry cough   Cardiovascular:  Negative for chest pain and palpitations.   Gastrointestinal:  Positive for abdominal pain, diarrhea and reflux. Negative for constipation, nausea and vomiting.        C/o dec appetite. Abd pain and diarrhea depending on what she eats. She ran out of PPI - will resume as she reports it helps when she takes it.    Endocrine: Positive for polydipsia. Negative for cold  "intolerance and heat intolerance.        On occasion.    Genitourinary:  Negative for bladder incontinence, dysuria, frequency and hematuria.   Musculoskeletal:  Positive for arthralgias and leg pain. Negative for myalgias.        Legs and rt knee.  + Feet; arms; lower back   Neurological:  Positive for dizziness, headaches and memory loss.   Psychiatric/Behavioral:  Positive for depressed mood. Negative for suicidal ideas. The patient is nervous/anxious.         Medications:  Current Medications[2]     Allergies:  Review of patient's allergies indicates:  No Known Allergies    Physical Exam      Vital Signs  Temp: 98.2 °F (36.8 °C)  Temp Source: Oral  Pulse: 77  SpO2: 95 %  BP: (!) 142/82  BP Location: Right arm  Patient Position: Sitting  Pain Score: 0-No pain  Height and Weight  Height: 5' 7" (170.2 cm)  Weight: 74.9 kg (165 lb 2 oz)  BSA (Calculated - sq m): 1.88 sq meters  BMI (Calculated): 25.9  Weight in (lb) to have BMI = 25: 159.3      Patient Position: Sitting     Physical Exam  Vitals reviewed.   Constitutional:       General: She is not in acute distress.     Appearance: Normal appearance. She is not ill-appearing, toxic-appearing or diaphoretic.   HENT:      Head: Normocephalic and atraumatic.      Ears:      Comments: Osei cerumen - unable to fully visualize TM's; unable to remove manually.     Mouth/Throat:      Mouth: Mucous membranes are moist.      Pharynx: No posterior oropharyngeal erythema.      Comments: Dentures.   Eyes:      Extraocular Movements: Extraocular movements intact.      Conjunctiva/sclera: Conjunctivae normal.      Pupils: Pupils are equal, round, and reactive to light.      Comments: Osei arcus senilis   Neck:      Vascular: No carotid bruit.   Cardiovascular:      Rate and Rhythm: Normal rate and regular rhythm.      Pulses: Normal pulses.   Pulmonary:      Effort: Pulmonary effort is normal. No respiratory distress.      Breath sounds: Normal breath sounds.   Abdominal:      " General: Bowel sounds are normal. There is no distension.      Palpations: Abdomen is soft.      Tenderness: There is no abdominal tenderness. There is no guarding or rebound.   Musculoskeletal:         General: Normal range of motion.      Comments: Wide based shuffling gait.    Skin:     General: Skin is warm.   Neurological:      General: No focal deficit present.      Mental Status: She is alert and oriented to person, place, and time.   Psychiatric:         Mood and Affect: Mood normal.         Behavior: Behavior normal.          Laboratory:  CBC:  Recent Labs   Lab 12/14/23  1200 09/26/24  1210 01/05/25  1122 01/05/25  1128   WBC 7.04 5.98 7.51  --    RBC 4.94 4.95 5.08  --    Hemoglobin 13.4 13.2 14.1  --    POC Hematocrit  --   --   --  43   Hematocrit 42.1 41.6 43.5  --    Platelets 338 294 316  --    MCV 85 84 86  --    MCH 27.1 26.7 L 27.8  --    MCHC 31.8 L 31.7 L 32.4  --        CMP:  Recent Labs   Lab 04/15/24  1411 09/26/24  1210 01/05/25  1122   Glucose 111 H 352 H 329 H   Calcium 10.5 10.5 10.3   Albumin 4.0 4.0 3.9   Total Protein 7.7 7.9 8.1   Sodium 142 139 140   Potassium 3.8 3.6 3.3 L   CO2 27 25 21 L   Chloride 105 102 104   BUN 9 8 7 L   Creatinine 0.7 1.0 0.8   Alkaline Phosphatase 105 105 110   ALT 25 18 10   AST 38 22 17   Total Bilirubin 0.4 0.5 0.6           URINALYSIS:  Recent Labs   Lab 02/28/24  1218 01/05/25  1404   Color, UA Straw Yellow   Clarity, UA Slightly Cloudy  --    Spec Grav UA 1.010  --    Specific Gravity, UA  --  1.030   pH, UA 6.5 6.0   Protein, UA  --  Negative   Bacteria  --  Many A   Nitrite, UA neg Positive A   Leukocytes, UA  --  Negative   Urobilinogen, UA normal  --         LIPIDS:  Recent Labs   Lab 06/22/22  1110 09/14/22  1240 02/24/23  1207 06/01/23  1006   TSH 0.871  --  0.966  --    HDL  --  32 L  --  40   Cholesterol  --  194  --  207 H   Triglycerides  --  272 H  --  160 H   LDL Cholesterol  --  107.6  --  135.0   HDL/Cholesterol Ratio  --  16.5 L  --   19.3 L   Non-HDL Cholesterol  --  162  --  167   Total Cholesterol/HDL Ratio  --  6.1 H  --  5.2 H       TSH:  Recent Labs   Lab 06/22/22  1110 02/24/23  1207   TSH 0.871 0.966       A1C:  Recent Labs   Lab 06/22/22  1110 09/14/22  1240 02/24/23  1207 06/01/23  1006 09/19/23  1245 11/02/23  1015 04/15/24  1411   Hemoglobin A1C 10.9 H 10.1 H 11.9 H 8.9 H 13.0 H 11.3 H 10.4 H       Urine Microalbumin/Cr:  Recent Labs   Lab 06/22/22  1228 02/24/23  1159 04/15/24  1422   Microalb/Creat Ratio 56.5 H Unable to calculate 33.2 H         Other:   Recent Labs   Lab 09/14/22  1240 04/18/23  1023 04/15/24  1411   Vit D, 25-Hydroxy 33  --  22 L   Vitamin B-12  --  605  --      Recent Labs   Lab 09/26/24  1210   Hepatitis C Ab Non-reactive       Assessment/Plan     Dee Hamilton is a 69 y.o.female with:    Hypertension, unspecified type  -     furosemide (LASIX) 20 MG tablet; Take 1 tablet (20 mg total) by mouth once daily.  Dispense: 90 tablet; Refill: 3  - Slightly uncontrolled. Will repeat BP.   Cont current.     Bilateral leg edema  -     furosemide (LASIX) 20 MG tablet; Take 1 tablet (20 mg total) by mouth once daily.  Dispense: 90 tablet; Refill: 3  - Stable.  Cont current regimen.    Hyperlipidemia associated with type 2 diabetes mellitus  - Stable.  Cont current regimen. Repeat FLP as prev ordered.     Type 2 diabetes mellitus with diabetic neuropathy, with long-term current use of insulin    Vitamin D deficiency  -     ergocalciferol (VITAMIN D2) 50,000 unit Cap; Take 1 capsule (50,000 Units total) by mouth every 30 days.  Dispense: 3 capsule; Refill: 1  - repeat level. Rec Ergo monthly.     Memory impairment  -     Ambulatory referral/consult to Adult Neuropsychology; Future; Expected date: 03/20/2025  - Refer to NeuroPsych.  plans to assist pt with med admin.     Hearing impaired person, bilateral  -     Ambulatory referral/consult to Audiology; Future; Expected date: 03/20/2025  -     Ambulatory  referral/consult to ENT; Future; Expected date: 2025  - Rfer to ENT and Audiology.    Hyperparathyroidism  -repeat PTH and Vit D as prev ordered.   Other orders  -     pantoprazole (PROTONIX) 40 MG tablet; Take 1 tablet (40 mg total) by mouth once daily.  Dispense: 30 tablet; Refill: 2         Chronic conditions status updated as per HPI.  Other than changes above, cont current medications and maintain follow up with specialists.    Follow up in about 3 months (around 2025).      Farzana Dorman MD  [facility]                       [1]   Social History  Tobacco Use    Smoking status: Former     Current packs/day: 0.00     Average packs/day: 2.0 packs/day for 25.0 years (50.0 ttl pk-yrs)     Types: Cigarettes     Start date:      Quit date: 2015     Years since quittin.7    Smokeless tobacco: Never   Substance Use Topics    Alcohol use: Not Currently    Drug use: No   [2]   Current Outpatient Medications:     blood sugar diagnostic Strp, To check BG 2 times daily, to use with insurance preferred meter, Disp: 200 strip, Rfl: 3    blood-glucose meter kit, To check BG 2 times daily, to use with insurance preferred meter, Disp: 1 each, Rfl: 0    blood-glucose sensor (DEXCOM G7 SENSOR) Louise, 1 each by Misc.(Non-Drug; Combo Route) route every 10 days., Disp: 3 each, Rfl: 11    clotrimazole (LOTRIMIN) 1 % cream, Apply topically 2 (two) times daily., Disp: 60 g, Rfl: 1    CONTOUR TEST STRIPS Strp, USE TO TEST BLOOD SUGAR TWICE DAILY, Disp: 50 strip, Rfl: 3    gabapentin (NEURONTIN) 100 MG capsule, Take 1 capsule (100 mg total) by mouth every evening., Disp: 90 capsule, Rfl: 3    insulin glargine-yfgn (SEMGLEE,INSULIN GLARG-YFGN,PEN) 100 unit/mL (3 mL) InPn, Inject 26 Units into the skin every evening., Disp: 15 mL, Rfl: 6    lancets 33 gauge Misc, 1 lancet  by Misc.(Non-Drug; Combo Route) route as needed. True plus, Disp: 100 each, Rfl: 3    LIDOCAINE 2 %, VALIUM 5 MG, BACLOFEN 4 %  "SUPPOSITORY, Place 1 suppository vaginally 2 (two) times daily as needed (pelvic pain)., Disp: 20 each, Rfl: 5    losartan (COZAAR) 100 MG tablet, Take 1 tablet (100 mg total) by mouth once daily., Disp: 90 tablet, Rfl: 1    methocarbamoL (ROBAXIN) 500 MG Tab, Take 1 tablet (500 mg total) by mouth 3 (three) times daily., Disp: 60 tablet, Rfl: 1    paroxetine (PAXIL) 20 MG tablet, Take 1 & 1/2 tablets (30 mg total) by mouth once daily., Disp: 135 tablet, Rfl: 1    pen needle, diabetic 32 gauge x 5/32" Ndle, Use daily to inject insulin as directed, Disp: 100 each, Rfl: 6    phenazopyridine (PYRIDIUM) 200 MG tablet, Take 1 tablet (200 mg total) by mouth 3 (three) times daily as needed for Pain., Disp: 10 tablet, Rfl: 0    potassium chloride SA (K-DUR,KLOR-CON) 20 MEQ tablet, Take 1 tablet (20 mEq total) by mouth once daily., Disp: 90 tablet, Rfl: 3    rosuvastatin (CRESTOR) 40 MG Tab, Take 1 tablet (40 mg total) by mouth every evening., Disp: 90 tablet, Rfl: 3    semaglutide (OZEMPIC) 0.25 mg or 0.5 mg (2 mg/3 mL) pen injector, Inject 0.25 mg into the skin every 7 days for 28 days, THEN 0.5 mg every 7 days. Start with 0.25 mg weekly and increase to 0.5 mg weekly after 4 weeks., Disp: 39.75 mL, Rfl: 0    ergocalciferol (VITAMIN D2) 50,000 unit Cap, Take 1 capsule (50,000 Units total) by mouth every 30 days., Disp: 3 capsule, Rfl: 1    furosemide (LASIX) 20 MG tablet, Take 1 tablet (20 mg total) by mouth once daily., Disp: 90 tablet, Rfl: 3    pantoprazole (PROTONIX) 40 MG tablet, Take 1 tablet (40 mg total) by mouth once daily., Disp: 30 tablet, Rfl: 2    travoprost (TRAVATAN Z) 0.004 % ophthalmic solution, Place 1 drop into both eyes every evening., Disp: 7.5 each, Rfl: 0    "

## 2025-03-13 ENCOUNTER — PATIENT MESSAGE (OUTPATIENT)
Dept: OTOLARYNGOLOGY | Facility: CLINIC | Age: 70
End: 2025-03-13
Payer: MEDICARE

## 2025-03-13 ENCOUNTER — PATIENT MESSAGE (OUTPATIENT)
Dept: ADMINISTRATIVE | Facility: HOSPITAL | Age: 70
End: 2025-03-13
Payer: MEDICARE

## 2025-03-13 ENCOUNTER — OFFICE VISIT (OUTPATIENT)
Dept: PRIMARY CARE CLINIC | Facility: CLINIC | Age: 70
End: 2025-03-13
Payer: MEDICARE

## 2025-03-13 ENCOUNTER — TELEPHONE (OUTPATIENT)
Dept: OTOLARYNGOLOGY | Facility: CLINIC | Age: 70
End: 2025-03-13
Payer: MEDICARE

## 2025-03-13 VITALS
HEIGHT: 67 IN | TEMPERATURE: 98 F | HEART RATE: 77 BPM | BODY MASS INDEX: 25.92 KG/M2 | WEIGHT: 165.13 LBS | SYSTOLIC BLOOD PRESSURE: 142 MMHG | DIASTOLIC BLOOD PRESSURE: 82 MMHG | OXYGEN SATURATION: 95 %

## 2025-03-13 DIAGNOSIS — I10 HYPERTENSION, UNSPECIFIED TYPE: Primary | ICD-10-CM

## 2025-03-13 DIAGNOSIS — H40.1111 PRIMARY OPEN ANGLE GLAUCOMA (POAG) OF RIGHT EYE, MILD STAGE: ICD-10-CM

## 2025-03-13 DIAGNOSIS — H91.93 HEARING IMPAIRED PERSON, BILATERAL: ICD-10-CM

## 2025-03-13 DIAGNOSIS — E11.69 HYPERLIPIDEMIA ASSOCIATED WITH TYPE 2 DIABETES MELLITUS: ICD-10-CM

## 2025-03-13 DIAGNOSIS — E11.40 TYPE 2 DIABETES MELLITUS WITH DIABETIC NEUROPATHY, WITH LONG-TERM CURRENT USE OF INSULIN: ICD-10-CM

## 2025-03-13 DIAGNOSIS — H40.022 OPEN ANGLE WITH BORDERLINE FINDINGS, HIGH RISK, LEFT: ICD-10-CM

## 2025-03-13 DIAGNOSIS — E55.9 VITAMIN D DEFICIENCY: ICD-10-CM

## 2025-03-13 DIAGNOSIS — E78.5 HYPERLIPIDEMIA ASSOCIATED WITH TYPE 2 DIABETES MELLITUS: ICD-10-CM

## 2025-03-13 DIAGNOSIS — R60.0 BILATERAL LEG EDEMA: ICD-10-CM

## 2025-03-13 DIAGNOSIS — R41.3 MEMORY IMPAIRMENT: ICD-10-CM

## 2025-03-13 DIAGNOSIS — Z79.4 TYPE 2 DIABETES MELLITUS WITH DIABETIC NEUROPATHY, WITH LONG-TERM CURRENT USE OF INSULIN: ICD-10-CM

## 2025-03-13 DIAGNOSIS — E21.3 HYPERPARATHYROIDISM: ICD-10-CM

## 2025-03-13 PROCEDURE — 1159F MED LIST DOCD IN RCRD: CPT | Mod: CPTII,S$GLB,, | Performed by: INTERNAL MEDICINE

## 2025-03-13 PROCEDURE — 3288F FALL RISK ASSESSMENT DOCD: CPT | Mod: CPTII,S$GLB,, | Performed by: INTERNAL MEDICINE

## 2025-03-13 PROCEDURE — 3077F SYST BP >= 140 MM HG: CPT | Mod: CPTII,S$GLB,, | Performed by: INTERNAL MEDICINE

## 2025-03-13 PROCEDURE — 99214 OFFICE O/P EST MOD 30 MIN: CPT | Mod: S$GLB,,, | Performed by: INTERNAL MEDICINE

## 2025-03-13 PROCEDURE — 1126F AMNT PAIN NOTED NONE PRSNT: CPT | Mod: CPTII,S$GLB,, | Performed by: INTERNAL MEDICINE

## 2025-03-13 PROCEDURE — 3008F BODY MASS INDEX DOCD: CPT | Mod: CPTII,S$GLB,, | Performed by: INTERNAL MEDICINE

## 2025-03-13 PROCEDURE — 99999 PR PBB SHADOW E&M-EST. PATIENT-LVL V: CPT | Mod: PBBFAC,,, | Performed by: INTERNAL MEDICINE

## 2025-03-13 PROCEDURE — 3079F DIAST BP 80-89 MM HG: CPT | Mod: CPTII,S$GLB,, | Performed by: INTERNAL MEDICINE

## 2025-03-13 PROCEDURE — 1101F PT FALLS ASSESS-DOCD LE1/YR: CPT | Mod: CPTII,S$GLB,, | Performed by: INTERNAL MEDICINE

## 2025-03-13 PROCEDURE — 1160F RVW MEDS BY RX/DR IN RCRD: CPT | Mod: CPTII,S$GLB,, | Performed by: INTERNAL MEDICINE

## 2025-03-13 RX ORDER — PANTOPRAZOLE SODIUM 40 MG/1
40 TABLET, DELAYED RELEASE ORAL DAILY
Qty: 30 TABLET | Refills: 2 | Status: SHIPPED | OUTPATIENT
Start: 2025-03-13

## 2025-03-13 RX ORDER — TRAVOPROST OPHTHALMIC SOLUTION 0.04 MG/ML
1 SOLUTION OPHTHALMIC NIGHTLY
Qty: 7.5 EACH | Refills: 0 | Status: SHIPPED | OUTPATIENT
Start: 2025-03-13 | End: 2026-03-13

## 2025-03-13 RX ORDER — ERGOCALCIFEROL 1.25 MG/1
50000 CAPSULE ORAL
Qty: 3 CAPSULE | Refills: 1 | Status: SHIPPED | OUTPATIENT
Start: 2025-03-13

## 2025-03-13 RX ORDER — FUROSEMIDE 20 MG/1
20 TABLET ORAL DAILY
Qty: 90 TABLET | Refills: 3 | Status: SHIPPED | OUTPATIENT
Start: 2025-03-13

## 2025-03-14 ENCOUNTER — TELEPHONE (OUTPATIENT)
Dept: PRIMARY CARE CLINIC | Facility: CLINIC | Age: 70
End: 2025-03-14
Payer: MEDICARE

## 2025-03-14 NOTE — TELEPHONE ENCOUNTER
----- Message from Farzana Dorman MD sent at 3/13/2025  5:51 PM CDT -----  I don't see her Repeat BPLabs were supposed to be done as ordered in Sept. Please ensure pt gets scheduled to perfrom these as she did not get them after our visit. Dr. GONSALO Isbell

## 2025-03-21 ENCOUNTER — PATIENT OUTREACH (OUTPATIENT)
Dept: ADMINISTRATIVE | Facility: HOSPITAL | Age: 70
End: 2025-03-21
Payer: MEDICARE

## 2025-03-21 NOTE — LETTER
AUTHORIZATION FOR RELEASE OF   CONFIDENTIAL INFORMATION    Dear [facility],    We are seeing Dee Hamilton, date of birth 1955, in the clinic at [facility]. Farzana Dorman MD is the patient's PCP. Dee Hamilton has an outstanding lab/procedure at the time we reviewed her chart. In order to help keep her health information updated, she has authorized us to request the following medical record(s):        (  )  MAMMOGRAM                                      (  )  COLONOSCOPY      (  )  PAP SMEAR                                          (  )  OUTSIDE LAB RESULTS     (  )  DEXA SCAN                                          ( X )  EYE EXAM            (  )  FOOT EXAM                                          (  )  ENTIRE RECORD     (  )  OUTSIDE IMMUNIZATIONS                 (  )  _______________         Please fax records to Farzana Dorman MD,  at [phone] or email to [email or web address].          Patient Name: Dee Hamilton  : 1955  Patient Phone #: [phone]                Dee Hamilton  MRN: [id number]  : 1955  Age: 68 y.o.  Sex: female         Patient/Legal Guardian Signature  This signature was collected at 2024           _______________________________   Printed Name/Relationship to Patient      Consent for Examination and Treatment: I hereby authorize the providers and employees of [facility] ([facility]) to provide medical treatment/services which includes, but is not limited to, performing and administering tests and diagnostic procedures that are deemed necessary, including, but not limited to, imaging examinations, blood tests and other laboratory procedures as may be required by the hospital, clinic, or may be ordered by my physician(s) or persons working under the general and/or special instructions of my physician(s).      I understand and agree that this consent covers all authorized persons,  including but not limited to physicians, residents, nurse practitioners, physicians' assistants, specialists, consultants, student nurses, and independently contracted physicians, who are called upon by the physician in charge, to carry out the diagnostic procedures and medical or surgical treatment.     I hereby authorize [facility] to retain or dispose of any specimens or tissue, should there be such remaining from any test or procedure.     I hereby authorize and give consent for [facility] providers and employees to take photographs, images or videotapes of such diagnostic, surgical or treatment procedures of Patient as may be required by [facility] or as may be ordered by a physician. I further acknowledge and agree that [facility] may use cameras or other devices for patient monitoring.     I am aware that the practice of medicine is not an exact science, and I acknowledge that no guarantees have been made to me as to the outcome of any tests, procedures or treatment.     Authorization for Release of Information: I understand that my insurance company and/or their agents may need information necessary to make determinations about payment/reimbursement. I hereby provide authorization to release to all insurance companies, their successors, assignees, other parties with whom they may have contracted, or others acting on their behalf, that are involved with payment for any hospital and/or clinic charges incurred by the patient, any information that they request and deem necessary for payment/reimbursement, and/or quality review.  I further authorize the release of my health information to physicians or other health care practitioners on staff who are involved in my health care now and in the future, and to other health care providers, entities, or institutions for the purpose of my continued care and treatment, including referrals.     REGISTRATION AUTHORIZATION  Form No. 85446 (Rev. 3/25/2024)    Page 1 of 3                        Medicare Patient's Certification and Authorization to Release Information and Payment Request:  I certify that the information given by me in applying for payment under Title XVIII of the Social Security Act is correct. I authorize any noriega of medical or other information about me to release to the Social SecurityAdministration, or its intermediaries or carriers, any information needed for this or a related Medicare claim. I request that payment of authorized benefits be made on my behalf.     Assignment of Insurance Benefits:   I hereby authorize any and all insurance companies, health plans, defined   benefit plans, health insurers or any entity that is or may be responsible for payment of my medical expenses to pay all hospital and medical benefits now due, and to become due and payable to me under any hospital benefits, sick benefits, injury benefits or any other benefit for services rendered to me, including Major Medical Benefits, direct to [facility] and all independently contracted physicians. I assign any and all rights that I may have against any and all insurance companies, health plans, defined benefit plans, health insurers or any entity that is or may be responsible for payment of my medical expenses, including, but not limited to any right to appeal a denial of a claim, any right to bring any action, lawsuit, administrative proceeding, or other cause of action on my behalf. I specifically assign my right to pursue litigation against any and all insurance companies, health plans, defined benefit plans, health insurers or any entity that is or may be responsible for payment of my medical expenses based upon a refusal to pay charges.            E. Valuables: It is understood and agreed that [facility] is not liable for the damage to or loss of any money, jewelry,   documents, dentures, eye glasses, hearing aids, prosthetics, or other property of value.     F. Computer Equipment: I understand and  agree that should I choose to use computer equipment owned by [facility] or if I choose to access the Internet via [facility]s network, I do so at my own risk. [facility] is not responsible for any damage to my computer equipment or to any damages of any type that might arise from my loss of equipment or data.     G. Acceptance of Financial Responsibility:  I agree that in consideration of the services and   supplies that have been   or will be furnished to the patient, I am hereby obligated to pay all charges made for or on the account of the patient according to the standard rates (in effect at the time the services and supplies are delivered) established by [facility], including its Patient Financial Assistance Policy to the extent it is applicable. I understand that I am responsible for all charges, or portions thereof, not covered by insurance or other sources. Patient refunds will be distributed only after balances at all [facility] facilities are paid.     H. Communication Authorization:  I hereby authorize [facility] and its representatives, along with any billing service   or  who may work on their behalf, to contact me on   my cell phone and/or home phone using pre- recorded messages, artificial voice messages, automatic telephone dialing devices or other computer assisted technology, or by electronic      mail, text messaging, or by any other form of electronic communication. This includes, but is not limited to, appointment reminders, yearly physical exam reminders, preventive care reminders, patient campaigns, welcome calls, and calls about account balances on my account or any account on which I am listed as a guarantor. I understand I have the right to opt out of these communications at any time.      Relationship  Between  Facility and  Provider:      I understand that some, but not all, providers furnishing services to the patient are not employees or agents of [facility]. The patient is under the care  and supervision of his/her attending physician, and it is the responsibility of the facility and its nursing staff to carry out the instructions of such physicians. It is the responsibility of the patient's physician/designee to obtain the patient's informed consent, when required, for medical or surgical treatment, special diagnostic or therapeutic procedures, or hospital services rendered for the patient under the special instructions of the physician/designee.           REGISTRATION AUTHORIZATION  Form No. 27954 (Rev. 3/25/2024)    Page 2 of 3                       Immunizations: [facility] shares immunization information with state sponsored health departments to help you and your doctor keep track of your immunization records. By signing, you consent to have this information shared with the health department in your state:                                [place] - LINKS ([place] Immunization Network for Kids Statewide)                                [place] - MIIX ([place] Immunization Information eXchange)                                [place] - ImmPRINT (Immunization Patient Registry with Integrated Technology)     TERM: This authorization is valid for this and subsequent care/treatment I receive at [facility] and will remain valid unless/until revoked in writing by me.     [facility]: As used in this document, [facility] means all [facility] owned and managed facilities, including, but not limited to, all health centers, surgery centers, clinics, urgent care centers, and hospitals.         [facility] complies with applicable Federal civil rights laws and does not discriminate on the basis of race, color, national origin, age, disability, or sex.  ATENCIÓN: si habla español, tiene a gill disposición servicios gratuitos de asistencia lingüística. Marj pedersen [phone].  CHÚ Ý: N?u b?n nói Ti?ng Vi?t, có các d?ch v? h? tr? ngôn ng? mi?n phí dành cho b?n. G?i s? [phone].         REGISTRATION AUTHORIZATION  Form No. 52803 (Rev. 3/25/2024)   Page 3 of 3

## 2025-04-28 ENCOUNTER — TELEPHONE (OUTPATIENT)
Dept: PRIMARY CARE CLINIC | Facility: CLINIC | Age: 70
End: 2025-04-28
Payer: MEDICARE

## 2025-04-28 NOTE — TELEPHONE ENCOUNTER
----- Message from Lucia sent at 4/28/2025  3:27 PM CDT -----  Contact: Leesa with [employer] [phone] c  [email or web address] Patient is calling for Medical Advice regarding:Leesa with [employer] [phone] calling to see if the fax was received about chronic condition.  Please call her back and advise.How long has patient had these symptoms:Pharmacy name and phone#:Patient wants a call back or thru [facility]:callbackComments:Please advise patient replies from provider may take up to 48 hours.

## 2025-04-30 ENCOUNTER — LAB VISIT (OUTPATIENT)
Dept: LAB | Facility: HOSPITAL | Age: 70
End: 2025-04-30
Attending: FAMILY MEDICINE
Payer: MEDICARE

## 2025-04-30 ENCOUNTER — OFFICE VISIT (OUTPATIENT)
Dept: URGENT CARE | Facility: CLINIC | Age: 70
End: 2025-04-30
Payer: MEDICARE

## 2025-04-30 VITALS
OXYGEN SATURATION: 98 % | TEMPERATURE: 99 F | DIASTOLIC BLOOD PRESSURE: 81 MMHG | RESPIRATION RATE: 17 BRPM | WEIGHT: 165 LBS | BODY MASS INDEX: 25.9 KG/M2 | SYSTOLIC BLOOD PRESSURE: 161 MMHG | HEIGHT: 67 IN | HEART RATE: 96 BPM

## 2025-04-30 DIAGNOSIS — N30.90 RECURRENT CYSTITIS: ICD-10-CM

## 2025-04-30 DIAGNOSIS — R35.0 URINARY FREQUENCY: Primary | ICD-10-CM

## 2025-04-30 LAB
BILIRUBIN, UA POC OHS: NEGATIVE
BLOOD, UA POC OHS: ABNORMAL
CLARITY, UA POC OHS: ABNORMAL
COLOR, UA POC OHS: YELLOW
GLUCOSE, UA POC OHS: NEGATIVE
KETONES, UA POC OHS: NEGATIVE
LEUKOCYTES, UA POC OHS: NEGATIVE
NITRITE, UA POC OHS: POSITIVE
PH, UA POC OHS: 5
PROTEIN, UA POC OHS: 30
SPECIFIC GRAVITY, UA POC OHS: >=1.03
UROBILINOGEN, UA POC OHS: 0.2

## 2025-04-30 PROCEDURE — 99214 OFFICE O/P EST MOD 30 MIN: CPT | Mod: S$GLB,,, | Performed by: FAMILY MEDICINE

## 2025-04-30 PROCEDURE — 87186 SC STD MICRODIL/AGAR DIL: CPT

## 2025-04-30 PROCEDURE — 81003 URINALYSIS AUTO W/O SCOPE: CPT | Mod: QW,S$GLB,, | Performed by: FAMILY MEDICINE

## 2025-04-30 RX ORDER — CIPROFLOXACIN 500 MG/1
500 TABLET ORAL 2 TIMES DAILY
Qty: 14 TABLET | Refills: 0 | Status: SHIPPED | OUTPATIENT
Start: 2025-04-30 | End: 2025-05-07

## 2025-04-30 NOTE — PROGRESS NOTES
"Subjective:      Patient ID: Dee Hamilton is a 69 y.o. female.    Vitals:  height is 5' 7.01" (1.702 m) and weight is 74.8 kg (165 lb). Her tympanic temperature is 98.6 °F (37 °C). Her blood pressure is 161/81 (abnormal) and her pulse is 96. Her respiration is 17 and oxygen saturation is 98%.     Chief Complaint: Urinary Frequency (1 day history of urinary frequency and urgency and foul smelling urine without dysuria or hematuria)    1 day history of urinary frequency and urgency and foul smelling urine without burning or blood in urine, no chills but mild nausea without vomiting. She has a history of UTIs, most recently a couple months ago.      Urinary Frequency   This is a new problem. The problem occurs every urination. The problem has been unchanged. The patient is experiencing no pain. There has been no fever. Associated symptoms include frequency, nausea and urgency. Pertinent negatives include no behavior changes, chills, discharge, flank pain, hematuria, hesitancy, possible pregnancy, vomiting, weight loss, bubble bath use, constipation, rash or withholding. Her past medical history is significant for diabetes mellitus, hypertension and recurrent UTIs. There is no history of catheterization, diabetes insipidus, genitourinary reflux, kidney stones, a single kidney, STD, urinary stasis or a urological procedure.       Constitution: Negative for chills.   Gastrointestinal:  Positive for nausea. Negative for abdominal pain, vomiting and constipation.   Genitourinary:  Positive for frequency and urgency. Negative for flank pain and hematuria.   Skin:  Negative for rash.      Objective:     Vitals:    04/30/25 1445   BP: (!) 161/81   BP Location: Right arm   Patient Position: Sitting   Pulse: 96   Resp: 17   Temp: 98.6 °F (37 °C)   TempSrc: Tympanic   SpO2: 98%   Weight: 74.8 kg (165 lb)   Height: 5' 7.01" (1.702 m)      Physical Exam   Constitutional: She is oriented to person, place, and time.  " Non-toxic appearance. She does not appear ill. No distress.   HENT:   Head: Atraumatic.   Eyes: Conjunctivae are normal.   Cardiovascular: Normal rate, regular rhythm, normal heart sounds and normal pulses.   Pulmonary/Chest: Effort normal and breath sounds normal.   Abdominal: Bowel sounds are normal. Soft. There is no rebound, no guarding, no left CVA tenderness and no right CVA tenderness.   Neurological: She is alert and oriented to person, place, and time.   Skin: Skin is not diaphoretic.   Psychiatric: Judgment and thought content normal.     Results for orders placed or performed in visit on 04/30/25   POCT Urinalysis(Instrument)    Collection Time: 04/30/25  3:33 PM   Result Value Ref Range    Color, POC UA Yellow Yellow, Straw, Colorless    Clarity, POC UA Cloudy (A) Clear    Glucose, POC UA Negative Negative    Bilirubin, POC UA Negative Negative    Ketones, POC UA Negative Negative    Spec Grav POC UA >=1.030 1.005 - 1.030    Blood, POC UA Moderate (A) Negative    pH, POC UA 5.0 5.0 - 8.0    Protein, POC UA 30 (A) Negative    Urobilinogen, POC UA 0.2 <=1.0    Nitrite, POC UA Positive (A) Negative    WBC, POC UA Negative Negative      Assessment:     1. Urinary frequency    2. Recurrent cystitis        Plan:       Urinary frequency  -     POCT Urinalysis(Instrument)    2. Recurrent cystitis  -     ciprofloxacin HCl (CIPRO) 500 MG tablet; Take 1 tablet (500 mg total) by mouth 2 (two) times daily. for 7 days  Dispense: 14 tablet; Refill: 0  Has tolerated in the past without adverse event  -     Urine Culture High Risk; Future      I have reviewed kidney function within last 12 months and Urine cultures from 2011 to present.

## 2025-05-03 ENCOUNTER — RESULTS FOLLOW-UP (OUTPATIENT)
Dept: URGENT CARE | Facility: CLINIC | Age: 70
End: 2025-05-03

## 2025-05-03 ENCOUNTER — TELEPHONE (OUTPATIENT)
Dept: URGENT CARE | Facility: CLINIC | Age: 70
End: 2025-05-03
Payer: MEDICARE

## 2025-05-03 LAB — BACTERIA UR CULT: ABNORMAL

## 2025-05-03 NOTE — TELEPHONE ENCOUNTER
Results for orders placed or performed in visit on 04/30/25   Urine Culture High Risk    Collection Time: 04/30/25  4:32 PM    Specimen: Urine, Clean Catch   Result Value Ref Range    Urine Culture >100,000 cfu/ml Klebsiella pneumoniae (A)        Susceptibility    Klebsiella pneumoniae - KELBY     Ampicillin >16 Resistant µg/ml     Ampicillin/Sulbactam <=8/4 Sensitive µg/ml     Cefazolin <=2 Sensitive µg/ml     Cefepime <=2 Sensitive µg/ml     Ceftriaxone <=1 Sensitive µg/ml     Ciprofloxacin <=0.25 Sensitive µg/ml     Ertapenem <=0.5 Sensitive µg/ml     Gentamicin <=2 Sensitive µg/ml     Levofloxacin <=0.5 Sensitive µg/ml     Meropenem <=1 Sensitive µg/ml     Nitrofurantoin <=32 Sensitive µg/ml     Piperacillin/Tazobactam <=8 Sensitive µg/ml     Tobramycin <=2 Sensitive µg/ml     Trimeth/Sulfa <=2/38 Sensitive µg/ml        Spoke with patient and discuss positive urine culture results.  Patient treated appropriately with ciprofloxacin.  Feeling better.

## 2025-05-21 ENCOUNTER — OFFICE VISIT (OUTPATIENT)
Dept: OPTOMETRY | Facility: CLINIC | Age: 70
End: 2025-05-21
Payer: MEDICARE

## 2025-05-21 DIAGNOSIS — H52.4 BILATERAL PRESBYOPIA: ICD-10-CM

## 2025-05-21 DIAGNOSIS — H43.393 VISUAL FLOATERS, BILATERAL: ICD-10-CM

## 2025-05-21 DIAGNOSIS — H04.123 DRY EYE SYNDROME OF BOTH EYES: Primary | ICD-10-CM

## 2025-05-21 DIAGNOSIS — H40.1111 PRIMARY OPEN ANGLE GLAUCOMA (POAG) OF RIGHT EYE, MILD STAGE: ICD-10-CM

## 2025-05-21 DIAGNOSIS — H40.022 OPEN ANGLE WITH BORDERLINE FINDINGS, HIGH RISK, LEFT: ICD-10-CM

## 2025-05-21 PROCEDURE — 99999 PR PBB SHADOW E&M-EST. PATIENT-LVL II: CPT | Mod: PBBFAC,,, | Performed by: OPTOMETRIST

## 2025-05-21 PROCEDURE — 99212 OFFICE O/P EST SF 10 MIN: CPT | Mod: PBBFAC,PO | Performed by: OPTOMETRIST

## 2025-05-21 RX ORDER — TIMOLOL MALEATE 5 MG/ML
1 SOLUTION/ DROPS OPHTHALMIC 2 TIMES DAILY
Qty: 10 ML | Refills: 11 | Status: SHIPPED | OUTPATIENT
Start: 2025-05-21 | End: 2026-05-21

## 2025-05-21 NOTE — PROGRESS NOTES
AMADO    NADIR: 08/23  Chief complaint (CC): Patient is here for annual eye exam today.  Patient   was contacted by Dr. Carver's office to schedule consult but did not   follow up. Patient has noticed more trouble seeing small print.  Patient   is wearing an older pair of glasses and feels she would see better with   new ones.  Eyes burn and feel dry.  Patient isn't using any AT's.  Glasses? + 5 yrs. old  Contacts? -  H/o eye surgery, injections or laser: PC IOL OU,punctal plugs  H/o eye injury: -  Known eye conditions? See above  Family h/o eye conditions? Father with glaucoma  Eye gtts? Using Travatan OU Q HS      (-) Flashes (+)  Floaters (-) Mucous   (-)  Tearing (-) Itching (-) Burning   (-) Headaches (-) Eye Pain/discomfort (-) Irritation   (-)  Redness (-) Double vision (-) Blurry vision    Diabetic? +  A1c? Hemoglobin A1C       Date                     Value               Ref Range             Status                04/15/2024               10.4 (H)            4.0 - 5.6 %           Final                 11/02/2023               11.3 (H)            4.0 - 5.6 %           Final                 09/19/2023               13.0 (H)            4.0 - 5.6 %           Final                Last edited by Emma Weaver on 5/21/2025  8:32 AM.            Assessment /Plan     For exam results, see Encounter Report.      Dry eye syndrome of both eyes  Recommend iVizia, Systane Ultra or Refresh Optive BID-TID OU to aid with symptoms of dry eyes.    Primary open angle glaucoma (POAG) of right eye, mild stage  -     Posterior Segment OCT Optic Nerve- Both eyes; Future  -     Darling Visual Field - OU - Extended - Both Eyes; Future  -     timolol maleate 0.5% (TIMOPTIC) 0.5 % Drop; Place 1 drop into both eyes 2 (two) times daily.  Dispense: 10 mL; Refill: 11  Open angle with borderline findings, high risk, left  -     Posterior Segment OCT Optic Nerve- Both eyes; Future  -     Darling Visual Field - OU - Extended - Both Eyes;  Future  -     timolol maleate 0.5% (TIMOPTIC) 0.5 % Drop; Place 1 drop into both eyes 2 (two) times daily.  Dispense: 10 mL; Refill: 11  IOP 22 OD, OS. Last iOP the same. Pt reports compliance. Pt was last seen by Dr Dawn on 4/1/2022 and failed to be seen by Dr Carver. Importance of drops usage and f/u discussed. Pachy 539 OD, 535 OS. C/d 0.9 OD, 0.8 OS.  Cont Travatan QHS OU.   Rx Timolol BiD OU  RTC 6 weeks IOP/OCT/24-2 Andria faster     Visual floaters, bilateral  No e/o h/b/t 360 degrees OU. Monitor for worsening of symptoms or S/Sx of RD.     Bilateral presbyopia  SRx released to patient. Patient educated on lens options. Normal ocular health. RTC 1 year for routine exam.       Today's visit is associated with current and anticipated ongoing medical care related to this patient's single serious/complex condition POAG mild OD, OA borderline, high risk, OS. Follow-up is to be continued indefinitely to monitor the condition.

## 2025-05-29 ENCOUNTER — TELEPHONE (OUTPATIENT)
Dept: NEUROLOGY | Facility: CLINIC | Age: 70
End: 2025-05-29
Payer: MEDICARE

## 2025-06-14 NOTE — PROGRESS NOTES
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 % SUPPOSITORY, Place 1 suppository vaginally 2 (two) times daily as needed (pelvic pain)., Disp: 20 each, Rfl: 5    pen needle, diabetic 32 gauge x 5/32" Ndle, Use daily to inject insulin as directed, Disp: 100 each, Rfl: 6    phenazopyridine (PYRIDIUM) 200 MG tablet, Take 1 tablet (200 mg total) by mouth 3 (three) times daily as needed for Pain., Disp: 10 tablet, Rfl: 0    potassium chloride SA (K-DUR,KLOR-CON) 20 MEQ tablet, Take 1 tablet (20 mEq total) by mouth once daily., Disp: 90 tablet, Rfl: 3    rosuvastatin (CRESTOR) 40 MG Tab, Take 1 tablet (40 mg total) by mouth every evening., Disp: 90 tablet, Rfl: 3    semaglutide (OZEMPIC) 0.25 mg or 0.5 mg (2 mg/3 mL) pen injector, Inject 0.25 mg into the skin every 7 days for 28 days, THEN 0.5 mg every 7 days. Start with 0.25 mg weekly and increase to 0.5 mg weekly after 4 weeks., Disp: 39.75 mL, Rfl: 0    timolol maleate 0.5% (TIMOPTIC) 0.5 % Drop, Place 1 drop into both eyes 2 (two) times daily., Disp: 10 mL, Rfl: 11    travoprost (TRAVATAN Z) 0.004 % ophthalmic solution, Place 1 drop into both eyes every evening., Disp: 7.5 each, Rfl: 0    EScitalopram oxalate (LEXAPRO) 5 MG Tab, Take 1 tablet (5 mg total) by mouth once daily., " Disp: 90 tablet, Rfl: 0    losartan (COZAAR) 100 MG tablet, Take 1 tablet (100 mg total) by mouth once daily., Disp: 90 tablet, Rfl: 1    methocarbamoL (ROBAXIN) 500 MG Tab, Take 1 tablet (500 mg total) by mouth 3 (three) times daily. (Patient not taking: Reported on 6/16/2025), Disp: 60 tablet, Rfl: 1    pantoprazole (PROTONIX) 40 MG tablet, Take 1 tablet (40 mg total) by mouth once daily., Disp: 90 tablet, Rfl: 0

## 2025-06-16 ENCOUNTER — OFFICE VISIT (OUTPATIENT)
Dept: PRIMARY CARE CLINIC | Facility: CLINIC | Age: 70
End: 2025-06-16
Payer: MEDICARE

## 2025-06-16 ENCOUNTER — LAB VISIT (OUTPATIENT)
Dept: LAB | Facility: HOSPITAL | Age: 70
End: 2025-06-16
Attending: INTERNAL MEDICINE
Payer: MEDICARE

## 2025-06-16 VITALS
SYSTOLIC BLOOD PRESSURE: 144 MMHG | HEART RATE: 95 BPM | HEIGHT: 67 IN | TEMPERATURE: 98 F | OXYGEN SATURATION: 95 % | BODY MASS INDEX: 27.37 KG/M2 | RESPIRATION RATE: 18 BRPM | WEIGHT: 174.38 LBS | DIASTOLIC BLOOD PRESSURE: 86 MMHG

## 2025-06-16 DIAGNOSIS — R60.0 BILATERAL LEG EDEMA: ICD-10-CM

## 2025-06-16 DIAGNOSIS — Z79.4 UNCONTROLLED DIABETES MELLITUS WITH HYPERGLYCEMIA, WITH LONG-TERM CURRENT USE OF INSULIN: Chronic | ICD-10-CM

## 2025-06-16 DIAGNOSIS — E55.9 VITAMIN D DEFICIENCY: ICD-10-CM

## 2025-06-16 DIAGNOSIS — Z91.199 NONCOMPLIANCE: ICD-10-CM

## 2025-06-16 DIAGNOSIS — E87.6 HYPOKALEMIA: ICD-10-CM

## 2025-06-16 DIAGNOSIS — E78.2 MIXED HYPERLIPIDEMIA: ICD-10-CM

## 2025-06-16 DIAGNOSIS — R39.9 URINARY SYMPTOM OR SIGN: ICD-10-CM

## 2025-06-16 DIAGNOSIS — F33.9 EPISODE OF RECURRENT MAJOR DEPRESSIVE DISORDER, UNSPECIFIED DEPRESSION EPISODE SEVERITY: ICD-10-CM

## 2025-06-16 DIAGNOSIS — I10 HYPERTENSION, UNSPECIFIED TYPE: ICD-10-CM

## 2025-06-16 DIAGNOSIS — Z79.4 UNCONTROLLED DIABETES MELLITUS WITH HYPERGLYCEMIA, WITH LONG-TERM CURRENT USE OF INSULIN: Primary | Chronic | ICD-10-CM

## 2025-06-16 DIAGNOSIS — R41.3 MEMORY IMPAIRMENT: ICD-10-CM

## 2025-06-16 DIAGNOSIS — E21.3 HYPERPARATHYROIDISM: ICD-10-CM

## 2025-06-16 DIAGNOSIS — F41.9 ANXIETY: ICD-10-CM

## 2025-06-16 DIAGNOSIS — E11.65 UNCONTROLLED DIABETES MELLITUS WITH HYPERGLYCEMIA, WITH LONG-TERM CURRENT USE OF INSULIN: Primary | Chronic | ICD-10-CM

## 2025-06-16 DIAGNOSIS — Z79.899 ON POTASSIUM WASTING DIURETIC THERAPY: ICD-10-CM

## 2025-06-16 DIAGNOSIS — E11.65 UNCONTROLLED DIABETES MELLITUS WITH HYPERGLYCEMIA, WITH LONG-TERM CURRENT USE OF INSULIN: Chronic | ICD-10-CM

## 2025-06-16 LAB
25(OH)D3+25(OH)D2 SERPL-MCNC: 21 NG/ML (ref 30–96)
ALBUMIN SERPL BCP-MCNC: 4.1 G/DL (ref 3.5–5.2)
ALBUMIN/CREAT UR: 20 UG/MG
ALP SERPL-CCNC: 94 UNIT/L (ref 40–150)
ALT SERPL W/O P-5'-P-CCNC: 14 UNIT/L (ref 10–44)
ANION GAP (OHS): 13 MMOL/L (ref 8–16)
AST SERPL-CCNC: 18 UNIT/L (ref 11–45)
BACTERIA #/AREA URNS AUTO: ABNORMAL /HPF
BILIRUB SERPL-MCNC: 0.2 MG/DL (ref 0.1–1)
BILIRUB UR QL STRIP.AUTO: NEGATIVE
BUN SERPL-MCNC: 12 MG/DL (ref 8–23)
CALCIUM SERPL-MCNC: 9.7 MG/DL (ref 8.7–10.5)
CHLORIDE SERPL-SCNC: 100 MMOL/L (ref 95–110)
CHOLEST SERPL-MCNC: 199 MG/DL (ref 120–199)
CHOLEST/HDLC SERPL: 4.2 {RATIO} (ref 2–5)
CLARITY UR: ABNORMAL
CO2 SERPL-SCNC: 27 MMOL/L (ref 23–29)
COLOR UR AUTO: COLORLESS
CREAT SERPL-MCNC: 0.8 MG/DL (ref 0.5–1.4)
CREAT UR-MCNC: 25 MG/DL (ref 15–325)
EAG (OHS): 324 MG/DL (ref 68–131)
GFR SERPLBLD CREATININE-BSD FMLA CKD-EPI: >60 ML/MIN/1.73/M2
GLUCOSE SERPL-MCNC: 282 MG/DL (ref 70–110)
GLUCOSE UR QL STRIP: ABNORMAL
HBA1C MFR BLD: 12.9 % (ref 4–5.6)
HDLC SERPL-MCNC: 47 MG/DL (ref 40–75)
HDLC SERPL: 23.6 % (ref 20–50)
HGB UR QL STRIP: NEGATIVE
KETONES UR QL STRIP: NEGATIVE
LDLC SERPL CALC-MCNC: 121.6 MG/DL (ref 63–159)
LEUKOCYTE ESTERASE UR QL STRIP: NEGATIVE
MAGNESIUM SERPL-MCNC: 1.8 MG/DL (ref 1.6–2.6)
MICROALBUMIN UR-MCNC: 5 UG/ML (ref ?–5000)
MICROSCOPIC COMMENT: ABNORMAL
NITRITE UR QL STRIP: NEGATIVE
NONHDLC SERPL-MCNC: 152 MG/DL
PH UR STRIP: 6 [PH]
POTASSIUM SERPL-SCNC: 3.3 MMOL/L (ref 3.5–5.1)
PROT SERPL-MCNC: 7.7 GM/DL (ref 6–8.4)
PROT UR QL STRIP: NEGATIVE
PTH-INTACT SERPL-MCNC: 204 PG/ML (ref 9–77)
RBC #/AREA URNS AUTO: <1 /HPF (ref 0–4)
SODIUM SERPL-SCNC: 140 MMOL/L (ref 136–145)
SP GR UR STRIP: 1.01
SQUAMOUS #/AREA URNS AUTO: 5 /HPF
TRIGL SERPL-MCNC: 152 MG/DL (ref 30–150)
TSH SERPL-ACNC: 0.86 UIU/ML (ref 0.4–4)
UROBILINOGEN UR STRIP-ACNC: NEGATIVE EU/DL
VIT B12 SERPL-MCNC: 651 PG/ML (ref 210–950)
WBC #/AREA URNS AUTO: 4 /HPF (ref 0–5)
YEAST UR QL AUTO: ABNORMAL /HPF

## 2025-06-16 PROCEDURE — 80053 COMPREHEN METABOLIC PANEL: CPT | Mod: HCNC

## 2025-06-16 PROCEDURE — 83970 ASSAY OF PARATHORMONE: CPT | Mod: HCNC

## 2025-06-16 PROCEDURE — 99999 PR PBB SHADOW E&M-EST. PATIENT-LVL V: CPT | Mod: PBBFAC,HCNC,, | Performed by: INTERNAL MEDICINE

## 2025-06-16 PROCEDURE — 82607 VITAMIN B-12: CPT | Mod: HCNC

## 2025-06-16 PROCEDURE — 82043 UR ALBUMIN QUANTITATIVE: CPT | Mod: HCNC | Performed by: INTERNAL MEDICINE

## 2025-06-16 PROCEDURE — 83735 ASSAY OF MAGNESIUM: CPT | Mod: HCNC

## 2025-06-16 PROCEDURE — 84425 ASSAY OF VITAMIN B-1: CPT | Mod: HCNC

## 2025-06-16 PROCEDURE — 83036 HEMOGLOBIN GLYCOSYLATED A1C: CPT | Mod: HCNC

## 2025-06-16 PROCEDURE — 80061 LIPID PANEL: CPT | Mod: HCNC

## 2025-06-16 PROCEDURE — 36415 COLL VENOUS BLD VENIPUNCTURE: CPT | Mod: HCNC,PN

## 2025-06-16 PROCEDURE — 84443 ASSAY THYROID STIM HORMONE: CPT | Mod: HCNC

## 2025-06-16 PROCEDURE — 81001 URINALYSIS AUTO W/SCOPE: CPT | Mod: HCNC | Performed by: INTERNAL MEDICINE

## 2025-06-16 PROCEDURE — 82306 VITAMIN D 25 HYDROXY: CPT | Mod: HCNC

## 2025-06-16 RX ORDER — PANTOPRAZOLE SODIUM 40 MG/1
40 TABLET, DELAYED RELEASE ORAL DAILY
Qty: 90 TABLET | Refills: 0 | Status: SHIPPED | OUTPATIENT
Start: 2025-06-16

## 2025-06-16 RX ORDER — ESCITALOPRAM OXALATE 5 MG/1
5 TABLET ORAL DAILY
Qty: 90 TABLET | Refills: 0 | Status: SHIPPED | OUTPATIENT
Start: 2025-06-16 | End: 2026-06-16

## 2025-06-16 RX ORDER — LOSARTAN POTASSIUM 100 MG/1
100 TABLET ORAL DAILY
Qty: 90 TABLET | Refills: 1 | Status: SHIPPED | OUTPATIENT
Start: 2025-06-16

## 2025-06-17 ENCOUNTER — RESULTS FOLLOW-UP (OUTPATIENT)
Dept: PRIMARY CARE CLINIC | Facility: CLINIC | Age: 70
End: 2025-06-17

## 2025-06-17 ENCOUNTER — TELEPHONE (OUTPATIENT)
Dept: PRIMARY CARE CLINIC | Facility: CLINIC | Age: 70
End: 2025-06-17
Payer: MEDICARE

## 2025-06-17 ENCOUNTER — TELEPHONE (OUTPATIENT)
Dept: ENDOCRINOLOGY | Facility: CLINIC | Age: 70
End: 2025-06-17
Payer: MEDICARE

## 2025-06-17 NOTE — TELEPHONE ENCOUNTER
----- Message from Giana sent at 6/17/2025 11:55 AM CDT -----  Pt stopped by tried to get Ozempic refill at the Pharmacy. Pharmacy does not have the dosage amount on file. Please contact Pharmacy and pt.    Thanks

## 2025-06-17 NOTE — TELEPHONE ENCOUNTER
----- Message from Giana sent at 6/16/2025 10:10 AM CDT -----  Dr. Dorman is wanting to see pt in 4 mo unable to schedule will you please assist in scheduling.    Thanks

## 2025-06-17 NOTE — TELEPHONE ENCOUNTER
----- Message from Sol Kaba MD sent at 6/17/2025 10:05 AM CDT -----  Can you book her an appointment with NP?  SD  ----- Message -----  From: Farzana Dorman MD  Sent: 6/17/2025   7:04 AM CDT  To: Sol Kaba MD; L[facility] Yanes Staff    Please get pt and  on the phone with me today as he sets up her meds for her.  I also sent pt a my chart message - (Note - pt with memory issues)  I reviewed your  labs.  Your Vitamin D is low at 21. Have you been taking your Ergocalciferol 50,000 units once weekly?  If so, I will increase this to twice per week but I suspect you likely have   been missing doses of this. Your kidney function and your liver function looked good. You potassium remains low at 3.3.  Have you been missing your Potassium pill daily? Your Magnesium was normal   at.18. Your cholesterol is high. Your Ha1c is markedly uncontrolled with a HA1C of 12.9 on   Ozempic 0.5 mg/d, Semglee 36 units QHS.  I am working to get you back in with Endocrinology and will send   a copy to Dr. Kaba since you did not follow up in Mar.  You parathyroid level has also increased to 204.  Your calcium is normal. We need to fix your Vitamin D level and see how your parathyroid   level responds.  Your Vitamin B12 was normal at 651.  Your TSH was normal at 0.856.  I would like to veronika with you and your  again to see which meds you may be missing and how often and would   like to verify your dose of Ozempic.  In the meantime I want you to increase your Semglee to 40 units daily.   Dr. BRUSH      ----- Message -----  From: Lab, Background User  Sent: 6/16/2025   4:27 PM CDT  To: Farzana Dorman MD

## 2025-06-20 LAB — W VITAMIN B1: 62 UG/L

## 2025-06-27 NOTE — PROGRESS NOTES
NEUROPSYCHOLOGY CONSULT (TELEHEALTH)  Referral Information  Name: Dee Hamilton  MRN: [id number]  : 1955  Age: 69 y.o.  Race: Black or   Gender: female  Referring Provider: Farzana Dorman MD   Referral Diagnoses: R41.3 (ICD-10-CM) - Memory impairment   [place]  Date Conducted: 2025     Telemedicine:   The patient location is: [place]  The provider location is: [place]  Total time spent with patient: 70 minutes  The chief complaint leading to consultation/medical necessity is: Cognitive changes   Visit type: Virtual visit with synchronous audio only (telephone)  The reason for the audio only service rather than synchronous audio and video virtual visit was related to technical difficulties or patient preference/necessity.     Each patient to whom I provide medical services by telemedicine is: (1) informed of the relationship between the physician and patient and the respective role of any other health care provider with respect to management of the patient; and (2) notified that they may decline to receive medical services by telemedicine and may withdraw from such care at any time. Patient verbally consented to receive this service via voice-only telephone call.    This service was not originating from a related E/M service provided within the previous 7 days nor will  to an E/M service or procedure within the next 24 hours or my soonest available appointment.  Prevailing standard of care was able to be met in this audio-only visit.      Consent/Emergency Plan: The patient expressed an understanding of the purpose of the evaluation and consented to all procedures, including providing consent for Alverto Boone, Ph.D., a clinical neuropsychology fellow under the supervision of Arleen Elliott Psy.D., to be involved in her care. We discussed the limits of confidentiality and discussed an emergency plan. Ms. Hamilton additionally provided consent to speak  "with her , Randell, who was present during the clinical interview.    SUMMARY    Ms. Hamilton is a 69 y.o., left-handed, Black or , woman with 14 years of formal education and past medical history of anxiety, bilateral leg edema, depression, DMT2, diverticulitis, GERD, glaucoma, HLD, HTN, hyperparathyroidism, and mixed urinary incontinence. Pt is presenting for evaluation of cognitive complaints. Pt and her  report worsening cognitive symptoms since 2021. Pt's  reports cognitive fluctuations started about 10-12 months ago. Reports a family history of dementia (mother, maternal grandmother), etiology unknown. She has not been seen by neurology. Neuroimaging is notable for mild chronic small vessel ischemic change and generalized cerebral volume loss. Per PCP, recent labs notable for "markedly uncontrolled A1c of 12.9," low potassium and vitamin D, and elevated cholesterol, glucose, and parathyroid. MMSE with PCP in 2022 was 27/30. Anticholinergic burden is low (ACB = 2; pantoprazole, Lexapro) - her  reports pt's not taking gabapentin or methocarbamol. Sleep is good, sometimes she mumbles in her sleep but no REM behaviors. Pt denied anxiety or depression. Pt's  reports neuropsychiatric symptoms started about 1.5 years ago - verbally aggressive, possible visual hallucinations, delusions. Pt's had multiple falls in the last year, has intermittent dizziness, and longstanding mixed urinary incontinence. She has a history of recurrent UTIs, last infection in April 2025. Pt oriented to weekday and date, disoriented to month (off by one, said June instead of July) and declined to provide an answer for the year. She remains independent with basic ADLs, dependent for IADLs. Currently resides with her .    Ms. Hamilton is scheduled to complete a neuropsychological evaluation on 08/18/2025 at 12:30pm. Full report to follow. Given reported course and progression, " "highest suspicion for mild to moderate mixed vascular dementia and Alzheimer's disease with acute on chronic overlay from repeat UTI's and overall poorly managed health conditions. The UTI's in particular may explain the cognitive fluctuations and hallucinations. Relatively lower suspicion for dementia with Lewy bodies (DLB), although she has not been seen by neurology and would still benefit from additional workup. Other factors likely exacerbating cognitive symptoms include uncontrolled diabetes, endocrine dysfunction (potassium, Vit D, parathyroid).     Problem List Items Addressed This Visit          Neuro    Memory impairment - Primary        Thank you for allowing us to participate in Ms. Hamilton's care. If you have any questions, please contact Dr. Elliott at [phone].     Alverto Boone, Ph.D.  Postdoctoral Fellow in Clinical Neuropsychology  [facility] - Department of Neurology    Arleen Elliott Psy.D.  Licensed Clinical Neuropsychologist   [facility] - Department of Neurology    HPI/CURRENT CONCERNS:   Ms. Hamilton is a 69 y.o. woman with past medical history of anxiety, bilateral leg edema, depression, DMT2, diverticulitis, GERD, glaucoma, HLD, HTN, hyperparathyroidism, and mixed urinary incontinence. Pt was referred by her PCP for neuropsychological evaluation in the context of cognitive changes. Per records, pt's had cognitive sxs since 2021 that have gradually worsened over time. Pt's  reports cognitive fluctuations started about 10-12 months ago and neuropsychiatric symptoms started about 1.5 years ago - verbally aggressive, visual hallucinations, delusions. She's had multiple falls in the last year and intermittent dizziness since 2021. Neuroimaging notable for mild chronic small vessel ischemic change and generalized cerebral volume loss. Per PCP, recent labs notable for "markedly uncontrolled A1c of 12.9," low potassium and vitamin D, and elevated cholesterol, glucose, " and parathyroid. Pt has not been seen by neurology.     Pt has a history of UTIs. Last UTI was in 2025 and pt was treated with CIPRO. PCP documented most recent UA was positive for traces of glucose.       Cognitive Functioning   Previous evaluation(s) & general findings: MMSE = 27/30 (2022)    Onset & course of difficulty: Ms. Hamilton and her  (Randell) report gradual onset of cognitive sxs since  that have worsened over time. Randell reports pt started having cognitive fluctuations about 10-12 months ago - happens anytime during the day but mostly in the evening. Pt will think she's in her childhood home or a different home, mostly gets disoriented and confused about where she is.     Cognitive Sxs:   Attention/Working Memory: Has difficulty focusing/concentrating, gets easily distracted  Executive Functioning/Planning: Has difficulty following instructions. Difficulty with staying organized and keeping up with daily routines  Processing Speed: No changes reported   Language: Has some word-finding and comprehension difficulty   Visuospatial: Gets turned around at home (e.g., thinks she's going to one room and will end up in another room). Misplaces items in strange place (e.g., toothbrush in the fridge).   Learning & Memory: Repeats questions/information, frequently losing/misplacing items, confuses her  for other people (e.g., thinks he's her  father or ex-), pt gets confused about who her  is and that they are .    Exacerbating factors: None reported  Ameliorating factors: None reported    Daily Functioning    ADLs  Self-Care Eating Safety Concerns Other   Independent and without difficulty - Randell stays close by when pt is showering as a precaution incase she falls or needs anything. Independent and without difficulty  falls      Instrumental IADLs:   Driving Medication Mgmt/Health Household Mgmt Finances   Stopped driving about 6-7 months ago b/c she  was getting confused and didn't feel safe while driving. Randell started managing pt's meds 4 months ago b/c pt was missing doses and misplacing medications. He hands pt medication at time of dosing. He started assisting with medical appointment management about 6 months ago - pt had stopped driving, was missing appts, and unable to remember what doctors were telling her at appts. Pt has no problems using the microwave or toaster oven. Randell has managed household responsibilities for the last 10-12 months. Pt was getting confused doing household tasks (e.g., putting dirty dishes from the  into the cabinets as if they were clean dishes, confused with how to wash clothes). Randell has managed their finances for years.      Physical Symptoms:    Tremor: no   Difficulty walking: yes -  Randell reports pt sometimes staggers/wobbles when she walks and takes small steps   Imbalance: yes - when pt is dizzy she feels off balance  Falls: yes - last fall was 2-3 weeks ago, pt felt dizzy and lost her balance. She's had about 4-6 falls within the last year, mostly happening when she is turning to her right or left and then gets off balance.   Weakness/Numbness: Denied  Trouble with fine motor movements: Denied  Lightheadedness/Dizziness/Syncope: yes - per records, pt has reported dizziness intermittently since 2021. Randell reports pt started complaining of dizziness again about 2 weeks ago, takes Dramamine as needed and this seems to be helping. Pt states she feels dizzy today.  Urinary or Bowel Urgency/Incontinence: yes - pt has hx urinary urge and incontinence, per records since 2011. She wears disposable under now. Has history of recurrent UTIs, last one in April 2025. Has occasional constipation.   Sensory Sxs: no   Pain:   Physical Exercise Routine: None reported    Psychiatric/Neuropsychiatric Symptoms   Depression: Denied - has some occasional sadness when she misses her grandchildren.   Current Ideation,  Intention, or Plan: Denied  Homicidal Ideation, Intention, or Plan: Denied  Laura/Hypomania: Denied  Anxiety: Denied   Stress: Denied  Coping Mechanisms: Denied  Social Withdrawal: Denied  Neurovegetative Sxs:  Appetite: No changes reported  Sleep: Good, sleeps from 9:30pm/10pm until 8am. Sometimes mumbles in her sleep. Occasionally wakes due to nocturia. No dream enactment or other parasomnias.    Energy: Good, naps occasionally  Hallucinations: Endorsed - Randell reports 4 months ago pt started saying she was seeing her  father. States this was happening frequently when it first started, now happening about 1x/week.    Delusional/Paranoid Thinking: Endorsed - Radnell reports pt started accusing people of stealing her things about 4-5 months ago. For example, she accused people of coming into their house and stealing her purse but they ended up finding it hours later. Another incident happened while the pt and her  hosted a Bible study at their home where the pt accused a Confucianism member of touching her papers while she was out of the room. Randell states no one has ever taken anything from the pt or messed with her belongings when their Confucianism friends are present at their home. Pt has these thoughts about once/week, coinciding with when the Confucianism group comes to their home.  Obsessive/Compulsive Behaviors: Denied  Impulsivity/Compulsivity: Denied  Disinhibition: Denied  Irritability/Agitation: Denied  Aggression: Endorsed - Randell reports about 1.5 years ago, pt started becoming verbally aggressive towards him, sometimes getting to the point where he is concerned that she will become physically violent. He reports it was difficult to de-escalate these episodes. He reports she's been calmer in the last couple of months and these episodes happen less frequently now.   Apathy/Indifference: Denied  Other changes in personality: Denied    RELEVANT HISTORY  Psychiatric History   Prior Diagnoses: Anxiety,  depression  History of Trauma/Abuse: no   History of Suicide Attempts: no   Medication(s): Lexapro  Hospitalization(s): no   Psychotherapy/Counseling: no   Other: no     Substance Use History   Ms. Hamilton denied use of alcohol, tobacco, or other substances.     Neurological History    Headaches/Migraines: Pt has headaches, manages with Advil  TBI: no   Seizures: no   Stroke: no   Tumor: no   Previous Episodes of Delirium: no   Movement Disorder: no   CNS Infection: no   Other: no    Family Neurological & Psychiatric History     Family history includes Cancer in her brother; Cancer (age of onset: 68) in her father; Cataracts in her father, mother, and sister; Dementia in her mother; Diabetes in her daughter, sister, and son; Hypertension in her mother; No Known Problems in her son; Pancreatic cancer in her brother; Ulcers in her brother.  Neurologic: Mother and maternal grandmother had dementia; etiology unknown  Psychiatric: Negative for heritable risk factors.    Development  Education   Born & raised: [place]  Prenatal and  development: [facility]  Developmental milestones: [facility]  Language Acquisition: English first language  Level Attained: High School + 2 years of college  Learning/Attention/Behavior Difficulties: no  Repeated Grade(s): no        Occupation  Social   \Occupational Status: Retired in   Primary Occupation:     Family Status: ; 3 adult children   Current Living Situation: with   Support System: , 2 sisters  Hobbies/Activities: None     Medical Status   Problem List[1]  Past Medical History:   Diagnosis Date    Anxiety     Bilateral leg edema 2021    Depression     Diabetes mellitus, type 2     Diverticulitis     GERD (gastroesophageal reflux disease)     Glaucoma     Hyperlipidemia     Hypertension     Nicotine dependence in remission     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     Past Surgical History:   Procedure Laterality Date    BLADDER  SUSPENSION  12/2011    CATARACT EXTRACTION, BILATERAL      CHOLECYSTECTOMY      COLD KNIFE CONIZATION OF CERVIX N/A 09/29/2021    Procedure: CONE BIOPSY, CERVIX, USING COLD KNIFE;  Surgeon: Sheeba Frank MD;  [place] [facility] by [facility] OR;  Service: OB/GYN;  Laterality: N/A;    COLONOSCOPY N/A 4/30/2024    Procedure: COLONOSCOPY;  Surgeon: Elisa Horn MD;  Location: [facility] ENDOSCOPY;  Service: Endoscopy;  Laterality: N/A;  Ref by Dr LENA Dorman, [facility], portal - PC  3-20 moved to [facility]; [facility]  4/22/24- pt ran out of [facility] and knows not to take prior to procedure if she is able to get it before then. pc complete. DBM    ESOPHAGOGASTRODUODENOSCOPY N/A 4/30/2024    Procedure: EGD (ESOPHAGOGASTRODUODENOSCOPY);  Surgeon: Elisa Horn MD;  Location: [facility] ENDOSCOPY;  Service: Endoscopy;  Laterality: N/A;    EYE SURGERY      Glaucoma Laser Sx ou      HYSTEROSCOPY WITH DILATION AND CURETTAGE OF UTERUS N/A 09/29/2021    Procedure: HYSTEROSCOPY, WITH DILATION AND CURETTAGE OF UTERUS;  Surgeon: Sheeba Frank MD;  [place] [facility], [place] by [facility] OR;  Service: OB/GYN;  Laterality: N/A;    OOPHORECTOMY      TONSILLECTOMY      TUBAL LIGATION      vaginal mesh         Neurodiagnostics     Results for orders placed or performed during the hospital encounter of 06/21/23   MRI Brain W WO Contrast    Narrative    EXAMINATION:  MRI BRAIN W WO CONTRAST    CLINICAL HISTORY:  Dizziness, non-specific;ataxia; Dizziness and giddiness    TECHNIQUE:  Multiplanar multisequence MR imaging of the brain was performed before and after the administration of 8.4 mL Gadavist intravenous contrast.    COMPARISON:  06/13/2021    FINDINGS:  Pattern of mild generalized cerebral volume loss, without evidence of hydrocephalus.    Mild patchy T2/FLAIR hyperintensity in the supratentorial white matter, nonspecific but most likely reflecting chronic small vessel ischemic changes. No evidence of recent or remote major vascular distribution  "infarct. No recent or remote hemorrhage.  No mass effect or midline shift. .    No abnormal parenchymal or leptomeningeal enhancement.    No extra-axial blood or fluid collections.    The T2 skull base flow voids are preserved.    Bone marrow signal intensity is unremarkable.      Impression    Mild chronic small vessel ischemic change and generalized cerebral volume loss.    No compelling evidence of acute intracranial pathology.      Electronically signed by: Mak Mark MD  Date:    06/21/2023  Time:    15:12       Pertinent Lab Work     Lab Results   Component Value Date    BRNBNLHZ77 651 06/16/2025     No results found for: "RPR"  Lab Results   Component Value Date    FOLATE 5.8 04/18/2023     Lab Results   Component Value Date    TSH 0.856 06/16/2025    B4NWNHI 102 04/06/2021     Lab Results   Component Value Date    HGBA1C 12.9 (H) 06/16/2025     Lab Results   Component Value Date    [id number] Non-reactive 09/26/2024     No results found for: "PTAU", "ADEVLPHOSTAU", "POJP552", "ADEVLTOTAL", "[id number]", "NEUROLGTCHN", "APGTPE", "APOE"      Medications     Current Outpatient Medications:     blood sugar diagnostic Strp, To check BG 2 times daily, to use with insurance preferred meter, Disp: 200 strip, Rfl: 3    blood-glucose meter kit, To check BG 2 times daily, to use with insurance preferred meter, Disp: 1 each, Rfl: 0    blood-glucose sensor (DEXCOM G7 SENSOR) Louise, 1 each by Misc.(Non-Drug; Combo Route) route every 10 days., Disp: 3 each, Rfl: 11    clotrimazole (LOTRIMIN) 1 % cream, Apply topically 2 (two) times daily., Disp: 60 g, Rfl: 1    CONTOUR TEST STRIPS Strp, USE TO TEST BLOOD SUGAR TWICE DAILY, Disp: 50 strip, Rfl: 3    ergocalciferol (VITAMIN D2) 50,000 unit Cap, Take 1 capsule (50,000 Units total) by mouth every 30 days., Disp: 3 capsule, Rfl: 1    EScitalopram oxalate (LEXAPRO) 5 MG Tab, Take 1 tablet (5 mg total) by mouth once daily., Disp: 90 tablet, Rfl: 0    furosemide (LASIX) 20 MG " "tablet, Take 1 tablet (20 mg total) by mouth once daily., Disp: 90 tablet, Rfl: 3    gabapentin (NEURONTIN) 100 MG capsule, Take 1 capsule (100 mg total) by mouth every evening., Disp: 90 capsule, Rfl: 3    insulin glargine-yfgn (SEMGLEE,INSULIN GLARG-YFGN,PEN) 100 unit/mL (3 mL) InPn, Inject 26 Units into the skin every evening., Disp: 15 mL, Rfl: 6    lancets 33 gauge Misc, 1 lancet  by Misc.(Non-Drug; Combo Route) route as needed. True plus, Disp: 100 each, Rfl: 3    LIDOCAINE 2 %, VALIUM 5 MG, BACLOFEN 4 % SUPPOSITORY, Place 1 suppository vaginally 2 (two) times daily as needed (pelvic pain)., Disp: 20 each, Rfl: 5    losartan (COZAAR) 100 MG tablet, Take 1 tablet (100 mg total) by mouth once daily., Disp: 90 tablet, Rfl: 1    methocarbamoL (ROBAXIN) 500 MG Tab, Take 1 tablet (500 mg total) by mouth 3 (three) times daily. (Patient not taking: Reported on 6/16/2025), Disp: 60 tablet, Rfl: 1    ondansetron (ZOFRAN) 8 MG tablet, Take 1 tablet by mouth every 8 (eight) hours as needed for Nausea for up to 2 days, Disp: 6 tablet, Rfl: 0    pantoprazole (PROTONIX) 40 MG tablet, Take 1 tablet (40 mg total) by mouth once daily., Disp: 90 tablet, Rfl: 0    pen needle, diabetic 32 gauge x 5/32" Ndle, Use daily to inject insulin as directed, Disp: 100 each, Rfl: 6    phenazopyridine (PYRIDIUM) 200 MG tablet, Take 1 tablet (200 mg total) by mouth 3 (three) times daily as needed for Pain., Disp: 10 tablet, Rfl: 0    potassium chloride SA (K-DUR,KLOR-CON) 20 MEQ tablet, Take 1 tablet (20 mEq total) by mouth once daily., Disp: 90 tablet, Rfl: 3    rosuvastatin (CRESTOR) 40 MG Tab, Take 1 tablet (40 mg total) by mouth every evening., Disp: 90 tablet, Rfl: 3    semaglutide (OZEMPIC) 0.25 mg or 0.5 mg (2 mg/3 mL) pen injector, Inject 0.25 mg into the skin every 7 days for 28 days, THEN 0.5 mg every 7 days. Start with 0.25 mg weekly and increase to 0.5 mg weekly after 4 weeks., Disp: 39.75 mL, Rfl: 0    timolol maleate 0.5% " (TIMOPTIC) 0.5 % Drop, Place 1 drop into both eyes 2 (two) times daily., Disp: 10 mL, Rfl: 11    travoprost (TRAVATAN Z) 0.004 % ophthalmic solution, Place 1 drop into both eyes every evening., Disp: 7.5 each, Rfl: 0       OBJECTIVE:     MENTAL STATUS AND BEHAVIORAL OBSERVATIONS:  Appearance:  Not observed (telephone visit)  Behavior:   alert, calm, cooperative, rapport easily established, and Appropriate interpersonal skills. Good interpretation of nonverbal cues.   Orientation:   Disoriented to month (said June instead of July) and year (stated she was unsure and did not provide a response). Oriented to date and weekday.  Sensory:   Hearing and vision adequate for virtual/telephone visit  Gait:   Not observed (virtual/telephone visit)   Psychomotor:  Not observed (telephone visit)  Speech:  Fluent and spontaneous. Normal volume, rate, pitch, tone, and prosody.  Language:  Expressive language appear intact. Comprehends conversational speech, though there were times during the interview where pt's responses to questions were tangential or off-topic.  Evidence of mild word-finding difficulties in conversational speech.   Mood:   euthymic  Affect:   mood-congruent. She became tearful when talking about her grandchildren but was able to self-regulate during the appt.   Thought Process: scattered and tangential  Thought Content: Denied current SI/HI. and No evidence of psychotic symptoms.  Memory:  limited historian  Attn/Concentration:  Normal  Judgment/Insight: Poor      ANTICIPATED TEST BATTERY:  MOCA, TOPF, WAIS (SI BD MR DS CD), NAB (Naming), COWAT, Animal Naming, RCFT (Copy), CVLT (2 Short), WMS (LM), BVMT-R, TMT, NPIQ, GDS, GAD7, and Rudi-Fantasma IADL    If MoCA low:  TOPF, RBANS, WAIS (BD, SI, MR), COWAT, Animal Naming, RCFT (copy), TMT, NPIQ, GDS, REENA, Rudi-Fantasma IADL       [1]   Patient Active Problem List  Diagnosis    Cystitis cystica    Cystocele, midline    Diaphragmatic hernia without mention of  obstruction or gangrene    Mixed incontinence urge and stress (male)(female)    REENA (generalized anxiety disorder)    Advanced open-angle glaucoma    Controlled type 2 diabetes mellitus without complication, with long-term current use of insulin    Depression, major    Uncontrolled diabetes mellitus with hyperglycemia, with long-term current use of insulin    Class 1 obesity due to excess calories with serious comorbidity and body mass index (BMI) of 30.0 to 30.9 in adult    Postmenopausal    Mixed hyperlipidemia    Hypercalcemia    Hypovitaminosis D    Bilateral leg edema    Imbalance    Memory impairment    Hypertension    Diarrhea    Atypical glandular cells on cervical Pap smear    Status post hysteroscopy D&C     History of conization of cervix    Left lower quadrant pain    Glaucoma    On potassium wasting diuretic therapy    Abnormal uterine bleeding (AUB)    Constipation    Pelvic pain    Anxiety    Dyssynergia    Dysplasia of cervix, high grade CHUN 2    Myalgia of pelvic floor    Urge urinary incontinence    Hyperparathyroidism    Fatty liver    Impacted cerumen of left ear    Atherosclerosis of aorta    Dysuria    Fatigue    Urinary symptom or sign    Pain in both lower extremities    Noncompliance    Osteopenia after menopause    Type 2 diabetes mellitus with diabetic neuropathy, with long-term current use of insulin    Hearing impaired person, bilateral

## 2025-07-02 ENCOUNTER — OFFICE VISIT (OUTPATIENT)
Dept: NEUROLOGY | Facility: CLINIC | Age: 70
End: 2025-07-02
Payer: MEDICARE

## 2025-07-02 DIAGNOSIS — R41.3 MEMORY IMPAIRMENT: Primary | ICD-10-CM

## 2025-07-07 ENCOUNTER — TELEPHONE (OUTPATIENT)
Dept: OPTOMETRY | Facility: CLINIC | Age: 70
End: 2025-07-07
Payer: MEDICARE

## 2025-07-07 NOTE — TELEPHONE ENCOUNTER
"Copied from CRM #[id number]. Topic: Appointments - Appointment Access  >> Jul 7, 2025  8:05 AM Nicki wrote:  .Name Of Caller: Randell /       Contact Preference?:[phone]      What is the nature of the call?: in reference to rescheduling both appts t 7/7/25 together, due to be ill Pls call       Additional Notes:  "Thank you for all that you do for our patients"  "

## 2025-07-14 ENCOUNTER — PATIENT OUTREACH (OUTPATIENT)
Dept: ADMINISTRATIVE | Facility: HOSPITAL | Age: 70
End: 2025-07-14
Payer: MEDICARE

## 2025-07-14 DIAGNOSIS — E11.65 UNCONTROLLED DIABETES MELLITUS WITH HYPERGLYCEMIA, WITH LONG-TERM CURRENT USE OF INSULIN: Primary | Chronic | ICD-10-CM

## 2025-07-14 DIAGNOSIS — Z79.4 UNCONTROLLED DIABETES MELLITUS WITH HYPERGLYCEMIA, WITH LONG-TERM CURRENT USE OF INSULIN: Primary | Chronic | ICD-10-CM

## 2025-07-14 DIAGNOSIS — I10 HYPERTENSION, UNSPECIFIED TYPE: ICD-10-CM

## 2025-08-04 ENCOUNTER — TELEPHONE (OUTPATIENT)
Dept: PRIMARY CARE CLINIC | Facility: CLINIC | Age: 70
End: 2025-08-04
Payer: MEDICARE

## 2025-08-04 DIAGNOSIS — E11.65 UNCONTROLLED DIABETES MELLITUS WITH HYPERGLYCEMIA, WITH LONG-TERM CURRENT USE OF INSULIN: Primary | ICD-10-CM

## 2025-08-04 DIAGNOSIS — Z79.4 UNCONTROLLED DIABETES MELLITUS WITH HYPERGLYCEMIA, WITH LONG-TERM CURRENT USE OF INSULIN: Primary | ICD-10-CM

## 2025-08-04 RX ORDER — INSULIN GLARGINE 300 [IU]/ML
40 INJECTION, SOLUTION SUBCUTANEOUS DAILY
Qty: 15 ML | Refills: 1 | Status: SHIPPED | OUTPATIENT
Start: 2025-08-04

## 2025-08-04 NOTE — TELEPHONE ENCOUNTER
Pharm informed me semglee no longer covered.  Will change to tuojeo 40 u/d.Please check and see how pt's glucose has been running.  Dr. BRUSH

## 2025-08-04 NOTE — TELEPHONE ENCOUNTER
Spoke with pt she states she was at the [facility] last week for a UTI she stated she is feeling better. She states her sugar has been running good she took it this morning it was 180.

## 2025-08-11 ENCOUNTER — OUTPATIENT CASE MANAGEMENT (OUTPATIENT)
Dept: ADMINISTRATIVE | Facility: OTHER | Age: 70
End: 2025-08-11
Payer: MEDICARE

## 2025-08-12 ENCOUNTER — OUTPATIENT CASE MANAGEMENT (OUTPATIENT)
Dept: ADMINISTRATIVE | Facility: OTHER | Age: 70
End: 2025-08-12
Payer: MEDICARE

## 2025-08-14 DIAGNOSIS — E55.9 VITAMIN D DEFICIENCY: ICD-10-CM

## 2025-08-14 RX ORDER — ERGOCALCIFEROL 1.25 MG/1
50000 CAPSULE ORAL
Qty: 3 CAPSULE | Refills: 1 | Status: SHIPPED | OUTPATIENT
Start: 2025-08-14

## 2025-08-21 ENCOUNTER — OUTPATIENT CASE MANAGEMENT (OUTPATIENT)
Dept: ADMINISTRATIVE | Facility: OTHER | Age: 70
End: 2025-08-21
Payer: MEDICARE

## 2025-08-21 ENCOUNTER — TELEPHONE (OUTPATIENT)
Dept: NEUROLOGY | Facility: CLINIC | Age: 70
End: 2025-08-21
Payer: MEDICARE

## 2025-08-27 ENCOUNTER — PATIENT MESSAGE (OUTPATIENT)
Dept: ADMINISTRATIVE | Facility: OTHER | Age: 70
End: 2025-08-27
Payer: MEDICARE

## (undated) DEVICE — SUT 0 27IN CHROMIC GUT CT-1

## (undated) DEVICE — Device

## (undated) DEVICE — NDL HYPO REG 25G X 1 1/2

## (undated) DEVICE — SCRUB 10% POVIDONE IODINE 4OZ

## (undated) DEVICE — SOL 9P NACL IRR PIC IL

## (undated) DEVICE — TRAY DRY SKIN SCRUB PREP

## (undated) DEVICE — SEAL LENS SCOPE MYOSURE

## (undated) DEVICE — TUBING SUC UNIV W/CONN 12FT

## (undated) DEVICE — SOL IRR NACL .9% 3000ML

## (undated) DEVICE — GLOVE BIOGEL SKINSENSE PI 7.0

## (undated) DEVICE — SYR 10CC LUER LOCK

## (undated) DEVICE — PACK FLUENT DISPOSABLE

## (undated) DEVICE — SUT 2/0 30IN SILK BLK BRAI

## (undated) DEVICE — SEE MEDLINE ITEM 157196

## (undated) DEVICE — TUBING SMOKE EVACUATOR LEEP

## (undated) DEVICE — UNDERGLOVES BIOGEL PI SZ 7 LF

## (undated) DEVICE — ELECTRODE BALL RED 5MM

## (undated) DEVICE — ELECTRODE REM PLYHSV RETURN 9

## (undated) DEVICE — BLADE SURG STAINLESS STEEL #11

## (undated) DEVICE — SUT SILK 2.0 BLK 18

## (undated) DEVICE — JELLY SURGILUBE 5GR

## (undated) DEVICE — CONTAINER SPECIMEN STRL 4OZ

## (undated) DEVICE — SPONGE GELFOAM 12-7MM

## (undated) DEVICE — SOL PVP-I SCRUB 7.5% 4OZ

## (undated) DEVICE — SWAB PROCTO RAYON TIP NS 16

## (undated) DEVICE — PENCIL ELECTROSURG HOLST W/BLD